# Patient Record
Sex: MALE | Race: ASIAN | NOT HISPANIC OR LATINO | Employment: OTHER | URBAN - METROPOLITAN AREA
[De-identification: names, ages, dates, MRNs, and addresses within clinical notes are randomized per-mention and may not be internally consistent; named-entity substitution may affect disease eponyms.]

---

## 2017-04-24 ENCOUNTER — HISTORICAL (OUTPATIENT)
Dept: ADMINISTRATIVE | Facility: HOSPITAL | Age: 62
End: 2017-04-24

## 2017-11-20 ENCOUNTER — HISTORICAL (OUTPATIENT)
Dept: ADMINISTRATIVE | Facility: HOSPITAL | Age: 62
End: 2017-11-20

## 2017-11-20 LAB
ALBUMIN SERPL-MCNC: 3.7 GM/DL (ref 3.4–5)
ALBUMIN/GLOB SERPL: 1 RATIO (ref 1.1–2)
ALP SERPL-CCNC: 74 UNIT/L (ref 50–136)
ALT SERPL-CCNC: 38 UNIT/L (ref 12–78)
AST SERPL-CCNC: 20 UNIT/L (ref 15–37)
BILIRUB SERPL-MCNC: 0.5 MG/DL (ref 0.2–1)
BILIRUBIN DIRECT+TOT PNL SERPL-MCNC: 0.1 MG/DL (ref 0–0.5)
BILIRUBIN DIRECT+TOT PNL SERPL-MCNC: 0.4 MG/DL (ref 0–0.8)
BUN SERPL-MCNC: 15 MG/DL (ref 7–18)
CALCIUM SERPL-MCNC: 8.8 MG/DL (ref 8.5–10.1)
CHLORIDE SERPL-SCNC: 104 MMOL/L (ref 98–107)
CHOLEST SERPL-MCNC: 179 MG/DL (ref 0–200)
CHOLEST/HDLC SERPL: 2.9 {RATIO} (ref 0–5)
CO2 SERPL-SCNC: 27 MMOL/L (ref 21–32)
CREAT SERPL-MCNC: 0.86 MG/DL (ref 0.7–1.3)
CREAT UR-MCNC: 285 MG/DL
GLOBULIN SER-MCNC: 3.7 GM/DL (ref 2.4–3.5)
GLUCOSE SERPL-MCNC: 106 MG/DL (ref 74–106)
HDLC SERPL-MCNC: 62 MG/DL (ref 35–60)
LDLC SERPL CALC-MCNC: 104 MG/DL (ref 0–129)
MICROALBUMIN UR-MCNC: 16.3 MG/DL
MICROALBUMIN/CREAT RATIO PNL UR: 57.2 MG/GM CR (ref 0–30)
POTASSIUM SERPL-SCNC: 4.4 MMOL/L (ref 3.5–5.1)
PROT SERPL-MCNC: 7.4 GM/DL (ref 6.4–8.2)
PSA SERPL-MCNC: 0.39 NG/ML (ref 0–4)
SODIUM SERPL-SCNC: 138 MMOL/L (ref 136–145)
TRIGL SERPL-MCNC: 67 MG/DL (ref 30–150)
VLDLC SERPL CALC-MCNC: 13 MG/DL

## 2019-12-11 ENCOUNTER — PATIENT OUTREACH (OUTPATIENT)
Dept: ADMINISTRATIVE | Facility: HOSPITAL | Age: 64
End: 2019-12-11

## 2019-12-11 ENCOUNTER — OFFICE VISIT (OUTPATIENT)
Dept: INTERNAL MEDICINE | Facility: CLINIC | Age: 64
End: 2019-12-11
Payer: MEDICAID

## 2019-12-11 VITALS
HEART RATE: 56 BPM | BODY MASS INDEX: 23.78 KG/M2 | HEIGHT: 64 IN | DIASTOLIC BLOOD PRESSURE: 76 MMHG | WEIGHT: 139.31 LBS | OXYGEN SATURATION: 99 % | TEMPERATURE: 97 F | SYSTOLIC BLOOD PRESSURE: 125 MMHG

## 2019-12-11 DIAGNOSIS — M79.10 MYALGIA: ICD-10-CM

## 2019-12-11 DIAGNOSIS — E78.5 HYPERLIPIDEMIA, UNSPECIFIED HYPERLIPIDEMIA TYPE: ICD-10-CM

## 2019-12-11 DIAGNOSIS — R74.8 ELEVATED CPK: ICD-10-CM

## 2019-12-11 DIAGNOSIS — I10 HYPERTENSION, ESSENTIAL: Primary | ICD-10-CM

## 2019-12-11 DIAGNOSIS — Z11.59 NEED FOR HEPATITIS C SCREENING TEST: ICD-10-CM

## 2019-12-11 DIAGNOSIS — Z23 IMMUNIZATION DUE: ICD-10-CM

## 2019-12-11 DIAGNOSIS — R06.83 SNORES: ICD-10-CM

## 2019-12-11 DIAGNOSIS — R53.83 FATIGUE, UNSPECIFIED TYPE: ICD-10-CM

## 2019-12-11 DIAGNOSIS — G47.19 EXCESSIVE DAYTIME SLEEPINESS: ICD-10-CM

## 2019-12-11 DIAGNOSIS — Z12.5 SCREENING FOR PROSTATE CANCER: ICD-10-CM

## 2019-12-11 LAB
ALBUMIN SERPL BCP-MCNC: 3.8 G/DL (ref 3.5–5.2)
ALP SERPL-CCNC: 87 U/L (ref 55–135)
ALT SERPL W/O P-5'-P-CCNC: 41 U/L (ref 10–44)
ANION GAP SERPL CALC-SCNC: 7 MMOL/L (ref 8–16)
AST SERPL-CCNC: 33 U/L (ref 10–40)
BASOPHILS # BLD AUTO: 0.1 K/UL (ref 0–0.2)
BASOPHILS NFR BLD: 1.5 % (ref 0–1.9)
BILIRUB SERPL-MCNC: 0.4 MG/DL (ref 0.1–1)
BUN SERPL-MCNC: 20 MG/DL (ref 8–23)
CALCIUM SERPL-MCNC: 9.1 MG/DL (ref 8.7–10.5)
CHLORIDE SERPL-SCNC: 107 MMOL/L (ref 95–110)
CK SERPL-CCNC: 308 U/L (ref 20–200)
CO2 SERPL-SCNC: 25 MMOL/L (ref 23–29)
CREAT SERPL-MCNC: 1.3 MG/DL (ref 0.5–1.4)
CRP SERPL-MCNC: 0.7 MG/L (ref 0–8.2)
DIFFERENTIAL METHOD: ABNORMAL
EOSINOPHIL # BLD AUTO: 0.2 K/UL (ref 0–0.5)
EOSINOPHIL NFR BLD: 2.3 % (ref 0–8)
ERYTHROCYTE [DISTWIDTH] IN BLOOD BY AUTOMATED COUNT: 13.8 % (ref 11.5–14.5)
ERYTHROCYTE [SEDIMENTATION RATE] IN BLOOD BY WESTERGREN METHOD: 28 MM/HR (ref 0–23)
EST. GFR  (AFRICAN AMERICAN): >60 ML/MIN/1.73 M^2
EST. GFR  (NON AFRICAN AMERICAN): 58.5 ML/MIN/1.73 M^2
GLUCOSE SERPL-MCNC: 111 MG/DL (ref 70–110)
HCT VFR BLD AUTO: 43.2 % (ref 40–54)
HGB BLD-MCNC: 13.4 G/DL (ref 14–18)
IMM GRANULOCYTES # BLD AUTO: 0.02 K/UL (ref 0–0.04)
IMM GRANULOCYTES NFR BLD AUTO: 0.3 % (ref 0–0.5)
LYMPHOCYTES # BLD AUTO: 1.7 K/UL (ref 1–4.8)
LYMPHOCYTES NFR BLD: 24.6 % (ref 18–48)
MCH RBC QN AUTO: 29.4 PG (ref 27–31)
MCHC RBC AUTO-ENTMCNC: 31 G/DL (ref 32–36)
MCV RBC AUTO: 95 FL (ref 82–98)
MONOCYTES # BLD AUTO: 0.5 K/UL (ref 0.3–1)
MONOCYTES NFR BLD: 7.8 % (ref 4–15)
NEUTROPHILS # BLD AUTO: 4.3 K/UL (ref 1.8–7.7)
NEUTROPHILS NFR BLD: 63.5 % (ref 38–73)
NRBC BLD-RTO: 0 /100 WBC
PLATELET # BLD AUTO: 301 K/UL (ref 150–350)
PMV BLD AUTO: 11.8 FL (ref 9.2–12.9)
POTASSIUM SERPL-SCNC: 4.7 MMOL/L (ref 3.5–5.1)
PROT SERPL-MCNC: 7.4 G/DL (ref 6–8.4)
RBC # BLD AUTO: 4.56 M/UL (ref 4.6–6.2)
SODIUM SERPL-SCNC: 139 MMOL/L (ref 136–145)
TSH SERPL DL<=0.005 MIU/L-ACNC: 0.57 UIU/ML (ref 0.4–4)
WBC # BLD AUTO: 6.82 K/UL (ref 3.9–12.7)

## 2019-12-11 PROCEDURE — 82550 ASSAY OF CK (CPK): CPT

## 2019-12-11 PROCEDURE — 99204 OFFICE O/P NEW MOD 45 MIN: CPT | Mod: S$PBB,,, | Performed by: FAMILY MEDICINE

## 2019-12-11 PROCEDURE — 85025 COMPLETE CBC W/AUTO DIFF WBC: CPT

## 2019-12-11 PROCEDURE — 86140 C-REACTIVE PROTEIN: CPT

## 2019-12-11 PROCEDURE — 84153 ASSAY OF PSA TOTAL: CPT

## 2019-12-11 PROCEDURE — 84443 ASSAY THYROID STIM HORMONE: CPT

## 2019-12-11 PROCEDURE — 99204 OFFICE O/P NEW MOD 45 MIN: CPT | Mod: PBBFAC,PO,25 | Performed by: FAMILY MEDICINE

## 2019-12-11 PROCEDURE — 99204 PR OFFICE/OUTPT VISIT, NEW, LEVL IV, 45-59 MIN: ICD-10-PCS | Mod: S$PBB,,, | Performed by: FAMILY MEDICINE

## 2019-12-11 PROCEDURE — 86803 HEPATITIS C AB TEST: CPT

## 2019-12-11 PROCEDURE — 99999 PR PBB SHADOW E&M-NEW PATIENT-LVL IV: CPT | Mod: PBBFAC,,, | Performed by: FAMILY MEDICINE

## 2019-12-11 PROCEDURE — 85652 RBC SED RATE AUTOMATED: CPT

## 2019-12-11 PROCEDURE — 99999 PR PBB SHADOW E&M-NEW PATIENT-LVL IV: ICD-10-PCS | Mod: PBBFAC,,, | Performed by: FAMILY MEDICINE

## 2019-12-11 PROCEDURE — 90471 IMMUNIZATION ADMIN: CPT | Mod: PBBFAC,PO

## 2019-12-11 PROCEDURE — 80053 COMPREHEN METABOLIC PANEL: CPT

## 2019-12-11 RX ORDER — NAPROXEN SODIUM 220 MG
220 TABLET ORAL
COMMUNITY
End: 2020-09-15 | Stop reason: ALTCHOICE

## 2019-12-11 RX ORDER — CLONIDINE HYDROCHLORIDE 0.2 MG/1
0.2 TABLET ORAL 2 TIMES DAILY
COMMUNITY
End: 2021-03-05 | Stop reason: SDUPTHER

## 2019-12-11 RX ORDER — BENAZEPRIL HYDROCHLORIDE 40 MG/1
40 TABLET ORAL DAILY
COMMUNITY
End: 2021-03-05 | Stop reason: SDUPTHER

## 2019-12-11 RX ORDER — AMLODIPINE BESYLATE 10 MG/1
10 TABLET ORAL DAILY
COMMUNITY
End: 2021-03-05 | Stop reason: SDUPTHER

## 2019-12-11 RX ORDER — SIMVASTATIN 40 MG/1
40 TABLET, FILM COATED ORAL NIGHTLY
COMMUNITY
End: 2020-09-15 | Stop reason: SINTOL

## 2019-12-11 NOTE — PROGRESS NOTES
Subjective:       Patient ID: Deborah White is a 64 y.o. male.    Chief Complaint: Annual Exam and Generalized Body Aches    He is here is a new patient.  I see his wife.  He has been treated for hypertension and hyperlipidemia.  He has myalgias and arthralgias.  Taking Aleve p.r.n..  Blood pressure controlled in office at 125/76.  Health maintenance, c/o all over body pain. Has RUE knots that are very painful.   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  When he was working for himself he could sit down during the course of his day as .  Now he is on his feet all day.    He intends to continue to see his doctor in [place], [place].  His daughter wants him to also have a doctor here he can refer to when visiting her.    S/he has completed a full 14 system review. All items are negative except as indicated.      Review of Systems   Constitutional: Positive for appetite change and unexpected weight change. Negative for activity change.   HENT: Negative.  Negative for hearing loss, rhinorrhea and trouble swallowing.    Eyes: Positive for visual disturbance. Negative for discharge.   Respiratory: Negative.  Negative for chest tightness and wheezing.    Cardiovascular: Negative.  Negative for chest pain and palpitations.   Gastrointestinal: Positive for abdominal pain and constipation. Negative for blood in stool, diarrhea and vomiting.   Endocrine: Negative.  Negative for polydipsia and polyuria.   Genitourinary: Negative.  Negative for difficulty urinating, hematuria and urgency.   Musculoskeletal: Positive for arthralgias, back pain and myalgias. Negative for joint swelling and neck pain.   Skin: Negative.    Allergic/Immunologic: Negative.    Neurological: Positive for headaches. Negative for weakness.   Hematological: Negative.    Psychiatric/Behavioral: Positive for sleep  disturbance (awakens after 4 hours - takes 1 1/2 hour nap daily. falls asleep any time he is sitting down.). Negative for confusion and dysphoric mood.       Objective:      Physical Exam   Constitutional: He is oriented to person, place, and time. He appears well-developed and well-nourished.   HENT:   Head: Normocephalic and atraumatic.   Right Ear: Tympanic membrane, external ear and ear canal normal.   Left Ear: Tympanic membrane, external ear and ear canal normal.   Nose: Nose normal.   Mouth/Throat: Oropharynx is clear and moist.   Eyes: Conjunctivae and EOM are normal.   Neck: Normal range of motion. Neck supple. No thyromegaly present.   Cardiovascular: Normal rate, regular rhythm and normal heart sounds.   Pulmonary/Chest: Effort normal and breath sounds normal.   Abdominal: Soft. He exhibits no distension. There is no tenderness.   Musculoskeletal: Normal range of motion. He exhibits no edema.   Lymphadenopathy:     He has no cervical adenopathy.   Neurological: He is alert and oriented to person, place, and time.   Skin: Skin is warm and dry.   Psychiatric: He has a normal mood and affect. His behavior is normal.         Assessment/Plan:     1. Hypertension, essential  Comprehensive metabolic panel    CBC auto differential   2. Hyperlipidemia, unspecified hyperlipidemia type     3. Immunization due  Pneumococcal Polysaccharide Vaccine (23 Valent) (SQ/IM)   4. Myalgia  Sedimentation rate    C-reactive protein    CK    CK isoenzymes   5. Screening for prostate cancer  PSA, Screening   6. Need for hepatitis C screening test  Hepatitis C antibody   7. Fatigue, unspecified type  TSH    Ambulatory referral to Sleep Disorders   8. Excessive daytime sleepiness  Ambulatory referral to Sleep Disorders   9. Snores  Ambulatory referral to Sleep Disorders   10. Elevated CPK  CK isoenzymes   MARIA G signed for lab from Dr Didier Maldonado in [place], [place] and for colonoscopy from [facility].  Myalgia - likely from exertion,  but discussed possiblity of statin related myalgias. He would discuss cessation of statin with his cardiologist.   He.does need eval for JESUS - although normal BMI, he does snore and has daytime sleepiness, narrow post oropharynx.  Advised on safety of tylenol use versus NSAID.

## 2019-12-12 LAB
COMPLEXED PSA SERPL-MCNC: 0.31 NG/ML (ref 0–4)
HCV AB SERPL QL IA: NEGATIVE

## 2019-12-13 ENCOUNTER — PATIENT MESSAGE (OUTPATIENT)
Dept: PULMONOLOGY | Facility: CLINIC | Age: 64
End: 2019-12-13

## 2019-12-15 ENCOUNTER — TELEPHONE (OUTPATIENT)
Dept: INTERNAL MEDICINE | Facility: CLINIC | Age: 64
End: 2019-12-15

## 2019-12-16 NOTE — TELEPHONE ENCOUNTER
Daughter informed the blood test result and additional lab ordered. Patient appointment scheduled for lab. Daughter verbally understood.

## 2019-12-19 ENCOUNTER — PATIENT OUTREACH (OUTPATIENT)
Dept: ADMINISTRATIVE | Facility: HOSPITAL | Age: 64
End: 2019-12-19

## 2020-01-06 ENCOUNTER — TELEPHONE (OUTPATIENT)
Dept: INTERNAL MEDICINE | Facility: CLINIC | Age: 65
End: 2020-01-06

## 2020-01-06 NOTE — TELEPHONE ENCOUNTER
----- Message from Jesi Leonardo sent at 1/6/2020  9:53 AM CST -----  Contact: [employer] request  Message     Appointment Request From: Deborah White    With Provider: nurse visit    Preferred Date Range: 1/7/2020 - 1/7/2020    Preferred Times: Tuesday Afternoon    Reason for visit: blood work    Comments:  follow up blood work requested by Dr. Rios

## 2020-01-07 ENCOUNTER — CLINICAL SUPPORT (OUTPATIENT)
Dept: INTERNAL MEDICINE | Facility: CLINIC | Age: 65
End: 2020-01-07
Payer: MEDICAID

## 2020-01-07 DIAGNOSIS — M79.10 MYALGIA: ICD-10-CM

## 2020-01-07 DIAGNOSIS — R74.8 ELEVATED CPK: ICD-10-CM

## 2020-01-07 PROCEDURE — 82552 ASSAY OF CPK IN BLOOD: CPT

## 2020-01-10 LAB
CK BB CFR SERPL ELPH: 0 %
CK MB CFR SERPL ELPH: 0 % (ref 0–3.3)
CK MM CFR SERPL ELPH: 100 % (ref 96.7–100)
CK SERPL-CCNC: 207 U/L (ref 30–223)

## 2020-04-24 ENCOUNTER — PATIENT MESSAGE (OUTPATIENT)
Dept: INTERNAL MEDICINE | Facility: CLINIC | Age: 65
End: 2020-04-24

## 2020-06-10 ENCOUNTER — TELEPHONE (OUTPATIENT)
Dept: INTERNAL MEDICINE | Facility: CLINIC | Age: 65
End: 2020-06-10

## 2020-06-10 NOTE — TELEPHONE ENCOUNTER
Spoke with patient's daughter and informed her of patient's appt on 06/22/2020. She thanked me for calling

## 2020-06-22 ENCOUNTER — LAB VISIT (OUTPATIENT)
Dept: LAB | Facility: HOSPITAL | Age: 65
End: 2020-06-22
Attending: FAMILY MEDICINE
Payer: MEDICAID

## 2020-06-22 ENCOUNTER — OFFICE VISIT (OUTPATIENT)
Dept: INTERNAL MEDICINE | Facility: CLINIC | Age: 65
End: 2020-06-22
Payer: MEDICAID

## 2020-06-22 VITALS
BODY MASS INDEX: 24.17 KG/M2 | HEIGHT: 64 IN | DIASTOLIC BLOOD PRESSURE: 64 MMHG | WEIGHT: 141.56 LBS | OXYGEN SATURATION: 96 % | HEART RATE: 66 BPM | SYSTOLIC BLOOD PRESSURE: 130 MMHG | TEMPERATURE: 97 F

## 2020-06-22 DIAGNOSIS — R74.8 ELEVATED CPK: ICD-10-CM

## 2020-06-22 DIAGNOSIS — R79.82 ELEVATED C-REACTIVE PROTEIN (CRP): ICD-10-CM

## 2020-06-22 DIAGNOSIS — Z79.899 ON STATIN THERAPY: ICD-10-CM

## 2020-06-22 DIAGNOSIS — Z11.4 SCREENING FOR HIV WITHOUT PRESENCE OF RISK FACTORS: ICD-10-CM

## 2020-06-22 DIAGNOSIS — I10 HYPERTENSION, ESSENTIAL: ICD-10-CM

## 2020-06-22 DIAGNOSIS — E78.5 HYPERLIPIDEMIA, UNSPECIFIED HYPERLIPIDEMIA TYPE: ICD-10-CM

## 2020-06-22 DIAGNOSIS — M79.10 MYALGIA: Primary | ICD-10-CM

## 2020-06-22 DIAGNOSIS — R53.83 FATIGUE, UNSPECIFIED TYPE: ICD-10-CM

## 2020-06-22 DIAGNOSIS — R73.9 ELEVATED BLOOD SUGAR LEVEL: ICD-10-CM

## 2020-06-22 DIAGNOSIS — M79.10 MYALGIA: ICD-10-CM

## 2020-06-22 LAB
ANION GAP SERPL CALC-SCNC: 11 MMOL/L (ref 8–16)
BASOPHILS # BLD AUTO: 0.03 K/UL (ref 0–0.2)
BASOPHILS NFR BLD: 0.6 % (ref 0–1.9)
BUN SERPL-MCNC: 21 MG/DL (ref 8–23)
CALCIUM SERPL-MCNC: 9 MG/DL (ref 8.7–10.5)
CHLORIDE SERPL-SCNC: 111 MMOL/L (ref 95–110)
CO2 SERPL-SCNC: 21 MMOL/L (ref 23–29)
CREAT SERPL-MCNC: 1.2 MG/DL (ref 0.5–1.4)
CRP SERPL-MCNC: 20.1 MG/L (ref 0–8.2)
DIFFERENTIAL METHOD: ABNORMAL
EOSINOPHIL # BLD AUTO: 0 K/UL (ref 0–0.5)
EOSINOPHIL NFR BLD: 0.6 % (ref 0–8)
ERYTHROCYTE [DISTWIDTH] IN BLOOD BY AUTOMATED COUNT: 13.9 % (ref 11.5–14.5)
ERYTHROCYTE [SEDIMENTATION RATE] IN BLOOD BY WESTERGREN METHOD: 39 MM/HR (ref 0–10)
EST. GFR  (AFRICAN AMERICAN): >60 ML/MIN/1.73 M^2
EST. GFR  (NON AFRICAN AMERICAN): >60 ML/MIN/1.73 M^2
GLUCOSE SERPL-MCNC: 92 MG/DL (ref 70–110)
HCT VFR BLD AUTO: 40.8 % (ref 40–54)
HGB BLD-MCNC: 12.5 G/DL (ref 14–18)
IMM GRANULOCYTES # BLD AUTO: 0.02 K/UL (ref 0–0.04)
IMM GRANULOCYTES NFR BLD AUTO: 0.4 % (ref 0–0.5)
LYMPHOCYTES # BLD AUTO: 1.7 K/UL (ref 1–4.8)
LYMPHOCYTES NFR BLD: 31.7 % (ref 18–48)
MCH RBC QN AUTO: 29.1 PG (ref 27–31)
MCHC RBC AUTO-ENTMCNC: 30.6 G/DL (ref 32–36)
MCV RBC AUTO: 95 FL (ref 82–98)
MONOCYTES # BLD AUTO: 0.7 K/UL (ref 0.3–1)
MONOCYTES NFR BLD: 13.1 % (ref 4–15)
NEUTROPHILS # BLD AUTO: 2.9 K/UL (ref 1.8–7.7)
NEUTROPHILS NFR BLD: 53.6 % (ref 38–73)
NRBC BLD-RTO: 0 /100 WBC
PLATELET # BLD AUTO: 237 K/UL (ref 150–350)
PMV BLD AUTO: 11 FL (ref 9.2–12.9)
POTASSIUM SERPL-SCNC: 4.2 MMOL/L (ref 3.5–5.1)
RBC # BLD AUTO: 4.3 M/UL (ref 4.6–6.2)
SODIUM SERPL-SCNC: 143 MMOL/L (ref 136–145)
WBC # BLD AUTO: 5.42 K/UL (ref 3.9–12.7)

## 2020-06-22 PROCEDURE — 99214 OFFICE O/P EST MOD 30 MIN: CPT | Mod: S$PBB,,, | Performed by: FAMILY MEDICINE

## 2020-06-22 PROCEDURE — 99215 OFFICE O/P EST HI 40 MIN: CPT | Mod: PBBFAC | Performed by: FAMILY MEDICINE

## 2020-06-22 PROCEDURE — 86703 HIV-1/HIV-2 1 RESULT ANTBDY: CPT

## 2020-06-22 PROCEDURE — 85651 RBC SED RATE NONAUTOMATED: CPT

## 2020-06-22 PROCEDURE — 99999 PR PBB SHADOW E&M-EST. PATIENT-LVL V: ICD-10-PCS | Mod: PBBFAC,,, | Performed by: FAMILY MEDICINE

## 2020-06-22 PROCEDURE — 99999 PR PBB SHADOW E&M-EST. PATIENT-LVL V: CPT | Mod: PBBFAC,,, | Performed by: FAMILY MEDICINE

## 2020-06-22 PROCEDURE — 36415 COLL VENOUS BLD VENIPUNCTURE: CPT

## 2020-06-22 PROCEDURE — 80048 BASIC METABOLIC PNL TOTAL CA: CPT

## 2020-06-22 PROCEDURE — 99214 PR OFFICE/OUTPT VISIT, EST, LEVL IV, 30-39 MIN: ICD-10-PCS | Mod: S$PBB,,, | Performed by: FAMILY MEDICINE

## 2020-06-22 PROCEDURE — 82552 ASSAY OF CPK IN BLOOD: CPT

## 2020-06-22 PROCEDURE — 83036 HEMOGLOBIN GLYCOSYLATED A1C: CPT

## 2020-06-22 PROCEDURE — 85025 COMPLETE CBC W/AUTO DIFF WBC: CPT

## 2020-06-22 PROCEDURE — 86140 C-REACTIVE PROTEIN: CPT

## 2020-06-22 NOTE — PROGRESS NOTES
"Subjective:       Patient ID: Deborah White is a 65 y.o. male.    Chief Complaint: Muscle Pain (joint pain (all over body))    Patient is here for follow-up of abnormal lab CPK elevated in December, in context of muscle pain.  On repeat it was in normal range.  100% CK . Here with daughter as . He has continued to hurt - no change except worsening.  He has taken "Flanex", a brand name for 220 mg naproxen and that has helped.  Walks 30 min every day no pain.  Sleeping only 1-2 hours before pain wakes him. Pain is very bad when lying down - "inside pain - to upper/lower arms, upper/lower legs, not really the joints. Also upper back. Not hip/pelvic girdle. Pain to both sides lower ribs. Not to low back, no ab pain, has occas CP if works hard but saw cardiologist this year before covid-19 with Dr Randall Nelson - has been seeing him for yrs. Had angiogram 1998 - told him small vessels and was put on statin - does treadmill every two yrs.  Blood sugar was 111 slightly elevated on a nonfasting specimen.  Lipids and screening for HIV are not on the chart.    Today he is continuing to complain of joint pain, headaches, complaining of neck pain.    Review of Systems   Constitutional: Positive for activity change. Negative for unexpected weight change.   HENT: Negative for hearing loss, rhinorrhea and trouble swallowing.    Eyes: Negative for discharge and visual disturbance.   Respiratory: Negative for chest tightness and wheezing.    Cardiovascular: Negative for chest pain and palpitations.   Gastrointestinal: Negative for blood in stool, constipation, diarrhea and vomiting.   Endocrine: Negative for polydipsia and polyuria.   Genitourinary: Negative for difficulty urinating, hematuria and urgency.   Musculoskeletal: Positive for arthralgias and neck pain. Negative for joint swelling.   Neurological: Positive for headaches. Negative for weakness.   Psychiatric/Behavioral: Negative for confusion and dysphoric " mood.       Objective:      Physical Exam  Constitutional:       Appearance: He is well-developed.   HENT:      Head: Normocephalic and atraumatic.   Cardiovascular:      Rate and Rhythm: Normal rate and regular rhythm.      Heart sounds: Normal heart sounds.   Pulmonary:      Effort: Pulmonary effort is normal. No respiratory distress.   Musculoskeletal:      Comments: NTTP to B calf muscles   Skin:     General: Skin is warm and dry.   Neurological:      Mental Status: He is alert and oriented to person, place, and time.   Psychiatric:         Behavior: Behavior normal.           Assessment/Plan:     1. Myalgia  Sedimentation rate    C-Reactive Protein    Ambulatory referral/consult to Rheumatology   2. Elevated CPK  CK isoenzymes   3. Hypertension, essential  CBC auto differential    Basic metabolic panel   4. Hyperlipidemia, unspecified hyperlipidemia type     5. Screening for HIV without presence of risk factors  HIV 1/2 Ag/Ab (4th Gen)   6. Fatigue, unspecified type     7. Elevated blood sugar level  Hemoglobin A1C   8. On statin therapy     9. Elevated C-reactive protein (CRP)       Will refer rheum - await labs.  Advise stopping statin for a month - consult with your cardiologist before doing so.  Pt instructions: Try one aleve or flanex (220mg naprosyn) at bedtime. Stay on this dose if it helps. If not helpful, may increase to two at bedtime.  OK to take acetaminophen 650 mg up to two twice daily (no more than 4 in a day).  I recommend stopping your statin for one month - talk to your cardiologist first.    Addendum 6/28/2020:  CK remains in normal range and has decreased from the last check.  Sed rate has increased slightly, now 30.  CRP is now elevated.  Recommend referral to rheumatology - also still recommend stop the statin and monitor for symptom change.

## 2020-06-22 NOTE — PATIENT INSTRUCTIONS
Try one aleve or flanex (220mg naprosyn) at bedtime. Stay on this dose if it helps. If not helpful, may increase to two at bedtime.    OK to take acetaminophen 650 mg up to two twice daily (no more than 4 in a day).    I recommend stopping your statin for one month - talk to your cardiologist first.

## 2020-06-23 LAB
ESTIMATED AVG GLUCOSE: 120 MG/DL (ref 68–131)
HBA1C MFR BLD HPLC: 5.8 % (ref 4–5.6)
HIV 1+2 AB+HIV1 P24 AG SERPL QL IA: NEGATIVE

## 2020-06-24 ENCOUNTER — PATIENT MESSAGE (OUTPATIENT)
Dept: INTERNAL MEDICINE | Facility: CLINIC | Age: 65
End: 2020-06-24

## 2020-06-25 LAB
CK BB CFR SERPL ELPH: 0 %
CK MB CFR SERPL ELPH: 0 % (ref 0–3.3)
CK MM CFR SERPL ELPH: 100 % (ref 96.7–100)
CK SERPL-CCNC: 113 U/L (ref 30–223)

## 2020-06-28 ENCOUNTER — PATIENT MESSAGE (OUTPATIENT)
Dept: INTERNAL MEDICINE | Facility: CLINIC | Age: 65
End: 2020-06-28

## 2020-08-05 LAB
CHOLEST SERPL-MSCNC: 285 MG/DL (ref 0–200)
HDLC SERPL-MCNC: 56 MG/DL
LDLC SERPL CALC-MCNC: 202 MG/DL
TRIGL SERPL-MCNC: 133 MG/DL

## 2020-08-10 ENCOUNTER — PATIENT OUTREACH (OUTPATIENT)
Dept: ADMINISTRATIVE | Facility: OTHER | Age: 65
End: 2020-08-10

## 2020-08-10 ENCOUNTER — TELEPHONE (OUTPATIENT)
Dept: RHEUMATOLOGY | Facility: CLINIC | Age: 65
End: 2020-08-10

## 2020-08-10 NOTE — TELEPHONE ENCOUNTER
Spoke with daughter and confirmed appointment with Dr. Lopez for 8.11.20 at 4.15.    Aware of check in process due to covid 19.

## 2020-08-11 ENCOUNTER — OFFICE VISIT (OUTPATIENT)
Dept: RHEUMATOLOGY | Facility: CLINIC | Age: 65
End: 2020-08-11
Payer: MEDICAID

## 2020-08-11 ENCOUNTER — HOSPITAL ENCOUNTER (OUTPATIENT)
Dept: RADIOLOGY | Facility: HOSPITAL | Age: 65
Discharge: HOME | End: 2020-08-11
Attending: INTERNAL MEDICINE
Payer: MEDICAID

## 2020-08-11 VITALS
DIASTOLIC BLOOD PRESSURE: 87 MMHG | WEIGHT: 150.38 LBS | HEART RATE: 78 BPM | BODY MASS INDEX: 25.67 KG/M2 | HEIGHT: 64 IN | SYSTOLIC BLOOD PRESSURE: 178 MMHG

## 2020-08-11 DIAGNOSIS — M25.50 POLYARTHRALGIA: ICD-10-CM

## 2020-08-11 DIAGNOSIS — R70.0 ELEVATED SED RATE: ICD-10-CM

## 2020-08-11 DIAGNOSIS — M79.10 MYALGIA: ICD-10-CM

## 2020-08-11 DIAGNOSIS — R20.0 NUMBNESS AND TINGLING IN BOTH HANDS: ICD-10-CM

## 2020-08-11 DIAGNOSIS — R79.82 ELEVATED C-REACTIVE PROTEIN (CRP): ICD-10-CM

## 2020-08-11 DIAGNOSIS — R20.2 NUMBNESS AND TINGLING IN BOTH HANDS: ICD-10-CM

## 2020-08-11 DIAGNOSIS — M25.50 POLYARTHRALGIA: Primary | ICD-10-CM

## 2020-08-11 PROCEDURE — 77075 RADEX OSSEOUS SURVEY COMPL: CPT | Mod: 26,,, | Performed by: RADIOLOGY

## 2020-08-11 PROCEDURE — 73130 X-RAY EXAM OF HAND: CPT | Mod: TC,50

## 2020-08-11 PROCEDURE — 99205 OFFICE O/P NEW HI 60 MIN: CPT | Mod: S$PBB,,, | Performed by: INTERNAL MEDICINE

## 2020-08-11 PROCEDURE — 77075 RADEX OSSEOUS SURVEY COMPL: CPT | Mod: TC

## 2020-08-11 PROCEDURE — 73130 XR HAND COMPLETE 3 VIEWS BILATERAL: ICD-10-PCS | Mod: 26,50,, | Performed by: RADIOLOGY

## 2020-08-11 PROCEDURE — 99999 PR PBB SHADOW E&M-EST. PATIENT-LVL IV: CPT | Mod: PBBFAC,,, | Performed by: INTERNAL MEDICINE

## 2020-08-11 PROCEDURE — 77075 XR LONG BONE SURVEY: ICD-10-PCS | Mod: 26,,, | Performed by: RADIOLOGY

## 2020-08-11 PROCEDURE — 73130 X-RAY EXAM OF HAND: CPT | Mod: 26,50,, | Performed by: RADIOLOGY

## 2020-08-11 PROCEDURE — 72040 XR CERVICAL SPINE AP LATERAL: ICD-10-PCS | Mod: 26,,, | Performed by: RADIOLOGY

## 2020-08-11 PROCEDURE — 99214 OFFICE O/P EST MOD 30 MIN: CPT | Mod: PBBFAC,25 | Performed by: INTERNAL MEDICINE

## 2020-08-11 PROCEDURE — 72040 X-RAY EXAM NECK SPINE 2-3 VW: CPT | Mod: 26,,, | Performed by: RADIOLOGY

## 2020-08-11 PROCEDURE — 99205 PR OFFICE/OUTPT VISIT, NEW, LEVL V, 60-74 MIN: ICD-10-PCS | Mod: S$PBB,,, | Performed by: INTERNAL MEDICINE

## 2020-08-11 PROCEDURE — 99999 PR PBB SHADOW E&M-EST. PATIENT-LVL IV: ICD-10-PCS | Mod: PBBFAC,,, | Performed by: INTERNAL MEDICINE

## 2020-08-11 PROCEDURE — 72040 X-RAY EXAM NECK SPINE 2-3 VW: CPT | Mod: TC

## 2020-08-11 RX ORDER — GABAPENTIN 300 MG/1
300 CAPSULE ORAL 3 TIMES DAILY
Qty: 90 CAPSULE | Refills: 11 | Status: SHIPPED | OUTPATIENT
Start: 2020-08-11 | End: 2020-09-15

## 2020-08-11 NOTE — LETTER
August 13, 2020      Indigo Rios MD  [id number] [place] [address] [address] [place] [id number]           [facility]  [address] [facility]  [place] [address]  Phone: [phone]  Fax: [phone]          Patient: Deborah White   MR Number: [id number]   YOB: 1955   Date of Visit: 8/11/2020       Dear Dr. Indigo Rios:    Thank you for referring Deborah White to me for evaluation. Attached you will find relevant portions of my assessment and plan of care.    If you have questions, please do not hesitate to call me. I look forward to following Deborah White along with you.    Sincerely,    Kory Lopez MD    Enclosure  CC:  No Recipients    If you would like to receive this communication electronically, please contact [email or web address] or [phone] to request more information on [email or web address] Link access.    For providers and/or their staff who would like to refer a patient to [facility], please contact us through our one-stop-shop provider referral line, [facility], at [phone].    If you feel you have received this communication in error or would no longer like to receive these types of communications, please e-mail [email or web address]

## 2020-08-12 ENCOUNTER — TELEPHONE (OUTPATIENT)
Dept: PHYSICAL MEDICINE AND REHAB | Facility: CLINIC | Age: 65
End: 2020-08-12

## 2020-08-12 ENCOUNTER — PATIENT MESSAGE (OUTPATIENT)
Dept: RHEUMATOLOGY | Facility: CLINIC | Age: 65
End: 2020-08-12

## 2020-08-12 DIAGNOSIS — M48.12: Primary | ICD-10-CM

## 2020-08-12 DIAGNOSIS — M54.12 CERVICAL RADICULOPATHY: ICD-10-CM

## 2020-08-13 ENCOUNTER — TELEPHONE (OUTPATIENT)
Dept: PHYSICAL MEDICINE AND REHAB | Facility: CLINIC | Age: 65
End: 2020-08-13

## 2020-08-13 RX ORDER — PREDNISONE 5 MG/1
5 TABLET ORAL DAILY
Qty: 30 TABLET | Refills: 2 | Status: SHIPPED | OUTPATIENT
Start: 2020-08-13 | End: 2020-11-17

## 2020-08-13 NOTE — PROGRESS NOTES
RHEUMATOLOGY CLINIC INITIAL VISIT    Reason for referral:-  Referred for evaluation of myalgias.    Chief complaints:-   My muscles and joints hurt.    HPI:-  Deborah Cordova a 65 y.o. pleasant male comes in for an initial visit with above chief complaints.  He has been complaining of muscle pain without any significant weakness for the past several months.  He had mild muscle enzyme abnormality which resolved after stopping cholesterol medication.  But the muscle pain failed to resolve.  He reports some numbness tingling in his bilateral fingers and decreased  strength in the last several months.  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    Review of Systems   Constitutional: Negative for chills and fever.   HENT: Negative for congestion and sore throat.    Eyes: Negative for blurred vision and redness.   Respiratory: Negative for cough and shortness of breath.    Cardiovascular: Negative for chest pain and leg swelling.   Gastrointestinal: Negative for abdominal pain.   Genitourinary: Negative for dysuria.   Musculoskeletal: Positive for back pain, joint pain, myalgias and neck pain. Negative for falls.   Skin: Negative for rash.   Neurological: Negative for headaches.   Endo/Heme/Allergies: Does not bruise/bleed easily.   Psychiatric/Behavioral: Negative for memory loss. The patient does not have insomnia.        Past Medical History:   Diagnosis Date    Coronary artery disease     Hyperlipidemia     Hypertension        History reviewed. No pertinent surgical history.     Social History     Tobacco Use    Smoking status: Current Every Day Smoker     Types: Cigarettes    Smokeless tobacco: Current User   Substance Use Topics    Alcohol use: Never     Frequency: Monthly or less     Drinks per session: 1 or 2     Binge frequency: Never    Drug use: Not on file  "      Family History   Problem Relation Age of Onset    Cancer Mother        Review of patient's allergies indicates:  No Known Allergies    Vitals:    08/11/20 1605   BP: (!) 178/87   Pulse: 78   Weight: 68.2 kg (150 lb 5.7 oz)   Height: 5' 4" (1.626 m)   PainSc:   3   PainLoc: Arm       Physical Exam   Constitutional: He is oriented to person, place, and time and well-developed, well-nourished, and in no distress. No distress.   HENT:   Head: Normocephalic.   Mouth/Throat: Oropharynx is clear and moist.   Eyes: Pupils are equal, round, and reactive to light. Conjunctivae are normal.   Neck: Normal range of motion. Neck supple.   Cardiovascular: Normal rate and intact distal pulses.   Pulmonary/Chest: Effort normal. No respiratory distress.   Abdominal: Soft. There is no abdominal tenderness.   Musculoskeletal:      Comments: Tenderness present over proximal and distal muscle groups but no evidence of erythema, synovitis over joints or effusion over long joints.  Tenderness over cervical and lumbar spine present.  No SI joint tenderness.   Neurological: He is alert and oriented to person, place, and time. Gait normal.   Skin: Skin is warm. No rash noted. No erythema.   Psychiatric: Mood and affect normal.   Nursing note and vitals reviewed.      Labs:-  Elevated inflammatory markers    Radiographs:-  Independent visualization of images done.    Old and Outside medical records :-  Reviewed old and all outside medical records available in Care Everywhere.    Medication List with Changes/Refills   New Medications    GABAPENTIN (NEURONTIN) 300 MG CAPSULE    Take 1 capsule (300 mg total) by mouth 3 (three) times daily.    PREDNISONE (DELTASONE) 5 MG TABLET    Take 1 tablet (5 mg total) by mouth once daily.   Current Medications    AMLODIPINE (NORVASC) 10 MG TABLET    Take 10 mg by mouth once daily.    BENAZEPRIL (LOTENSIN) 40 MG TABLET    Take 40 mg by mouth once daily.    CLONIDINE (CATAPRES) 0.2 MG TABLET    Take 0.2 " mg by mouth 2 (two) times daily.    NAPROXEN SODIUM (ANAPROX) 220 MG TABLET    Take 220 mg by mouth every 12 (twelve) hours.    SIMVASTATIN (ZOCOR) 40 MG TABLET    Take 40 mg by mouth every evening.       Assessment/Plans:-  1. Polyarthralgia    2. Myalgia    3. Elevated sed rate    4. Elevated C-reactive protein (CRP)    5. Numbness and tingling in both hands    ·  Based on his history and physical presentation, his myalgias are more likely related to his radiculopathy symptoms from cervical and lumbar region rather than underlying myopathy.  He has more of a neuropathic pain.  · Check x-ray of cervical spine and sent for EMG to confirm radiculopathy.  · Not sure what is causing his inflammatory marker elevation.  His examination failed to show any evidence of rheumatoid arthritis.  Possible that he might have underlying CPPD arthropathy since his MCP joint shows evidence of osteoarthritis on exam.  Check hand x-rays to confirm that.  Repeat inflammatory markers today.  · Check serologies for underlying inflammatory connective tissue disease.  · Will try gabapentin for radiculopathy and low dose prednisone for possible chronic CPPD arthropathy.     Problem List Items Addressed This Visit     None      Visit Diagnoses     Polyarthralgia    -  Primary    Relevant Medications    predniSONE (DELTASONE) 5 MG tablet    Other Relevant Orders    Rheumatoid factor (Completed)    C-Reactive Protein (Completed)    CARMEN Screen w/Reflex    Cyclic Citrullinated Peptide Antibody, IgG (Completed)    XR Long Bone Survey (Completed)    X-Ray Hand 3 View Bilateral (Completed)    EMG W/ ULTRASOUND AND NERVE CONDUCTION TEST 2 Extremities    Myalgia        Relevant Orders    Aldolase    HMGCR (HMG Coenzyme A Reductase) Ab    EMG W/ ULTRASOUND AND NERVE CONDUCTION TEST 2 Extremities    Elevated sed rate        Relevant Medications    predniSONE (DELTASONE) 5 MG tablet    Other Relevant Orders    Rheumatoid factor (Completed)    C-Reactive  Protein (Completed)    CARMEN Screen w/Reflex    Cyclic Citrullinated Peptide Antibody, IgG (Completed)    XR Long Bone Survey (Completed)    Elevated C-reactive protein (CRP)        Relevant Medications    predniSONE (DELTASONE) 5 MG tablet    Other Relevant Orders    Rheumatoid factor (Completed)    C-Reactive Protein (Completed)    CARMEN Screen w/Reflex    Cyclic Citrullinated Peptide Antibody, IgG (Completed)    XR Long Bone Survey (Completed)    Numbness and tingling in both hands        Relevant Medications    gabapentin (NEURONTIN) 300 MG capsule    Other Relevant Orders    EMG W/ ULTRASOUND AND NERVE CONDUCTION TEST 2 Extremities    X-Ray Cervical Spine AP And Lateral (Completed)    EMG W/ ULTRASOUND AND NERVE CONDUCTION TEST 2 Extremities          Follow up in about 4 weeks (around 9/8/2020).    Thank you for allowing me to participate in the care ofKODYcortes White.    Disclaimer: This note was prepared using voice recognition system and is likely to have sound alike errors and is not proof read.  Please call me with any questions.

## 2020-08-13 NOTE — TELEPHONE ENCOUNTER
----- Message from Lizeth Walters sent at 8/13/2020  8:32 AM CDT -----  Please call pt daughter Lea @ [phone] regarding appt on 8/31, pt would cathi to move appt to 8/28, pt have another appt the same day.

## 2020-08-18 ENCOUNTER — PATIENT MESSAGE (OUTPATIENT)
Dept: INTERNAL MEDICINE | Facility: CLINIC | Age: 65
End: 2020-08-18

## 2020-08-28 ENCOUNTER — OFFICE VISIT (OUTPATIENT)
Dept: PAIN MEDICINE | Facility: CLINIC | Age: 65
End: 2020-08-28
Payer: MEDICAID

## 2020-08-28 ENCOUNTER — OFFICE VISIT (OUTPATIENT)
Dept: PHYSICAL MEDICINE AND REHAB | Facility: CLINIC | Age: 65
End: 2020-08-28
Payer: MEDICAID

## 2020-08-28 VITALS
WEIGHT: 148 LBS | HEART RATE: 59 BPM | SYSTOLIC BLOOD PRESSURE: 162 MMHG | RESPIRATION RATE: 14 BRPM | BODY MASS INDEX: 25.27 KG/M2 | DIASTOLIC BLOOD PRESSURE: 90 MMHG | HEIGHT: 64 IN

## 2020-08-28 VITALS
HEIGHT: 64 IN | WEIGHT: 148.38 LBS | SYSTOLIC BLOOD PRESSURE: 145 MMHG | DIASTOLIC BLOOD PRESSURE: 85 MMHG | BODY MASS INDEX: 25.33 KG/M2 | HEART RATE: 58 BPM

## 2020-08-28 DIAGNOSIS — M48.12: ICD-10-CM

## 2020-08-28 DIAGNOSIS — M47.812 CERVICAL SPONDYLOSIS: ICD-10-CM

## 2020-08-28 DIAGNOSIS — M47.812 CERVICAL FACET JOINT SYNDROME: Primary | ICD-10-CM

## 2020-08-28 DIAGNOSIS — M54.12 CERVICAL RADICULOPATHY: ICD-10-CM

## 2020-08-28 DIAGNOSIS — G56.03 BILATERAL CARPAL TUNNEL SYNDROME: ICD-10-CM

## 2020-08-28 PROCEDURE — 99204 PR OFFICE/OUTPT VISIT, NEW, LEVL IV, 45-59 MIN: ICD-10-PCS | Mod: 25,S$PBB,, | Performed by: PHYSICAL MEDICINE & REHABILITATION

## 2020-08-28 PROCEDURE — 99213 OFFICE O/P EST LOW 20 MIN: CPT | Mod: PBBFAC,27 | Performed by: PHYSICAL MEDICINE & REHABILITATION

## 2020-08-28 PROCEDURE — 99204 PR OFFICE/OUTPT VISIT, NEW, LEVL IV, 45-59 MIN: ICD-10-PCS | Mod: S$PBB,,, | Performed by: ANESTHESIOLOGY

## 2020-08-28 PROCEDURE — 99999 PR PBB SHADOW E&M-EST. PATIENT-LVL IV: ICD-10-PCS | Mod: PBBFAC,,, | Performed by: ANESTHESIOLOGY

## 2020-08-28 PROCEDURE — 99204 OFFICE O/P NEW MOD 45 MIN: CPT | Mod: 25,S$PBB,, | Performed by: PHYSICAL MEDICINE & REHABILITATION

## 2020-08-28 PROCEDURE — 99999 PR PBB SHADOW E&M-EST. PATIENT-LVL IV: CPT | Mod: PBBFAC,,, | Performed by: ANESTHESIOLOGY

## 2020-08-28 PROCEDURE — 99204 OFFICE O/P NEW MOD 45 MIN: CPT | Mod: S$PBB,,, | Performed by: ANESTHESIOLOGY

## 2020-08-28 PROCEDURE — 95885 MUSC TST DONE W/NERV TST LIM: CPT | Mod: PBBFAC | Performed by: PHYSICAL MEDICINE & REHABILITATION

## 2020-08-28 PROCEDURE — 99214 OFFICE O/P EST MOD 30 MIN: CPT | Mod: PBBFAC | Performed by: ANESTHESIOLOGY

## 2020-08-28 PROCEDURE — 95885 MUSC TST DONE W/NERV TST LIM: CPT | Mod: 26,S$PBB,, | Performed by: PHYSICAL MEDICINE & REHABILITATION

## 2020-08-28 PROCEDURE — 99999 PR PBB SHADOW E&M-EST. PATIENT-LVL III: CPT | Mod: PBBFAC,,, | Performed by: PHYSICAL MEDICINE & REHABILITATION

## 2020-08-28 PROCEDURE — 99999 PR PBB SHADOW E&M-EST. PATIENT-LVL III: ICD-10-PCS | Mod: PBBFAC,,, | Performed by: PHYSICAL MEDICINE & REHABILITATION

## 2020-08-28 PROCEDURE — 95912 PR NERVE CONDUCTION STUDY; 11 -12 STUDIES: ICD-10-PCS | Mod: 26,S$PBB,, | Performed by: PHYSICAL MEDICINE & REHABILITATION

## 2020-08-28 PROCEDURE — 95912 NRV CNDJ TEST 11-12 STUDIES: CPT | Mod: 26,S$PBB,, | Performed by: PHYSICAL MEDICINE & REHABILITATION

## 2020-08-28 PROCEDURE — 95912 NRV CNDJ TEST 11-12 STUDIES: CPT | Mod: PBBFAC | Performed by: PHYSICAL MEDICINE & REHABILITATION

## 2020-08-28 PROCEDURE — 95885 PR MUSC TST DONE W/NERV TST LIM: ICD-10-PCS | Mod: 26,S$PBB,, | Performed by: PHYSICAL MEDICINE & REHABILITATION

## 2020-08-28 NOTE — PATIENT INSTRUCTIONS
Carpal Tunnel Syndrome Prevention Tips  Some repetitive hand activities put you at higher risk for carpal tunnel syndrome (CTS). But you can reduce your risk. Learn how to change the way you use your hands. Below are tips for at home and on the job. Be sure to also follow the hand and wrist safety policies at your workplace.      Keep your wrist in a neutral (straight) position when exercising.      Keep your wrist in neutral  Keep a neutral (straight) wrist position as often as you can. Dont use your wrist in a bent (flexed) position for long periods of time. This includes extended or twisted positions.  Watch your   Dont just use your thumb and index finger to grasp or lift. This can put stress on your wrist. When you can, use your whole hand and all its fingers to grasp an object.  Minimize repetition  Dont move your arms or hands or hold an object in the same way for long periods of time. Even simple, light tasks can cause injury this way. Instead, alternate tasks or switch hands.  Rest your hands  Give your hands a break from time to time with a rest. Even a few minutes once an hour can help.  Reduce speed and force  Slow down the speed in which you do a forceful, repetitive motion. This gives your wrist time to recover from the effort. Use power tools to help reduce the force.  Strengthen the muscles  Weak muscles may lead to a poor wrist or arm position. Exercises will make your hand and arm muscles stronger. This can help you keep a better position.  Date Last Reviewed: 9/11/2015 © 2000-2017 [employer]. [address] [place] [address], [place] [address]. All rights reserved. This information is not intended as a substitute for professional medical care. Always follow your healthcare professional's instructions.        Carpal Tunnel Syndrome    Carpal tunnel syndrome is a painful condition of the wrist and arm. It is caused by pressure on the median nerve.  The median nerve is one of the  nerves that give feeling and movement to the hand. It passes through a tunnel in the wrist called the carpal tunnel. This tunnel is made up of bones and ligaments. Narrowing of this tunnel or swelling of the tissues inside the tunnel puts pressure on the median nerve. This causes numbness, pins and needles, or electric shooting pains in your hand and forearm. Often the pain is worse at night and may wake you when you are asleep.  Carpal tunnel syndrome may occur during pregnancy and with use of birth control pills. It is more common in workers who must often bend their wrists. It is also common in people who work with power tools that cause strong vibrations.  Home care  · Rest the painful wrist. Avoid repeated bending of the wrist back and forth. This puts pressure on the median nerve. Avoid using power tools with strong vibrations.  · If you were given a splint, wear it at night while you sleep. You may also wear it during the day for comfort.  · Move your fingers and wrists often to avoid stiffness.  · Elevate your arms on pillows when you lie down.  · Try using the unaffected hand more.  · Try not to hold your wrists in a bent, downward position.  · Sometimes changes in the work place may ease symptoms. If you type most of the day, it may help to change the position of your keyboard or add a wrist support. Your wrist should be in a neutral position and not bent back when typing.  · You may use over-the-counter pain medicine to treat pain and inflammation, unless another medicine was prescribed. Anti-inflammatory pain medicines, such as ibuprofen or naproxen may be more effective than acetaminophen, which treats pain, but not inflammation. If you have chronic liver or kidney disease or ever had a stomach ulcer or GI bleeding, talk with your doctor before using these medicines.  · Opioid pain medicine will only give temporary relief and does not treat the problem. If pain continues, you may need a shot of a  steroid drug into your wrist.  · If the above methods fail, you may need surgery. This will open the carpal tunnel and release the pressure on the trapped nerve.  Follow-up care  Follow up with your healthcare provider, or as advised, if the pain doesnt begin to improve within the next week.  If X-rays were taken, you will be notified of any new findings that may affect your care.  When to seek medical advice  Call your healthcare provider right away if any of these occur:  · Pain not improving with the above treatment  · Fingers or hand become cold, blue, numb, or tingly  · Your whole arm becomes swollen or weak  Date Last Reviewed: 11/23/2015 © 2000-2017 [employer]. [address] [place] [address], [place] [address]. All rights reserved. This information is not intended as a substitute for professional medical care. Always follow your healthcare professional's instructions.        Understanding Carpal Tunnel Syndrome    The carpal tunnel is a narrow space inside the wrist. It is ringed by bone and a band of tough tissue called the transverse carpal ligament. A major nerve called the median nerve runs from the forearm into the hand through the carpal tunnel. Tendons also run through the carpal tunnel.  With carpal tunnel syndrome, the tendons or nearby tissues within the carpal tunnel may swell or thicken. Or the transverse carpal ligament may harden and shorten. This narrows the space in the carpal tunnel and puts pressure on the median nerve. This pressure leads to tingling and numbness of the hand and wrist. In time, the condition can make even simple tasks hard to do.  What causes carpal tunnel syndrome?  Doctors arent entirely clear why the condition occurs. Certain things may make a person more likely to have it. These include:  · Being female  · Being pregnant  · Being overweight  · Having diabetes or rheumatoid arthritis  Symptoms of carpal tunnel syndrome  Symptoms often come and go. At first,  symptoms may occur mainly at night. Later, they may be noticed during the day as well. They may get worse with activities such as driving, reading, typing, or holding a phone. Symptoms can include:  · Tingling and numbness in the hand or wrist  · Sharp pain that shoots up the arm or down to the fingers  · Hand stiffness or cramping, especially in the morning  · Trouble making a fist  · Hand weakness and clumsiness  Treatment for carpal tunnel syndrome  Certain treatments help reduce the pressure on the median nerve and relieve symptoms. Choices for treatment may include one or more of the following:  · Wrist splint. This involves wearing a special brace on the wrist and hand. The splint holds the wrist straight, in a neutral position. This helps keep the carpal tunnel as open as possible.  · Cortisone shots. Cortisone is a medicine that helps reduce swelling. It is injected directly into the wrist. It helps shrink tissues inside the carpal tunnel. This relieves symptoms for a time.  · Pain medicines. You may take over-the-counter or prescription medicines to help reduce swelling and relieve symptoms.  · Surgery. If the condition doesnt respond to other treatments and doesnt go away on its own, you may need surgery. During surgery, the surgeon cuts the transverse carpal ligament to relieve pressure on the median nerve.     When to call your healthcare provider  Call your healthcare provider right away if you have any of these:  · Fever of 100.4°F (38°C) or higher, or as directed  · Symptoms that dont get better, or get worse  · New symptoms   Date Last Reviewed: 3/10/2016  © 1249-2513 [employer]. [address] [place] [address], [place] [address]. All rights reserved. This information is not intended as a substitute for professional medical care. Always follow your healthcare professional's instructions.

## 2020-08-28 NOTE — LETTER
August 28, 2020      Kory Lopez MD  [address] [facility]  [place] [address]           [facility]  [address] [facility]  [place] [address]  Phone: [phone]  Fax: [phone]          Patient: Deborah White   MR Number: [id number]   YOB: 1955   Date of Visit: 8/28/2020       Dear Dr. Kory Lopez:    Thank you for referring Deborah White to me for evaluation. Attached you will find relevant portions of my assessment and plan of care.    If you have questions, please do not hesitate to call me. I look forward to following Deborah White along with you.    Sincerely,    Zuly Watson MD    Enclosure  CC:  No Recipients    If you would like to receive this communication electronically, please contact [email or web address] or [phone] to request more information on [employer] Link access.    For providers and/or their staff who would like to refer a patient to [facility], please contact us through our one-stop-shop provider referral line, [facility], at [phone].    If you feel you have received this communication in error or would no longer like to receive these types of communications, please e-mail [email or web address]

## 2020-08-28 NOTE — PROGRESS NOTES
[facility]   [address] [facility]  [place] [address]  Phone: [phone]        Full Name: brittnee doyle YOB: 1955  Patient ID: [id number]      Visit Date: 8/28/2020 13:31  Age: 65 Years 6 Months Old  Examining Physician: Zuly Watson M.D.  Referring Physician:   Reason for Referral: uex numbness      Chief Complaint   Patient presents with    Numbness     hands     HPI: This is a 65 y.o.  male being seen in clinic today for evaluation of chronic hand numbness/tingling with decreased  strength and achy joint pain-left worse than right.  His symptoms are worse with increased hand usage ( He has been a  at a [employer] for over 30+ years).  He has cervical DJD with DISH and mild cervical DDD.  Resting his hands/arms provides minimal relief.     History obtained from patient and daughter translating     Past family, medical, social, and surgical history reviewed in chart    Review of Systems:     General- denies lethargy, weight change, fever, chills  Head/neck- denies swallowing difficulties  ENT- denies hearing changes  Cardiovascular-denies chest pain  Pulmonary- denies shortness of breath  GI- denies constipation or bowel incontinence  - denies bladder incontinence  Skin- denies wounds or rashes  Musculoskeletal- +weakness, +pain  Neurologic- +numbness and tingling  Psychiatric- denies depressive or psychotic features, denies anxiety  Lymphatic-denies swelling  Endocrine- denies hypoglycemic symptoms/DM history  All other pertinent systems negative     Physical Examination:  General: Well developed, well nourished male, NAD, very pleasant  HEENT:NCAT EOMI bilaterally   Pulmonary:Normal respirations    Spinal Examination: CERVICAL  Active ROM is within normal limits.  Inspection: No deformity of spinal alignment.    Musculoskeletal Tests:  Phalen: neg  Elbow compression (ulnar): neg  Tinels at wrist: neg    Bilateral Upper and Lower  Extremities:  Pulses are 2+ at radial bilaterally.  Shoulder/Elbow/Wrist/Hand ROM wnl, ttp at jts, arthritic nodules at DIP, PIP  Hip/Knee/Ankle ROM   Bilateral Extremities show normal capillary refill.  No signs of cyanosis, rubor, edema, skin changes, or dysvascular changes of appendages.  Nails appear intact.    Neurological Exam:  Cranial Nerves:  II-XII grossly intact    Manual Muscle Testing: (Motor 5=normal)  5/5 strength bilateral upper extremities    No focal atrophy is noted of either upper extremity.    Bilateral Reflexes:1+bic tri br  Moss's response is absent bilaterally.    Sensation: tested to light touch  - intact in arms  Gait: Narrow base and good arm swing.      Entire procedure explained to patient prior to proceeding.  Verbal consent obtained      SNC      Nerve / Sites Rec. Site Onset Lat Peak Lat Amp Segments Distance Velocity     ms ms µV  mm m/s   R Median - Digit II (Antidromic)      Wrist Dig II 3.0 3.5 22.2 Wrist - Dig  46   L Median - Digit II (Antidromic)      Wrist Dig II 3.2 4.1 16.8 Wrist - Dig  44   R Ulnar - Digit V (Antidromic)      Wrist Dig V 2.5 3.3 15.5 Wrist - Dig V 140 56   L Ulnar - Digit V (Antidromic)      Wrist Dig V 2.8 3.4 19.5 Wrist - Dig V 140 51   R Radial - Anatomical snuff box (Forearm)      Forearm Wrist 1.4 2.2 13.3 Forearm - Wrist 100 71   L Radial - Anatomical snuff box (Forearm)      Forearm Wrist 2.0 2.4 18.5 Forearm - Wrist 100 51       CSI      Nerve / Sites Rec. Site Peak Lat NP Amp Segments Peak Diff     ms µV  ms   R Median - CSI      Median Thumb 3.4 9.3 Median - Radial 1.4      Radial Thumb 2.1 19.6 Median - Ulnar       CSI    CSI 1.4       MNC      Nerve / Sites Muscle Latency Amplitude Duration Rel Amp Segments Distance Lat Diff Velocity     ms mV ms %  mm ms m/s   R Median - APB      Wrist APB 3.8 9.9 5.7 100 Wrist - APB 80        Elbow APB 7.8 9.3 5.8 94.6 Elbow - Wrist 230 4.1 57   L Median - APB      Wrist APB 3.7 11.0 5.9 100  Wrist - APB 80        Elbow APB 7.9 9.9 6.0 90.1 Elbow - Wrist 210 4.2 50   R Ulnar - ADM      Wrist ADM 2.6 6.9 5.3 100 Wrist - ADM 80        B.Elbow ADM 6.1 6.6 5.4 96.1 B.Elbow - Wrist 200 3.5 56      A.Elbow ADM 8.5 6.4 5.6 96.4 A.Elbow - B.Elbow 120 2.3 51         A.Elbow - Wrist  5.9    L Ulnar - ADM      Wrist ADM 3.0 6.8 5.4 100 Wrist - ADM 80        B.Elbow ADM 6.7 6.8 7.1 99.4 B.Elbow - Wrist 210 3.7 57      A.Elbow ADM 8.5 7.0 7.8 103 A.Elbow - B.Elbow 95 1.9 51         A.Elbow - Wrist  5.6        EMG            EMG Summary Table     Spontaneous MUAP Recruitment   Muscle IA Fib PSW Fasc Other Dur. Dur Amp Dur Polys Pattern Effort   L. First dorsal interosseous N None None None .   N N N N Max   L. Pronator teres N None None None .   N N N N Max   L. Deltoid N None None None .   N N N N Max                                INTERPRETATION  -Bilateral median motor nerve conduction study showed normal latency, amplitude, and conduction velocity  -Bilateral median sensory nerve conduction study showed prolonged peak latency on the left and normal amplitude  -Bilateral ulnar motor nerve conduction study showed normal latency, amplitude, and conduction velocity  -Bilateral ulnar sensory nerve conduction study showed normal peak latency and amplitude  -Bilateral radial sensory nerve conduction study showed normal peak latency and amplitude  -Right combined sensory index was significant at 1.4 msec  -Needle EMG examination performed to above mentioned muscles     IMPRESSION  1. ABNORMAL study   2. There is electrodiagnostic evidence of a MILD demyelinating median neuropathy (Carpal tunnel syndrome) across BILATERAL wrists-worse on the LEFT.  There was no evidence of a cervical radiculopathy of the C5-T1 nerve roots or a myopathy.    PLAN  1. Follow up with referring provider: Dr. Kory Kang*  2. Handouts on CTS provided. Consider wrist braces and continue care for polyarthralgia   3. This study is good for  one year. If symptoms worsen or do not improve, please re-consult.    Zuly Watson M.D.  Physical Medicine and Rehab

## 2020-08-28 NOTE — LETTER
August 28, 2020      Kory Lopez MD  [address]  [place] [address]           [facility]  [address] [facility] [address]  Phone: [phone]  Fax: [phone]          Patient: Deborah White   MR Number: [id number]   YOB: 1955   Date of Visit: 8/28/2020       Dear Dr. Kory Lopez:    Thank you for referring Deborah White to me for evaluation. Attached you will find relevant portions of my assessment and plan of care.    If you have questions, please do not hesitate to call me. I look forward to following Deborah White along with you.    Sincerely,    Martín Barnes MD    Enclosure  CC:  No Recipients    If you would like to receive this communication electronically, please contact [email or web address] or [phone] to request more information on [employer] Link access.    For providers and/or their staff who would like to refer a patient to [facility], please contact us through our one-stop-shop provider referral line, [facility], at [phone].    If you feel you have received this communication in error or would no longer like to receive these types of communications, please e-mail [email or web address]

## 2020-08-28 NOTE — PROGRESS NOTES
Chief Pain Complaint:  Neck Pain        History of Present Illness:   Deborah White is a 65 y.o. male  who is presenting with a chief complaint of Neck Pain  . The patient began experiencing this problem insidiously, and the pain has been gradually worsening over the past 12 month(s). The pain is described as throbbing, cramping, aching and heavy and is located in the right cervical spine C3-4. Pain is intermittent and lasts hours. The pain radiates to bilateral upper extremities. Pain is 80% axial. The patient rates his pain a 8 out of ten and interferes with activities of daily living a 7 out of ten. Pain is exacerbated by rotation of the cervical spine, and is improved by rest. Patient reports no prior trauma, no prior spinal surgery     - pertinent negatives: No fever, No chills, No weight loss, No bladder dysfunction, No bowel dysfunction, No saddle anesthesia  - pertinent positives: none    - medications, other therapies tried (physical therapy, injections):     >> NSAIDs, Tylenol, gabapentin and medrol dose pack    >> Has NOT previously undergone Physical Therapy    >> Has NOT previously undergone spinal injection/s      Imaging / Labs / Studies (reviewed on 8/28/2020):    Results for orders placed during the hospital encounter of 08/11/20   X-Ray Cervical Spine AP And Lateral    Narrative EXAMINATION:  XR CERVICAL SPINE AP LATERAL    CLINICAL HISTORY:  neck pain; Anesthesia of skin    TECHNIQUE:  AP, lateral and open mouth views of the cervical spine were performed.    COMPARISON:  None.    FINDINGS:  The vertebral body heights are well maintained.  There is bridging ossification noted anteriorly at the C2-3, C3-4, and C4-5 levels with relative maintenance of the disc spaces.  Findings are most consistent with DISH.  Mild disc space narrowing and spondylosis present at the C6-7 level.  Multilevel bilateral facet arthropathy noted within the mid cervical spine greater on the right.  The C1-C2 articulation is  "within normal limits.      Impression As above      Electronically signed by: Rfai Larkin DO  Date:    08/12/2020  Time:    08:43       Review of Systems:  CONSTITUTIONAL: patient denies any fever, chills, or weight loss  SKIN: patient denies any rash or itching  RESPIRATORY: patient denies having any shortness of breath  GASTROINTESTINAL: patient denies having any diarrhea, constipation, or bowel incontinence  GENITOURINARY: patient denies having any abnormal bladder function    MUSCULOSKELETAL:  - patient complains of the above noted pain/s (see chief pain complaint)    NEUROLOGICAL:   - pain as above  - strength in Upper extremities is intact, BILATERALLY  - sensation in Upper extremities is intact, BILATERALLY  - patient denies any loss of bowel or bladder control      PSYCHIATRIC: patient denies any change in mood    Other:  All other systems reviewed and are negative      Physical Exam:  BP (!) 145/85   Pulse (!) 58   Ht 5' 4" (1.626 m)   Wt 67.3 kg (148 lb 5.9 oz)   BMI 25.47 kg/m²  (reviewed on 8/28/2020)  General: Alert and oriented, in no apparent distress.  Gait: normal gait.  Skin: No rashes, No discoloration, No obvious lesions  HEENT: Normocephalic, atraumatic. Pupils equal and round.  Cardiovascular: Regular rate and rhythm , no significant peripheral edema present  Respiratory: Without audible wheezing, without use of accessory muscles of respiration.    Musculoskeletal:    Cervical Spine    - Pain on flexion of cervical spine Absent  - Spurling's Test:  Absent    - Pain on extension of cervical spine Present  - TTP over the cervical facet joints Present Right C3-4   - Cervical facet loading Present  -TTP over bilateral trapezius       Lumbar Spine    - Pain on flexion of lumbar spine Absent  - Straight Leg Raise:  Absent    - Pain on extension of lumbar spine Absent  - TTP over the lumbar facet joints Absent  - Lumbar facet loading Absent    -Pain on palpation over the SI joint  Absent  - " DOROTHY: Absent      Neuro:    Strength:  UE R/L: D: 5/5; B: 5/5; T: 5/5; WF: 5/5; WE: 5/5; IO: 5/5;  LE R/L: HF: 5/5, HE: 5/5, KF: 5/5; KE: 5/5; FE: 5/5; FF: 5/5    Extremity Reflexes: Brisk and symmetric throughout.      Extremity Sensory: Sensation to pinprick and temperature symmetric. Proprioception intact.      Psych:  Mood and affect is appropriate      Assessment:    Deborah White is a 65 y.o. year old male who is presenting with   Encounter Diagnoses   Name Primary?    Ankylosing hyperostosis of cervical region     Cervical radiculopathy        Plan:    1. Interventional: Consider Right C 3, 4, 5 MBB with RN IV sedation    2. Pharmacologic: Increase Gabapentin from 300 mg PO TID to 300/300/600 then to 300/300/900 mg PO TID. Tylenol NSAIDs PRN.    3. Rehabilitative: External referal to PT.    4. Diagnostic: Cervical xray reviewed. Consider Cervical MRI if pain not relived with PT and MBB.     5. Follow up: 4 weeks.     20  minutes were spent in this encounter with more than 50% of the time used for counseling and review of the plan.  Imaging / studies reviewed, detailed above.  I discussed in detail the risks, benefits, and alternatives to any and all potential treatment options.  All questions and concerns were fully addressed today in clinic. Medical decision making moderate.    Thank you for the opportunity to assist in the care of this patient.    Best wishes,    Signed:    Martín Barnes MD          Disclaimer:  This note may have been prepared using voice recognition software, it may have not been extensively proofed, as such there could be errors within the text such as sound alike errors.

## 2020-08-29 DIAGNOSIS — Z12.11 SPECIAL SCREENING FOR MALIGNANT NEOPLASM OF COLON: Primary | ICD-10-CM

## 2020-08-31 ENCOUNTER — PATIENT OUTREACH (OUTPATIENT)
Dept: ADMINISTRATIVE | Facility: HOSPITAL | Age: 65
End: 2020-08-31

## 2020-09-15 ENCOUNTER — LAB VISIT (OUTPATIENT)
Dept: LAB | Facility: HOSPITAL | Age: 65
End: 2020-09-15
Attending: INTERNAL MEDICINE
Payer: MEDICAID

## 2020-09-15 ENCOUNTER — OFFICE VISIT (OUTPATIENT)
Dept: RHEUMATOLOGY | Facility: CLINIC | Age: 65
End: 2020-09-15
Payer: MEDICAID

## 2020-09-15 ENCOUNTER — OFFICE VISIT (OUTPATIENT)
Dept: INTERNAL MEDICINE | Facility: CLINIC | Age: 65
End: 2020-09-15
Payer: MEDICAID

## 2020-09-15 VITALS
WEIGHT: 148.56 LBS | DIASTOLIC BLOOD PRESSURE: 72 MMHG | OXYGEN SATURATION: 96 % | TEMPERATURE: 98 F | BODY MASS INDEX: 25.36 KG/M2 | HEIGHT: 64 IN | SYSTOLIC BLOOD PRESSURE: 156 MMHG | HEART RATE: 69 BPM

## 2020-09-15 VITALS
HEART RATE: 67 BPM | WEIGHT: 148.56 LBS | HEIGHT: 64 IN | BODY MASS INDEX: 25.36 KG/M2 | DIASTOLIC BLOOD PRESSURE: 79 MMHG | SYSTOLIC BLOOD PRESSURE: 159 MMHG

## 2020-09-15 DIAGNOSIS — Z23 FLU VACCINE NEED: ICD-10-CM

## 2020-09-15 DIAGNOSIS — M48.12: Primary | ICD-10-CM

## 2020-09-15 DIAGNOSIS — G56.03 BILATERAL CARPAL TUNNEL SYNDROME: ICD-10-CM

## 2020-09-15 DIAGNOSIS — E78.5 HYPERLIPIDEMIA, UNSPECIFIED HYPERLIPIDEMIA TYPE: ICD-10-CM

## 2020-09-15 DIAGNOSIS — R70.0 ELEVATED SED RATE: ICD-10-CM

## 2020-09-15 DIAGNOSIS — M48.12: ICD-10-CM

## 2020-09-15 DIAGNOSIS — M48.10 DISH (DIFFUSE IDIOPATHIC SKELETAL HYPEROSTOSIS): ICD-10-CM

## 2020-09-15 DIAGNOSIS — I10 HYPERTENSION, ESSENTIAL: Primary | ICD-10-CM

## 2020-09-15 LAB — ERYTHROCYTE [SEDIMENTATION RATE] IN BLOOD BY WESTERGREN METHOD: 13 MM/HR (ref 0–10)

## 2020-09-15 PROCEDURE — 99213 OFFICE O/P EST LOW 20 MIN: CPT | Mod: PBBFAC | Performed by: INTERNAL MEDICINE

## 2020-09-15 PROCEDURE — 99213 PR OFFICE/OUTPT VISIT, EST, LEVL III, 20-29 MIN: ICD-10-PCS | Mod: S$PBB,,, | Performed by: FAMILY MEDICINE

## 2020-09-15 PROCEDURE — 99214 PR OFFICE/OUTPT VISIT, EST, LEVL IV, 30-39 MIN: ICD-10-PCS | Mod: S$PBB,,, | Performed by: INTERNAL MEDICINE

## 2020-09-15 PROCEDURE — 81374 HLA I TYPING 1 ANTIGEN LR: CPT

## 2020-09-15 PROCEDURE — 99214 OFFICE O/P EST MOD 30 MIN: CPT | Mod: PBBFAC,27 | Performed by: FAMILY MEDICINE

## 2020-09-15 PROCEDURE — 99999 PR PBB SHADOW E&M-EST. PATIENT-LVL IV: CPT | Mod: PBBFAC,,, | Performed by: FAMILY MEDICINE

## 2020-09-15 PROCEDURE — 99214 OFFICE O/P EST MOD 30 MIN: CPT | Mod: S$PBB,,, | Performed by: INTERNAL MEDICINE

## 2020-09-15 PROCEDURE — 99999 PR PBB SHADOW E&M-EST. PATIENT-LVL IV: ICD-10-PCS | Mod: PBBFAC,,, | Performed by: FAMILY MEDICINE

## 2020-09-15 PROCEDURE — 99999 PR PBB SHADOW E&M-EST. PATIENT-LVL III: ICD-10-PCS | Mod: PBBFAC,,, | Performed by: INTERNAL MEDICINE

## 2020-09-15 PROCEDURE — 85651 RBC SED RATE NONAUTOMATED: CPT

## 2020-09-15 PROCEDURE — 99999 PR PBB SHADOW E&M-EST. PATIENT-LVL III: CPT | Mod: PBBFAC,,, | Performed by: INTERNAL MEDICINE

## 2020-09-15 PROCEDURE — 90471 IMMUNIZATION ADMIN: CPT | Mod: PBBFAC

## 2020-09-15 PROCEDURE — 99213 OFFICE O/P EST LOW 20 MIN: CPT | Mod: S$PBB,,, | Performed by: FAMILY MEDICINE

## 2020-09-15 PROCEDURE — 36415 COLL VENOUS BLD VENIPUNCTURE: CPT

## 2020-09-15 RX ORDER — GABAPENTIN 300 MG/1
300 CAPSULE ORAL NIGHTLY
Qty: 90 CAPSULE | Refills: 3 | Status: SHIPPED | OUTPATIENT
Start: 2020-09-15 | End: 2021-08-18

## 2020-09-15 RX ORDER — HYDROCHLOROTHIAZIDE 12.5 MG/1
12.5 TABLET ORAL DAILY
Qty: 90 TABLET | Refills: 1 | Status: SHIPPED | OUTPATIENT
Start: 2020-09-15 | End: 2021-01-20

## 2020-09-15 RX ORDER — EZETIMIBE 10 MG/1
10 TABLET ORAL DAILY
COMMUNITY
Start: 2020-09-01 | End: 2021-11-16 | Stop reason: SDUPTHER

## 2020-09-15 NOTE — PROGRESS NOTES
Subjective:       Patient ID: Deborah White is a 65 y.o. male.    Chief Complaint: Follow-up    Patient with hypertension, hyperlipidemia, chronic neck pain here for recheck.  Accompanied by his daughter.  recent visit with his cardiologist Dr Abimael Nelson with ECHO - no problem except small amount leak one valve. Placed on zetia by Dr Nelson - on it a week. Pt had taken the simvastatin about 10 days prior to his visit with  Lab Results       Component                Value               Date                       WBC                      5.42                06/22/2020                 HGB                      12.5 (L)            06/22/2020                 HCT                      40.8                06/22/2020                 PLT                      237                 06/22/2020                 CHOL                     285 (A)             08/05/2020                 TRIG                     133                 08/05/2020                 HDL                      56                  08/05/2020                 LDLCALC                  202                 08/05/2020                 ALT                      41                  12/11/2019                 AST                      33                  12/11/2019                 NA                       143                 06/22/2020                 K                        4.2                 06/22/2020                 CL                       111 (H)             06/22/2020                 CALCIUM                  9.0                 06/22/2020                 CREATININE               1.2                 06/22/2020                 BUN                      21                  06/22/2020                 CO2                      21 (L)              06/22/2020                 TSH                      0.572               12/11/2019                 PSA                      0.31                12/11/2019                 GLU                      92                  06/22/2020                  ESTGFRAFRICA             >60.0               06/22/2020                 EGFRNONAA                >60.0               06/22/2020                 HGBA1C                   5.8 (H)             06/22/2020            Lab Results       Component                Value               Date                       HGBA1C                   5.8 (H)             06/22/2020                BP Readings from Last 3 Encounters:  09/15/20 : (!) 156/72 repeat: 164/82  08/28/20 : (!) 162/90  08/28/20 : (!) 145/85  Uncontrolled - on his meter 135/80 this AM.  It was good at his cardiologist 10 days ago.  Hypertension Medications        amLODIPine (NORVASC) 10 MG tablet Take 10 mg by mouth once daily.    benazepril (LOTENSIN) 40 MG tablet Take 40 mg by mouth once daily.    cloNIDine (CATAPRES) 0.2 MG tablet Take 0.2 mg by mouth 2 (two) times daily.        Lab Results on Zetia       Component                Value               Date                       CHOL                     285 (A)             08/05/2020            Lab Results       Component                Value               Date                       HDL                      56                  08/05/2020            Lab Results       Component                Value               Date                       LDLCALC                  202                 08/05/2020            Lab Results       Component                Value               Date                       TRIG                     133                 08/05/2020              He is to start with PT next week first per his pain interventionist.    Review of Systems   Constitutional: Negative for activity change and unexpected weight change.   HENT: Negative for hearing loss, rhinorrhea and trouble swallowing.    Eyes: Negative for discharge and visual disturbance.   Respiratory: Negative for chest tightness and wheezing.    Cardiovascular: Negative for chest pain and palpitations.   Gastrointestinal: Negative for blood in stool,  constipation, diarrhea and vomiting.   Endocrine: Negative for polydipsia and polyuria.   Genitourinary: Negative for difficulty urinating, hematuria and urgency.   Musculoskeletal: Positive for neck pain. Negative for arthralgias and joint swelling.   Neurological: Negative for weakness and headaches.   Psychiatric/Behavioral: Negative for confusion and dysphoric mood.       Objective:      Physical Exam  Constitutional:       Appearance: Normal appearance. He is well-developed.   HENT:      Head: Normocephalic and atraumatic.   Cardiovascular:      Rate and Rhythm: Normal rate and regular rhythm.      Heart sounds: Normal heart sounds.   Pulmonary:      Effort: Pulmonary effort is normal.      Breath sounds: Normal breath sounds.   Musculoskeletal:      Right lower leg: No edema.      Left lower leg: No edema.   Skin:     General: Skin is warm and dry.   Neurological:      Mental Status: He is alert and oriented to person, place, and time.   Psychiatric:         Behavior: Behavior normal.           Assessment/Plan:     1. Hypertension, essential  hydroCHLOROthiazide (HYDRODIURIL) 12.5 MG Tab   2. Hyperlipidemia, unspecified hyperlipidemia type     3. Flu vaccine need  CANCELED: Influenza - Quadrivalent (PF)     Uncontrolled hypertension on our checks but controlled on his meter in at his cardiologist 10 days ago..  Add hydrochlorothiazide 12.5 and recheck 3 months

## 2020-09-15 NOTE — PROGRESS NOTES
RHEUMATOLOGY CLINIC FOLLOW UP VISIT  Chief complaints:-  To follow up for neck pain.  Diffuse body pain.    HPI:-  Deborah Cordova a 65 y.o. pleasant male comes in for a follow up visit.  He was recently seen in rheumatology clinic for diffuse body pain, joint pain and neck pain with numbness tingling in both hands.  Workup showed bilateral carpal tunnel, cervical hyperostosis-diffuse idiopathic skeletal hyperostosis and osteoarthritis.  He also had elevated inflammatory marker-ESR which was gradually resolving.  No symptoms to suspect polymyalgia rheumatica.  No symptoms of giant cell arteritis.  He reports significant improvement in pain especially the back pain and the radiculopathy symptoms when he takes gabapentin at night.  Physical therapy is scheduled by pain specialist before considering interventional therapy for his back.    Review of Systems   Constitutional: Negative for chills and fever.   HENT: Negative for congestion and sore throat.    Eyes: Negative for blurred vision and redness.   Respiratory: Negative for cough and shortness of breath.    Cardiovascular: Negative for chest pain and leg swelling.   Gastrointestinal: Negative for abdominal pain.   Genitourinary: Negative for dysuria.   Musculoskeletal: Positive for back pain, joint pain and neck pain. Negative for falls and myalgias.   Skin: Negative for rash.   Neurological: Negative for headaches.   Endo/Heme/Allergies: Does not bruise/bleed easily.   Psychiatric/Behavioral: Negative for memory loss. The patient does not have insomnia.        Past Medical History:   Diagnosis Date    Coronary artery disease     Hyperlipidemia     Hypertension        History reviewed. No pertinent surgical history.     Social History     Tobacco Use    Smoking status: Current Every Day Smoker     Types: Cigarettes    Smokeless tobacco: Current User   Substance Use Topics    Alcohol use: Never     Frequency:  "Monthly or less     Drinks per session: 1 or 2     Binge frequency: Never    Drug use: Not on file       Family History   Problem Relation Age of Onset    Cancer Mother        Review of patient's allergies indicates:  No Known Allergies    Vitals:    09/15/20 1656   BP: (!) 159/79   Pulse: 67   Weight: 67.4 kg (148 lb 9.4 oz)   Height: 5' 4" (1.626 m)   PainSc:   3   PainLoc: Generalized       Physical Exam   Constitutional: He is oriented to person, place, and time and well-developed, well-nourished, and in no distress. No distress.   HENT:   Head: Normocephalic.   Mouth/Throat: Oropharynx is clear and moist.   Eyes: Pupils are equal, round, and reactive to light. Conjunctivae are normal.   Neck: Normal range of motion. Neck supple.   Cardiovascular: Normal rate and intact distal pulses.   Pulmonary/Chest: Effort normal. No respiratory distress.   Abdominal: Soft. There is no abdominal tenderness.   Musculoskeletal:      Comments: Cervical tenderness present.  Mild tenderness around bilateral MCP joints.   Neurological: He is alert and oriented to person, place, and time. Gait normal.   Skin: Skin is warm. No rash noted. No erythema.   Psychiatric: Mood and affect normal.   Nursing note and vitals reviewed.      Medication List with Changes/Refills   Current Medications    AMLODIPINE (NORVASC) 10 MG TABLET    Take 10 mg by mouth once daily.    BENAZEPRIL (LOTENSIN) 40 MG TABLET    Take 40 mg by mouth once daily.    CLONIDINE (CATAPRES) 0.2 MG TABLET    Take 0.2 mg by mouth 2 (two) times daily.    EZETIMIBE (ZETIA) 10 MG TABLET    Take 10 mg by mouth once daily.    HYDROCHLOROTHIAZIDE (HYDRODIURIL) 12.5 MG TAB    Take 1 tablet (12.5 mg total) by mouth once daily.    PREDNISONE (DELTASONE) 5 MG TABLET    Take 1 tablet (5 mg total) by mouth once daily.   Changed and/or Refilled Medications    Modified Medication Previous Medication    GABAPENTIN (NEURONTIN) 300 MG CAPSULE gabapentin (NEURONTIN) 300 MG capsule       " Take 1 capsule (300 mg total) by mouth every evening.    Take 1 capsule (300 mg total) by mouth 3 (three) times daily.   Discontinued Medications    NAPROXEN SODIUM (ANAPROX) 220 MG TABLET    Take 220 mg by mouth every 12 (twelve) hours.       Assessment/Plans:-  1. Ankylosing hyperostosis of cervical region    2. Bilateral carpal tunnel syndrome    3. Elevated sed rate    4. DISH (diffuse idiopathic skeletal hyperostosis)    ·  Diffuse idiopathic skeletal hyperostosis primarily affecting the cervical region with mild radiculopathy and myelopathy symptoms improving on low-dose prednisone.  · Consulted pain specialist and was advised to do physical therapy before considering interventional pain therapy.  · Advised to follow up with physical therapy.  · No significant evidence to suspect underlying spondylitis other than hyperostosis.  But he had elevated inflammatory marker-ESR.  No polymyalgia rheumatica or small joint inflammation except mild pain around MCP joints.  Possible that he has mild underlying inflammatory arthritis but not sure at this time.  Repeat ESR today.  Continue low-dose prednisone.  Continue gabapentin.  Problem List Items Addressed This Visit     Ankylosing hyperostosis of cervical region - Primary    Relevant Orders    HLA B27 Antigen    Sedimentation rate    Bilateral carpal tunnel syndrome    Relevant Medications    gabapentin (NEURONTIN) 300 MG capsule    Elevated sed rate      Other Visit Diagnoses     DISH (diffuse idiopathic skeletal hyperostosis)            # Follow up in about 2 months (around 11/15/2020).      Disclaimer: This note was prepared using voice recognition system and is likely to have sound alike errors and is not proof read.  Please call me with any questions.

## 2020-09-21 PROBLEM — I10 HYPERTENSION, ESSENTIAL: Status: ACTIVE | Noted: 2020-09-21

## 2020-09-21 PROBLEM — E78.5 HYPERLIPIDEMIA: Status: ACTIVE | Noted: 2020-09-21

## 2020-09-23 LAB
HLA B27 INTERPRETATION: NORMAL
HLA-B27 RELATED AG QL: NORMAL
HLA-B27 RELATED AG QL: POSITIVE

## 2020-09-24 ENCOUNTER — PATIENT MESSAGE (OUTPATIENT)
Dept: RHEUMATOLOGY | Facility: CLINIC | Age: 65
End: 2020-09-24

## 2020-09-24 ENCOUNTER — TELEPHONE (OUTPATIENT)
Dept: RHEUMATOLOGY | Facility: CLINIC | Age: 65
End: 2020-09-24

## 2020-09-24 DIAGNOSIS — M45.0 ANKYLOSING SPONDYLITIS OF MULTIPLE SITES IN SPINE: Primary | ICD-10-CM

## 2020-09-24 NOTE — TELEPHONE ENCOUNTER
Positive HLAB27. I might start him on humira. Please schedule lab appointment for the tests ordered today and I will call once the results are available.   Thanks.

## 2020-09-24 NOTE — TELEPHONE ENCOUNTER
----- Message from Dominique Bean sent at 9/24/2020  2:45 PM CDT -----  Contact: Lea-mushtaq  Type:  Patient Returning Call    Who Called:Lea-daughter  Who Left Message for Patient:  Does the patient know what this is regarding?:  Would the patient rather a call back or a response via [facility]? Call  Best Call Back Number: [phone]  Additional Information:

## 2020-09-25 ENCOUNTER — HISTORICAL (OUTPATIENT)
Dept: PHYSICAL THERAPY | Facility: HOSPITAL | Age: 65
End: 2020-09-25

## 2020-09-29 ENCOUNTER — HISTORICAL (OUTPATIENT)
Dept: PHYSICAL THERAPY | Facility: HOSPITAL | Age: 65
End: 2020-09-29

## 2020-10-02 ENCOUNTER — HISTORICAL (OUTPATIENT)
Dept: PHYSICAL THERAPY | Facility: HOSPITAL | Age: 65
End: 2020-10-02

## 2020-10-06 ENCOUNTER — HISTORICAL (OUTPATIENT)
Dept: PHYSICAL THERAPY | Facility: HOSPITAL | Age: 65
End: 2020-10-06

## 2020-10-13 ENCOUNTER — HISTORICAL (OUTPATIENT)
Dept: PHYSICAL THERAPY | Facility: HOSPITAL | Age: 65
End: 2020-10-13

## 2020-10-16 ENCOUNTER — HISTORICAL (OUTPATIENT)
Dept: PHYSICAL THERAPY | Facility: HOSPITAL | Age: 65
End: 2020-10-16

## 2020-10-18 ENCOUNTER — PATIENT MESSAGE (OUTPATIENT)
Dept: INTERNAL MEDICINE | Facility: CLINIC | Age: 65
End: 2020-10-18

## 2020-10-20 ENCOUNTER — HISTORICAL (OUTPATIENT)
Dept: PHYSICAL THERAPY | Facility: HOSPITAL | Age: 65
End: 2020-10-20

## 2020-10-22 ENCOUNTER — PATIENT MESSAGE (OUTPATIENT)
Dept: RHEUMATOLOGY | Facility: CLINIC | Age: 65
End: 2020-10-22

## 2020-10-23 ENCOUNTER — HISTORICAL (OUTPATIENT)
Dept: PHYSICAL THERAPY | Facility: HOSPITAL | Age: 65
End: 2020-10-23

## 2020-10-23 ENCOUNTER — PATIENT OUTREACH (OUTPATIENT)
Dept: ADMINISTRATIVE | Facility: OTHER | Age: 65
End: 2020-10-23

## 2020-10-23 ENCOUNTER — LAB VISIT (OUTPATIENT)
Dept: LAB | Facility: HOSPITAL | Age: 65
End: 2020-10-23
Attending: FAMILY MEDICINE
Payer: MEDICAID

## 2020-10-23 ENCOUNTER — OFFICE VISIT (OUTPATIENT)
Dept: PAIN MEDICINE | Facility: CLINIC | Age: 65
End: 2020-10-23
Payer: MEDICAID

## 2020-10-23 VITALS
BODY MASS INDEX: 25.67 KG/M2 | DIASTOLIC BLOOD PRESSURE: 76 MMHG | HEART RATE: 71 BPM | HEIGHT: 64 IN | WEIGHT: 150.38 LBS | SYSTOLIC BLOOD PRESSURE: 123 MMHG

## 2020-10-23 DIAGNOSIS — M45.0 ANKYLOSING SPONDYLITIS OF MULTIPLE SITES IN SPINE: ICD-10-CM

## 2020-10-23 DIAGNOSIS — M47.812 CERVICAL SPONDYLOSIS: ICD-10-CM

## 2020-10-23 DIAGNOSIS — M47.812 CERVICAL FACET JOINT SYNDROME: Primary | ICD-10-CM

## 2020-10-23 PROCEDURE — 99213 OFFICE O/P EST LOW 20 MIN: CPT | Mod: S$PBB,,, | Performed by: ANESTHESIOLOGY

## 2020-10-23 PROCEDURE — 87340 HEPATITIS B SURFACE AG IA: CPT

## 2020-10-23 PROCEDURE — 36415 COLL VENOUS BLD VENIPUNCTURE: CPT

## 2020-10-23 PROCEDURE — 99213 PR OFFICE/OUTPT VISIT, EST, LEVL III, 20-29 MIN: ICD-10-PCS | Mod: S$PBB,,, | Performed by: ANESTHESIOLOGY

## 2020-10-23 PROCEDURE — 99999 PR PBB SHADOW E&M-EST. PATIENT-LVL III: CPT | Mod: PBBFAC,,, | Performed by: ANESTHESIOLOGY

## 2020-10-23 PROCEDURE — 99213 OFFICE O/P EST LOW 20 MIN: CPT | Mod: PBBFAC | Performed by: ANESTHESIOLOGY

## 2020-10-23 PROCEDURE — 86704 HEP B CORE ANTIBODY TOTAL: CPT

## 2020-10-23 PROCEDURE — 99999 PR PBB SHADOW E&M-EST. PATIENT-LVL III: ICD-10-PCS | Mod: PBBFAC,,, | Performed by: ANESTHESIOLOGY

## 2020-10-23 PROCEDURE — 86803 HEPATITIS C AB TEST: CPT

## 2020-10-23 PROCEDURE — 86706 HEP B SURFACE ANTIBODY: CPT

## 2020-10-23 PROCEDURE — 86480 TB TEST CELL IMMUN MEASURE: CPT

## 2020-10-23 RX ORDER — ASPIRIN 81 MG/1
81 TABLET ORAL DAILY
COMMUNITY

## 2020-10-23 NOTE — PROGRESS NOTES
Chief Pain Complaint:  Neck Pain, Elbow Pain (Bilateral elbow pain), and Leg Pain (Bilateral leg pain)    History of Present Illness:   Deborah White is a 65 y.o. male  who is presenting with a chief complaint of Neck Pain, Elbow Pain (Bilateral elbow pain), and Leg Pain (Bilateral leg pain)  . The patient began experiencing this problem insidiously, and the pain has been gradually worsening over the past 12 month(s). The pain is described as throbbing, cramping, aching and heavy and is located in the right cervical spine C3-4. Pain is intermittent and lasts hours. The pain radiates to bilateral upper extremities. Pain is 80% axial. The patient rates his pain a 8 out of ten and interferes with activities of daily living a 7 out of ten. Pain is exacerbated by rotation of the cervical spine, and is improved by rest. Patient reports no prior trauma, no prior spinal surgery     - pertinent negatives: No fever, No chills, No weight loss, No bladder dysfunction, No bowel dysfunction, No saddle anesthesia  - pertinent positives: none    - medications, other therapies tried (physical therapy, injections):     >> NSAIDs, Tylenol, gabapentin and medrol dose pack    >> Has  previously undergone Physical Therapy    >> Has NOT previously undergone spinal injection/s    Imaging / Labs / Studies (reviewed on 10/23/2020):    Results for orders placed during the hospital encounter of 08/11/20   X-Ray Cervical Spine AP And Lateral    Narrative EXAMINATION:  XR CERVICAL SPINE AP LATERAL    CLINICAL HISTORY:  neck pain; Anesthesia of skin    TECHNIQUE:  AP, lateral and open mouth views of the cervical spine were performed.    COMPARISON:  None.    FINDINGS:  The vertebral body heights are well maintained.  There is bridging ossification noted anteriorly at the C2-3, C3-4, and C4-5 levels with relative maintenance of the disc spaces.  Findings are most consistent with DISH.  Mild disc space narrowing and spondylosis present at the C6-7  "level.  Multilevel bilateral facet arthropathy noted within the mid cervical spine greater on the right.  The C1-C2 articulation is within normal limits.      Impression As above      Electronically signed by: Rafi Larkin DO  Date:    08/12/2020  Time:    08:43       Review of Systems:  CONSTITUTIONAL: patient denies any fever, chills, or weight loss  SKIN: patient denies any rash or itching  RESPIRATORY: patient denies having any shortness of breath  GASTROINTESTINAL: patient denies having any diarrhea, constipation, or bowel incontinence  GENITOURINARY: patient denies having any abnormal bladder function    MUSCULOSKELETAL:  - patient complains of the above noted pain/s (see chief pain complaint)    NEUROLOGICAL:   - pain as above  - strength in Upper extremities is intact, BILATERALLY  - sensation in Upper extremities is intact, BILATERALLY  - patient denies any loss of bowel or bladder control      PSYCHIATRIC: patient denies any change in mood    Other:  All other systems reviewed and are negative      Physical Exam:  /76 (BP Location: Left arm, Patient Position: Sitting, BP Method: Medium (Automatic))   Pulse 71   Ht 5' 4" (1.626 m)   Wt 68.2 kg (150 lb 5.7 oz)   BMI 25.81 kg/m²  (reviewed on 10/23/2020)  General: Alert and oriented, in no apparent distress.  Gait: normal gait.  Skin: No rashes, No discoloration, No obvious lesions  HEENT: Normocephalic, atraumatic. Pupils equal and round.  Cardiovascular: Regular rate and rhythm , no significant peripheral edema present  Respiratory: Without audible wheezing, without use of accessory muscles of respiration.    Musculoskeletal:    Cervical Spine    - Pain on flexion of cervical spine Absent  - Spurling's Test:  Absent    - Pain on extension of cervical spine Present  - TTP over the cervical facet joints Present Right C3-4   - Cervical facet loading Present  -TTP over bilateral trapezius       Lumbar Spine    - Pain on flexion of lumbar spine " Absent  - Straight Leg Raise:  Absent    - Pain on extension of lumbar spine Absent  - TTP over the lumbar facet joints Absent  - Lumbar facet loading Absent    -Pain on palpation over the SI joint  Absent  - DOROTHY: Absent      Neuro:    Strength:  UE R/L: D: 5/5; B: 5/5; T: 5/5; WF: 5/5; WE: 5/5; IO: 5/5;  LE R/L: HF: 5/5, HE: 5/5, KF: 5/5; KE: 5/5; FE: 5/5; FF: 5/5    Extremity Reflexes: Brisk and symmetric throughout.      Extremity Sensory: Sensation to pinprick and temperature symmetric. Proprioception intact.      Psych:  Mood and affect is appropriate      Assessment:    Deborah White is a 65 y.o. year old male who is presenting with   Encounter Diagnoses   Name Primary?    Cervical facet joint syndrome Yes    Cervical spondylosis        Plan:    1. Interventional: Proceed with Right C 3, 4, 5 MBB with RN IV sedation    2. Pharmacologic: Increase Gabapentin from 300 mg PO TID to 300/300/600 then to 300/300/900 mg PO TID. Tylenol NSAIDs PRN.    3. Rehabilitative: External referal to PT.    4. Diagnostic: Cervical xray reviewed. Consider Cervical MRI if pain not relived with PT and MBB.     5. Follow up: Post injection.     20  minutes were spent in this encounter with more than 50% of the time used for counseling and review of the plan.  Imaging / studies reviewed, detailed above.  I discussed in detail the risks, benefits, and alternatives to any and all potential treatment options.  All questions and concerns were fully addressed today in clinic. Medical decision making moderate.    Thank you for the opportunity to assist in the care of this patient.    Best wishes,    Signed:    Martín Barnes MD          Disclaimer:  This note may have been prepared using voice recognition software, it may have not been extensively proofed, as such there could be errors within the text such as sound alike errors.

## 2020-10-23 NOTE — PROGRESS NOTES
Health Maintenance Due   Topic Date Due    TETANUS VACCINE  02/25/1973    Shingles Vaccine (1 of 2) 02/25/2007    Colorectal Cancer Screening  02/25/2007       Chart was reviewed for overdue Proactive [facility] Encounters (JS) topics (CRS, Breast Cancer Screening, Eye exam)  Health Maintenance has been updated.  LINKS immunization registry triggered.  Immunizations were reconciled.

## 2020-10-26 LAB
HBV CORE AB SERPL QL IA: NEGATIVE
HBV SURFACE AB SER-ACNC: NEGATIVE M[IU]/ML
HBV SURFACE AG SERPL QL IA: NEGATIVE
HCV AB SERPL QL IA: NEGATIVE

## 2020-10-27 ENCOUNTER — PATIENT MESSAGE (OUTPATIENT)
Dept: RHEUMATOLOGY | Facility: CLINIC | Age: 65
End: 2020-10-27

## 2020-10-27 ENCOUNTER — TELEPHONE (OUTPATIENT)
Dept: RHEUMATOLOGY | Facility: CLINIC | Age: 65
End: 2020-10-27

## 2020-10-27 DIAGNOSIS — R76.12 POSITIVE QUANTIFERON-TB GOLD TEST: Primary | ICD-10-CM

## 2020-10-27 LAB
GAMMA INTERFERON BACKGROUND BLD IA-ACNC: 0.03 IU/ML
M TB IFN-G CD4+ BCKGRND COR BLD-ACNC: 1.75 IU/ML
MITOGEN IGNF BCKGRD COR BLD-ACNC: 8.58 IU/ML
TB GOLD PLUS: POSITIVE
TB2 - NIL: 1.17 IU/ML

## 2020-10-27 NOTE — TELEPHONE ENCOUNTER
----- Message from Kory Lopez MD sent at 10/27/2020  4:51 PM CDT -----  Positive TB QuantiFERON.  Refer to Infectious Disease.  Thanks.

## 2020-10-27 NOTE — TELEPHONE ENCOUNTER
Spoke with daughter and advised her of below, verbalized understanding.         Please place internal referral.

## 2020-10-28 ENCOUNTER — PATIENT MESSAGE (OUTPATIENT)
Dept: INTERNAL MEDICINE | Facility: CLINIC | Age: 65
End: 2020-10-28

## 2020-10-28 DIAGNOSIS — I73.9 CLAUDICATION OF BOTH LOWER EXTREMITIES: Primary | ICD-10-CM

## 2020-10-28 NOTE — TELEPHONE ENCOUNTER
I have placed an order for ankle brachial index testing through cardiology dept. See if this test can be scheduled - usually has to be scheduled through cardiology dept I believe.- his insurance may be limiting factor. Let me know status.

## 2020-10-30 ENCOUNTER — PATIENT MESSAGE (OUTPATIENT)
Dept: ADMINISTRATIVE | Facility: HOSPITAL | Age: 65
End: 2020-10-30

## 2020-11-09 ENCOUNTER — PATIENT MESSAGE (OUTPATIENT)
Dept: INFECTIOUS DISEASES | Facility: CLINIC | Age: 65
End: 2020-11-09

## 2020-11-09 ENCOUNTER — TELEPHONE (OUTPATIENT)
Dept: INFECTIOUS DISEASES | Facility: CLINIC | Age: 65
End: 2020-11-09

## 2020-11-16 ENCOUNTER — PATIENT MESSAGE (OUTPATIENT)
Dept: RHEUMATOLOGY | Facility: CLINIC | Age: 65
End: 2020-11-16

## 2020-11-16 ENCOUNTER — TELEPHONE (OUTPATIENT)
Dept: RHEUMATOLOGY | Facility: CLINIC | Age: 65
End: 2020-11-16

## 2020-11-16 NOTE — TELEPHONE ENCOUNTER
Left message to confirm apt for 11/17/20 at 5:15 with Dr. Lopez at the [facility].

## 2020-11-16 NOTE — PRE-PROCEDURE INSTRUCTIONS
Spoke with patient regarding procedure scheduled on 11/18    Arrival time 1040    Has patient been sick with fever or on antibiotics within the last 7 days? No    Has patient received a vaccination within the last 7 days? no    Has the patient stopped all medications as directed? ASA on 11.9. Hold vitamins, supplements and other NSAIDS. Cardiac clearance obtained from Dr. Nelson on 10.29.    Does patient have a pacemaker and or defibrillator? no    Does the patient have a ride to and from procedure and someone reliable to remain with patient? Daughter Lea [phone]. Per daughter she will interpret. Pt speaks chinese of Serbian.    Is the patient diabetic? no    Does the patient have sleep apnea? Or use O2 at home? No and no     Is the patient receiving sedation? yes    Is the patient instructed to remain NPO beginning at midnight the night before their procedure? yes    Procedure location confirmed with patient? Yes    Covid- Denies signs/symptoms. Instructed to notify PAT/MD if any changes.

## 2020-11-17 ENCOUNTER — OFFICE VISIT (OUTPATIENT)
Dept: RHEUMATOLOGY | Facility: CLINIC | Age: 65
End: 2020-11-17
Payer: MEDICAID

## 2020-11-17 VITALS
HEIGHT: 64 IN | SYSTOLIC BLOOD PRESSURE: 159 MMHG | HEART RATE: 72 BPM | BODY MASS INDEX: 26.31 KG/M2 | WEIGHT: 154.13 LBS | DIASTOLIC BLOOD PRESSURE: 80 MMHG

## 2020-11-17 DIAGNOSIS — M25.50 POLYARTHRALGIA: ICD-10-CM

## 2020-11-17 DIAGNOSIS — M45.0 ANKYLOSING SPONDYLITIS OF MULTIPLE SITES IN SPINE: Primary | ICD-10-CM

## 2020-11-17 DIAGNOSIS — R79.82 ELEVATED C-REACTIVE PROTEIN (CRP): ICD-10-CM

## 2020-11-17 DIAGNOSIS — R70.0 ELEVATED SED RATE: ICD-10-CM

## 2020-11-17 PROCEDURE — 99215 OFFICE O/P EST HI 40 MIN: CPT | Mod: S$PBB,,, | Performed by: INTERNAL MEDICINE

## 2020-11-17 PROCEDURE — 99213 OFFICE O/P EST LOW 20 MIN: CPT | Mod: PBBFAC | Performed by: INTERNAL MEDICINE

## 2020-11-17 PROCEDURE — 99999 PR PBB SHADOW E&M-EST. PATIENT-LVL III: ICD-10-PCS | Mod: PBBFAC,,, | Performed by: INTERNAL MEDICINE

## 2020-11-17 PROCEDURE — 99215 PR OFFICE/OUTPT VISIT, EST, LEVL V, 40-54 MIN: ICD-10-PCS | Mod: S$PBB,,, | Performed by: INTERNAL MEDICINE

## 2020-11-17 PROCEDURE — 99999 PR PBB SHADOW E&M-EST. PATIENT-LVL III: CPT | Mod: PBBFAC,,, | Performed by: INTERNAL MEDICINE

## 2020-11-17 RX ORDER — FOLIC ACID 1 MG/1
1 TABLET ORAL DAILY
Qty: 90 TABLET | Refills: 0 | Status: SHIPPED | OUTPATIENT
Start: 2020-11-17 | End: 2021-03-08 | Stop reason: SDUPTHER

## 2020-11-17 RX ORDER — METHOTREXATE 2.5 MG/1
10 TABLET ORAL
Qty: 16 TABLET | Refills: 1 | Status: SHIPPED | OUTPATIENT
Start: 2020-11-17 | End: 2021-01-15 | Stop reason: SDUPTHER

## 2020-11-17 RX ORDER — PREDNISONE 5 MG/1
10 TABLET ORAL DAILY
Qty: 60 TABLET | Refills: 3 | Status: SHIPPED | OUTPATIENT
Start: 2020-11-17 | End: 2021-03-22

## 2020-11-17 NOTE — Clinical Note
No labs today, 4 labs in 4 weeks since he is starting methotrexate and before next visit in 3 months.

## 2020-11-17 NOTE — PROGRESS NOTES
RHEUMATOLOGY CLINIC FOLLOW UP VISIT  Chief complaints:-  To follow up for neck pain    HPI:-  Deborah Cordova a 65 y.o. pleasant male comes in for a follow up visit.  He has significant past medical history as reviewed below including recently diagnosed ankylosing spondylitis with diffuse idiopathic skeletal hyperostosis with elevated inflammatory markers in positive HLA B27 antigen.  He noticed significant improvement with higher dose of prednisone.  Persistent numbness tingling in his hands.  Scheduled to undergo nerve block of the lower cervical region tomorrow.    Review of Systems   Constitutional: Negative for chills and fever.   HENT: Negative for congestion and sore throat.    Eyes: Negative for blurred vision and redness.   Respiratory: Negative for cough and shortness of breath.    Cardiovascular: Negative for chest pain and leg swelling.   Gastrointestinal: Negative for abdominal pain.   Genitourinary: Negative for dysuria.   Musculoskeletal: Positive for back pain, joint pain and neck pain. Negative for falls and myalgias.   Skin: Negative for rash.   Neurological: Positive for tingling. Negative for headaches.   Endo/Heme/Allergies: Does not bruise/bleed easily.   Psychiatric/Behavioral: Negative for memory loss. The patient does not have insomnia.        Past Medical History:   Diagnosis Date    Coronary artery disease     Hyperlipidemia     Hypertension        History reviewed. No pertinent surgical history.     Social History     Tobacco Use    Smoking status: Current Every Day Smoker     Types: Cigarettes    Smokeless tobacco: Current User   Substance Use Topics    Alcohol use: Never     Frequency: Monthly or less     Drinks per session: 1 or 2     Binge frequency: Never    Drug use: Not on file       Family History   Problem Relation Age of Onset    Cancer Mother        Review of patient's allergies indicates:  No Known Allergies    Vitals:  "   11/17/20 1705   BP: (!) 159/80   Pulse: 72   Weight: 69.9 kg (154 lb 1.6 oz)   Height: 5' 4" (1.626 m)   PainSc:   3   PainLoc: Neck       Physical Exam   Constitutional: He is oriented to person, place, and time and well-developed, well-nourished, and in no distress. No distress.   HENT:   Head: Normocephalic.   Mouth/Throat: Oropharynx is clear and moist.   Eyes: Pupils are equal, round, and reactive to light. Conjunctivae are normal.   Neck: Normal range of motion. Neck supple.   Cardiovascular: Normal rate and intact distal pulses.   Pulmonary/Chest: Effort normal. No respiratory distress.   Abdominal: Soft. There is no abdominal tenderness.   Musculoskeletal:      Comments: Cervical tenderness present causing restricted range of motion with evidence of fusion.  Mild tenderness around bilateral MCP joints.   Neurological: He is alert and oriented to person, place, and time. Gait normal.   Skin: Skin is warm. No rash noted. No erythema.   Psychiatric: Mood and affect normal.   Nursing note and vitals reviewed.      Medication List with Changes/Refills   New Medications    FOLIC ACID (FOLVITE) 1 MG TABLET    Take 1 tablet (1 mg total) by mouth once daily.    METHOTREXATE 2.5 MG TAB    Take 4 tablets (10 mg total) by mouth every 7 days.   Current Medications    AMLODIPINE (NORVASC) 10 MG TABLET    Take 10 mg by mouth once daily.    ASPIRIN (ECOTRIN) 81 MG EC TABLET    Take 81 mg by mouth once daily.    BENAZEPRIL (LOTENSIN) 40 MG TABLET    Take 40 mg by mouth once daily.    CLONIDINE (CATAPRES) 0.2 MG TABLET    Take 0.2 mg by mouth 2 (two) times daily.    EZETIMIBE (ZETIA) 10 MG TABLET    Take 10 mg by mouth once daily.    GABAPENTIN (NEURONTIN) 300 MG CAPSULE    Take 1 capsule (300 mg total) by mouth every evening.    HYDROCHLOROTHIAZIDE (HYDRODIURIL) 12.5 MG TAB    Take 1 tablet (12.5 mg total) by mouth once daily.   Changed and/or Refilled Medications    Modified Medication Previous Medication    PREDNISONE " (DELTASONE) 5 MG TABLET predniSONE (DELTASONE) 5 MG tablet       Take 2 tablets (10 mg total) by mouth once daily.    Take 1 tablet (5 mg total) by mouth once daily.       Assessment/Plans:-  1. Ankylosing spondylitis of multiple sites in spine    2. Polyarthralgia    3. Elevated sed rate    4. Elevated C-reactive protein (CRP)     Ankylosing spondylitis predominantly affecting cervical spine causing diffuse idiopathic skeletal hyperostosis with elevated inflammatory markers, positive HLA B27 antigen.  Start methotrexate along with prednisone.  Referral sent to establish care with infectious disease specialist to start treatment for latent TB since he might benefit from biologic disease modifying agents like Enbrel/Humira in the near future.    Problem List Items Addressed This Visit     Elevated sed rate    Relevant Medications    predniSONE (DELTASONE) 5 MG tablet    Ankylosing spondylitis of multiple sites in spine - Primary    Relevant Medications    methotrexate 2.5 MG Tab    folic acid (FOLVITE) 1 MG tablet    predniSONE (DELTASONE) 5 MG tablet    Other Relevant Orders    CBC Auto Differential    Comprehensive Metabolic Panel    C-Reactive Protein    Sedimentation rate      Other Visit Diagnoses     Polyarthralgia        Relevant Medications    methotrexate 2.5 MG Tab    predniSONE (DELTASONE) 5 MG tablet    Elevated C-reactive protein (CRP)        Relevant Medications    predniSONE (DELTASONE) 5 MG tablet        # Follow up in about 3 months (around 2/17/2021).      Disclaimer: This note was prepared using voice recognition system and is likely to have sound alike errors and is not proof read.  Please call me with any questions.

## 2020-11-18 ENCOUNTER — HOSPITAL ENCOUNTER (OUTPATIENT)
Facility: HOSPITAL | Age: 65
Discharge: HOME | End: 2020-11-18
Attending: ANESTHESIOLOGY | Admitting: ANESTHESIOLOGY
Payer: MEDICAID

## 2020-11-18 VITALS
BODY MASS INDEX: 25.62 KG/M2 | TEMPERATURE: 98 F | RESPIRATION RATE: 16 BRPM | HEIGHT: 65 IN | HEART RATE: 58 BPM | WEIGHT: 153.75 LBS | OXYGEN SATURATION: 97 % | SYSTOLIC BLOOD PRESSURE: 152 MMHG | DIASTOLIC BLOOD PRESSURE: 80 MMHG

## 2020-11-18 DIAGNOSIS — M47.812 CERVICAL FACET JOINT SYNDROME: ICD-10-CM

## 2020-11-18 PROCEDURE — 25000003 PHARM REV CODE 250: Performed by: ANESTHESIOLOGY

## 2020-11-18 PROCEDURE — 64491 PR INJ DX/THER AGNT PARAVERT FACET JOINT,IMG GUIDE,CERV/THORAC, 2ND LEVEL: ICD-10-PCS | Mod: RT,,, | Performed by: ANESTHESIOLOGY

## 2020-11-18 PROCEDURE — 64492 INJ PARAVERT F JNT C/T 3 LEV: CPT | Performed by: ANESTHESIOLOGY

## 2020-11-18 PROCEDURE — 63600175 PHARM REV CODE 636 W HCPCS: Performed by: ANESTHESIOLOGY

## 2020-11-18 PROCEDURE — 64491 INJ PARAVERT F JNT C/T 2 LEV: CPT | Performed by: ANESTHESIOLOGY

## 2020-11-18 PROCEDURE — 64490 INJ PARAVERT F JNT C/T 1 LEV: CPT | Performed by: ANESTHESIOLOGY

## 2020-11-18 PROCEDURE — 99152 MOD SED SAME PHYS/QHP 5/>YRS: CPT | Performed by: ANESTHESIOLOGY

## 2020-11-18 PROCEDURE — 64490 INJ PARAVERT F JNT C/T 1 LEV: CPT | Mod: RT,,, | Performed by: ANESTHESIOLOGY

## 2020-11-18 PROCEDURE — 64491 INJ PARAVERT F JNT C/T 2 LEV: CPT | Mod: RT,,, | Performed by: ANESTHESIOLOGY

## 2020-11-18 PROCEDURE — 64490 PR INJ DX/THER AGNT PARAVERT FACET JOINT,IMG GUIDE,CERV/THORAC, 1ST LEVEL: ICD-10-PCS | Mod: RT,,, | Performed by: ANESTHESIOLOGY

## 2020-11-18 RX ORDER — FENTANYL CITRATE 50 UG/ML
INJECTION, SOLUTION INTRAMUSCULAR; INTRAVENOUS
Status: DISCONTINUED | OUTPATIENT
Start: 2020-11-18 | End: 2020-11-18 | Stop reason: HOSPADM

## 2020-11-18 RX ORDER — MIDAZOLAM HYDROCHLORIDE 1 MG/ML
INJECTION, SOLUTION INTRAMUSCULAR; INTRAVENOUS
Status: DISCONTINUED | OUTPATIENT
Start: 2020-11-18 | End: 2020-11-18 | Stop reason: HOSPADM

## 2020-11-18 RX ORDER — DEXAMETHASONE SODIUM PHOSPHATE 100 MG/10ML
INJECTION INTRAMUSCULAR; INTRAVENOUS
Status: DISCONTINUED | OUTPATIENT
Start: 2020-11-18 | End: 2020-11-18 | Stop reason: HOSPADM

## 2020-11-18 RX ORDER — LIDOCAINE HYDROCHLORIDE 20 MG/ML
INJECTION, SOLUTION EPIDURAL; INFILTRATION; INTRACAUDAL; PERINEURAL
Status: DISCONTINUED | OUTPATIENT
Start: 2020-11-18 | End: 2020-11-18 | Stop reason: HOSPADM

## 2020-11-18 NOTE — DISCHARGE SUMMARY
[facility]  Discharge Note       Description of Procedure: Right C 3, 4, 5  Cervical Medial Branch Block under Fluoroscopic Guidance    Procedure Date: 11/18/2020    Admit Date: 11/18/2020  Discharge Date: 11/18/2020     Attending Physician: Martín Barnes   Discharge Provider: Martín Barnes    Preoperative Diagnosis: Cervical Spondylosis, Cervical facet joint syndrome    Postoperative Diagnosis: as above, same as preoperative diagnosis    Discharged Condition: Stable    Hospital Course: Patient was admitted for an outpatient procedure. The procedure was tolerated well with no complications.    Final Diagnoses: Same as principal problem.    Disposition: Home, self-care.    Follow up/Patient Instructions:  Follow-up in clinic in 2-3 weeks.    Medications: No medications were prescribed today. The patient was advised to resume normal medication regimen without change.  Specific information was provided regarding restarting any anticoagulant/s.    Discharge Procedure Orders (must include Diet, Follow-up, Activity):  Light activity for the remainder of the day, resume normal activity tomorrow. Resume normal diet. Follow-up in clinic in 2-3 weeks.

## 2020-11-18 NOTE — OP NOTE
"Procedure: CERVICAL Medial Branch Block (posterior approach) under Fluorsocopic Guidance    Side: right    Level:  C3 articular pillar (Corresponding to the C3 medial branch), C4 articular pillar (Corresponding to the C4 medial branch) and C5 articular pillar (Corresponding to the C5 medial branch)     Procedure Date: 11/18/2020    Pre-operative Diagnosis: Cervical Spondylosis  Post-operative Diagnosis: Cervical Spondylosis    Provider: Martín Barnes MD  Assistant(s): none     Anesthesia: Local, IV Sedation     >> 2 mg of VERSED    >> 100 mcg of FENTANYL     Indication: Cervical pain without radiculopathy. Symptoms unresponsive to conservative treatments. Fluoroscopy was used to optimize visualization of needle placement and to maximize safety.     Procedure Description / Technique:  The patient was seen and identified in the preoperative area. Risks, benefits, complications, and alternatives were discussed with the patient. The patient agreed to proceed with the procedure and signed the consent. The site and side of the procedure was identified and marked. An IV was started. The patient was taken to the procedural suite.     The patient was positioned in PRONE orientation on procedure table. A time out was performed prior to any intervention. The procedure, site, side, and allergies were stated and agreed to by all present. The cervical area was widely prepped with ChloraPrep (10.5 mL) x 2. The procedural site was draped in usual sterile fashion. Vital signs were closely monitored throughout this procedure. Conscious sedation was used for this procedure to decrease patient anxiety.     The articular pillars at the above noted levels and side/s were identified using AP fluoroscopy. The "scallops" of the articular pillars at each level were the sites targeted. Each site was marked and localized with 0.5 mL of 1% PF Lidocaine using a 27 gauge 1.5 inch needle. A 25 gauge 3.5 inch spinal needle was then advanced at each " targeted area under AP fluoroscopic guidance until the needle rested on OS at lateral most border of the articular pillar. After negative aspiration, 0.5 mL of a solution containing 2 mL of 1% PF Lidocaine and 2 mL of Dexamethasone (10 mg/mL) was injected. No pain or paresthesia was noted on injection. This same technique was performed for each of the above noted levels. The stylet was replaced and the needle was removed intact following injection at each targeted site.    Description of Findings: Not applicable    Prosthetic devices, grafts, tissues, or devices implanted: None    Specimen Removed: No    Estimated Blood Loss: minimal    COMPLICATIONS: None    DISPOSITION / PLANS: The patient was transferred to the recovery area in a stable condition for observation. The patient was reexamined prior to discharge. There was no evidence of acute neurologic injury following the procedure.  Patient was discharged from the recovery room after meeting discharge criteria. Home discharge instructions were given to the patient by the staff.

## 2020-11-18 NOTE — PLAN OF CARE
Pt drowsy but arousable, o x's 4, denies any pain/discomfort, what to expect during recovery discussed with pt and family at bedside. Pt and family verbalized understanding of all post op instructions. All questions and concerns addressed and answered, will continue to monitor pt until discharged.

## 2020-11-18 NOTE — DISCHARGE INSTRUCTIONS

## 2020-11-18 NOTE — PLAN OF CARE
Consent signed with cantonese  on language line. Site marked as well on neck. Nurse for procedure and admission spoke information as well. Aware no to drive for 24 hours via .

## 2020-11-19 ENCOUNTER — PATIENT MESSAGE (OUTPATIENT)
Dept: INTERNAL MEDICINE | Facility: CLINIC | Age: 65
End: 2020-11-19

## 2020-11-20 ENCOUNTER — TELEPHONE (OUTPATIENT)
Dept: PULMONOLOGY | Facility: CLINIC | Age: 65
End: 2020-11-20

## 2020-11-20 NOTE — TELEPHONE ENCOUNTER
----- Message from Ro Boland sent at 11/20/2020  2:14 PM CST -----  Regarding: Need to r/s  Contact: Lea Villagomez:  Needs Medical Advice    Who Called: Pt daughter  Would the patient rather a call back or a response via [facility]? Call back   Best Call Back Number: [phone]  Additional Information: Need to r/s 12/2 appt

## 2020-11-30 ENCOUNTER — HOSPITAL ENCOUNTER (OUTPATIENT)
Dept: CARDIOLOGY | Facility: HOSPITAL | Age: 65
Discharge: HOME | End: 2020-11-30
Attending: FAMILY MEDICINE
Payer: MEDICAID

## 2020-11-30 VITALS — BODY MASS INDEX: 25.49 KG/M2 | WEIGHT: 153 LBS | HEIGHT: 65 IN

## 2020-11-30 DIAGNOSIS — I73.9 CLAUDICATION OF BOTH LOWER EXTREMITIES: ICD-10-CM

## 2020-11-30 LAB
ABI CLAUDICATION TIME: 1 MIN
ABI POST MINUTES1: 2 MIN
ABI POST MINUTES2: 3 MIN
IMMEDIATE ARM BP: 180 MMHG
IMMEDIATE LEFT ABI: 0.52
IMMEDIATE LEFT TIBIAL: 94 MMHG
IMMEDIATE RIGHT ABI: 0.68
IMMEDIATE RIGHT TIBIAL: 123 MMHG
LEFT ABI: 1.07
LEFT ARM BP: 146 MMHG
LEFT DORSALIS PEDIS: 140 MMHG
LEFT POSTERIOR TIBIAL: 156 MMHG
LEFT TBI: 0.82
LEFT TOE PRESSURE: 119 MMHG
POST1 ARM BP: 175 MMHG
POST1 LEFT ABI: 0.71
POST1 LEFT TIBIAL: 125 MMHG
POST1 RIGHT ABI: 0.81
POST1 RIGHT TIBIAL: 142 MMHG
POST2 ARM BP: 164 MMHG
POST2 LEFT ABI: 0.85
POST2 LEFT TIBIAL: 140 MMHG
POST2 RIGHT ABI: 0.88
POST2 RIGHT TIBIAL: 145 MMHG
RIGHT ABI: 1.09
RIGHT ARM BP: 143 MMHG
RIGHT DORSALIS PEDIS: 140 MMHG
RIGHT POSTERIOR TIBIAL: 159 MMHG
RIGHT TBI: 0.87
RIGHT TOE PRESSURE: 127 MMHG
TREADMILL GRADE: 10 %
TREADMILL SPEED: 2 MPH
TREADMILL TIME: 3.5 MIN

## 2020-11-30 PROCEDURE — 93924 ANKLE BRACHIAL INDICES (ABI): ICD-10-PCS | Mod: 26,,, | Performed by: INTERNAL MEDICINE

## 2020-11-30 PROCEDURE — 93924 LWR XTR VASC STDY BILAT: CPT

## 2020-11-30 PROCEDURE — 93924 LWR XTR VASC STDY BILAT: CPT | Mod: 26,,, | Performed by: INTERNAL MEDICINE

## 2020-12-17 ENCOUNTER — PATIENT OUTREACH (OUTPATIENT)
Dept: ADMINISTRATIVE | Facility: OTHER | Age: 65
End: 2020-12-17

## 2020-12-17 NOTE — PROGRESS NOTES
Health Maintenance Due   Topic Date Due    TETANUS VACCINE  02/25/1975    Shingles Vaccine (1 of 2) 02/25/2007    Colorectal Cancer Screening  02/25/2007     Updates were requested from care everywhere.  Chart was reviewed for overdue Proactive [facility] Encounters (JS) topics (CRS, Breast Cancer Screening, Eye exam)  Health Maintenance has been updated.  LINKS immunization registry triggered.  LINKS not responding.

## 2020-12-18 ENCOUNTER — PATIENT MESSAGE (OUTPATIENT)
Dept: INFECTIOUS DISEASES | Facility: CLINIC | Age: 65
End: 2020-12-18

## 2020-12-18 ENCOUNTER — LAB VISIT (OUTPATIENT)
Dept: LAB | Facility: HOSPITAL | Age: 65
End: 2020-12-18
Attending: INTERNAL MEDICINE
Payer: MEDICAID

## 2020-12-18 ENCOUNTER — TELEPHONE (OUTPATIENT)
Dept: INFECTIOUS DISEASES | Facility: CLINIC | Age: 65
End: 2020-12-18

## 2020-12-18 ENCOUNTER — OFFICE VISIT (OUTPATIENT)
Dept: PAIN MEDICINE | Facility: CLINIC | Age: 65
End: 2020-12-18
Payer: MEDICAID

## 2020-12-18 VITALS
DIASTOLIC BLOOD PRESSURE: 79 MMHG | HEIGHT: 65 IN | BODY MASS INDEX: 25.33 KG/M2 | RESPIRATION RATE: 18 BRPM | HEART RATE: 73 BPM | SYSTOLIC BLOOD PRESSURE: 132 MMHG | WEIGHT: 152 LBS

## 2020-12-18 DIAGNOSIS — M54.12 CERVICAL RADICULOPATHY: ICD-10-CM

## 2020-12-18 DIAGNOSIS — M45.0 ANKYLOSING SPONDYLITIS OF MULTIPLE SITES IN SPINE: ICD-10-CM

## 2020-12-18 DIAGNOSIS — M47.812 CERVICAL FACET JOINT SYNDROME: Primary | ICD-10-CM

## 2020-12-18 DIAGNOSIS — M47.812 CERVICAL SPONDYLOSIS: ICD-10-CM

## 2020-12-18 LAB
ALBUMIN SERPL BCP-MCNC: 4 G/DL (ref 3.5–5.2)
ALP SERPL-CCNC: 71 U/L (ref 55–135)
ALT SERPL W/O P-5'-P-CCNC: 44 U/L (ref 10–44)
ANION GAP SERPL CALC-SCNC: 11 MMOL/L (ref 8–16)
AST SERPL-CCNC: 25 U/L (ref 10–40)
BASOPHILS # BLD AUTO: 0.06 K/UL (ref 0–0.2)
BASOPHILS NFR BLD: 0.6 % (ref 0–1.9)
BILIRUB SERPL-MCNC: 0.2 MG/DL (ref 0.1–1)
BUN SERPL-MCNC: 22 MG/DL (ref 8–23)
CALCIUM SERPL-MCNC: 9.2 MG/DL (ref 8.7–10.5)
CHLORIDE SERPL-SCNC: 105 MMOL/L (ref 95–110)
CO2 SERPL-SCNC: 24 MMOL/L (ref 23–29)
CREAT SERPL-MCNC: 1.4 MG/DL (ref 0.5–1.4)
CRP SERPL-MCNC: 0.9 MG/L (ref 0–8.2)
DIFFERENTIAL METHOD: ABNORMAL
EOSINOPHIL # BLD AUTO: 0 K/UL (ref 0–0.5)
EOSINOPHIL NFR BLD: 0.2 % (ref 0–8)
ERYTHROCYTE [DISTWIDTH] IN BLOOD BY AUTOMATED COUNT: 14.6 % (ref 11.5–14.5)
ERYTHROCYTE [SEDIMENTATION RATE] IN BLOOD BY WESTERGREN METHOD: 15 MM/HR (ref 0–10)
EST. GFR  (AFRICAN AMERICAN): >60 ML/MIN/1.73 M^2
EST. GFR  (NON AFRICAN AMERICAN): 53 ML/MIN/1.73 M^2
GLUCOSE SERPL-MCNC: 97 MG/DL (ref 70–110)
HCT VFR BLD AUTO: 41.5 % (ref 40–54)
HGB BLD-MCNC: 13.5 G/DL (ref 14–18)
IMM GRANULOCYTES # BLD AUTO: 0.14 K/UL (ref 0–0.04)
IMM GRANULOCYTES NFR BLD AUTO: 1.4 % (ref 0–0.5)
LYMPHOCYTES # BLD AUTO: 2.2 K/UL (ref 1–4.8)
LYMPHOCYTES NFR BLD: 21.9 % (ref 18–48)
MCH RBC QN AUTO: 29.1 PG (ref 27–31)
MCHC RBC AUTO-ENTMCNC: 32.5 G/DL (ref 32–36)
MCV RBC AUTO: 89 FL (ref 82–98)
MONOCYTES # BLD AUTO: 0.5 K/UL (ref 0.3–1)
MONOCYTES NFR BLD: 4.9 % (ref 4–15)
NEUTROPHILS # BLD AUTO: 7.2 K/UL (ref 1.8–7.7)
NEUTROPHILS NFR BLD: 71 % (ref 38–73)
NRBC BLD-RTO: 0 /100 WBC
PLATELET # BLD AUTO: 349 K/UL (ref 150–350)
PMV BLD AUTO: 9.3 FL (ref 9.2–12.9)
POTASSIUM SERPL-SCNC: 3.8 MMOL/L (ref 3.5–5.1)
PROT SERPL-MCNC: 7.5 G/DL (ref 6–8.4)
RBC # BLD AUTO: 4.64 M/UL (ref 4.6–6.2)
SODIUM SERPL-SCNC: 140 MMOL/L (ref 136–145)
WBC # BLD AUTO: 10.12 K/UL (ref 3.9–12.7)

## 2020-12-18 PROCEDURE — 99213 PR OFFICE/OUTPT VISIT, EST, LEVL III, 20-29 MIN: ICD-10-PCS | Mod: S$PBB,,, | Performed by: ANESTHESIOLOGY

## 2020-12-18 PROCEDURE — 86140 C-REACTIVE PROTEIN: CPT

## 2020-12-18 PROCEDURE — 99213 OFFICE O/P EST LOW 20 MIN: CPT | Mod: PBBFAC | Performed by: ANESTHESIOLOGY

## 2020-12-18 PROCEDURE — 85025 COMPLETE CBC W/AUTO DIFF WBC: CPT

## 2020-12-18 PROCEDURE — 99213 OFFICE O/P EST LOW 20 MIN: CPT | Mod: S$PBB,,, | Performed by: ANESTHESIOLOGY

## 2020-12-18 PROCEDURE — 80053 COMPREHEN METABOLIC PANEL: CPT

## 2020-12-18 PROCEDURE — 85651 RBC SED RATE NONAUTOMATED: CPT

## 2020-12-18 PROCEDURE — 99999 PR PBB SHADOW E&M-EST. PATIENT-LVL III: CPT | Mod: PBBFAC,,, | Performed by: ANESTHESIOLOGY

## 2020-12-18 PROCEDURE — 99999 PR PBB SHADOW E&M-EST. PATIENT-LVL III: ICD-10-PCS | Mod: PBBFAC,,, | Performed by: ANESTHESIOLOGY

## 2020-12-18 PROCEDURE — 36415 COLL VENOUS BLD VENIPUNCTURE: CPT

## 2020-12-18 RX ORDER — SIMVASTATIN 40 MG/1
40 TABLET, FILM COATED ORAL NIGHTLY
COMMUNITY
Start: 2020-12-08 | End: 2021-02-23 | Stop reason: ALTCHOICE

## 2020-12-18 NOTE — PROGRESS NOTES
Chief Pain Complaint:  Cervical Spine Pain (C-spine)    History of Present Illness:   Deborah White is a 65 y.o. male  who is presenting with a chief complaint of Cervical Spine Pain (C-spine)  . The patient began experiencing this problem insidiously, and the pain has been gradually worsening over the past 12 month(s). The pain is described as throbbing, cramping, aching and heavy and is located in the right cervical spine C3-4. Pain is intermittent and lasts hours. The pain radiates to bilateral upper extremities. Pain is 80% axial. The patient rates his pain a 8 out of ten and interferes with activities of daily living a 7 out of ten. Pain is exacerbated by rotation of the cervical spine, and is improved by rest. Patient reports no prior trauma, no prior spinal surgery     - pertinent negatives: No fever, No chills, No weight loss, No bladder dysfunction, No bowel dysfunction, No saddle anesthesia  - pertinent positives: none    - medications, other therapies tried (physical therapy, injections):     >> NSAIDs, Tylenol, gabapentin and medrol dose pack    >> Has  previously undergone Physical Therapy    >> Has previously undergone spinal injection/s          Right C 3, 4, 5 MBB on 11/18/2020 with 80% improvement in pain.     Imaging / Labs / Studies (reviewed on 12/18/2020):    Results for orders placed during the hospital encounter of 08/11/20   X-Ray Cervical Spine AP And Lateral    Narrative EXAMINATION:  XR CERVICAL SPINE AP LATERAL    CLINICAL HISTORY:  neck pain; Anesthesia of skin    TECHNIQUE:  AP, lateral and open mouth views of the cervical spine were performed.    COMPARISON:  None.    FINDINGS:  The vertebral body heights are well maintained.  There is bridging ossification noted anteriorly at the C2-3, C3-4, and C4-5 levels with relative maintenance of the disc spaces.  Findings are most consistent with DISH.  Mild disc space narrowing and spondylosis present at the C6-7 level.  Multilevel bilateral  "facet arthropathy noted within the mid cervical spine greater on the right.  The C1-C2 articulation is within normal limits.      Impression As above      Electronically signed by: Rafi Larkin DO  Date:    08/12/2020  Time:    08:43       Review of Systems:  CONSTITUTIONAL: patient denies any fever, chills, or weight loss  SKIN: patient denies any rash or itching  RESPIRATORY: patient denies having any shortness of breath  GASTROINTESTINAL: patient denies having any diarrhea, constipation, or bowel incontinence  GENITOURINARY: patient denies having any abnormal bladder function    MUSCULOSKELETAL:  - patient complains of the above noted pain/s (see chief pain complaint)    NEUROLOGICAL:   - pain as above  - strength in Upper extremities is intact, BILATERALLY  - sensation in Upper extremities is intact, BILATERALLY  - patient denies any loss of bowel or bladder control      PSYCHIATRIC: patient denies any change in mood    Other:  All other systems reviewed and are negative      Physical Exam:  /79 (BP Location: Left arm, Patient Position: Sitting, BP Method: Medium (Automatic))   Pulse 73   Resp 18   Ht 5' 5" (1.651 m)   Wt 68.9 kg (152 lb)   BMI 25.29 kg/m²  (reviewed on 12/18/2020)  General: Alert and oriented, in no apparent distress.  Gait: normal gait.  Skin: No rashes, No discoloration, No obvious lesions  HEENT: Normocephalic, atraumatic. Pupils equal and round.  Cardiovascular: Regular rate and rhythm , no significant peripheral edema present  Respiratory: Without audible wheezing, without use of accessory muscles of respiration.    Musculoskeletal:    Cervical Spine    - Pain on flexion of cervical spine Absent  - Spurling's Test:  Absent    - Pain on extension of cervical spine Present  - TTP over the cervical facet joints Present Right C3-4   - Cervical facet loading Present  -TTP over bilateral trapezius       Lumbar Spine    - Pain on flexion of lumbar spine Absent  - Straight Leg Raise:  " Absent    - Pain on extension of lumbar spine Absent  - TTP over the lumbar facet joints Absent  - Lumbar facet loading Absent    -Pain on palpation over the SI joint  Absent  - DOROTHY: Absent      Neuro:    Strength:  UE R/L: D: 5/5; B: 5/5; T: 5/5; WF: 5/5; WE: 5/5; IO: 5/5;  LE R/L: HF: 5/5, HE: 5/5, KF: 5/5; KE: 5/5; FE: 5/5; FF: 5/5    Extremity Reflexes: Brisk and symmetric throughout.      Extremity Sensory: Sensation to pinprick and temperature symmetric. Proprioception intact.      Psych:  Mood and affect is appropriate      Assessment:    Deborah White is a 65 y.o. year old male who is presenting with   Encounter Diagnoses   Name Primary?    Cervical facet joint syndrome Yes    Cervical spondylosis     Cervical radiculopathy        Plan:    1. Interventional: Consider cervical YENNY.   S/p Right C 3, 4, 5 MBB on 11/18/2020 with 80% improvement in pain.     2. Pharmacologic: Continue Gabapentin 300/300/900 mg PO TID. Tylenol NSAIDs PRN.    3. Rehabilitative: External referal to PT done at last visit..    4. Diagnostic: Cervical xray reviewed. Consider Cervical MRI if radicular pain worsens.     5. Follow up: PRN.      20  minutes were spent in this encounter with more than 50% of the time used for counseling and review of the plan.  Imaging / studies reviewed, detailed above.  I discussed in detail the risks, benefits, and alternatives to any and all potential treatment options.  All questions and concerns were fully addressed today in clinic. Medical decision making moderate.    Thank you for the opportunity to assist in the care of this patient.    Best wishes,    Signed:    Martín Barnes MD          Disclaimer:  This note may have been prepared using voice recognition software, it may have not been extensively proofed, as such there could be errors within the text such as sound alike errors.

## 2020-12-22 DIAGNOSIS — I10 HYPERTENSION, ESSENTIAL: Primary | ICD-10-CM

## 2020-12-28 ENCOUNTER — HOSPITAL ENCOUNTER (OUTPATIENT)
Dept: CARDIOLOGY | Facility: HOSPITAL | Age: 65
Discharge: HOME | End: 2020-12-28
Attending: INTERNAL MEDICINE
Payer: MEDICAID

## 2020-12-28 ENCOUNTER — OFFICE VISIT (OUTPATIENT)
Dept: CARDIOLOGY | Facility: CLINIC | Age: 65
End: 2020-12-28
Payer: MEDICAID

## 2020-12-28 VITALS
OXYGEN SATURATION: 96 % | SYSTOLIC BLOOD PRESSURE: 130 MMHG | DIASTOLIC BLOOD PRESSURE: 62 MMHG | WEIGHT: 152 LBS | HEART RATE: 61 BPM | BODY MASS INDEX: 25.29 KG/M2

## 2020-12-28 DIAGNOSIS — I25.118 CORONARY ARTERY DISEASE OF NATIVE ARTERY OF NATIVE HEART WITH STABLE ANGINA PECTORIS: ICD-10-CM

## 2020-12-28 DIAGNOSIS — I10 HYPERTENSION, ESSENTIAL: ICD-10-CM

## 2020-12-28 DIAGNOSIS — I73.9 PAD (PERIPHERAL ARTERY DISEASE): Primary | ICD-10-CM

## 2020-12-28 DIAGNOSIS — F17.200 SMOKING: ICD-10-CM

## 2020-12-28 DIAGNOSIS — E78.2 MIXED HYPERLIPIDEMIA: ICD-10-CM

## 2020-12-28 PROCEDURE — 99999 PR PBB SHADOW E&M-EST. PATIENT-LVL III: CPT | Mod: PBBFAC,,, | Performed by: INTERNAL MEDICINE

## 2020-12-28 PROCEDURE — 93010 ELECTROCARDIOGRAM REPORT: CPT | Mod: ,,, | Performed by: INTERNAL MEDICINE

## 2020-12-28 PROCEDURE — 93010 EKG 12-LEAD: ICD-10-PCS | Mod: ,,, | Performed by: INTERNAL MEDICINE

## 2020-12-28 PROCEDURE — 99999 PR PBB SHADOW E&M-EST. PATIENT-LVL III: ICD-10-PCS | Mod: PBBFAC,,, | Performed by: INTERNAL MEDICINE

## 2020-12-28 PROCEDURE — 93005 ELECTROCARDIOGRAM TRACING: CPT

## 2020-12-28 PROCEDURE — 99213 OFFICE O/P EST LOW 20 MIN: CPT | Mod: PBBFAC,25 | Performed by: INTERNAL MEDICINE

## 2020-12-28 PROCEDURE — 99204 OFFICE O/P NEW MOD 45 MIN: CPT | Mod: S$PBB,,, | Performed by: INTERNAL MEDICINE

## 2020-12-28 PROCEDURE — 99204 PR OFFICE/OUTPT VISIT, NEW, LEVL IV, 45-59 MIN: ICD-10-PCS | Mod: S$PBB,,, | Performed by: INTERNAL MEDICINE

## 2020-12-28 RX ORDER — CILOSTAZOL 50 MG/1
50 TABLET ORAL 2 TIMES DAILY
Qty: 60 TABLET | Refills: 11 | Status: SHIPPED | OUTPATIENT
Start: 2020-12-28 | End: 2021-02-24 | Stop reason: SDUPTHER

## 2020-12-28 NOTE — PROGRESS NOTES
Subjective:   Patient ID:  Deborah White is a 65 y.o. male who presents for evaluation of No chief complaint on file.      64 yo male, care establish. Retired from restaurant bussiness  [facility] CAD h/o LHC in 1998, nonobstructive, HTN HLD. Smoking 1/2 PPD > 40 yrs, no drinking  Prior cardiologist DR FISHMAN AT [facility]  Walks at home  He denied chest pain, dyspnea on exertion, palpitation, fainting, PND, orthopnea, syncope  Exertional calf pain.    exercise KAL showed normal resting KAL. Mod to severe decreased exercise KAL to 0.5 left and 0.6 right  EKG NSR and LVH  Started Simvastatin in 1998 and developed body ache this year. Pt states that the ache resovled after d/c simvastatin. ldl 202 IN . Now resumed Simvastatin AND NO RECURRENT BODY ACHE    08-202O ECHO DONE AT [facility] NORMAL EF, MILD MR AND MOD. LVH  , CR 1.1      Addendum on 03/15/2021  Decrease Pletal to once a day due to headache      Past Medical History:   Diagnosis Date    Coronary artery disease     Hyperlipidemia     Hypertension        Past Surgical History:   Procedure Laterality Date    INJECTION OF ANESTHETIC AGENT AROUND MEDIAL BRANCH NERVES INNERVATING CERVICAL FACET JOINT Right 11/18/2020    Procedure: Block-nerve-medial branch-cervical Right C3, 4, 5with RN IV sedation;  Surgeon: Martín Barnes MD;  Location: [facility];  Service: Pain Management;  Laterality: Right;       Social History     Tobacco Use    Smoking status: Current Every Day Smoker     Types: Cigarettes    Smokeless tobacco: Current User   Substance Use Topics    Alcohol use: Never     Frequency: Monthly or less     Drinks per session: 1 or 2     Binge frequency: Never    Drug use: Not on file       Family History   Problem Relation Age of Onset    Cancer Mother        Review of Systems   Constitution: Negative for decreased appetite, diaphoresis, fever, malaise/fatigue and night sweats.   HENT: Negative for nosebleeds.    Eyes: Negative for blurred vision  and double vision.   Cardiovascular: Negative for chest pain, claudication, dyspnea on exertion, irregular heartbeat, leg swelling, near-syncope, orthopnea, palpitations, paroxysmal nocturnal dyspnea and syncope.   Respiratory: Negative for cough, shortness of breath, sleep disturbances due to breathing, snoring, sputum production and wheezing.    Endocrine: Negative for cold intolerance and polyuria.   Hematologic/Lymphatic: Does not bruise/bleed easily.   Skin: Negative for rash.   Musculoskeletal: Negative for back pain, falls, joint pain, joint swelling and neck pain.   Gastrointestinal: Negative for abdominal pain, heartburn, nausea and vomiting.   Genitourinary: Negative for dysuria, frequency and hematuria.   Neurological: Negative for difficulty with concentration, dizziness, focal weakness, headaches, light-headedness, numbness, seizures and weakness.   Psychiatric/Behavioral: Negative for depression, memory loss and substance abuse. The patient does not have insomnia.    Allergic/Immunologic: Negative for HIV exposure and hives.       Objective:   Physical Exam   Constitutional: He is oriented to person, place, and time. He appears well-nourished.   HENT:   Head: Normocephalic.   Eyes: Pupils are equal, round, and reactive to light.   Neck: Normal carotid pulses and no JVD present. Carotid bruit is not present. No thyromegaly present.   Cardiovascular: Normal rate, regular rhythm, normal heart sounds and normal pulses.  No extrasystoles are present. PMI is not displaced. Exam reveals no gallop and no S3.   No murmur heard.  Pulmonary/Chest: Breath sounds normal. No stridor. No respiratory distress.   Abdominal: Soft. Bowel sounds are normal. There is no abdominal tenderness. There is no rebound.   Musculoskeletal: Normal range of motion.   Neurological: He is alert and oriented to person, place, and time.   Skin: Skin is intact. No rash noted.   Psychiatric: His behavior is normal.       Lab Results    Component Value Date    CHOL 285 (A) 08/05/2020     Lab Results   Component Value Date    HDL 56 08/05/2020     Lab Results   Component Value Date    LDLCALC 202 08/05/2020     Lab Results   Component Value Date    TRIG 133 08/05/2020     No results found for: CHOLHDL    Chemistry        Component Value Date/Time     12/18/2020 1506    K 3.8 12/18/2020 1506     12/18/2020 1506    CO2 24 12/18/2020 1506    BUN 22 12/18/2020 1506    CREATININE 1.4 12/18/2020 1506    GLU 97 12/18/2020 1506        Component Value Date/Time    CALCIUM 9.2 12/18/2020 1506    ALKPHOS 71 12/18/2020 1506    AST 25 12/18/2020 1506    ALT 44 12/18/2020 1506    BILITOT 0.2 12/18/2020 1506    ESTGFRAFRICA >60 12/18/2020 1506    EGFRNONAA 53 (A) 12/18/2020 1506          Lab Results   Component Value Date    HGBA1C 5.8 (H) 06/22/2020     Lab Results   Component Value Date    TSH 0.572 12/11/2019     No results found for: INR, PROTIME  Lab Results   Component Value Date    WBC 10.12 12/18/2020    HGB 13.5 (L) 12/18/2020    HCT 41.5 12/18/2020    MCV 89 12/18/2020     12/18/2020     BNP  @LABRCNTIP(BNP,BNPTRIAGEBLO)@  CrCl cannot be calculated (Patient's most recent lab result is older than the maximum 7 days allowed.).  No results found in the last 24 hours.  No results found in the last 24 hours.  No results found in the last 24 hours.    Assessment:      1. PAD (peripheral artery disease)    2. Coronary artery disease of native artery of native heart with stable angina pectoris    3. Mixed hyperlipidemia    4. Hypertension, essential    5. Smoking        Plan:   ADD PLETAL 50 MG BID for claudicartion  LE ARTERIAL US ordered  SMOKING CESSATION  CONTINUE asa, statin zetia, HCTZ amlodipine, benaepril and Clonidine    Counseled DASH  Check Lipid profile in 6 months  Recommend heart-healthy diet, weight control and regular exercise.  Quique. Risk modification.   I have reviewed all pertinent labs and cardiac studies independently.  Plans and recommendations have been formulated under my direct supervision. All questions answered and patient voiced understanding.   If symptoms persist go to the ED  RTC in 6 months

## 2021-01-05 ENCOUNTER — PATIENT MESSAGE (OUTPATIENT)
Dept: RHEUMATOLOGY | Facility: CLINIC | Age: 66
End: 2021-01-05

## 2021-01-11 ENCOUNTER — TELEPHONE (OUTPATIENT)
Dept: CARDIOLOGY | Facility: CLINIC | Age: 66
End: 2021-01-11

## 2021-01-15 ENCOUNTER — PATIENT MESSAGE (OUTPATIENT)
Dept: RHEUMATOLOGY | Facility: CLINIC | Age: 66
End: 2021-01-15

## 2021-01-15 DIAGNOSIS — M25.50 POLYARTHRALGIA: ICD-10-CM

## 2021-01-15 DIAGNOSIS — M45.0 ANKYLOSING SPONDYLITIS OF MULTIPLE SITES IN SPINE: ICD-10-CM

## 2021-01-15 RX ORDER — METHOTREXATE 2.5 MG/1
10 TABLET ORAL
Qty: 16 TABLET | Refills: 1 | Status: SHIPPED | OUTPATIENT
Start: 2021-01-15 | End: 2021-03-26 | Stop reason: SDUPTHER

## 2021-01-18 ENCOUNTER — PATIENT MESSAGE (OUTPATIENT)
Dept: INTERNAL MEDICINE | Facility: CLINIC | Age: 66
End: 2021-01-18

## 2021-01-18 DIAGNOSIS — I10 HYPERTENSION, ESSENTIAL: ICD-10-CM

## 2021-01-19 ENCOUNTER — PATIENT MESSAGE (OUTPATIENT)
Dept: RHEUMATOLOGY | Facility: CLINIC | Age: 66
End: 2021-01-19

## 2021-01-19 RX ORDER — HYDROCHLOROTHIAZIDE 12.5 MG/1
12.5 TABLET ORAL DAILY
Qty: 90 TABLET | Refills: 1 | Status: CANCELLED | OUTPATIENT
Start: 2021-01-19

## 2021-01-20 RX ORDER — HYDROCHLOROTHIAZIDE 25 MG/1
25 TABLET ORAL DAILY
Qty: 90 TABLET | Refills: 3 | Status: SHIPPED | OUTPATIENT
Start: 2021-01-20 | End: 2022-01-05 | Stop reason: SDUPTHER

## 2021-01-27 ENCOUNTER — PATIENT MESSAGE (OUTPATIENT)
Dept: RHEUMATOLOGY | Facility: CLINIC | Age: 66
End: 2021-01-27

## 2021-02-02 ENCOUNTER — PATIENT MESSAGE (OUTPATIENT)
Dept: INTERNAL MEDICINE | Facility: CLINIC | Age: 66
End: 2021-02-02

## 2021-02-03 ENCOUNTER — PATIENT MESSAGE (OUTPATIENT)
Dept: CARDIOLOGY | Facility: CLINIC | Age: 66
End: 2021-02-03

## 2021-02-04 ENCOUNTER — PATIENT MESSAGE (OUTPATIENT)
Dept: RHEUMATOLOGY | Facility: CLINIC | Age: 66
End: 2021-02-04

## 2021-02-04 ENCOUNTER — PATIENT MESSAGE (OUTPATIENT)
Dept: CARDIOLOGY | Facility: CLINIC | Age: 66
End: 2021-02-04

## 2021-02-05 ENCOUNTER — LAB VISIT (OUTPATIENT)
Dept: LAB | Facility: HOSPITAL | Age: 66
End: 2021-02-05
Attending: FAMILY MEDICINE
Payer: MEDICAID

## 2021-02-05 DIAGNOSIS — M45.0 ANKYLOSING SPONDYLITIS OF MULTIPLE SITES IN SPINE: ICD-10-CM

## 2021-02-05 LAB
BASOPHILS # BLD AUTO: 0.09 K/UL (ref 0–0.2)
BASOPHILS NFR BLD: 0.9 % (ref 0–1.9)
DIFFERENTIAL METHOD: ABNORMAL
EOSINOPHIL # BLD AUTO: 0 K/UL (ref 0–0.5)
EOSINOPHIL NFR BLD: 0.2 % (ref 0–8)
ERYTHROCYTE [DISTWIDTH] IN BLOOD BY AUTOMATED COUNT: 14.9 % (ref 11.5–14.5)
ERYTHROCYTE [SEDIMENTATION RATE] IN BLOOD BY WESTERGREN METHOD: 62 MM/HR (ref 0–23)
HCT VFR BLD AUTO: 40.1 % (ref 40–54)
HGB BLD-MCNC: 13.5 G/DL (ref 14–18)
IMM GRANULOCYTES # BLD AUTO: 0.15 K/UL (ref 0–0.04)
IMM GRANULOCYTES NFR BLD AUTO: 1.5 % (ref 0–0.5)
LYMPHOCYTES # BLD AUTO: 1.4 K/UL (ref 1–4.8)
LYMPHOCYTES NFR BLD: 13.7 % (ref 18–48)
MCH RBC QN AUTO: 30.8 PG (ref 27–31)
MCHC RBC AUTO-ENTMCNC: 33.7 G/DL (ref 32–36)
MCV RBC AUTO: 92 FL (ref 82–98)
MONOCYTES # BLD AUTO: 0.5 K/UL (ref 0.3–1)
MONOCYTES NFR BLD: 4.8 % (ref 4–15)
NEUTROPHILS # BLD AUTO: 7.9 K/UL (ref 1.8–7.7)
NEUTROPHILS NFR BLD: 78.9 % (ref 38–73)
NRBC BLD-RTO: 0 /100 WBC
PLATELET # BLD AUTO: 298 K/UL (ref 150–350)
PMV BLD AUTO: 9 FL (ref 9.2–12.9)
RBC # BLD AUTO: 4.38 M/UL (ref 4.6–6.2)
WBC # BLD AUTO: 10.02 K/UL (ref 3.9–12.7)

## 2021-02-05 PROCEDURE — 86140 C-REACTIVE PROTEIN: CPT

## 2021-02-05 PROCEDURE — 85651 RBC SED RATE NONAUTOMATED: CPT

## 2021-02-05 PROCEDURE — 36415 COLL VENOUS BLD VENIPUNCTURE: CPT

## 2021-02-05 PROCEDURE — 80053 COMPREHEN METABOLIC PANEL: CPT

## 2021-02-05 PROCEDURE — 85025 COMPLETE CBC W/AUTO DIFF WBC: CPT

## 2021-02-06 LAB
ALBUMIN SERPL BCP-MCNC: 3.9 G/DL (ref 3.5–5.2)
ALP SERPL-CCNC: 61 U/L (ref 55–135)
ALT SERPL W/O P-5'-P-CCNC: 34 U/L (ref 10–44)
ANION GAP SERPL CALC-SCNC: 17 MMOL/L (ref 8–16)
AST SERPL-CCNC: 26 U/L (ref 10–40)
BILIRUB SERPL-MCNC: 0.2 MG/DL (ref 0.1–1)
BUN SERPL-MCNC: 19 MG/DL (ref 8–23)
CALCIUM SERPL-MCNC: 9.3 MG/DL (ref 8.7–10.5)
CHLORIDE SERPL-SCNC: 101 MMOL/L (ref 95–110)
CO2 SERPL-SCNC: 22 MMOL/L (ref 23–29)
CREAT SERPL-MCNC: 1.4 MG/DL (ref 0.5–1.4)
CRP SERPL-MCNC: 26.1 MG/L (ref 0–8.2)
EST. GFR  (AFRICAN AMERICAN): >60 ML/MIN/1.73 M^2
EST. GFR  (NON AFRICAN AMERICAN): 53.1 ML/MIN/1.73 M^2
GLUCOSE SERPL-MCNC: 156 MG/DL (ref 70–110)
POTASSIUM SERPL-SCNC: 4.4 MMOL/L (ref 3.5–5.1)
PROT SERPL-MCNC: 7.6 G/DL (ref 6–8.4)
SODIUM SERPL-SCNC: 140 MMOL/L (ref 136–145)

## 2021-02-23 ENCOUNTER — OFFICE VISIT (OUTPATIENT)
Dept: RHEUMATOLOGY | Facility: CLINIC | Age: 66
End: 2021-02-23
Payer: MEDICAID

## 2021-02-23 ENCOUNTER — HOSPITAL ENCOUNTER (OUTPATIENT)
Dept: CARDIOLOGY | Facility: HOSPITAL | Age: 66
Discharge: HOME | End: 2021-02-23
Attending: INTERNAL MEDICINE
Payer: MEDICAID

## 2021-02-23 ENCOUNTER — OFFICE VISIT (OUTPATIENT)
Dept: INTERNAL MEDICINE | Facility: CLINIC | Age: 66
End: 2021-02-23
Payer: MEDICAID

## 2021-02-23 ENCOUNTER — PATIENT OUTREACH (OUTPATIENT)
Dept: ADMINISTRATIVE | Facility: OTHER | Age: 66
End: 2021-02-23

## 2021-02-23 ENCOUNTER — PATIENT MESSAGE (OUTPATIENT)
Dept: INTERNAL MEDICINE | Facility: CLINIC | Age: 66
End: 2021-02-23

## 2021-02-23 VITALS
OXYGEN SATURATION: 98 % | HEART RATE: 74 BPM | SYSTOLIC BLOOD PRESSURE: 120 MMHG | DIASTOLIC BLOOD PRESSURE: 70 MMHG | HEIGHT: 65 IN | BODY MASS INDEX: 26.15 KG/M2 | WEIGHT: 156.94 LBS | TEMPERATURE: 99 F

## 2021-02-23 VITALS
WEIGHT: 157.63 LBS | WEIGHT: 152 LBS | HEIGHT: 65 IN | DIASTOLIC BLOOD PRESSURE: 62 MMHG | SYSTOLIC BLOOD PRESSURE: 130 MMHG | SYSTOLIC BLOOD PRESSURE: 156 MMHG | DIASTOLIC BLOOD PRESSURE: 78 MMHG | BODY MASS INDEX: 26.26 KG/M2 | HEIGHT: 65 IN | HEART RATE: 66 BPM | BODY MASS INDEX: 25.33 KG/M2

## 2021-02-23 DIAGNOSIS — E78.5 HYPERLIPIDEMIA, UNSPECIFIED HYPERLIPIDEMIA TYPE: ICD-10-CM

## 2021-02-23 DIAGNOSIS — D84.9 IMMUNOCOMPROMISED: ICD-10-CM

## 2021-02-23 DIAGNOSIS — Z79.52 CURRENT CHRONIC USE OF SYSTEMIC STEROIDS: ICD-10-CM

## 2021-02-23 DIAGNOSIS — R76.12 POSITIVE QUANTIFERON-TB GOLD TEST: ICD-10-CM

## 2021-02-23 DIAGNOSIS — Z51.81 ENCOUNTER FOR MEDICATION MONITORING: ICD-10-CM

## 2021-02-23 DIAGNOSIS — Z79.899 LONG TERM CURRENT USE OF IMMUNOSUPPRESSIVE DRUG: ICD-10-CM

## 2021-02-23 DIAGNOSIS — M45.0 ANKYLOSING SPONDYLITIS OF MULTIPLE SITES IN SPINE: Primary | ICD-10-CM

## 2021-02-23 DIAGNOSIS — M48.10 DISH (DIFFUSE IDIOPATHIC SKELETAL HYPEROSTOSIS): ICD-10-CM

## 2021-02-23 DIAGNOSIS — I10 HYPERTENSION, ESSENTIAL: Primary | ICD-10-CM

## 2021-02-23 DIAGNOSIS — R73.9 ELEVATED BLOOD SUGAR LEVEL: ICD-10-CM

## 2021-02-23 DIAGNOSIS — I73.9 PAD (PERIPHERAL ARTERY DISEASE): ICD-10-CM

## 2021-02-23 DIAGNOSIS — Z12.11 SCREENING FOR COLON CANCER: ICD-10-CM

## 2021-02-23 DIAGNOSIS — Z12.5 SCREENING FOR PROSTATE CANCER: ICD-10-CM

## 2021-02-23 PROBLEM — Z79.60 LONG TERM CURRENT USE OF IMMUNOSUPPRESSIVE DRUG: Status: ACTIVE | Noted: 2021-02-23

## 2021-02-23 LAB
LEFT ANT TIBIAL SYS PSV: 46 CM/S
LEFT CFA PSV: 148 CM/S
LEFT PERONEAL SYS PSV: 29 CM/S
LEFT POPLITEAL PSV: 47 CM/S
LEFT POST TIBIAL SYS PSV: 50 CM/S
LEFT PROFUNDA SYS PSV: 52 CM/S
LEFT SUPER FEMORAL DIST SYS PSV: 62 CM/S
LEFT SUPER FEMORAL MID SYS PSV: 64 CM/S
LEFT SUPER FEMORAL OSTIAL SYS PSV: 92 CM/S
LEFT SUPER FEMORAL PROX SYS PSV: 72 CM/S
LEFT TIB/PER TRUNK SYS PSV: 53 CM/S
OHS CV LEFT LOWER EXTREMITY ABI (NO CALC): 1.07
OHS CV RIGHT ABI LOWER EXTREMITY (NO CALC): 1.09
RIGHT ANT TIBIAL SYS PSV: 38 CM/S
RIGHT CFA PSV: 108 CM/S
RIGHT PERONEAL SYS PSV: 39 CM/S
RIGHT POPLITEAL PSV: 53 CM/S
RIGHT POST TIBIAL SYS PSV: 71 CM/S
RIGHT PROFUNDA SYS PSV: 66 CM/S
RIGHT SUPER FEMORAL DIST SYS PSV: 62 CM/S
RIGHT SUPER FEMORAL MID SYS PSV: 66 CM/S
RIGHT SUPER FEMORAL OSTIAL SYS PSV: 89 CM/S
RIGHT SUPER FEMORAL PROX SYS PSV: 71 CM/S
RIGHT TIB/PER TRUNK SYS PSV: 96 CM/S

## 2021-02-23 PROCEDURE — 99212 OFFICE O/P EST SF 10 MIN: CPT | Mod: PBBFAC,25 | Performed by: INTERNAL MEDICINE

## 2021-02-23 PROCEDURE — 99214 PR OFFICE/OUTPT VISIT, EST, LEVL IV, 30-39 MIN: ICD-10-PCS | Mod: S$PBB,,, | Performed by: FAMILY MEDICINE

## 2021-02-23 PROCEDURE — 99999 PR PBB SHADOW E&M-EST. PATIENT-LVL IV: ICD-10-PCS | Mod: PBBFAC,,, | Performed by: FAMILY MEDICINE

## 2021-02-23 PROCEDURE — 99214 OFFICE O/P EST MOD 30 MIN: CPT | Mod: PBBFAC,25,27 | Performed by: FAMILY MEDICINE

## 2021-02-23 PROCEDURE — 93925 LOWER EXTREMITY STUDY: CPT

## 2021-02-23 PROCEDURE — 99999 PR PBB SHADOW E&M-EST. PATIENT-LVL II: CPT | Mod: PBBFAC,,, | Performed by: INTERNAL MEDICINE

## 2021-02-23 PROCEDURE — 99214 OFFICE O/P EST MOD 30 MIN: CPT | Mod: S$PBB,,, | Performed by: FAMILY MEDICINE

## 2021-02-23 PROCEDURE — 93925 LOWER EXTREMITY STUDY: CPT | Mod: 26,,, | Performed by: INTERNAL MEDICINE

## 2021-02-23 PROCEDURE — 99215 PR OFFICE/OUTPT VISIT, EST, LEVL V, 40-54 MIN: ICD-10-PCS | Mod: S$PBB,,, | Performed by: INTERNAL MEDICINE

## 2021-02-23 PROCEDURE — 99215 OFFICE O/P EST HI 40 MIN: CPT | Mod: S$PBB,,, | Performed by: INTERNAL MEDICINE

## 2021-02-23 PROCEDURE — 99999 PR PBB SHADOW E&M-EST. PATIENT-LVL II: ICD-10-PCS | Mod: PBBFAC,,, | Performed by: INTERNAL MEDICINE

## 2021-02-23 PROCEDURE — 93925 CV US DOPPLER ARTERIAL LEGS BILATERAL (CUPID ONLY): ICD-10-PCS | Mod: 26,,, | Performed by: INTERNAL MEDICINE

## 2021-02-23 PROCEDURE — 99999 PR PBB SHADOW E&M-EST. PATIENT-LVL IV: CPT | Mod: PBBFAC,,, | Performed by: FAMILY MEDICINE

## 2021-02-23 RX ORDER — SULFASALAZINE 500 MG/1
500 TABLET, DELAYED RELEASE ORAL 2 TIMES DAILY
Qty: 60 TABLET | Refills: 2 | Status: SHIPPED | OUTPATIENT
Start: 2021-02-23 | End: 2021-05-18 | Stop reason: SDUPTHER

## 2021-02-23 RX ORDER — ROSUVASTATIN CALCIUM 20 MG/1
20 TABLET, COATED ORAL DAILY
Qty: 90 TABLET | Refills: 3 | Status: SHIPPED | OUTPATIENT
Start: 2021-02-23 | End: 2021-06-04 | Stop reason: SDUPTHER

## 2021-02-24 ENCOUNTER — PATIENT MESSAGE (OUTPATIENT)
Dept: CARDIOLOGY | Facility: CLINIC | Age: 66
End: 2021-02-24

## 2021-02-24 ENCOUNTER — TELEPHONE (OUTPATIENT)
Dept: CARDIOLOGY | Facility: CLINIC | Age: 66
End: 2021-02-24

## 2021-02-24 RX ORDER — CILOSTAZOL 50 MG/1
50 TABLET ORAL 2 TIMES DAILY
Qty: 60 TABLET | Refills: 11 | Status: SHIPPED | OUTPATIENT
Start: 2021-02-24 | End: 2021-02-25 | Stop reason: SDUPTHER

## 2021-02-25 ENCOUNTER — PATIENT MESSAGE (OUTPATIENT)
Dept: INTERNAL MEDICINE | Facility: CLINIC | Age: 66
End: 2021-02-25

## 2021-02-25 DIAGNOSIS — E78.5 HYPERLIPIDEMIA, UNSPECIFIED HYPERLIPIDEMIA TYPE: Primary | ICD-10-CM

## 2021-02-25 RX ORDER — CILOSTAZOL 50 MG/1
50 TABLET ORAL 2 TIMES DAILY
Qty: 60 TABLET | Refills: 11 | Status: SHIPPED | OUTPATIENT
Start: 2021-02-25 | End: 2021-02-25 | Stop reason: SDUPTHER

## 2021-02-25 RX ORDER — CILOSTAZOL 50 MG/1
50 TABLET ORAL 2 TIMES DAILY
Qty: 60 TABLET | Refills: 11 | Status: SHIPPED | OUTPATIENT
Start: 2021-02-25 | End: 2022-03-02

## 2021-03-02 ENCOUNTER — PATIENT OUTREACH (OUTPATIENT)
Dept: ADMINISTRATIVE | Facility: HOSPITAL | Age: 66
End: 2021-03-02

## 2021-03-05 ENCOUNTER — PATIENT MESSAGE (OUTPATIENT)
Dept: INTERNAL MEDICINE | Facility: CLINIC | Age: 66
End: 2021-03-05

## 2021-03-05 ENCOUNTER — PATIENT MESSAGE (OUTPATIENT)
Dept: CARDIOLOGY | Facility: CLINIC | Age: 66
End: 2021-03-05

## 2021-03-05 DIAGNOSIS — E78.2 MIXED HYPERLIPIDEMIA: Primary | ICD-10-CM

## 2021-03-05 DIAGNOSIS — I25.118 CORONARY ARTERY DISEASE OF NATIVE ARTERY OF NATIVE HEART WITH STABLE ANGINA PECTORIS: ICD-10-CM

## 2021-03-05 DIAGNOSIS — I10 HYPERTENSION, ESSENTIAL: ICD-10-CM

## 2021-03-05 RX ORDER — CLONIDINE HYDROCHLORIDE 0.2 MG/1
0.2 TABLET ORAL 2 TIMES DAILY
Qty: 180 TABLET | Refills: 0 | Status: SHIPPED | OUTPATIENT
Start: 2021-03-05 | End: 2021-03-07 | Stop reason: SDUPTHER

## 2021-03-05 RX ORDER — AMLODIPINE BESYLATE 10 MG/1
10 TABLET ORAL DAILY
Qty: 30 TABLET | Refills: 3 | Status: CANCELLED | OUTPATIENT
Start: 2021-03-05

## 2021-03-05 RX ORDER — CLONIDINE HYDROCHLORIDE 0.2 MG/1
0.2 TABLET ORAL 2 TIMES DAILY
Qty: 60 TABLET | Refills: 3 | Status: CANCELLED | OUTPATIENT
Start: 2021-03-05 | End: 2021-04-04

## 2021-03-05 RX ORDER — BENAZEPRIL HYDROCHLORIDE 40 MG/1
40 TABLET ORAL DAILY
Qty: 30 TABLET | Refills: 3 | Status: CANCELLED | OUTPATIENT
Start: 2021-03-05 | End: 2021-04-04

## 2021-03-05 RX ORDER — AMLODIPINE BESYLATE 10 MG/1
10 TABLET ORAL DAILY
Qty: 90 TABLET | Refills: 0 | Status: SHIPPED | OUTPATIENT
Start: 2021-03-05 | End: 2021-03-07 | Stop reason: SDUPTHER

## 2021-03-05 RX ORDER — BENAZEPRIL HYDROCHLORIDE 40 MG/1
40 TABLET ORAL DAILY
Qty: 90 TABLET | Refills: 0 | Status: SHIPPED | OUTPATIENT
Start: 2021-03-05 | End: 2021-03-07 | Stop reason: SDUPTHER

## 2021-03-06 ENCOUNTER — PATIENT MESSAGE (OUTPATIENT)
Dept: RHEUMATOLOGY | Facility: CLINIC | Age: 66
End: 2021-03-06

## 2021-03-06 DIAGNOSIS — M45.0 ANKYLOSING SPONDYLITIS OF MULTIPLE SITES IN SPINE: ICD-10-CM

## 2021-03-07 RX ORDER — BENAZEPRIL HYDROCHLORIDE 40 MG/1
40 TABLET ORAL DAILY
Qty: 90 TABLET | Refills: 2 | Status: SHIPPED | OUTPATIENT
Start: 2021-03-07 | End: 2021-08-14 | Stop reason: SDUPTHER

## 2021-03-07 RX ORDER — AMLODIPINE BESYLATE 10 MG/1
10 TABLET ORAL DAILY
Qty: 90 TABLET | Refills: 2 | Status: SHIPPED | OUTPATIENT
Start: 2021-03-07 | End: 2021-08-14 | Stop reason: SDUPTHER

## 2021-03-07 RX ORDER — CLONIDINE HYDROCHLORIDE 0.2 MG/1
0.2 TABLET ORAL 2 TIMES DAILY
Qty: 180 TABLET | Refills: 2 | Status: SHIPPED | OUTPATIENT
Start: 2021-03-07 | End: 2021-08-14 | Stop reason: SDUPTHER

## 2021-03-08 RX ORDER — FOLIC ACID 1 MG/1
1 TABLET ORAL DAILY
Qty: 90 TABLET | Refills: 0 | Status: SHIPPED | OUTPATIENT
Start: 2021-03-08 | End: 2021-05-18 | Stop reason: SDUPTHER

## 2021-03-15 ENCOUNTER — PATIENT MESSAGE (OUTPATIENT)
Dept: RHEUMATOLOGY | Facility: CLINIC | Age: 66
End: 2021-03-15

## 2021-03-15 ENCOUNTER — PATIENT MESSAGE (OUTPATIENT)
Dept: CARDIOLOGY | Facility: CLINIC | Age: 66
End: 2021-03-15

## 2021-03-26 ENCOUNTER — LAB VISIT (OUTPATIENT)
Dept: LAB | Facility: HOSPITAL | Age: 66
End: 2021-03-26
Attending: INTERNAL MEDICINE
Payer: MEDICAID

## 2021-03-26 ENCOUNTER — OFFICE VISIT (OUTPATIENT)
Dept: CARDIOLOGY | Facility: CLINIC | Age: 66
End: 2021-03-26
Payer: MEDICAID

## 2021-03-26 VITALS
WEIGHT: 157.44 LBS | SYSTOLIC BLOOD PRESSURE: 144 MMHG | HEART RATE: 79 BPM | OXYGEN SATURATION: 97 % | DIASTOLIC BLOOD PRESSURE: 82 MMHG | BODY MASS INDEX: 26.19 KG/M2

## 2021-03-26 DIAGNOSIS — M25.50 POLYARTHRALGIA: ICD-10-CM

## 2021-03-26 DIAGNOSIS — F17.200 SMOKING: ICD-10-CM

## 2021-03-26 DIAGNOSIS — I25.118 CORONARY ARTERY DISEASE OF NATIVE ARTERY OF NATIVE HEART WITH STABLE ANGINA PECTORIS: ICD-10-CM

## 2021-03-26 DIAGNOSIS — I73.9 PAD (PERIPHERAL ARTERY DISEASE): Primary | ICD-10-CM

## 2021-03-26 DIAGNOSIS — M45.0 ANKYLOSING SPONDYLITIS OF MULTIPLE SITES IN SPINE: ICD-10-CM

## 2021-03-26 DIAGNOSIS — I10 HYPERTENSION, ESSENTIAL: ICD-10-CM

## 2021-03-26 DIAGNOSIS — E78.2 MIXED HYPERLIPIDEMIA: ICD-10-CM

## 2021-03-26 LAB
ALBUMIN SERPL BCP-MCNC: 3.8 G/DL (ref 3.5–5.2)
ALP SERPL-CCNC: 54 U/L (ref 55–135)
ALT SERPL W/O P-5'-P-CCNC: 29 U/L (ref 10–44)
ANION GAP SERPL CALC-SCNC: 12 MMOL/L (ref 8–16)
AST SERPL-CCNC: 21 U/L (ref 10–40)
BASOPHILS # BLD AUTO: 0.07 K/UL (ref 0–0.2)
BASOPHILS NFR BLD: 0.8 % (ref 0–1.9)
BILIRUB SERPL-MCNC: 0.2 MG/DL (ref 0.1–1)
BUN SERPL-MCNC: 17 MG/DL (ref 8–23)
CALCIUM SERPL-MCNC: 9.1 MG/DL (ref 8.7–10.5)
CHLORIDE SERPL-SCNC: 101 MMOL/L (ref 95–110)
CO2 SERPL-SCNC: 27 MMOL/L (ref 23–29)
CREAT SERPL-MCNC: 1.6 MG/DL (ref 0.5–1.4)
CRP SERPL-MCNC: 0.5 MG/L (ref 0–8.2)
DIFFERENTIAL METHOD: ABNORMAL
EOSINOPHIL # BLD AUTO: 0.1 K/UL (ref 0–0.5)
EOSINOPHIL NFR BLD: 0.9 % (ref 0–8)
ERYTHROCYTE [DISTWIDTH] IN BLOOD BY AUTOMATED COUNT: 14.5 % (ref 11.5–14.5)
ERYTHROCYTE [SEDIMENTATION RATE] IN BLOOD BY WESTERGREN METHOD: 12 MM/HR (ref 0–10)
EST. GFR  (AFRICAN AMERICAN): 52 ML/MIN/1.73 M^2
EST. GFR  (NON AFRICAN AMERICAN): 45 ML/MIN/1.73 M^2
GLUCOSE SERPL-MCNC: 231 MG/DL (ref 70–110)
HCT VFR BLD AUTO: 39.4 % (ref 40–54)
HGB BLD-MCNC: 12.7 G/DL (ref 14–18)
IMM GRANULOCYTES # BLD AUTO: 0.1 K/UL (ref 0–0.04)
IMM GRANULOCYTES NFR BLD AUTO: 1.1 % (ref 0–0.5)
LYMPHOCYTES # BLD AUTO: 2 K/UL (ref 1–4.8)
LYMPHOCYTES NFR BLD: 21.4 % (ref 18–48)
MCH RBC QN AUTO: 31.1 PG (ref 27–31)
MCHC RBC AUTO-ENTMCNC: 32.2 G/DL (ref 32–36)
MCV RBC AUTO: 96 FL (ref 82–98)
MONOCYTES # BLD AUTO: 0.6 K/UL (ref 0.3–1)
MONOCYTES NFR BLD: 5.9 % (ref 4–15)
NEUTROPHILS # BLD AUTO: 6.5 K/UL (ref 1.8–7.7)
NEUTROPHILS NFR BLD: 69.9 % (ref 38–73)
NRBC BLD-RTO: 0 /100 WBC
PLATELET # BLD AUTO: 341 K/UL (ref 150–350)
PMV BLD AUTO: 8.5 FL (ref 9.2–12.9)
POTASSIUM SERPL-SCNC: 3.5 MMOL/L (ref 3.5–5.1)
PROT SERPL-MCNC: 7.5 G/DL (ref 6–8.4)
RBC # BLD AUTO: 4.09 M/UL (ref 4.6–6.2)
SODIUM SERPL-SCNC: 140 MMOL/L (ref 136–145)
WBC # BLD AUTO: 9.33 K/UL (ref 3.9–12.7)

## 2021-03-26 PROCEDURE — 85025 COMPLETE CBC W/AUTO DIFF WBC: CPT | Performed by: INTERNAL MEDICINE

## 2021-03-26 PROCEDURE — 99214 OFFICE O/P EST MOD 30 MIN: CPT | Mod: S$PBB,,, | Performed by: INTERNAL MEDICINE

## 2021-03-26 PROCEDURE — 99999 PR PBB SHADOW E&M-EST. PATIENT-LVL IV: CPT | Mod: PBBFAC,,, | Performed by: INTERNAL MEDICINE

## 2021-03-26 PROCEDURE — 85651 RBC SED RATE NONAUTOMATED: CPT | Performed by: INTERNAL MEDICINE

## 2021-03-26 PROCEDURE — 99999 PR PBB SHADOW E&M-EST. PATIENT-LVL IV: ICD-10-PCS | Mod: PBBFAC,,, | Performed by: INTERNAL MEDICINE

## 2021-03-26 PROCEDURE — 80053 COMPREHEN METABOLIC PANEL: CPT | Performed by: INTERNAL MEDICINE

## 2021-03-26 PROCEDURE — 36415 COLL VENOUS BLD VENIPUNCTURE: CPT | Performed by: INTERNAL MEDICINE

## 2021-03-26 PROCEDURE — 99214 PR OFFICE/OUTPT VISIT, EST, LEVL IV, 30-39 MIN: ICD-10-PCS | Mod: S$PBB,,, | Performed by: INTERNAL MEDICINE

## 2021-03-26 PROCEDURE — 99214 OFFICE O/P EST MOD 30 MIN: CPT | Mod: PBBFAC | Performed by: INTERNAL MEDICINE

## 2021-03-26 PROCEDURE — 86140 C-REACTIVE PROTEIN: CPT | Performed by: INTERNAL MEDICINE

## 2021-03-26 RX ORDER — METHOTREXATE 2.5 MG/1
10 TABLET ORAL
Qty: 16 TABLET | Refills: 1 | Status: SHIPPED | OUTPATIENT
Start: 2021-03-26 | End: 2021-05-22 | Stop reason: SDUPTHER

## 2021-04-01 ENCOUNTER — PATIENT OUTREACH (OUTPATIENT)
Dept: ADMINISTRATIVE | Facility: HOSPITAL | Age: 66
End: 2021-04-01

## 2021-04-02 ENCOUNTER — PATIENT MESSAGE (OUTPATIENT)
Dept: ADMINISTRATIVE | Facility: HOSPITAL | Age: 66
End: 2021-04-02

## 2021-05-18 DIAGNOSIS — R79.82 ELEVATED C-REACTIVE PROTEIN (CRP): ICD-10-CM

## 2021-05-18 DIAGNOSIS — M45.0 ANKYLOSING SPONDYLITIS OF MULTIPLE SITES IN SPINE: ICD-10-CM

## 2021-05-18 DIAGNOSIS — R70.0 ELEVATED SED RATE: ICD-10-CM

## 2021-05-18 DIAGNOSIS — M25.50 POLYARTHRALGIA: ICD-10-CM

## 2021-05-18 RX ORDER — PREDNISONE 5 MG/1
10 TABLET ORAL DAILY
Qty: 60 TABLET | Refills: 0 | Status: SHIPPED | OUTPATIENT
Start: 2021-05-18 | End: 2022-04-14

## 2021-05-18 RX ORDER — SULFASALAZINE 500 MG/1
500 TABLET, DELAYED RELEASE ORAL 2 TIMES DAILY
Qty: 60 TABLET | Refills: 2 | Status: SHIPPED | OUTPATIENT
Start: 2021-05-18 | End: 2021-06-23 | Stop reason: SINTOL

## 2021-05-18 RX ORDER — FOLIC ACID 1 MG/1
1 TABLET ORAL DAILY
Qty: 90 TABLET | Refills: 0 | Status: SHIPPED | OUTPATIENT
Start: 2021-05-18 | End: 2021-06-23 | Stop reason: ALTCHOICE

## 2021-05-22 DIAGNOSIS — M25.50 POLYARTHRALGIA: ICD-10-CM

## 2021-05-22 DIAGNOSIS — M45.0 ANKYLOSING SPONDYLITIS OF MULTIPLE SITES IN SPINE: ICD-10-CM

## 2021-05-24 RX ORDER — METHOTREXATE 2.5 MG/1
10 TABLET ORAL
Qty: 16 TABLET | Refills: 1 | Status: SHIPPED | OUTPATIENT
Start: 2021-05-24 | End: 2021-06-23 | Stop reason: SINTOL

## 2021-06-03 ENCOUNTER — PATIENT MESSAGE (OUTPATIENT)
Dept: RHEUMATOLOGY | Facility: CLINIC | Age: 66
End: 2021-06-03

## 2021-06-04 ENCOUNTER — LAB VISIT (OUTPATIENT)
Dept: LAB | Facility: HOSPITAL | Age: 66
End: 2021-06-04
Attending: FAMILY MEDICINE
Payer: MEDICAID

## 2021-06-04 ENCOUNTER — OFFICE VISIT (OUTPATIENT)
Dept: INTERNAL MEDICINE | Facility: CLINIC | Age: 66
End: 2021-06-04
Payer: MEDICAID

## 2021-06-04 VITALS
OXYGEN SATURATION: 99 % | DIASTOLIC BLOOD PRESSURE: 72 MMHG | HEART RATE: 65 BPM | BODY MASS INDEX: 26.38 KG/M2 | TEMPERATURE: 96 F | RESPIRATION RATE: 18 BRPM | SYSTOLIC BLOOD PRESSURE: 126 MMHG | HEIGHT: 65 IN | WEIGHT: 158.31 LBS

## 2021-06-04 DIAGNOSIS — E78.2 MIXED HYPERLIPIDEMIA: ICD-10-CM

## 2021-06-04 DIAGNOSIS — M45.0 ANKYLOSING SPONDYLITIS OF MULTIPLE SITES IN SPINE: ICD-10-CM

## 2021-06-04 DIAGNOSIS — R52 SHARP PAIN: ICD-10-CM

## 2021-06-04 DIAGNOSIS — R51.9 INTRACTABLE HEADACHE, UNSPECIFIED CHRONICITY PATTERN, UNSPECIFIED HEADACHE TYPE: ICD-10-CM

## 2021-06-04 DIAGNOSIS — R73.03 PREDIABETES: ICD-10-CM

## 2021-06-04 DIAGNOSIS — Z00.00 ROUTINE PHYSICAL EXAMINATION: Primary | ICD-10-CM

## 2021-06-04 DIAGNOSIS — E78.5 HYPERLIPIDEMIA, UNSPECIFIED HYPERLIPIDEMIA TYPE: ICD-10-CM

## 2021-06-04 LAB
ALBUMIN SERPL BCP-MCNC: 4 G/DL (ref 3.5–5.2)
ALP SERPL-CCNC: 56 U/L (ref 55–135)
ALT SERPL W/O P-5'-P-CCNC: 36 U/L (ref 10–44)
ANION GAP SERPL CALC-SCNC: 13 MMOL/L (ref 8–16)
AST SERPL-CCNC: 27 U/L (ref 10–40)
BASOPHILS # BLD AUTO: 0.1 K/UL (ref 0–0.2)
BASOPHILS NFR BLD: 1.1 % (ref 0–1.9)
BILIRUB SERPL-MCNC: 0.2 MG/DL (ref 0.1–1)
BUN SERPL-MCNC: 16 MG/DL (ref 8–23)
CALCIUM SERPL-MCNC: 9.2 MG/DL (ref 8.7–10.5)
CHLORIDE SERPL-SCNC: 104 MMOL/L (ref 95–110)
CO2 SERPL-SCNC: 24 MMOL/L (ref 23–29)
CREAT SERPL-MCNC: 1.3 MG/DL (ref 0.5–1.4)
CRP SERPL-MCNC: 0.8 MG/L (ref 0–8.2)
DIFFERENTIAL METHOD: ABNORMAL
EOSINOPHIL # BLD AUTO: 0.1 K/UL (ref 0–0.5)
EOSINOPHIL NFR BLD: 1 % (ref 0–8)
ERYTHROCYTE [DISTWIDTH] IN BLOOD BY AUTOMATED COUNT: 14.3 % (ref 11.5–14.5)
ERYTHROCYTE [SEDIMENTATION RATE] IN BLOOD BY WESTERGREN METHOD: 22 MM/HR (ref 0–10)
EST. GFR  (AFRICAN AMERICAN): >60 ML/MIN/1.73 M^2
EST. GFR  (NON AFRICAN AMERICAN): 58 ML/MIN/1.73 M^2
ESTIMATED AVG GLUCOSE: 137 MG/DL (ref 68–131)
GLUCOSE SERPL-MCNC: 121 MG/DL (ref 70–110)
HBA1C MFR BLD: 6.4 % (ref 4–5.6)
HCT VFR BLD AUTO: 39.8 % (ref 40–54)
HGB BLD-MCNC: 13.2 G/DL (ref 14–18)
IMM GRANULOCYTES # BLD AUTO: 0.09 K/UL (ref 0–0.04)
IMM GRANULOCYTES NFR BLD AUTO: 1 % (ref 0–0.5)
LYMPHOCYTES # BLD AUTO: 1.8 K/UL (ref 1–4.8)
LYMPHOCYTES NFR BLD: 20.3 % (ref 18–48)
MCH RBC QN AUTO: 31.8 PG (ref 27–31)
MCHC RBC AUTO-ENTMCNC: 33.2 G/DL (ref 32–36)
MCV RBC AUTO: 96 FL (ref 82–98)
MONOCYTES # BLD AUTO: 0.5 K/UL (ref 0.3–1)
MONOCYTES NFR BLD: 5.7 % (ref 4–15)
NEUTROPHILS # BLD AUTO: 6.3 K/UL (ref 1.8–7.7)
NEUTROPHILS NFR BLD: 70.9 % (ref 38–73)
NRBC BLD-RTO: 0 /100 WBC
PLATELET # BLD AUTO: 333 K/UL (ref 150–450)
PMV BLD AUTO: 9.2 FL (ref 9.2–12.9)
POTASSIUM SERPL-SCNC: 4 MMOL/L (ref 3.5–5.1)
PROT SERPL-MCNC: 7.6 G/DL (ref 6–8.4)
RBC # BLD AUTO: 4.15 M/UL (ref 4.6–6.2)
SODIUM SERPL-SCNC: 141 MMOL/L (ref 136–145)
WBC # BLD AUTO: 8.9 K/UL (ref 3.9–12.7)

## 2021-06-04 PROCEDURE — 80061 LIPID PANEL: CPT | Performed by: INTERNAL MEDICINE

## 2021-06-04 PROCEDURE — 99999 PR PBB SHADOW E&M-EST. PATIENT-LVL III: CPT | Mod: PBBFAC,,, | Performed by: FAMILY MEDICINE

## 2021-06-04 PROCEDURE — 86140 C-REACTIVE PROTEIN: CPT | Performed by: INTERNAL MEDICINE

## 2021-06-04 PROCEDURE — 83036 HEMOGLOBIN GLYCOSYLATED A1C: CPT | Performed by: FAMILY MEDICINE

## 2021-06-04 PROCEDURE — 99999 PR PBB SHADOW E&M-EST. PATIENT-LVL III: ICD-10-PCS | Mod: PBBFAC,,, | Performed by: FAMILY MEDICINE

## 2021-06-04 PROCEDURE — 85651 RBC SED RATE NONAUTOMATED: CPT | Performed by: INTERNAL MEDICINE

## 2021-06-04 PROCEDURE — 85025 COMPLETE CBC W/AUTO DIFF WBC: CPT | Performed by: INTERNAL MEDICINE

## 2021-06-04 PROCEDURE — 99213 OFFICE O/P EST LOW 20 MIN: CPT | Mod: PBBFAC | Performed by: FAMILY MEDICINE

## 2021-06-04 PROCEDURE — 80053 COMPREHEN METABOLIC PANEL: CPT | Performed by: INTERNAL MEDICINE

## 2021-06-04 PROCEDURE — 99396 PREV VISIT EST AGE 40-64: CPT | Mod: S$PBB,,, | Performed by: FAMILY MEDICINE

## 2021-06-04 PROCEDURE — 36415 COLL VENOUS BLD VENIPUNCTURE: CPT | Performed by: INTERNAL MEDICINE

## 2021-06-04 PROCEDURE — 99396 PR PREVENTIVE VISIT,EST,40-64: ICD-10-PCS | Mod: S$PBB,,, | Performed by: FAMILY MEDICINE

## 2021-06-04 RX ORDER — ROSUVASTATIN CALCIUM 20 MG/1
20 TABLET, COATED ORAL DAILY
Qty: 90 TABLET | Refills: 3 | Status: SHIPPED | OUTPATIENT
Start: 2021-06-04 | End: 2022-02-14 | Stop reason: SDUPTHER

## 2021-06-05 LAB
CHOLEST SERPL-MCNC: 131 MG/DL (ref 120–199)
CHOLEST/HDLC SERPL: 2.2 {RATIO} (ref 2–5)
HDLC SERPL-MCNC: 60 MG/DL (ref 40–75)
HDLC SERPL: 45.8 % (ref 20–50)
LDLC SERPL CALC-MCNC: 51 MG/DL (ref 63–159)
NONHDLC SERPL-MCNC: 71 MG/DL
TRIGL SERPL-MCNC: 100 MG/DL (ref 30–150)

## 2021-06-07 ENCOUNTER — TELEPHONE (OUTPATIENT)
Dept: CARDIOLOGY | Facility: CLINIC | Age: 66
End: 2021-06-07

## 2021-06-10 ENCOUNTER — HOSPITAL ENCOUNTER (OUTPATIENT)
Dept: RADIOLOGY | Facility: HOSPITAL | Age: 66
Discharge: HOME | End: 2021-06-10
Attending: FAMILY MEDICINE
Payer: MEDICAID

## 2021-06-10 DIAGNOSIS — R51.9 INTRACTABLE HEADACHE, UNSPECIFIED CHRONICITY PATTERN, UNSPECIFIED HEADACHE TYPE: ICD-10-CM

## 2021-06-10 DIAGNOSIS — R52 SHARP PAIN: ICD-10-CM

## 2021-06-10 PROCEDURE — 70450 CT HEAD/BRAIN W/O DYE: CPT | Mod: TC

## 2021-06-22 ENCOUNTER — PATIENT OUTREACH (OUTPATIENT)
Dept: ADMINISTRATIVE | Facility: OTHER | Age: 66
End: 2021-06-22

## 2021-06-23 ENCOUNTER — OFFICE VISIT (OUTPATIENT)
Dept: RHEUMATOLOGY | Facility: CLINIC | Age: 66
End: 2021-06-23
Payer: MEDICAID

## 2021-06-23 VITALS
HEIGHT: 65 IN | BODY MASS INDEX: 25.97 KG/M2 | SYSTOLIC BLOOD PRESSURE: 116 MMHG | DIASTOLIC BLOOD PRESSURE: 67 MMHG | WEIGHT: 155.88 LBS | HEART RATE: 69 BPM

## 2021-06-23 DIAGNOSIS — M48.10 DISH (DIFFUSE IDIOPATHIC SKELETAL HYPEROSTOSIS): ICD-10-CM

## 2021-06-23 DIAGNOSIS — M45.0 ANKYLOSING SPONDYLITIS OF MULTIPLE SITES IN SPINE: Primary | ICD-10-CM

## 2021-06-23 PROCEDURE — 99214 OFFICE O/P EST MOD 30 MIN: CPT | Mod: PBBFAC | Performed by: INTERNAL MEDICINE

## 2021-06-23 PROCEDURE — 99999 PR PBB SHADOW E&M-EST. PATIENT-LVL IV: CPT | Mod: PBBFAC,,, | Performed by: INTERNAL MEDICINE

## 2021-06-23 PROCEDURE — 99214 PR OFFICE/OUTPT VISIT, EST, LEVL IV, 30-39 MIN: ICD-10-PCS | Mod: S$PBB,,, | Performed by: INTERNAL MEDICINE

## 2021-06-23 PROCEDURE — 99999 PR PBB SHADOW E&M-EST. PATIENT-LVL IV: ICD-10-PCS | Mod: PBBFAC,,, | Performed by: INTERNAL MEDICINE

## 2021-06-23 PROCEDURE — 99214 OFFICE O/P EST MOD 30 MIN: CPT | Mod: S$PBB,,, | Performed by: INTERNAL MEDICINE

## 2021-07-12 ENCOUNTER — PATIENT MESSAGE (OUTPATIENT)
Dept: RHEUMATOLOGY | Facility: CLINIC | Age: 66
End: 2021-07-12

## 2021-07-13 ENCOUNTER — PATIENT MESSAGE (OUTPATIENT)
Dept: RHEUMATOLOGY | Facility: CLINIC | Age: 66
End: 2021-07-13

## 2021-07-13 DIAGNOSIS — M45.0 ANKYLOSING SPONDYLITIS OF MULTIPLE SITES IN SPINE: Primary | ICD-10-CM

## 2021-07-15 ENCOUNTER — HISTORICAL (OUTPATIENT)
Dept: LAB | Facility: HOSPITAL | Age: 66
End: 2021-07-15

## 2021-07-15 LAB
CRP SERPL-MCNC: <0.1 MG/DL
ERYTHROCYTE [SEDIMENTATION RATE] IN BLOOD: 32 MM/HR (ref 0–20)
NEG CONT SPOT COUNT: NORMAL
PANEL A SPOT COUNT: 31
PANEL B SPOT COUNT: 9
POS CONT SPOT COUNT: NORMAL
T-SPOT.TB: POSITIVE

## 2021-07-19 ENCOUNTER — TELEPHONE (OUTPATIENT)
Dept: RHEUMATOLOGY | Facility: CLINIC | Age: 66
End: 2021-07-19

## 2021-07-19 DIAGNOSIS — Z22.7 TB LUNG, LATENT: Primary | ICD-10-CM

## 2021-07-20 ENCOUNTER — DOCUMENTATION ONLY (OUTPATIENT)
Dept: RHEUMATOLOGY | Facility: CLINIC | Age: 66
End: 2021-07-20

## 2021-07-21 ENCOUNTER — PATIENT MESSAGE (OUTPATIENT)
Dept: RHEUMATOLOGY | Facility: CLINIC | Age: 66
End: 2021-07-21

## 2021-08-14 ENCOUNTER — PATIENT MESSAGE (OUTPATIENT)
Dept: INTERNAL MEDICINE | Facility: CLINIC | Age: 66
End: 2021-08-14

## 2021-08-14 DIAGNOSIS — I27.20 PULMONARY HTN: Primary | ICD-10-CM

## 2021-08-16 RX ORDER — SILDENAFIL CITRATE 20 MG/1
20 TABLET ORAL DAILY PRN
Qty: 90 TABLET | Refills: 0 | Status: SHIPPED | OUTPATIENT
Start: 2021-08-16 | End: 2022-08-16 | Stop reason: SDUPTHER

## 2021-09-20 DIAGNOSIS — G56.03 BILATERAL CARPAL TUNNEL SYNDROME: ICD-10-CM

## 2021-09-21 RX ORDER — GABAPENTIN 300 MG/1
300 CAPSULE ORAL 3 TIMES DAILY
Qty: 90 CAPSULE | Refills: 11 | Status: SHIPPED | OUTPATIENT
Start: 2021-09-21 | End: 2022-10-02

## 2021-11-03 ENCOUNTER — HOSPITAL ENCOUNTER (OUTPATIENT)
Dept: RADIOLOGY | Facility: HOSPITAL | Age: 66
Discharge: HOME | End: 2021-11-03
Attending: INTERNAL MEDICINE
Payer: MEDICAID

## 2021-11-03 ENCOUNTER — OFFICE VISIT (OUTPATIENT)
Dept: INFECTIOUS DISEASES | Facility: CLINIC | Age: 66
End: 2021-11-03
Payer: MEDICAID

## 2021-11-03 VITALS
DIASTOLIC BLOOD PRESSURE: 70 MMHG | HEART RATE: 65 BPM | SYSTOLIC BLOOD PRESSURE: 137 MMHG | BODY MASS INDEX: 25.68 KG/M2 | WEIGHT: 154.31 LBS

## 2021-11-03 DIAGNOSIS — M48.10 DISH (DIFFUSE IDIOPATHIC SKELETAL HYPEROSTOSIS): ICD-10-CM

## 2021-11-03 DIAGNOSIS — I10 HYPERTENSION, ESSENTIAL: ICD-10-CM

## 2021-11-03 DIAGNOSIS — M48.12: ICD-10-CM

## 2021-11-03 DIAGNOSIS — Z22.7 TB LUNG, LATENT: Primary | ICD-10-CM

## 2021-11-03 DIAGNOSIS — Z22.7 TB LUNG, LATENT: ICD-10-CM

## 2021-11-03 PROCEDURE — 99212 OFFICE O/P EST SF 10 MIN: CPT | Mod: PBBFAC,25 | Performed by: INTERNAL MEDICINE

## 2021-11-03 PROCEDURE — 71046 XR CHEST PA AND LATERAL: ICD-10-PCS | Mod: 26,,, | Performed by: RADIOLOGY

## 2021-11-03 PROCEDURE — 99999 PR PBB SHADOW E&M-EST. PATIENT-LVL II: ICD-10-PCS | Mod: PBBFAC,,, | Performed by: INTERNAL MEDICINE

## 2021-11-03 PROCEDURE — 71046 X-RAY EXAM CHEST 2 VIEWS: CPT | Mod: 26,,, | Performed by: RADIOLOGY

## 2021-11-03 PROCEDURE — 71046 X-RAY EXAM CHEST 2 VIEWS: CPT | Mod: TC

## 2021-11-03 PROCEDURE — 99999 PR PBB SHADOW E&M-EST. PATIENT-LVL II: CPT | Mod: PBBFAC,,, | Performed by: INTERNAL MEDICINE

## 2021-11-03 PROCEDURE — 99204 PR OFFICE/OUTPT VISIT, NEW, LEVL IV, 45-59 MIN: ICD-10-PCS | Mod: S$PBB,,, | Performed by: INTERNAL MEDICINE

## 2021-11-03 PROCEDURE — 99204 OFFICE O/P NEW MOD 45 MIN: CPT | Mod: S$PBB,,, | Performed by: INTERNAL MEDICINE

## 2021-11-03 RX ORDER — ISONIAZID 300 MG/1
900 TABLET ORAL WEEKLY
Qty: 12 TABLET | Refills: 2 | Status: SHIPPED | OUTPATIENT
Start: 2021-11-03 | End: 2022-01-05

## 2021-11-03 RX ORDER — SULFASALAZINE 500 MG/1
TABLET, DELAYED RELEASE ORAL
COMMUNITY
Start: 2021-10-13 | End: 2022-03-14 | Stop reason: ALTCHOICE

## 2021-11-03 RX ORDER — LANOLIN ALCOHOL/MO/W.PET/CERES
50 CREAM (GRAM) TOPICAL WEEKLY
Qty: 12 TABLET | Refills: 0 | Status: SHIPPED | OUTPATIENT
Start: 2021-11-03 | End: 2022-02-01

## 2021-11-15 ENCOUNTER — PATIENT MESSAGE (OUTPATIENT)
Dept: INTERNAL MEDICINE | Facility: CLINIC | Age: 66
End: 2021-11-15
Payer: MEDICAID

## 2021-11-15 DIAGNOSIS — E78.5 HYPERLIPIDEMIA, UNSPECIFIED HYPERLIPIDEMIA TYPE: Primary | ICD-10-CM

## 2021-11-16 RX ORDER — EZETIMIBE 10 MG/1
10 TABLET ORAL DAILY
Qty: 90 TABLET | Refills: 1 | Status: SHIPPED | OUTPATIENT
Start: 2021-11-16 | End: 2022-05-12 | Stop reason: SDUPTHER

## 2021-11-21 ENCOUNTER — PATIENT MESSAGE (OUTPATIENT)
Dept: RHEUMATOLOGY | Facility: CLINIC | Age: 66
End: 2021-11-21
Payer: MEDICAID

## 2022-01-05 ENCOUNTER — OFFICE VISIT (OUTPATIENT)
Dept: RHEUMATOLOGY | Facility: CLINIC | Age: 67
End: 2022-01-05
Payer: MEDICAID

## 2022-01-05 ENCOUNTER — PATIENT OUTREACH (OUTPATIENT)
Dept: ADMINISTRATIVE | Facility: OTHER | Age: 67
End: 2022-01-05
Payer: MEDICAID

## 2022-01-05 ENCOUNTER — LAB VISIT (OUTPATIENT)
Dept: LAB | Facility: HOSPITAL | Age: 67
End: 2022-01-05
Attending: INTERNAL MEDICINE
Payer: MEDICAID

## 2022-01-05 VITALS
RESPIRATION RATE: 17 BRPM | HEIGHT: 65 IN | BODY MASS INDEX: 26.93 KG/M2 | SYSTOLIC BLOOD PRESSURE: 134 MMHG | DIASTOLIC BLOOD PRESSURE: 72 MMHG | WEIGHT: 161.63 LBS | HEART RATE: 65 BPM

## 2022-01-05 DIAGNOSIS — M45.0 ANKYLOSING SPONDYLITIS OF MULTIPLE SITES IN SPINE: ICD-10-CM

## 2022-01-05 DIAGNOSIS — M45.0 ANKYLOSING SPONDYLITIS OF MULTIPLE SITES IN SPINE: Primary | ICD-10-CM

## 2022-01-05 DIAGNOSIS — M48.10 DISH (DIFFUSE IDIOPATHIC SKELETAL HYPEROSTOSIS): ICD-10-CM

## 2022-01-05 DIAGNOSIS — Z51.81 ENCOUNTER FOR MEDICATION MONITORING: ICD-10-CM

## 2022-01-05 DIAGNOSIS — I10 HYPERTENSION, ESSENTIAL: ICD-10-CM

## 2022-01-05 DIAGNOSIS — D84.9 IMMUNOCOMPROMISED: ICD-10-CM

## 2022-01-05 DIAGNOSIS — Z22.7 TB LUNG, LATENT: ICD-10-CM

## 2022-01-05 LAB
ALBUMIN SERPL BCP-MCNC: 4.3 G/DL (ref 3.5–5.2)
ALP SERPL-CCNC: 60 U/L (ref 55–135)
ALT SERPL W/O P-5'-P-CCNC: 25 U/L (ref 10–44)
ANION GAP SERPL CALC-SCNC: 10 MMOL/L (ref 8–16)
AST SERPL-CCNC: 20 U/L (ref 10–40)
BASOPHILS # BLD AUTO: 0.08 K/UL (ref 0–0.2)
BASOPHILS NFR BLD: 1.2 % (ref 0–1.9)
BILIRUB SERPL-MCNC: 0.2 MG/DL (ref 0.1–1)
BUN SERPL-MCNC: 20 MG/DL (ref 8–23)
CALCIUM SERPL-MCNC: 9.3 MG/DL (ref 8.7–10.5)
CHLORIDE SERPL-SCNC: 107 MMOL/L (ref 95–110)
CO2 SERPL-SCNC: 27 MMOL/L (ref 23–29)
CREAT SERPL-MCNC: 1.4 MG/DL (ref 0.5–1.4)
CRP SERPL-MCNC: 1.6 MG/L (ref 0–8.2)
DIFFERENTIAL METHOD: ABNORMAL
EOSINOPHIL # BLD AUTO: 0.1 K/UL (ref 0–0.5)
EOSINOPHIL NFR BLD: 1.6 % (ref 0–8)
ERYTHROCYTE [DISTWIDTH] IN BLOOD BY AUTOMATED COUNT: 13.8 % (ref 11.5–14.5)
EST. GFR  (AFRICAN AMERICAN): >60 ML/MIN/1.73 M^2
EST. GFR  (NON AFRICAN AMERICAN): 53 ML/MIN/1.73 M^2
GLUCOSE SERPL-MCNC: 106 MG/DL (ref 70–110)
HCT VFR BLD AUTO: 39.3 % (ref 40–54)
HGB BLD-MCNC: 13 G/DL (ref 14–18)
IMM GRANULOCYTES # BLD AUTO: 0.04 K/UL (ref 0–0.04)
IMM GRANULOCYTES NFR BLD AUTO: 0.6 % (ref 0–0.5)
LYMPHOCYTES # BLD AUTO: 2.6 K/UL (ref 1–4.8)
LYMPHOCYTES NFR BLD: 39.3 % (ref 18–48)
MCH RBC QN AUTO: 29.7 PG (ref 27–31)
MCHC RBC AUTO-ENTMCNC: 33.1 G/DL (ref 32–36)
MCV RBC AUTO: 90 FL (ref 82–98)
MONOCYTES # BLD AUTO: 0.8 K/UL (ref 0.3–1)
MONOCYTES NFR BLD: 11.5 % (ref 4–15)
NEUTROPHILS # BLD AUTO: 3.1 K/UL (ref 1.8–7.7)
NEUTROPHILS NFR BLD: 45.8 % (ref 38–73)
NRBC BLD-RTO: 0 /100 WBC
PLATELET # BLD AUTO: 315 K/UL (ref 150–450)
PMV BLD AUTO: 9.4 FL (ref 9.2–12.9)
POTASSIUM SERPL-SCNC: 3.9 MMOL/L (ref 3.5–5.1)
PROT SERPL-MCNC: 7.9 G/DL (ref 6–8.4)
RBC # BLD AUTO: 4.37 M/UL (ref 4.6–6.2)
SODIUM SERPL-SCNC: 144 MMOL/L (ref 136–145)
WBC # BLD AUTO: 6.67 K/UL (ref 3.9–12.7)

## 2022-01-05 PROCEDURE — 86140 C-REACTIVE PROTEIN: CPT | Performed by: INTERNAL MEDICINE

## 2022-01-05 PROCEDURE — 85652 RBC SED RATE AUTOMATED: CPT | Performed by: INTERNAL MEDICINE

## 2022-01-05 PROCEDURE — 3078F PR MOST RECENT DIASTOLIC BLOOD PRESSURE < 80 MM HG: ICD-10-PCS | Mod: CPTII,,, | Performed by: INTERNAL MEDICINE

## 2022-01-05 PROCEDURE — 99215 PR OFFICE/OUTPT VISIT, EST, LEVL V, 40-54 MIN: ICD-10-PCS | Mod: S$PBB,,, | Performed by: INTERNAL MEDICINE

## 2022-01-05 PROCEDURE — 1159F PR MEDICATION LIST DOCUMENTED IN MEDICAL RECORD: ICD-10-PCS | Mod: CPTII,,, | Performed by: INTERNAL MEDICINE

## 2022-01-05 PROCEDURE — 3075F SYST BP GE 130 - 139MM HG: CPT | Mod: CPTII,,, | Performed by: INTERNAL MEDICINE

## 2022-01-05 PROCEDURE — 3075F PR MOST RECENT SYSTOLIC BLOOD PRESS GE 130-139MM HG: ICD-10-PCS | Mod: CPTII,,, | Performed by: INTERNAL MEDICINE

## 2022-01-05 PROCEDURE — 1159F MED LIST DOCD IN RCRD: CPT | Mod: CPTII,,, | Performed by: INTERNAL MEDICINE

## 2022-01-05 PROCEDURE — 99215 OFFICE O/P EST HI 40 MIN: CPT | Mod: S$PBB,,, | Performed by: INTERNAL MEDICINE

## 2022-01-05 PROCEDURE — 80053 COMPREHEN METABOLIC PANEL: CPT | Performed by: INTERNAL MEDICINE

## 2022-01-05 PROCEDURE — 85025 COMPLETE CBC W/AUTO DIFF WBC: CPT | Performed by: INTERNAL MEDICINE

## 2022-01-05 PROCEDURE — 99999 PR PBB SHADOW E&M-EST. PATIENT-LVL III: ICD-10-PCS | Mod: PBBFAC,,, | Performed by: INTERNAL MEDICINE

## 2022-01-05 PROCEDURE — 3078F DIAST BP <80 MM HG: CPT | Mod: CPTII,,, | Performed by: INTERNAL MEDICINE

## 2022-01-05 PROCEDURE — 3008F PR BODY MASS INDEX (BMI) DOCUMENTED: ICD-10-PCS | Mod: CPTII,,, | Performed by: INTERNAL MEDICINE

## 2022-01-05 PROCEDURE — 1160F PR REVIEW ALL MEDS BY PRESCRIBER/CLIN PHARMACIST DOCUMENTED: ICD-10-PCS | Mod: CPTII,,, | Performed by: INTERNAL MEDICINE

## 2022-01-05 PROCEDURE — 3008F BODY MASS INDEX DOCD: CPT | Mod: CPTII,,, | Performed by: INTERNAL MEDICINE

## 2022-01-05 PROCEDURE — 99999 PR PBB SHADOW E&M-EST. PATIENT-LVL III: CPT | Mod: PBBFAC,,, | Performed by: INTERNAL MEDICINE

## 2022-01-05 PROCEDURE — 1160F RVW MEDS BY RX/DR IN RCRD: CPT | Mod: CPTII,,, | Performed by: INTERNAL MEDICINE

## 2022-01-05 PROCEDURE — 99213 OFFICE O/P EST LOW 20 MIN: CPT | Mod: PBBFAC | Performed by: INTERNAL MEDICINE

## 2022-01-05 RX ORDER — IPRATROPIUM BROMIDE AND ALBUTEROL SULFATE 2.5; .5 MG/3ML; MG/3ML
3 SOLUTION RESPIRATORY (INHALATION)
Status: CANCELLED | OUTPATIENT
Start: 2022-01-05

## 2022-01-05 RX ORDER — HEPARIN 100 UNIT/ML
500 SYRINGE INTRAVENOUS
Status: CANCELLED | OUTPATIENT
Start: 2022-01-05

## 2022-01-05 RX ORDER — DIPHENHYDRAMINE HYDROCHLORIDE 50 MG/ML
25 INJECTION INTRAMUSCULAR; INTRAVENOUS
Status: CANCELLED | OUTPATIENT
Start: 2022-01-05

## 2022-01-05 RX ORDER — METHYLPREDNISOLONE SOD SUCC 125 MG
40 VIAL (EA) INJECTION
Status: CANCELLED | OUTPATIENT
Start: 2022-01-05

## 2022-01-05 RX ORDER — RIFAMPIN 300 MG/1
600 CAPSULE ORAL DAILY
Qty: 60 CAPSULE | Refills: 3 | Status: SHIPPED | OUTPATIENT
Start: 2022-01-05 | End: 2022-05-12

## 2022-01-05 RX ORDER — SODIUM CHLORIDE 0.9 % (FLUSH) 0.9 %
10 SYRINGE (ML) INJECTION
Status: CANCELLED | OUTPATIENT
Start: 2022-01-05

## 2022-01-05 RX ORDER — ACETAMINOPHEN 325 MG/1
650 TABLET ORAL
Status: CANCELLED | OUTPATIENT
Start: 2022-01-05

## 2022-01-05 RX ORDER — DIPHENHYDRAMINE HYDROCHLORIDE 50 MG/ML
50 INJECTION INTRAMUSCULAR; INTRAVENOUS ONCE AS NEEDED
Status: CANCELLED | OUTPATIENT
Start: 2022-01-05

## 2022-01-05 RX ORDER — EPINEPHRINE 0.3 MG/.3ML
0.3 INJECTION SUBCUTANEOUS ONCE AS NEEDED
Status: CANCELLED | OUTPATIENT
Start: 2022-01-05

## 2022-01-05 RX ORDER — TRAMADOL HYDROCHLORIDE 50 MG/1
50 TABLET ORAL
Qty: 30 TABLET | Refills: 0 | Status: SHIPPED | OUTPATIENT
Start: 2022-01-05 | End: 2022-02-10 | Stop reason: SDUPTHER

## 2022-01-06 ENCOUNTER — PATIENT MESSAGE (OUTPATIENT)
Dept: INTERNAL MEDICINE | Facility: CLINIC | Age: 67
End: 2022-01-06
Payer: MEDICAID

## 2022-01-06 ENCOUNTER — PATIENT MESSAGE (OUTPATIENT)
Dept: RHEUMATOLOGY | Facility: CLINIC | Age: 67
End: 2022-01-06
Payer: MEDICAID

## 2022-01-06 LAB — ERYTHROCYTE [SEDIMENTATION RATE] IN BLOOD BY WESTERGREN METHOD: 44 MM/HR (ref 0–23)

## 2022-01-06 RX ORDER — HYDROCHLOROTHIAZIDE 25 MG/1
25 TABLET ORAL DAILY
Qty: 90 TABLET | Refills: 0 | Status: SHIPPED | OUTPATIENT
Start: 2022-01-06 | End: 2022-02-14 | Stop reason: SDUPTHER

## 2022-01-19 ENCOUNTER — PATIENT MESSAGE (OUTPATIENT)
Dept: RHEUMATOLOGY | Facility: CLINIC | Age: 67
End: 2022-01-19
Payer: MEDICAID

## 2022-01-21 ENCOUNTER — TELEPHONE (OUTPATIENT)
Dept: INFUSION THERAPY | Facility: HOSPITAL | Age: 67
End: 2022-01-21
Payer: MEDICAID

## 2022-01-21 NOTE — TELEPHONE ENCOUNTER
Left a phone message with pt's daughter, Lea. Pt's ins. Has approved the [employer] infusions. First appt is scheduled for 1/27/22 at 10:00. Pt will need an .

## 2022-01-27 ENCOUNTER — INFUSION (OUTPATIENT)
Dept: INFUSION THERAPY | Facility: HOSPITAL | Age: 67
End: 2022-01-27
Attending: INTERNAL MEDICINE
Payer: MEDICAID

## 2022-01-27 ENCOUNTER — PATIENT MESSAGE (OUTPATIENT)
Dept: CARDIOLOGY | Facility: CLINIC | Age: 67
End: 2022-01-27
Payer: MEDICAID

## 2022-01-27 VITALS
TEMPERATURE: 99 F | BODY MASS INDEX: 26.41 KG/M2 | DIASTOLIC BLOOD PRESSURE: 84 MMHG | RESPIRATION RATE: 18 BRPM | WEIGHT: 158.5 LBS | OXYGEN SATURATION: 99 % | SYSTOLIC BLOOD PRESSURE: 187 MMHG | HEIGHT: 65 IN | HEART RATE: 74 BPM

## 2022-01-27 DIAGNOSIS — M45.0 ANKYLOSING SPONDYLITIS OF MULTIPLE SITES IN SPINE: Primary | ICD-10-CM

## 2022-01-27 PROCEDURE — 25000003 PHARM REV CODE 250: Performed by: INTERNAL MEDICINE

## 2022-01-27 PROCEDURE — 96375 TX/PRO/DX INJ NEW DRUG ADDON: CPT

## 2022-01-27 PROCEDURE — 63600175 PHARM REV CODE 636 W HCPCS: Performed by: INTERNAL MEDICINE

## 2022-01-27 PROCEDURE — 96413 CHEMO IV INFUSION 1 HR: CPT

## 2022-01-27 PROCEDURE — 96415 CHEMO IV INFUSION ADDL HR: CPT

## 2022-01-27 RX ORDER — IPRATROPIUM BROMIDE AND ALBUTEROL SULFATE 2.5; .5 MG/3ML; MG/3ML
3 SOLUTION RESPIRATORY (INHALATION)
Status: CANCELLED | OUTPATIENT
Start: 2022-03-24

## 2022-01-27 RX ORDER — HEPARIN 100 UNIT/ML
500 SYRINGE INTRAVENOUS
Status: CANCELLED | OUTPATIENT
Start: 2022-03-24

## 2022-01-27 RX ORDER — DIPHENHYDRAMINE HYDROCHLORIDE 50 MG/ML
25 INJECTION INTRAMUSCULAR; INTRAVENOUS
Status: CANCELLED | OUTPATIENT
Start: 2022-03-24

## 2022-01-27 RX ORDER — EPINEPHRINE 0.3 MG/.3ML
0.3 INJECTION SUBCUTANEOUS ONCE AS NEEDED
Status: CANCELLED | OUTPATIENT
Start: 2022-03-24

## 2022-01-27 RX ORDER — METHYLPREDNISOLONE SOD SUCC 125 MG
40 VIAL (EA) INJECTION
Status: COMPLETED | OUTPATIENT
Start: 2022-01-27 | End: 2022-01-27

## 2022-01-27 RX ORDER — METHYLPREDNISOLONE SOD SUCC 125 MG
40 VIAL (EA) INJECTION
Status: CANCELLED | OUTPATIENT
Start: 2022-03-24

## 2022-01-27 RX ORDER — SODIUM CHLORIDE 0.9 % (FLUSH) 0.9 %
10 SYRINGE (ML) INJECTION
Status: CANCELLED | OUTPATIENT
Start: 2022-03-24

## 2022-01-27 RX ORDER — ACETAMINOPHEN 325 MG/1
650 TABLET ORAL
Status: CANCELLED | OUTPATIENT
Start: 2022-03-24

## 2022-01-27 RX ORDER — ACETAMINOPHEN 325 MG/1
650 TABLET ORAL
Status: DISCONTINUED | OUTPATIENT
Start: 2022-01-27 | End: 2022-01-27 | Stop reason: HOSPADM

## 2022-01-27 RX ORDER — DIPHENHYDRAMINE HYDROCHLORIDE 50 MG/ML
50 INJECTION INTRAMUSCULAR; INTRAVENOUS ONCE AS NEEDED
Status: CANCELLED | OUTPATIENT
Start: 2022-03-24

## 2022-01-27 RX ORDER — SODIUM CHLORIDE 0.9 % (FLUSH) 0.9 %
10 SYRINGE (ML) INJECTION
Status: DISCONTINUED | OUTPATIENT
Start: 2022-01-27 | End: 2022-01-27 | Stop reason: HOSPADM

## 2022-01-27 RX ORDER — DIPHENHYDRAMINE HYDROCHLORIDE 50 MG/ML
25 INJECTION INTRAMUSCULAR; INTRAVENOUS
Status: COMPLETED | OUTPATIENT
Start: 2022-01-27 | End: 2022-01-27

## 2022-01-27 RX ADMIN — DIPHENHYDRAMINE HYDROCHLORIDE 25 MG: 50 INJECTION INTRAMUSCULAR; INTRAVENOUS at 10:01

## 2022-01-27 RX ADMIN — METHYLPREDNISOLONE SODIUM SUCCINATE 40 MG: 125 INJECTION, POWDER, FOR SOLUTION INTRAMUSCULAR; INTRAVENOUS at 10:01

## 2022-01-27 RX ADMIN — SODIUM CHLORIDE 360 MG: 0.9 INJECTION, SOLUTION INTRAVENOUS at 10:01

## 2022-01-27 NOTE — TELEPHONE ENCOUNTER
AUSTEN, see below message re: ins. Change. Pt is scheduled for his first remicade this morning. I will meet with them to discuss coverage options and the approval process.

## 2022-01-27 NOTE — PLAN OF CARE
Plan of care reviewed with patient. Discussed if there are any new or ongoing concerns. Denies.   Problem: Adult Inpatient Plan of Care  Goal: Plan of Care Review  Outcome: Ongoing, Progressing  Flowsheets (Taken 1/27/2022 1212)  Plan of Care Reviewed With: patient  Goal: Absence of Hospital-Acquired Illness or Injury  Outcome: Ongoing, Progressing  Intervention: Identify and Manage Fall Risk  Flowsheets (Taken 1/27/2022 1212)  Safety Promotion/Fall Prevention: in recliner, wheels locked  Goal: Optimal Comfort and Wellbeing  Outcome: Ongoing, Progressing  Intervention: Provide Person-Centered Care  Flowsheets (Taken 1/27/2022 1212)  Trust Relationship/Rapport:   care explained   reassurance provided   choices provided   thoughts/feelings acknowledged   emotional support provided   empathic listening provided   questions answered   questions encouraged     Problem: Infection  Goal: Absence of Infection Signs and Symptoms  Outcome: Ongoing, Progressing  Intervention: Prevent or Manage Infection  Flowsheets (Taken 1/27/2022 1212)  Infection Management: aseptic technique maintained

## 2022-01-27 NOTE — NURSING
Infusion # 1 (Week 0 loading dose) - Inflectra 5 mg/kg q 8 weeks after loading period    Any:  -recent illness, infection, or antibiotic use in past week- denies  -open wounds or mouth sores- denies  -invasive procedures or surgeries in past 4 weeks or in upcoming 4 weeks- denies  -vaccinations in past week- denies  -any new symptoms/change in symptoms-denies  -chance you may be pregnant- n/a      Recent labs? 1/5/22  Last Rheumatology provider visit- Seen by Dr. GATES on 1/5/22     Premeds-650 Tylenol PO, 40 Solumedrol IVP over 3 minutes, and 25 mg Benadryl IVP over 3 minutes     Inflectra 360 mg administered IV per ramp up protocol at a 120 minute rate per orders; see MAR and vitals for more details. Tolerated well without adverse events, discharged and ambulatory out of clinic.

## 2022-02-01 ENCOUNTER — PATIENT MESSAGE (OUTPATIENT)
Dept: RHEUMATOLOGY | Facility: CLINIC | Age: 67
End: 2022-02-01
Payer: MEDICARE

## 2022-02-01 NOTE — TELEPHONE ENCOUNTER
Please note pt has new insurance as of 2/01/22. He no longer has Medicaid. His new insurance has been updated in his chart. He now has Medicare A,B, & D with a supplemental insurance policy. His next dose of Inflectra is due next week. Please let me know if you need any additional information to ensure he is approved and stays on schedule with his treatments. Thanks for your help.

## 2022-02-03 ENCOUNTER — PATIENT MESSAGE (OUTPATIENT)
Dept: RHEUMATOLOGY | Facility: CLINIC | Age: 67
End: 2022-02-03
Payer: MEDICARE

## 2022-02-10 ENCOUNTER — INFUSION (OUTPATIENT)
Dept: INFUSION THERAPY | Facility: HOSPITAL | Age: 67
End: 2022-02-10
Attending: INTERNAL MEDICINE
Payer: MEDICARE

## 2022-02-10 VITALS
TEMPERATURE: 98 F | HEART RATE: 56 BPM | WEIGHT: 160.69 LBS | SYSTOLIC BLOOD PRESSURE: 179 MMHG | RESPIRATION RATE: 16 BRPM | BODY MASS INDEX: 26.74 KG/M2 | DIASTOLIC BLOOD PRESSURE: 79 MMHG | OXYGEN SATURATION: 97 %

## 2022-02-10 DIAGNOSIS — I10 HYPERTENSION, ESSENTIAL: ICD-10-CM

## 2022-02-10 DIAGNOSIS — M45.0 ANKYLOSING SPONDYLITIS OF MULTIPLE SITES IN SPINE: ICD-10-CM

## 2022-02-10 DIAGNOSIS — M45.0 ANKYLOSING SPONDYLITIS OF MULTIPLE SITES IN SPINE: Primary | ICD-10-CM

## 2022-02-10 PROCEDURE — 96413 CHEMO IV INFUSION 1 HR: CPT

## 2022-02-10 PROCEDURE — 63600175 PHARM REV CODE 636 W HCPCS: Performed by: INTERNAL MEDICINE

## 2022-02-10 PROCEDURE — 25000003 PHARM REV CODE 250: Performed by: INTERNAL MEDICINE

## 2022-02-10 PROCEDURE — 96375 TX/PRO/DX INJ NEW DRUG ADDON: CPT

## 2022-02-10 PROCEDURE — 96415 CHEMO IV INFUSION ADDL HR: CPT

## 2022-02-10 RX ORDER — SODIUM CHLORIDE 0.9 % (FLUSH) 0.9 %
10 SYRINGE (ML) INJECTION
Status: CANCELLED | OUTPATIENT
Start: 2022-03-24

## 2022-02-10 RX ORDER — HYDROCHLOROTHIAZIDE 25 MG/1
25 TABLET ORAL DAILY
Qty: 90 TABLET | Refills: 0 | Status: CANCELLED | OUTPATIENT
Start: 2022-02-10 | End: 2023-02-10

## 2022-02-10 RX ORDER — DIPHENHYDRAMINE HYDROCHLORIDE 50 MG/ML
50 INJECTION INTRAMUSCULAR; INTRAVENOUS ONCE AS NEEDED
Status: CANCELLED | OUTPATIENT
Start: 2022-03-24

## 2022-02-10 RX ORDER — ACETAMINOPHEN 325 MG/1
650 TABLET ORAL
Status: CANCELLED | OUTPATIENT
Start: 2022-03-24

## 2022-02-10 RX ORDER — EPINEPHRINE 0.3 MG/.3ML
0.3 INJECTION SUBCUTANEOUS ONCE AS NEEDED
Status: CANCELLED | OUTPATIENT
Start: 2022-03-24

## 2022-02-10 RX ORDER — METHYLPREDNISOLONE SOD SUCC 125 MG
40 VIAL (EA) INJECTION
Status: CANCELLED | OUTPATIENT
Start: 2022-03-24

## 2022-02-10 RX ORDER — IPRATROPIUM BROMIDE AND ALBUTEROL SULFATE 2.5; .5 MG/3ML; MG/3ML
3 SOLUTION RESPIRATORY (INHALATION)
Status: CANCELLED | OUTPATIENT
Start: 2022-03-24

## 2022-02-10 RX ORDER — SODIUM CHLORIDE 0.9 % (FLUSH) 0.9 %
10 SYRINGE (ML) INJECTION
Status: DISCONTINUED | OUTPATIENT
Start: 2022-02-10 | End: 2022-02-10 | Stop reason: HOSPADM

## 2022-02-10 RX ORDER — DIPHENHYDRAMINE HYDROCHLORIDE 50 MG/ML
25 INJECTION INTRAMUSCULAR; INTRAVENOUS
Status: COMPLETED | OUTPATIENT
Start: 2022-02-10 | End: 2022-02-10

## 2022-02-10 RX ORDER — HEPARIN 100 UNIT/ML
500 SYRINGE INTRAVENOUS
Status: CANCELLED | OUTPATIENT
Start: 2022-03-24

## 2022-02-10 RX ORDER — DIPHENHYDRAMINE HYDROCHLORIDE 50 MG/ML
25 INJECTION INTRAMUSCULAR; INTRAVENOUS
Status: CANCELLED | OUTPATIENT
Start: 2022-03-24

## 2022-02-10 RX ORDER — METHYLPREDNISOLONE SOD SUCC 125 MG
40 VIAL (EA) INJECTION
Status: COMPLETED | OUTPATIENT
Start: 2022-02-10 | End: 2022-02-10

## 2022-02-10 RX ADMIN — DIPHENHYDRAMINE HYDROCHLORIDE 25 MG: 50 INJECTION INTRAMUSCULAR; INTRAVENOUS at 11:02

## 2022-02-10 RX ADMIN — SODIUM CHLORIDE 360 MG: 0.9 INJECTION, SOLUTION INTRAVENOUS at 11:02

## 2022-02-10 RX ADMIN — METHYLPREDNISOLONE SODIUM SUCCINATE 40 MG: 125 INJECTION, POWDER, FOR SOLUTION INTRAMUSCULAR; INTRAVENOUS at 11:02

## 2022-02-10 NOTE — PLAN OF CARE
Plan of care reviewed with pt. All needs and concerns addressed.   Problem: Adult Inpatient Plan of Care  Goal: Plan of Care Review  Outcome: Ongoing, Progressing  Flowsheets (Taken 2/10/2022 1231)  Plan of Care Reviewed With: patient  Goal: Absence of Hospital-Acquired Illness or Injury  Outcome: Ongoing, Progressing  Intervention: Identify and Manage Fall Risk  Flowsheets (Taken 2/10/2022 1231)  Safety Promotion/Fall Prevention:   assistive device/personal item within reach   room near unit station   instructed to call staff for mobility   in recliner, wheels locked  Goal: Optimal Comfort and Wellbeing  Outcome: Ongoing, Progressing  Intervention: Provide Person-Centered Care  Flowsheets (Taken 2/10/2022 1231)  Trust Relationship/Rapport:   care explained   empathic listening provided   reassurance provided   choices provided   questions answered   thoughts/feelings acknowledged   emotional support provided   questions encouraged     Problem: Infection  Goal: Absence of Infection Signs and Symptoms  Outcome: Ongoing, Progressing  Intervention: Prevent or Manage Infection  Flowsheets (Taken 2/10/2022 1231)  Infection Management: aseptic technique maintained

## 2022-02-10 NOTE — DISCHARGE INSTRUCTIONS
HOME CARE AFTER CHEMOTHERAPY   Meals   Many patients feel sick and lose their appetites during treatment. Eat small meals several times a day. Choose bland foods with little taste or smell if you have problems with nausea. Be sure to cook all food thoroughly. This kills bacteria and helps you avoid intestinal infection. Soft foods are easier to swallow and digest.   Activity   Exercise keeps you strong and keeps your heart and lungs active. Talk to your doctor about an appropriate exercise program for you.   Skin Care   To prevent a skin infection, bathe or shower once a day. Use a moisturizing soap and wash with warm water. Avoid very hot or cold water. Chemotherapy can make your skin dry . Apply moisturizing lotion to help relieve dry skin. Some drugs used in high doses can cause slight burns to appear (like sunburn). Ask for a special cream to help relieve the burn and protect your skin.   Prevent Mouth Sores   During chemotherapy, many people get mouth sores. Do the following to help prevent mouth sores or to ease discomfort.   Brush your teeth with a soft-bristle toothbrush after every meal.  Don't use dental floss if your platelet count is below 50,000. Your doctor or nurse will tell you if this is the case.  Use an oral swab or special soft toothbrush if your gums bleed during regular brushing.  Use mouthwash as directed. If you can't tolerate commercial mouthwash, use salt and baking soda to clean your mouth. Mix 1 teaspoon of salt and 1 teaspoon of baking soda into a glass of water. Swish and spit.  Call your doctor or return to this facility if you develop any of the following:   Sore throat   White patches in the mouth or throat   Fever of 100.4ºF (38ºC) or higher, or as directed by your healthcare provider  © 0552-8349 [employer], [address] [place] [address], [place] [address]. All rights reserved. This information is not intended as a substitute for professional medical care. Always follow your  healthcare professional's   WAYS TO HELP PREVENT INFECTION         WASH YOUR HANDS OFTEN DURING THE DAY, ESPECIALLY BEFORE YOU EAT, AFTER USING THE BATHROOM, AND AFTER TOUCHING ANIMALS     STAY AWAY FROM PEOPLE WHO HAVE ILLNESSES YOU CAN CATCH; SUCH AS COLDS, FLU, CHICKEN POX     TRY TO AVOID CROWDS     STAY AWAY FROM CHILDREN WHO RECENTLY HAVE RECEIVED LIVE VIRUS VACCINES     MAINTAIN GOOD MOUTH CARE     DO NOT SQUEEZE OR SCRATCH PIMPLES     CLEAN CUTS & SCRAPES RIGHT AWAY AND DAILY UNTIL HEALED WITH WARM WATER, SOAP & AN ANTISEPTIC     AVOID CONTACT WITH LITTER BOXES, BIRD CAGES, & FISH TANKS     AVOID STANDING WATER, IE., BIRD BATHS, FLOWER POTS/VASES, OR HUMIDIFIERS     WEAR GLOVES WHEN GARDENING OR CLEANING UP AFTER OTHERS, ESPECIALLY BABIES & SMALL CHILDREN     DO NOT EAT RAW FISH, SEAFOOD, MEAT, OR EGGS

## 2022-02-10 NOTE — PLAN OF CARE
Plan of care reviewed with pt. All needs and concerns addressed.   Problem: Adult Inpatient Plan of Care  Goal: Plan of Care Review  2/10/2022 1436 by Yecenia Delatorre RN  Outcome: Ongoing, Progressing  Flowsheets (Taken 2/10/2022 1436)  Plan of Care Reviewed With: patient  2/10/2022 1231 by Yecenia Delatorre RN  Outcome: Ongoing, Progressing  Flowsheets (Taken 2/10/2022 1231)  Plan of Care Reviewed With: patient  Goal: Absence of Hospital-Acquired Illness or Injury  2/10/2022 1436 by Yecenia Delatorre RN  Outcome: Ongoing, Progressing  2/10/2022 1231 by Yecenia Delatorre RN  Outcome: Ongoing, Progressing  Intervention: Identify and Manage Fall Risk  Flowsheets (Taken 2/10/2022 1231)  Safety Promotion/Fall Prevention:   assistive device/personal item within reach   room near unit station   instructed to call staff for mobility   in recliner, wheels locked  Goal: Optimal Comfort and Wellbeing  2/10/2022 1436 by Yecenia Delatorre RN  Outcome: Ongoing, Progressing  2/10/2022 1231 by Yecenia Delatorre RN  Outcome: Ongoing, Progressing  Intervention: Provide Person-Centered Care  Flowsheets (Taken 2/10/2022 1231)  Trust Relationship/Rapport:   care explained   empathic listening provided   reassurance provided   choices provided   questions answered   thoughts/feelings acknowledged   emotional support provided   questions encouraged     Problem: Infection  Goal: Absence of Infection Signs and Symptoms  2/10/2022 1436 by Yecenia Delatorre RN  Outcome: Ongoing, Progressing  2/10/2022 1231 by Yecenia Delatorre RN  Outcome: Ongoing, Progressing  Intervention: Prevent or Manage Infection  2/10/2022 1436 by Yecenia Delatorre RN  Flowsheets (Taken 2/10/2022 1436)  Infection Management: aseptic technique maintained  2/10/2022 1231 by Yecenia Delatorre RN  Flowsheets (Taken 2/10/2022 1231)  Infection Management: aseptic technique maintained

## 2022-02-11 DIAGNOSIS — I10 HYPERTENSION, ESSENTIAL: ICD-10-CM

## 2022-02-11 RX ORDER — HYDROCHLOROTHIAZIDE 25 MG/1
25 TABLET ORAL DAILY
Qty: 90 TABLET | Refills: 0 | Status: CANCELLED | OUTPATIENT
Start: 2022-02-11 | End: 2023-02-11

## 2022-02-11 RX ORDER — TRAMADOL HYDROCHLORIDE 50 MG/1
50 TABLET ORAL
Qty: 30 TABLET | Refills: 0 | Status: SHIPPED | OUTPATIENT
Start: 2022-02-11 | End: 2022-06-25

## 2022-02-12 ENCOUNTER — PATIENT MESSAGE (OUTPATIENT)
Dept: RHEUMATOLOGY | Facility: CLINIC | Age: 67
End: 2022-02-12
Payer: MEDICARE

## 2022-02-14 ENCOUNTER — PATIENT MESSAGE (OUTPATIENT)
Dept: INTERNAL MEDICINE | Facility: CLINIC | Age: 67
End: 2022-02-14
Payer: MEDICARE

## 2022-02-14 DIAGNOSIS — E78.5 HYPERLIPIDEMIA, UNSPECIFIED HYPERLIPIDEMIA TYPE: ICD-10-CM

## 2022-02-14 DIAGNOSIS — I10 HYPERTENSION, ESSENTIAL: ICD-10-CM

## 2022-02-14 RX ORDER — ROSUVASTATIN CALCIUM 20 MG/1
20 TABLET, COATED ORAL DAILY
Qty: 90 TABLET | Refills: 0 | Status: SHIPPED | OUTPATIENT
Start: 2022-02-14 | End: 2022-05-12 | Stop reason: SDUPTHER

## 2022-02-14 RX ORDER — HYDROCHLOROTHIAZIDE 25 MG/1
25 TABLET ORAL DAILY
Qty: 90 TABLET | Refills: 0 | Status: SHIPPED | OUTPATIENT
Start: 2022-02-14 | End: 2022-05-12 | Stop reason: SDUPTHER

## 2022-02-14 NOTE — TELEPHONE ENCOUNTER
No new care gaps identified.  Powered by [employer]. Reference number: [id number].   2/14/2022 11:14:46 AM CST

## 2022-02-14 NOTE — TELEPHONE ENCOUNTER
----- Message from Don Bruno sent at 2/14/2022  9:45 AM CST -----  Contact: Kaye  Type:  RX Refill Request    Who Called: Kaye (JunoAbdiels)  Refill or New Rx:Refill  RX Name and Strength:hydrochlorothiazide 25 mg  Rosuvastatin 20 mg  How is the patient currently taking it? (ex. 1XDay):1XDay  Is this a 30 day or 90 day RX:90  Preferred Pharmacy with phone number:  [id number] [place] - [address]  [address] [place] [address]  Phone: [phone] Fax: [phone]  Local or Mail Order:Local  Ordering Provider:Willem  Would the patient rather a call back or a response via [facility]? Call back  Best Call Back Number:[phone]  Additional Information:

## 2022-03-08 ENCOUNTER — PATIENT MESSAGE (OUTPATIENT)
Dept: RHEUMATOLOGY | Facility: CLINIC | Age: 67
End: 2022-03-08
Payer: MEDICARE

## 2022-03-08 NOTE — TELEPHONE ENCOUNTER
Kaleb GATES, I spoke to pt's daughter, Lea, and she verified there have been no changes in Mr. White's medications or diet. I know that he get solumedrol 40 mg IVP prior to each infusion and did not require any prn steroids. Please advise. thanks

## 2022-03-09 ENCOUNTER — PATIENT OUTREACH (OUTPATIENT)
Dept: ADMINISTRATIVE | Facility: OTHER | Age: 67
End: 2022-03-09
Payer: MEDICARE

## 2022-03-09 ENCOUNTER — TELEPHONE (OUTPATIENT)
Dept: RHEUMATOLOGY | Facility: CLINIC | Age: 67
End: 2022-03-09
Payer: MEDICARE

## 2022-03-10 ENCOUNTER — PATIENT MESSAGE (OUTPATIENT)
Dept: RHEUMATOLOGY | Facility: CLINIC | Age: 67
End: 2022-03-10

## 2022-03-10 ENCOUNTER — INFUSION (OUTPATIENT)
Dept: INFUSION THERAPY | Facility: HOSPITAL | Age: 67
End: 2022-03-10
Attending: INTERNAL MEDICINE
Payer: MEDICARE

## 2022-03-10 ENCOUNTER — OFFICE VISIT (OUTPATIENT)
Dept: RHEUMATOLOGY | Facility: CLINIC | Age: 67
End: 2022-03-10
Payer: MEDICARE

## 2022-03-10 VITALS
RESPIRATION RATE: 18 BRPM | BODY MASS INDEX: 27.18 KG/M2 | HEIGHT: 65 IN | DIASTOLIC BLOOD PRESSURE: 64 MMHG | HEART RATE: 54 BPM | SYSTOLIC BLOOD PRESSURE: 153 MMHG | OXYGEN SATURATION: 97 % | WEIGHT: 163.13 LBS | TEMPERATURE: 98 F

## 2022-03-10 VITALS
DIASTOLIC BLOOD PRESSURE: 71 MMHG | BODY MASS INDEX: 27.18 KG/M2 | HEIGHT: 65 IN | SYSTOLIC BLOOD PRESSURE: 131 MMHG | WEIGHT: 163.13 LBS | HEART RATE: 73 BPM

## 2022-03-10 DIAGNOSIS — M06.00 SERONEGATIVE RHEUMATOID ARTHRITIS: ICD-10-CM

## 2022-03-10 DIAGNOSIS — M45.0 ANKYLOSING SPONDYLITIS OF MULTIPLE SITES IN SPINE: Primary | ICD-10-CM

## 2022-03-10 DIAGNOSIS — D84.9 IMMUNOCOMPROMISED: ICD-10-CM

## 2022-03-10 DIAGNOSIS — Z22.7 TB LUNG, LATENT: ICD-10-CM

## 2022-03-10 DIAGNOSIS — M48.10 DISH (DIFFUSE IDIOPATHIC SKELETAL HYPEROSTOSIS): ICD-10-CM

## 2022-03-10 DIAGNOSIS — Z51.81 ENCOUNTER FOR MEDICATION MONITORING: ICD-10-CM

## 2022-03-10 PROCEDURE — 99999 PR PBB SHADOW E&M-EST. PATIENT-LVL III: CPT | Mod: PBBFAC,,, | Performed by: INTERNAL MEDICINE

## 2022-03-10 PROCEDURE — 96413 CHEMO IV INFUSION 1 HR: CPT

## 2022-03-10 PROCEDURE — 63600175 PHARM REV CODE 636 W HCPCS: Mod: JG | Performed by: INTERNAL MEDICINE

## 2022-03-10 PROCEDURE — 25000003 PHARM REV CODE 250: Performed by: INTERNAL MEDICINE

## 2022-03-10 PROCEDURE — 99215 OFFICE O/P EST HI 40 MIN: CPT | Mod: S$PBB,,, | Performed by: INTERNAL MEDICINE

## 2022-03-10 PROCEDURE — 99213 OFFICE O/P EST LOW 20 MIN: CPT | Mod: PBBFAC | Performed by: INTERNAL MEDICINE

## 2022-03-10 PROCEDURE — 99215 PR OFFICE/OUTPT VISIT, EST, LEVL V, 40-54 MIN: ICD-10-PCS | Mod: S$PBB,,, | Performed by: INTERNAL MEDICINE

## 2022-03-10 PROCEDURE — 96415 CHEMO IV INFUSION ADDL HR: CPT

## 2022-03-10 PROCEDURE — 99999 PR PBB SHADOW E&M-EST. PATIENT-LVL III: ICD-10-PCS | Mod: PBBFAC,,, | Performed by: INTERNAL MEDICINE

## 2022-03-10 RX ORDER — DIPHENHYDRAMINE HYDROCHLORIDE 50 MG/ML
50 INJECTION INTRAMUSCULAR; INTRAVENOUS ONCE AS NEEDED
Status: CANCELLED | OUTPATIENT
Start: 2022-03-24

## 2022-03-10 RX ORDER — ACETAMINOPHEN 325 MG/1
650 TABLET ORAL
Status: COMPLETED | OUTPATIENT
Start: 2022-03-10 | End: 2022-03-10

## 2022-03-10 RX ORDER — SODIUM CHLORIDE 0.9 % (FLUSH) 0.9 %
10 SYRINGE (ML) INJECTION
Status: CANCELLED | OUTPATIENT
Start: 2022-03-24

## 2022-03-10 RX ORDER — IPRATROPIUM BROMIDE AND ALBUTEROL SULFATE 2.5; .5 MG/3ML; MG/3ML
3 SOLUTION RESPIRATORY (INHALATION)
Status: CANCELLED | OUTPATIENT
Start: 2022-03-24

## 2022-03-10 RX ORDER — HEPARIN 100 UNIT/ML
500 SYRINGE INTRAVENOUS
Status: CANCELLED | OUTPATIENT
Start: 2022-03-24

## 2022-03-10 RX ORDER — EPINEPHRINE 0.3 MG/.3ML
0.3 INJECTION SUBCUTANEOUS ONCE AS NEEDED
Status: CANCELLED | OUTPATIENT
Start: 2022-03-24

## 2022-03-10 RX ORDER — SODIUM CHLORIDE 0.9 % (FLUSH) 0.9 %
10 SYRINGE (ML) INJECTION
Status: DISCONTINUED | OUTPATIENT
Start: 2022-03-10 | End: 2022-03-10 | Stop reason: HOSPADM

## 2022-03-10 RX ORDER — ACETAMINOPHEN 325 MG/1
650 TABLET ORAL
Status: CANCELLED | OUTPATIENT
Start: 2022-03-24

## 2022-03-10 RX ADMIN — ACETAMINOPHEN 650 MG: 325 TABLET ORAL at 11:03

## 2022-03-10 RX ADMIN — SODIUM CHLORIDE 370 MG: 0.9 INJECTION, SOLUTION INTRAVENOUS at 11:03

## 2022-03-10 NOTE — PROGRESS NOTES
RHEUMATOLOGY CLINIC FOLLOW UP VISIT  Chief complaints, HPI, ROS, EXAM, Assessment & Plans:-  Deborah Cordova a 67 y.o. pleasant male comes in for worsening symptoms.  He has ankylosing spondylitis affecting multiple sites including diffuse idiopathic skeletal hyperostosis with bridging osteophytes.  He has failed methotrexate and sulfasalazine.  He responds very well to prednisone. Rheumatological review of system negative otherwise.  Exam shows severe tenderness of cervical, thoracic and lower lumbar vertebra and severe tenderness of bilateral sacroiliac joint with restricted lumbar flexion.  No small joint synovitis.    1. Ankylosing spondylitis of multiple sites in spine    2. Immunocompromised    3. Encounter for medication monitoring    4. DISH (diffuse idiopathic skeletal hyperostosis)    5. TB lung, latent    6. Seronegative rheumatoid arthritis      Problem List Items Addressed This Visit     Ankylosing spondylitis of multiple sites in spine - Primary    Encounter for medication monitoring    Immunocompromised    DISH (diffuse idiopathic skeletal hyperostosis)    TB lung, latent    Seronegative rheumatoid arthritis           AS with features of Seronegative RA responding well to remicade. But , I am not sure about his worsening BP at the end of infusion. ? Related to Steroids .    Try without premeds today with close monitoring.    If any infusion reaction, try benadryl before solumedrol.    Continuing rifampin for 4 months.   P.r.n. tramadol for worsening ankylosing spondylitis pain.  Advised all precautions.       # Follow up in about 3 months (around 6/10/2022).      Disclaimer: This note was prepared using voice recognition system and is likely to have sound alike errors and is not proof read.  Please call me with any questions.

## 2022-03-10 NOTE — NURSING
Infusion# 3  Next infusion is scheduled for 5/5/22.    Recent labs? Reviewed    S/S infection noted or voiced? None/Denies  Recent or planned surgeries/invasive procedures? None/Denies  Recent vaccines? None/Denies    Premeds? None  ( Patient had already taken Tylenol at home).    Remicade 370 mg administered IV. Titrated per orders over 2 hrs. see MAR & Flow Sheet for more  details. Tolerated well without adverse events

## 2022-03-14 ENCOUNTER — PATIENT MESSAGE (OUTPATIENT)
Dept: RHEUMATOLOGY | Facility: CLINIC | Age: 67
End: 2022-03-14
Payer: MEDICARE

## 2022-03-14 RX ORDER — TOFACITINIB 5 MG/1
5 TABLET, FILM COATED ORAL DAILY
Qty: 30 TABLET | Refills: 11 | Status: SHIPPED | OUTPATIENT
Start: 2022-03-14 | End: 2023-04-04 | Stop reason: SDUPTHER

## 2022-04-07 ENCOUNTER — SPECIALTY PHARMACY (OUTPATIENT)
Dept: PHARMACY | Facility: CLINIC | Age: 67
End: 2022-04-07
Payer: MEDICARE

## 2022-04-07 ENCOUNTER — PATIENT MESSAGE (OUTPATIENT)
Dept: RHEUMATOLOGY | Facility: CLINIC | Age: 67
End: 2022-04-07
Payer: MEDICARE

## 2022-04-07 NOTE — TELEPHONE ENCOUNTER
[facility] Medicare Plan  [facility] in network. LIS level 1. No deductible or TrOOP max.  Patient is in the initial stage of coverage  Will pay 2532.84 in initial & GAP and $0 in catastrophic

## 2022-04-07 NOTE — TELEPHONE ENCOUNTER
Open ivent- pt has abnormal TB test. On rifampin since January, per note will be on for 4 months. Patient currently receiving Remicade infusions

## 2022-04-07 NOTE — TELEPHONE ENCOUNTER
Ricardo, this is Mana Byrne with [facility].  We are working on your prescription that your doctor has sent us. We will be working with your insurance to get this approved for you. We will be calling you along the way with updates on your medication.  If you have any questions, you can reach us at [phone].    Welcome call outcome: Patient/caregiver reached       Spoke with patient's daughter, who is his caregiver. She stated that her father has either one more month of Rifampin or this is his last month of it. Also educated her that the Xeljanz will take the place of the Remicade that he was on.   Sending to initial.

## 2022-04-07 NOTE — TELEPHONE ENCOUNTER
Xeljanz PA approved from 01/01/2022 through 12/31/2022  Case ID: [id number]  Test claim going through for $9.85

## 2022-04-08 ENCOUNTER — PATIENT OUTREACH (OUTPATIENT)
Dept: ADMINISTRATIVE | Facility: HOSPITAL | Age: 67
End: 2022-04-08
Payer: MEDICARE

## 2022-04-08 ENCOUNTER — LAB VISIT (OUTPATIENT)
Dept: LAB | Facility: HOSPITAL | Age: 67
End: 2022-04-08
Attending: INTERNAL MEDICINE
Payer: MEDICARE

## 2022-04-08 DIAGNOSIS — D84.9 IMMUNOCOMPROMISED: ICD-10-CM

## 2022-04-08 DIAGNOSIS — M45.0 ANKYLOSING SPONDYLITIS OF MULTIPLE SITES IN SPINE: ICD-10-CM

## 2022-04-08 DIAGNOSIS — M48.10 DISH (DIFFUSE IDIOPATHIC SKELETAL HYPEROSTOSIS): ICD-10-CM

## 2022-04-08 DIAGNOSIS — Z51.81 ENCOUNTER FOR MEDICATION MONITORING: ICD-10-CM

## 2022-04-08 LAB
ALBUMIN SERPL BCP-MCNC: 3.8 G/DL (ref 3.5–5.2)
ALP SERPL-CCNC: 67 U/L (ref 55–135)
ALT SERPL W/O P-5'-P-CCNC: 20 U/L (ref 10–44)
ANION GAP SERPL CALC-SCNC: 10 MMOL/L (ref 8–16)
AST SERPL-CCNC: 21 U/L (ref 10–40)
BASOPHILS # BLD AUTO: 0.06 K/UL (ref 0–0.2)
BASOPHILS NFR BLD: 1 % (ref 0–1.9)
BILIRUB SERPL-MCNC: 0.2 MG/DL (ref 0.1–1)
BUN SERPL-MCNC: 16 MG/DL (ref 8–23)
CALCIUM SERPL-MCNC: 9 MG/DL (ref 8.7–10.5)
CHLORIDE SERPL-SCNC: 101 MMOL/L (ref 95–110)
CO2 SERPL-SCNC: 28 MMOL/L (ref 23–29)
CREAT SERPL-MCNC: 1.3 MG/DL (ref 0.5–1.4)
CRP SERPL-MCNC: 0.8 MG/L (ref 0–8.2)
DIFFERENTIAL METHOD: ABNORMAL
EOSINOPHIL # BLD AUTO: 0.1 K/UL (ref 0–0.5)
EOSINOPHIL NFR BLD: 2.2 % (ref 0–8)
ERYTHROCYTE [DISTWIDTH] IN BLOOD BY AUTOMATED COUNT: 13.5 % (ref 11.5–14.5)
ERYTHROCYTE [SEDIMENTATION RATE] IN BLOOD BY WESTERGREN METHOD: 7 MM/HR (ref 0–10)
EST. GFR  (AFRICAN AMERICAN): >60 ML/MIN/1.73 M^2
EST. GFR  (NON AFRICAN AMERICAN): 57 ML/MIN/1.73 M^2
GLUCOSE SERPL-MCNC: 175 MG/DL (ref 70–110)
HCT VFR BLD AUTO: 43.6 % (ref 40–54)
HGB BLD-MCNC: 14.3 G/DL (ref 14–18)
IMM GRANULOCYTES # BLD AUTO: 0.04 K/UL (ref 0–0.04)
IMM GRANULOCYTES NFR BLD AUTO: 0.6 % (ref 0–0.5)
LYMPHOCYTES # BLD AUTO: 2.3 K/UL (ref 1–4.8)
LYMPHOCYTES NFR BLD: 37 % (ref 18–48)
MCH RBC QN AUTO: 30 PG (ref 27–31)
MCHC RBC AUTO-ENTMCNC: 32.8 G/DL (ref 32–36)
MCV RBC AUTO: 92 FL (ref 82–98)
MONOCYTES # BLD AUTO: 0.6 K/UL (ref 0.3–1)
MONOCYTES NFR BLD: 10.2 % (ref 4–15)
NEUTROPHILS # BLD AUTO: 3.1 K/UL (ref 1.8–7.7)
NEUTROPHILS NFR BLD: 49 % (ref 38–73)
NRBC BLD-RTO: 0 /100 WBC
PLATELET # BLD AUTO: 268 K/UL (ref 150–450)
PMV BLD AUTO: 9.1 FL (ref 9.2–12.9)
POTASSIUM SERPL-SCNC: 3.8 MMOL/L (ref 3.5–5.1)
PROT SERPL-MCNC: 7.5 G/DL (ref 6–8.4)
RBC # BLD AUTO: 4.76 M/UL (ref 4.6–6.2)
SODIUM SERPL-SCNC: 139 MMOL/L (ref 136–145)
WBC # BLD AUTO: 6.27 K/UL (ref 3.9–12.7)

## 2022-04-08 PROCEDURE — 86140 C-REACTIVE PROTEIN: CPT | Performed by: INTERNAL MEDICINE

## 2022-04-08 PROCEDURE — 85651 RBC SED RATE NONAUTOMATED: CPT | Performed by: INTERNAL MEDICINE

## 2022-04-08 PROCEDURE — 85025 COMPLETE CBC W/AUTO DIFF WBC: CPT | Performed by: INTERNAL MEDICINE

## 2022-04-08 PROCEDURE — 80053 COMPREHEN METABOLIC PANEL: CPT | Performed by: INTERNAL MEDICINE

## 2022-04-08 PROCEDURE — 36415 COLL VENOUS BLD VENIPUNCTURE: CPT | Performed by: INTERNAL MEDICINE

## 2022-04-08 NOTE — PROGRESS NOTES
HTN REPORT: I spoke to patients daughter she states she will get her dads last BP reading and call me back with it. I scheduled pt an appt he is having some incontinence, his jaw is sore and locks up when he chews for a while. His eyes are also watering a lot. Appt scheduled 4/14/2022.

## 2022-04-12 NOTE — TELEPHONE ENCOUNTER
Per Dr. GATES patient to start Xeljanz when Rifampin complete. Patient should complete rifampin course on 05/12/2022

## 2022-04-14 ENCOUNTER — OFFICE VISIT (OUTPATIENT)
Dept: INTERNAL MEDICINE | Facility: CLINIC | Age: 67
End: 2022-04-14
Payer: MEDICARE

## 2022-04-14 ENCOUNTER — HOSPITAL ENCOUNTER (OUTPATIENT)
Dept: RADIOLOGY | Facility: HOSPITAL | Age: 67
Discharge: HOME | End: 2022-04-14
Attending: PHYSICIAN ASSISTANT
Payer: MEDICARE

## 2022-04-14 VITALS
BODY MASS INDEX: 26.63 KG/M2 | SYSTOLIC BLOOD PRESSURE: 162 MMHG | DIASTOLIC BLOOD PRESSURE: 80 MMHG | HEART RATE: 69 BPM | WEIGHT: 159.81 LBS | HEIGHT: 65 IN | OXYGEN SATURATION: 98 % | TEMPERATURE: 98 F

## 2022-04-14 DIAGNOSIS — I10 HYPERTENSION, ESSENTIAL: ICD-10-CM

## 2022-04-14 DIAGNOSIS — R29.898 WEAKNESS OF LOWER EXTREMITY, UNSPECIFIED LATERALITY: ICD-10-CM

## 2022-04-14 DIAGNOSIS — M06.00 SERONEGATIVE RHEUMATOID ARTHRITIS: ICD-10-CM

## 2022-04-14 DIAGNOSIS — Z79.899 LONG TERM CURRENT USE OF IMMUNOSUPPRESSIVE DRUG: ICD-10-CM

## 2022-04-14 DIAGNOSIS — M45.0 ANKYLOSING SPONDYLITIS OF MULTIPLE SITES IN SPINE: ICD-10-CM

## 2022-04-14 DIAGNOSIS — R14.0 ABDOMINAL BLOATING: ICD-10-CM

## 2022-04-14 DIAGNOSIS — H04.203 WATERY EYES: ICD-10-CM

## 2022-04-14 DIAGNOSIS — M54.9 DORSALGIA, UNSPECIFIED: Primary | ICD-10-CM

## 2022-04-14 DIAGNOSIS — R32 URINARY INCONTINENCE, UNSPECIFIED TYPE: ICD-10-CM

## 2022-04-14 PROCEDURE — 99214 PR OFFICE/OUTPT VISIT, EST, LEVL IV, 30-39 MIN: ICD-10-PCS | Mod: S$PBB,,, | Performed by: PHYSICIAN ASSISTANT

## 2022-04-14 PROCEDURE — 99999 PR PBB SHADOW E&M-EST. PATIENT-LVL V: ICD-10-PCS | Mod: PBBFAC,,, | Performed by: PHYSICIAN ASSISTANT

## 2022-04-14 PROCEDURE — 99999 PR PBB SHADOW E&M-EST. PATIENT-LVL V: CPT | Mod: PBBFAC,,, | Performed by: PHYSICIAN ASSISTANT

## 2022-04-14 PROCEDURE — 74019 RADEX ABDOMEN 2 VIEWS: CPT | Mod: TC

## 2022-04-14 PROCEDURE — 74019 XR ABDOMEN FLAT AND ERECT: ICD-10-PCS | Mod: 26,,, | Performed by: RADIOLOGY

## 2022-04-14 PROCEDURE — 99214 OFFICE O/P EST MOD 30 MIN: CPT | Mod: S$PBB,,, | Performed by: PHYSICIAN ASSISTANT

## 2022-04-14 PROCEDURE — 87086 URINE CULTURE/COLONY COUNT: CPT | Performed by: PHYSICIAN ASSISTANT

## 2022-04-14 PROCEDURE — 99215 OFFICE O/P EST HI 40 MIN: CPT | Mod: PBBFAC | Performed by: PHYSICIAN ASSISTANT

## 2022-04-14 PROCEDURE — 74019 RADEX ABDOMEN 2 VIEWS: CPT | Mod: 26,,, | Performed by: RADIOLOGY

## 2022-04-14 NOTE — PROGRESS NOTES
"Subjective:      Patient ID: Deborah White is a 67 y.o. male.    Chief Complaint: Jaw Pain (Only after talking for a while. ), Urinary Incontinence, and watery eyes (Only while eating)    Patient is new to me, being seen today for incontinence.     Ocassionally with urinary incontinence and fecal incontinence over the past month  Generalized pain but has not specifically complained of low back pain   Lower extremity weakness, gradually worsening, using cane for assistance, last fall 2mths ago   Numbness/tingling of L leg  Denies known injury, no recent falls     Started remicaide infusions about the same time incontinence occured, unsure whether this could be related, talked to Dr. GATES and they recommended intial eval to r/o pinched nerve as cause     Denies jaw pain but after eating/chewing has trouble closing completely    Reports tearing of eyes when eating, this has been years, have not been evaluated by eye doctor     Present with daughter at today's appt    Review of Systems   Constitutional: Negative for chills, diaphoresis and fever.   HENT: Negative for congestion, rhinorrhea and sore throat.    Respiratory: Negative for cough, shortness of breath and wheezing.    Gastrointestinal: Negative for abdominal pain, constipation, diarrhea, nausea and vomiting.        Feels stomach is bloated   Genitourinary: Negative for dysuria, frequency and hematuria.        +incontinence over the past month   Musculoskeletal: Positive for arthralgias (arthritis).   Skin: Negative for rash.   Neurological: Positive for numbness. Negative for dizziness, light-headedness and headaches.       Objective:   BP (!) 160/82   Pulse 69   Temp 97.9 °F (36.6 °C) (Temporal)   Ht 5' 5" (1.651 m)   Wt 72.5 kg (159 lb 13.3 oz)   SpO2 98%   BMI 26.60 kg/m²   Physical Exam  Constitutional:       General: He is not in acute distress.     Appearance: Normal appearance. He is well-developed. He is not ill-appearing.   HENT:      Head: " Normocephalic and atraumatic.      Jaw: No tenderness, pain on movement or malocclusion.   Cardiovascular:      Rate and Rhythm: Normal rate and regular rhythm.      Heart sounds: Normal heart sounds. No murmur heard.  Pulmonary:      Effort: Pulmonary effort is normal. No respiratory distress.      Breath sounds: Normal breath sounds. No decreased breath sounds.   Abdominal:      General: Bowel sounds are normal. There is distension.      Palpations: Abdomen is soft.      Tenderness: There is no abdominal tenderness.   Musculoskeletal:      Lumbar back: Normal.      Right lower leg: No edema.      Left lower leg: No edema.   Skin:     General: Skin is warm and dry.      Findings: No rash.   Neurological:      Sensory: No sensory deficit.      Motor: Motor function is intact.   Psychiatric:         Speech: Speech normal.         Behavior: Behavior normal.         Thought Content: Thought content normal.       Assessment:      1. Dorsalgia, unspecified    2. Weakness of lower extremity, unspecified laterality    3. Urinary incontinence, unspecified type    4. Hypertension, essential    5. Abdominal bloating    6. Long term current use of immunosuppressive drug    7. Ankylosing spondylitis of multiple sites in spine    8. Seronegative rheumatoid arthritis    9. Watery eyes       Plan:   Dorsalgia, unspecified  -     MRI Lumbar Spine Without Contrast; Future; Expected date: 04/14/2022    Weakness of lower extremity, unspecified laterality  -     MRI Lumbar Spine Without Contrast; Future; Expected date: 04/14/2022  -     Ambulatory referral/consult to Physical/Occupational Therapy; Future; Expected date: 04/21/2022    Urinary incontinence, unspecified type  -     Urinalysis  -     Urine culture    Hypertension, essential   2wk f/u for recheck     Abdominal bloating  -     X-Ray Abdomen Flat And Erect; Future; Expected date: 04/14/2022    Long term current use of immunosuppressive drug   Followed by Rheum     Ankylosing  spondylitis of multiple sites in spine   Followed by Rheum     Seronegative rheumatoid arthritis   Followed by Rheum     Watery eyes  -     Ambulatory referral/consult to Ophthalmology; Future; Expected date: 04/21/2022      Avoid chewing gum/candy  Follow up with dentist for TMJ eval     F/u eye doctor     Additional recs pending above eval     Discussed worsening signs/symptoms and when to return to clinic or go to ED.   Patient expresses understanding and agrees with treatment plan.

## 2022-04-14 NOTE — Clinical Note
Please fax referral to [facility] (through [facility]) in [place], [place]

## 2022-04-15 LAB — BACTERIA UR CULT: NO GROWTH

## 2022-04-21 ENCOUNTER — PATIENT OUTREACH (OUTPATIENT)
Dept: ADMINISTRATIVE | Facility: OTHER | Age: 67
End: 2022-04-21
Payer: MEDICARE

## 2022-04-21 NOTE — PROGRESS NOTES
Health Maintenance Due   Topic Date Due    TETANUS VACCINE  Never done    Shingles Vaccine (1 of 2) Never done    Pneumococcal Vaccines (Age 65+) (2 - PCV) 12/11/2020    PROSTATE-SPECIFIC ANTIGEN  02/23/2022    Abdominal Aortic Aneurysm Screening  Never done     Updates were requested from care everywhere.  Chart was reviewed for overdue Proactive [facility] Encounters (JS) topics (CRS, Breast Cancer Screening, Eye exam)  Health Maintenance has been updated.  LINKS immunization registry triggered.  Immunizations were reconciled.

## 2022-04-25 ENCOUNTER — OFFICE VISIT (OUTPATIENT)
Dept: OPHTHALMOLOGY | Facility: CLINIC | Age: 67
End: 2022-04-25
Payer: MEDICARE

## 2022-04-25 ENCOUNTER — OFFICE VISIT (OUTPATIENT)
Dept: INTERNAL MEDICINE | Facility: CLINIC | Age: 67
End: 2022-04-25
Payer: MEDICARE

## 2022-04-25 VITALS
TEMPERATURE: 100 F | HEART RATE: 82 BPM | WEIGHT: 157.19 LBS | DIASTOLIC BLOOD PRESSURE: 62 MMHG | SYSTOLIC BLOOD PRESSURE: 112 MMHG | HEIGHT: 65 IN | BODY MASS INDEX: 26.19 KG/M2 | RESPIRATION RATE: 18 BRPM | OXYGEN SATURATION: 95 %

## 2022-04-25 DIAGNOSIS — H04.203 WATERY EYES: ICD-10-CM

## 2022-04-25 DIAGNOSIS — I10 HYPERTENSION, ESSENTIAL: Primary | ICD-10-CM

## 2022-04-25 DIAGNOSIS — H04.123 DRY EYES, BILATERAL: Primary | ICD-10-CM

## 2022-04-25 PROCEDURE — 99213 PR OFFICE/OUTPT VISIT, EST, LEVL III, 20-29 MIN: ICD-10-PCS | Mod: S$PBB,,, | Performed by: PHYSICIAN ASSISTANT

## 2022-04-25 PROCEDURE — 92002 PR EYE EXAM, NEW PATIENT,INTERMED: ICD-10-PCS | Mod: S$PBB,,, | Performed by: OPTOMETRIST

## 2022-04-25 PROCEDURE — 99213 OFFICE O/P EST LOW 20 MIN: CPT | Mod: PBBFAC,27 | Performed by: OPTOMETRIST

## 2022-04-25 PROCEDURE — 99999 PR PBB SHADOW E&M-EST. PATIENT-LVL IV: CPT | Mod: PBBFAC,,, | Performed by: PHYSICIAN ASSISTANT

## 2022-04-25 PROCEDURE — 99999 PR PBB SHADOW E&M-EST. PATIENT-LVL III: CPT | Mod: PBBFAC,,, | Performed by: OPTOMETRIST

## 2022-04-25 PROCEDURE — 99999 PR PBB SHADOW E&M-EST. PATIENT-LVL III: ICD-10-PCS | Mod: PBBFAC,,, | Performed by: OPTOMETRIST

## 2022-04-25 PROCEDURE — 99999 PR PBB SHADOW E&M-EST. PATIENT-LVL IV: ICD-10-PCS | Mod: PBBFAC,,, | Performed by: PHYSICIAN ASSISTANT

## 2022-04-25 PROCEDURE — 99214 OFFICE O/P EST MOD 30 MIN: CPT | Mod: PBBFAC | Performed by: PHYSICIAN ASSISTANT

## 2022-04-25 PROCEDURE — 92002 INTRM OPH EXAM NEW PATIENT: CPT | Mod: S$PBB,,, | Performed by: OPTOMETRIST

## 2022-04-25 PROCEDURE — 99213 OFFICE O/P EST LOW 20 MIN: CPT | Mod: S$PBB,,, | Performed by: PHYSICIAN ASSISTANT

## 2022-04-25 NOTE — PROGRESS NOTES
HPI     Eye Drainage      Additional comments: Excessive tearing OU.              Comments     Pt c/o frequent tearing over the past year.  Worse when he eats food of   any kind.  No itching or pain.          Last edited by Vilma Duron MA on 4/25/2022  3:30 PM. (History)            Assessment /Plan     For exam results, see Encounter Report.    Dry eyes, bilateral    Watery eyes  -     Ambulatory referral/consult to Ophthalmology      Worksheet given. Discussed Dry Eyes in detail including Artificial Tears, lubricants, and Omega 3 Fish Oils.    RTC 2 months full exam, sooner if worsens, discuss Restasis

## 2022-04-25 NOTE — PROGRESS NOTES
"Subjective:      Patient ID: Deborah White is a 67 y.o. male.    Chief Complaint: Hypertension and Follow-up    Patient is known to me, being seen today for BP f/u.   Denies any new concerns/complaints     Last visit 2wks ago with myself, BP elevated at that time.  BP controlled at home     Daughter present at today's visit     Review of Systems   Constitutional: Negative for chills, diaphoresis and fever.   HENT: Negative for congestion, rhinorrhea and sore throat.    Respiratory: Negative for cough, shortness of breath and wheezing.    Cardiovascular: Negative for chest pain and leg swelling.   Gastrointestinal: Negative for abdominal pain, constipation, diarrhea, nausea and vomiting.   Skin: Negative for rash.   Neurological: Negative for dizziness, light-headedness and headaches.       Objective:   /62   Pulse 82   Temp 99.5 °F (37.5 °C) (Tympanic)   Resp 18   Ht 5' 5" (1.651 m)   Wt 71.3 kg (157 lb 3 oz)   SpO2 95%   BMI 26.16 kg/m²   Physical Exam  Constitutional:       General: He is not in acute distress.     Appearance: Normal appearance. He is well-developed. He is not ill-appearing.   HENT:      Head: Normocephalic and atraumatic.   Cardiovascular:      Rate and Rhythm: Normal rate and regular rhythm.      Heart sounds: Normal heart sounds. No murmur heard.  Pulmonary:      Effort: Pulmonary effort is normal. No respiratory distress.      Breath sounds: Normal breath sounds. No decreased breath sounds.   Musculoskeletal:      Right lower leg: No edema.      Left lower leg: No edema.   Skin:     General: Skin is warm and dry.      Findings: No rash.   Psychiatric:         Speech: Speech normal.         Behavior: Behavior normal.         Thought Content: Thought content normal.       Assessment:      1. Hypertension, essential       Plan:   Hypertension, essential   Controlled     Keep upcoming scheduled appts     Continue to monitor BP at home     Discussed worsening signs/symptoms and when to " return to clinic or go to ED.   Patient expresses understanding and agrees with treatment plan.

## 2022-04-26 ENCOUNTER — TELEPHONE (OUTPATIENT)
Dept: CARDIOTHORACIC SURGERY | Facility: CLINIC | Age: 67
End: 2022-04-26
Payer: MEDICARE

## 2022-04-26 ENCOUNTER — HOSPITAL ENCOUNTER (OUTPATIENT)
Dept: RADIOLOGY | Facility: HOSPITAL | Age: 67
Discharge: HOME | End: 2022-04-26
Attending: PHYSICIAN ASSISTANT
Payer: MEDICARE

## 2022-04-26 DIAGNOSIS — I71.40 ABDOMINAL AORTIC ANEURYSM (AAA) WITHOUT RUPTURE: Primary | ICD-10-CM

## 2022-04-26 DIAGNOSIS — R29.898 WEAKNESS OF LOWER EXTREMITY, UNSPECIFIED LATERALITY: ICD-10-CM

## 2022-04-26 DIAGNOSIS — M54.9 DORSALGIA, UNSPECIFIED: ICD-10-CM

## 2022-04-26 PROCEDURE — 72148 MRI LUMBAR SPINE W/O DYE: CPT | Mod: TC,PO

## 2022-04-26 PROCEDURE — 72148 MRI LUMBAR SPINE W/O DYE: CPT | Mod: 26,,, | Performed by: RADIOLOGY

## 2022-04-26 PROCEDURE — 72148 MRI LUMBAR SPINE WITHOUT CONTRAST: ICD-10-PCS | Mod: 26,,, | Performed by: RADIOLOGY

## 2022-04-26 NOTE — TELEPHONE ENCOUNTER
Patient contacted and spoke to the daughter, she was advised her that a Consult was placed to discuss a plan for --Abdominal aortic aneurysm (AAA) without rupture. The daughter stated that she will be accompanying the patient to the visit tomorrow. Daughter confirmed time and location.

## 2022-04-26 NOTE — PROGRESS NOTES
Please schedule close follow up with Cardiovascular for large abdominal aortic aneurysm (we may need to message Dr. Boyer staff).  If no close follow up, will need to refer externally.  Advise patient of results. He is to got to ER if he develops any sudden sharp chest/abdominal pain, shortness of breath, weakness, syncope.

## 2022-04-27 ENCOUNTER — PATIENT MESSAGE (OUTPATIENT)
Dept: INTERNAL MEDICINE | Facility: CLINIC | Age: 67
End: 2022-04-27
Payer: MEDICARE

## 2022-04-27 ENCOUNTER — PATIENT MESSAGE (OUTPATIENT)
Dept: RHEUMATOLOGY | Facility: CLINIC | Age: 67
End: 2022-04-27
Payer: MEDICARE

## 2022-04-28 ENCOUNTER — PATIENT MESSAGE (OUTPATIENT)
Dept: RHEUMATOLOGY | Facility: CLINIC | Age: 67
End: 2022-04-28
Payer: MEDICARE

## 2022-04-28 NOTE — TELEPHONE ENCOUNTER
Yes, he will stop infusion. Ok to schedule labs at [facility] when he is there for an appointment. Thanks.

## 2022-05-09 ENCOUNTER — PATIENT MESSAGE (OUTPATIENT)
Dept: RHEUMATOLOGY | Facility: CLINIC | Age: 67
End: 2022-05-09
Payer: MEDICARE

## 2022-05-12 ENCOUNTER — OFFICE VISIT (OUTPATIENT)
Dept: CARDIOTHORACIC SURGERY | Facility: CLINIC | Age: 67
End: 2022-05-12
Payer: MEDICARE

## 2022-05-12 ENCOUNTER — LAB VISIT (OUTPATIENT)
Dept: LAB | Facility: HOSPITAL | Age: 67
End: 2022-05-12
Attending: INTERNAL MEDICINE
Payer: MEDICARE

## 2022-05-12 ENCOUNTER — OFFICE VISIT (OUTPATIENT)
Dept: INTERNAL MEDICINE | Facility: CLINIC | Age: 67
End: 2022-05-12
Payer: MEDICARE

## 2022-05-12 VITALS
DIASTOLIC BLOOD PRESSURE: 64 MMHG | HEIGHT: 65 IN | SYSTOLIC BLOOD PRESSURE: 102 MMHG | OXYGEN SATURATION: 98 % | WEIGHT: 156.06 LBS | BODY MASS INDEX: 26 KG/M2 | HEART RATE: 68 BPM | RESPIRATION RATE: 18 BRPM | TEMPERATURE: 98 F

## 2022-05-12 VITALS
WEIGHT: 156.06 LBS | OXYGEN SATURATION: 97 % | HEIGHT: 65 IN | SYSTOLIC BLOOD PRESSURE: 116 MMHG | HEART RATE: 56 BPM | DIASTOLIC BLOOD PRESSURE: 70 MMHG | BODY MASS INDEX: 26 KG/M2

## 2022-05-12 DIAGNOSIS — M48.10 DISH (DIFFUSE IDIOPATHIC SKELETAL HYPEROSTOSIS): ICD-10-CM

## 2022-05-12 DIAGNOSIS — Z51.81 ENCOUNTER FOR MEDICATION MONITORING: ICD-10-CM

## 2022-05-12 DIAGNOSIS — Z01.818 ENCOUNTER FOR OTHER PREPROCEDURAL EXAMINATION: Primary | ICD-10-CM

## 2022-05-12 DIAGNOSIS — D84.9 IMMUNOCOMPROMISED: ICD-10-CM

## 2022-05-12 DIAGNOSIS — I71.40 ABDOMINAL AORTIC ANEURYSM (AAA) WITHOUT RUPTURE: ICD-10-CM

## 2022-05-12 DIAGNOSIS — M45.0 ANKYLOSING SPONDYLITIS OF MULTIPLE SITES IN SPINE: ICD-10-CM

## 2022-05-12 DIAGNOSIS — E78.5 HYPERLIPIDEMIA, UNSPECIFIED HYPERLIPIDEMIA TYPE: ICD-10-CM

## 2022-05-12 DIAGNOSIS — I10 HYPERTENSION, ESSENTIAL: Primary | ICD-10-CM

## 2022-05-12 LAB
ALBUMIN SERPL BCP-MCNC: 3.8 G/DL (ref 3.5–5.2)
ALP SERPL-CCNC: 62 U/L (ref 55–135)
ALT SERPL W/O P-5'-P-CCNC: 25 U/L (ref 10–44)
ANION GAP SERPL CALC-SCNC: 12 MMOL/L (ref 8–16)
AST SERPL-CCNC: 22 U/L (ref 10–40)
BASOPHILS # BLD AUTO: 0.06 K/UL (ref 0–0.2)
BASOPHILS NFR BLD: 0.9 % (ref 0–1.9)
BILIRUB SERPL-MCNC: 0.3 MG/DL (ref 0.1–1)
BUN SERPL-MCNC: 16 MG/DL (ref 8–23)
CALCIUM SERPL-MCNC: 9.2 MG/DL (ref 8.7–10.5)
CHLORIDE SERPL-SCNC: 102 MMOL/L (ref 95–110)
CO2 SERPL-SCNC: 27 MMOL/L (ref 23–29)
CREAT SERPL-MCNC: 1.4 MG/DL (ref 0.5–1.4)
CRP SERPL-MCNC: 1.3 MG/L (ref 0–8.2)
DIFFERENTIAL METHOD: ABNORMAL
EOSINOPHIL # BLD AUTO: 0.1 K/UL (ref 0–0.5)
EOSINOPHIL NFR BLD: 2 % (ref 0–8)
ERYTHROCYTE [DISTWIDTH] IN BLOOD BY AUTOMATED COUNT: 12.9 % (ref 11.5–14.5)
ERYTHROCYTE [SEDIMENTATION RATE] IN BLOOD BY WESTERGREN METHOD: 16 MM/HR (ref 0–10)
EST. GFR  (AFRICAN AMERICAN): >60 ML/MIN/1.73 M^2
EST. GFR  (NON AFRICAN AMERICAN): 52 ML/MIN/1.73 M^2
GLUCOSE SERPL-MCNC: 104 MG/DL (ref 70–110)
HCT VFR BLD AUTO: 40.7 % (ref 40–54)
HGB BLD-MCNC: 14.1 G/DL (ref 14–18)
IMM GRANULOCYTES # BLD AUTO: 0.04 K/UL (ref 0–0.04)
IMM GRANULOCYTES NFR BLD AUTO: 0.6 % (ref 0–0.5)
LYMPHOCYTES # BLD AUTO: 2.9 K/UL (ref 1–4.8)
LYMPHOCYTES NFR BLD: 42.3 % (ref 18–48)
MCH RBC QN AUTO: 30.8 PG (ref 27–31)
MCHC RBC AUTO-ENTMCNC: 34.6 G/DL (ref 32–36)
MCV RBC AUTO: 89 FL (ref 82–98)
MONOCYTES # BLD AUTO: 0.7 K/UL (ref 0.3–1)
MONOCYTES NFR BLD: 10.7 % (ref 4–15)
NEUTROPHILS # BLD AUTO: 3 K/UL (ref 1.8–7.7)
NEUTROPHILS NFR BLD: 43.5 % (ref 38–73)
NRBC BLD-RTO: 0 /100 WBC
PLATELET # BLD AUTO: 247 K/UL (ref 150–450)
PMV BLD AUTO: 9.3 FL (ref 9.2–12.9)
POTASSIUM SERPL-SCNC: 3.7 MMOL/L (ref 3.5–5.1)
PROT SERPL-MCNC: 7.5 G/DL (ref 6–8.4)
RBC # BLD AUTO: 4.58 M/UL (ref 4.6–6.2)
SODIUM SERPL-SCNC: 141 MMOL/L (ref 136–145)
WBC # BLD AUTO: 6.85 K/UL (ref 3.9–12.7)

## 2022-05-12 PROCEDURE — 80053 COMPREHEN METABOLIC PANEL: CPT | Performed by: INTERNAL MEDICINE

## 2022-05-12 PROCEDURE — 99214 OFFICE O/P EST MOD 30 MIN: CPT | Mod: PBBFAC | Performed by: PHYSICIAN ASSISTANT

## 2022-05-12 PROCEDURE — 99203 OFFICE O/P NEW LOW 30 MIN: CPT | Mod: S$PBB,,, | Performed by: THORACIC SURGERY (CARDIOTHORACIC VASCULAR SURGERY)

## 2022-05-12 PROCEDURE — 36415 COLL VENOUS BLD VENIPUNCTURE: CPT | Performed by: INTERNAL MEDICINE

## 2022-05-12 PROCEDURE — 99999 PR PBB SHADOW E&M-EST. PATIENT-LVL V: CPT | Mod: PBBFAC,,, | Performed by: THORACIC SURGERY (CARDIOTHORACIC VASCULAR SURGERY)

## 2022-05-12 PROCEDURE — 99214 PR OFFICE/OUTPT VISIT, EST, LEVL IV, 30-39 MIN: ICD-10-PCS | Mod: S$PBB,,, | Performed by: PHYSICIAN ASSISTANT

## 2022-05-12 PROCEDURE — 99999 PR PBB SHADOW E&M-EST. PATIENT-LVL IV: ICD-10-PCS | Mod: PBBFAC,,, | Performed by: PHYSICIAN ASSISTANT

## 2022-05-12 PROCEDURE — 86140 C-REACTIVE PROTEIN: CPT | Performed by: INTERNAL MEDICINE

## 2022-05-12 PROCEDURE — 85025 COMPLETE CBC W/AUTO DIFF WBC: CPT | Performed by: INTERNAL MEDICINE

## 2022-05-12 PROCEDURE — 99215 OFFICE O/P EST HI 40 MIN: CPT | Mod: PBBFAC,27 | Performed by: THORACIC SURGERY (CARDIOTHORACIC VASCULAR SURGERY)

## 2022-05-12 PROCEDURE — 85651 RBC SED RATE NONAUTOMATED: CPT | Performed by: INTERNAL MEDICINE

## 2022-05-12 PROCEDURE — 99203 PR OFFICE/OUTPT VISIT, NEW, LEVL III, 30-44 MIN: ICD-10-PCS | Mod: S$PBB,,, | Performed by: THORACIC SURGERY (CARDIOTHORACIC VASCULAR SURGERY)

## 2022-05-12 PROCEDURE — 99214 OFFICE O/P EST MOD 30 MIN: CPT | Mod: S$PBB,,, | Performed by: PHYSICIAN ASSISTANT

## 2022-05-12 PROCEDURE — 99999 PR PBB SHADOW E&M-EST. PATIENT-LVL IV: CPT | Mod: PBBFAC,,, | Performed by: PHYSICIAN ASSISTANT

## 2022-05-12 PROCEDURE — 99999 PR PBB SHADOW E&M-EST. PATIENT-LVL V: ICD-10-PCS | Mod: PBBFAC,,, | Performed by: THORACIC SURGERY (CARDIOTHORACIC VASCULAR SURGERY)

## 2022-05-12 RX ORDER — EZETIMIBE 10 MG/1
10 TABLET ORAL DAILY
Qty: 90 TABLET | Refills: 1 | Status: SHIPPED | OUTPATIENT
Start: 2022-05-12 | End: 2022-11-08 | Stop reason: SDUPTHER

## 2022-05-12 RX ORDER — HYDROCHLOROTHIAZIDE 25 MG/1
25 TABLET ORAL DAILY
Qty: 90 TABLET | Refills: 1 | Status: SHIPPED | OUTPATIENT
Start: 2022-05-12 | End: 2022-11-08 | Stop reason: SDUPTHER

## 2022-05-12 RX ORDER — AMLODIPINE BESYLATE 5 MG/1
5 TABLET ORAL DAILY
Qty: 90 TABLET | Refills: 1 | Status: SHIPPED | OUTPATIENT
Start: 2022-05-12 | End: 2022-11-08 | Stop reason: SDUPTHER

## 2022-05-12 RX ORDER — ROSUVASTATIN CALCIUM 20 MG/1
20 TABLET, COATED ORAL DAILY
Qty: 90 TABLET | Refills: 1 | Status: SHIPPED | OUTPATIENT
Start: 2022-05-12 | End: 2022-11-08 | Stop reason: SDUPTHER

## 2022-05-12 RX ORDER — BENAZEPRIL HYDROCHLORIDE 40 MG/1
40 TABLET ORAL DAILY
Qty: 90 TABLET | Refills: 1 | Status: SHIPPED | OUTPATIENT
Start: 2022-05-12 | End: 2022-11-08 | Stop reason: SDUPTHER

## 2022-05-12 NOTE — PROGRESS NOTES
"Subjective:      Patient ID: Deborah White is a 67 y.o. male.    Chief Complaint: Hypotension    Patient is known to me, being seen today for low BP.     HTN- benazepril 40mg, clonidine .2mg bid, amlodipine 10mg, HCTZ 25mg   Recently stopped rifampin and since that time BP has been low, completed medication   BP this morning at home 90/60s    Last visit April 2022 with myself, BP controlled at that time.    Present with daughter at today's appt.     Review of Systems   Constitutional: Positive for fatigue. Negative for activity change and unexpected weight change.   HENT: Negative for hearing loss, rhinorrhea and trouble swallowing.    Eyes: Negative for discharge and visual disturbance.   Respiratory: Negative for chest tightness and wheezing.    Cardiovascular: Negative for chest pain, palpitations and leg swelling.   Gastrointestinal: Negative for blood in stool, constipation, diarrhea and vomiting.   Endocrine: Negative for polydipsia and polyuria.   Genitourinary: Negative for difficulty urinating, hematuria and urgency.   Musculoskeletal: Positive for arthralgias. Negative for joint swelling and neck pain.   Neurological: Positive for dizziness, weakness and light-headedness. Negative for headaches.   Psychiatric/Behavioral: Negative for confusion and dysphoric mood.       Objective:   /64   Pulse 68   Temp 98.1 °F (36.7 °C) (Oral)   Resp 18   Ht 5' 5" (1.651 m)   Wt 70.8 kg (156 lb 1.4 oz)   SpO2 98%   BMI 25.97 kg/m²   Physical Exam  Constitutional:       General: He is not in acute distress.     Appearance: Normal appearance. He is well-developed. He is not ill-appearing.   HENT:      Head: Normocephalic and atraumatic.   Cardiovascular:      Rate and Rhythm: Normal rate and regular rhythm.      Heart sounds: Normal heart sounds. No murmur heard.  Pulmonary:      Effort: Pulmonary effort is normal. No respiratory distress.      Breath sounds: Normal breath sounds. No decreased breath sounds. "   Musculoskeletal:      Right lower leg: No edema.      Left lower leg: No edema.   Skin:     General: Skin is warm and dry.      Findings: No rash.   Psychiatric:         Speech: Speech normal.         Behavior: Behavior normal.         Thought Content: Thought content normal.       Assessment:      1. Hypertension, essential    2. Hyperlipidemia, unspecified hyperlipidemia type       Plan:   Hypertension, essential  -     amLODIPine (NORVASC) 5 MG tablet; Take 1 tablet (5 mg total) by mouth once daily.  Dispense: 90 tablet; Refill: 1  -     benazepriL (LOTENSIN) 40 MG tablet; Take 1 tablet (40 mg total) by mouth once daily.  Dispense: 90 tablet; Refill: 1  -     hydroCHLOROthiazide (HYDRODIURIL) 25 MG tablet; Take 1 tablet (25 mg total) by mouth once daily.  Dispense: 90 tablet; Refill: 1    Hyperlipidemia, unspecified hyperlipidemia type  -     rosuvastatin (CRESTOR) 20 MG tablet; Take 1 tablet (20 mg total) by mouth once daily.  Dispense: 90 tablet; Refill: 1  -     ezetimibe (ZETIA) 10 mg tablet; Take 1 tablet (10 mg total) by mouth once daily.  Dispense: 90 tablet; Refill: 1      Decrease amlodipine to 5mg   Take clonidine .2mg twice daily if BP >160/100  Monitor BP at home  Keep upcoming appt with Cardiology     Discussed worsening signs/symptoms and when to return to clinic or go to ED.   Patient expresses understanding and agrees with treatment plan.

## 2022-05-12 NOTE — PROGRESS NOTES
Subjective:      Patient ID: Deborah White is a 67 y.o. male.    Chief Complaint: No chief complaint on file.    HPI: 67-year-old male with a history of chronic backache had MRI of the lumbar spine which revealed a 5.1 cm infrarenal abdominal aortic aneurysm patient denies any abdominal pain.  No history of distress and his blood pressure has been fairly controlled.    Review of patient's allergies indicates:  No Known Allergies    Past Medical History:   Diagnosis Date    Coronary artery disease     Hyperlipidemia     Hypertension        Family History   Problem Relation Age of Onset    Cancer Mother        Social History     Socioeconomic History    Marital status:    Tobacco Use    Smoking status: Current Every Day Smoker     Types: Cigarettes    Smokeless tobacco: Never Used   Substance and Sexual Activity    Alcohol use: Never     Social Determinants of Health     Financial Resource Strain: Low Risk     Difficulty of Paying Living Expenses: Not very hard   Food Insecurity: No Food Insecurity    Worried About Running Out of Food in the Last Year: Never true    Ran Out of Food in the Last Year: Never true   Transportation Needs: No Transportation Needs    Lack of Transportation (Medical): No    Lack of Transportation (Non-Medical): No   Physical Activity: Insufficiently Active    Days of Exercise per Week: 3 days    Minutes of Exercise per Session: 30 min   Stress: Stress Concern Present    Feeling of Stress : Very much   Social Connections: Unknown    Frequency of Communication with Friends and Family: Twice a week    Frequency of Social Gatherings with Friends and Family: Once a week    Active Member of Clubs or Organizations: No    Attends Club or Organization Meetings: Never    Marital Status:    Housing Stability: Low Risk     Unable to Pay for Housing in the Last Year: No    Number of Places Lived in the Last Year: 1    Unstable Housing in the Last Year: No       Past  "Surgical History:   Procedure Laterality Date    INJECTION OF ANESTHETIC AGENT AROUND MEDIAL BRANCH NERVES INNERVATING CERVICAL FACET JOINT Right 11/18/2020    Procedure: Block-nerve-medial branch-cervical Right C3, 4, 5with RN IV sedation;  Surgeon: Martín Barnes MD;  Location: [facility], [place];  Service: Pain Management;  Laterality: Right;       Review of Systems   Constitutional: Negative for activity change and appetite change.   HENT: Negative for dental problem, nosebleeds and sore throat.    Eyes: Negative for discharge and visual disturbance.   Respiratory: Negative for cough, chest tightness and stridor.    Cardiovascular: Negative for leg swelling.   Gastrointestinal: Negative for abdominal distention and abdominal pain.   Genitourinary: Negative for difficulty urinating and dysuria.   Musculoskeletal: Negative for arthralgias, back pain and joint swelling.   Allergic/Immunologic: Negative for environmental allergies.   Neurological: Negative for dizziness, syncope and headaches.   Hematological: Does not bruise/bleed easily.   Psychiatric/Behavioral: Negative for behavioral problems.          Objective:   /70 (BP Location: Right arm, Patient Position: Sitting)   Pulse (!) 56   Ht 5' 5" (1.651 m)   Wt 70.8 kg (156 lb 1.4 oz)   SpO2 97%   BMI 25.97 kg/m²     MRI Lumbar Spine Without Contrast  Narrative: EXAMINATION:  MRI LUMBAR SPINE WITHOUT CONTRAST    CLINICAL HISTORY:  Low back pain, cauda equina syndrome suspected; Dorsalgia, unspecified    TECHNIQUE:  Multiplanar, multisequence MR images were acquired from the thoracolumbar junction to the sacrum without the administration of contrast.    COMPARISON:  None.    FINDINGS:  Visualized distal cord demonstrates normal signal intensity.  Tip of the conus medullaris terminates near the T12/L1 level.  Vertebral body heights are maintained.  There is mild grade 1 anterolisthesis of L4 on L5.  Mild associated disc space narrowing is also noted.  There " is mild multilevel marginal spurring with lower lumbar facet arthropathy.  No acute fracture.  No marrow edema.  Mildly heterogeneous appearance of the osseous structures likely sequelae of degenerative change.    Probable subcentimeter right renal cyst.  Aneurysmal dilation of the abdominal aorta is seen measuring up to 5.1 cm maximally.    L1-L2: No spinal canal or neural foraminal encroachment.    L2-L3: Mild facet arthropathy.  Minimal posterior disc bulge.  No spinal canal or neural foraminal encroachment.    L3-L4: Mild facet arthropathy.  No significant spinal canal or neural foraminal encroachment.    L4-L5: Grade 1 anterolisthesis.  Moderate bilateral facet arthropathy.  Posterior disc osteophyte complex.  Bilateral narrowing of the lateral recesses.  Bilateral the inferior neural foraminal narrowing is seen, right greater than left.  Normal AP spinal canal diameter is maintained.    L5-S1: Moderate facet arthropathy.  Minimal uncovertebral hypertrophy.  Spinal canal is maintained.  Asymmetric mild-to-moderate right neural foraminal narrowing is noted.  Impression: Discogenic degenerative changes as discussed above.  Infrarenal abdominal aortic aneurysm measuring up to 5.1 cm.    This report was flagged in Epic as abnormal.    Electronically signed by: Pardeep Saavedra MD  Date:    04/26/2022  Time:    15:42         Physical Exam  Vitals and nursing note reviewed.   Constitutional:       Appearance: He is obese.   HENT:      Head: Normocephalic.      Mouth/Throat:      Mouth: Mucous membranes are moist.   Eyes:      Extraocular Movements: Extraocular movements intact.      Pupils: Pupils are equal, round, and reactive to light.   Cardiovascular:      Rate and Rhythm: Normal rate and regular rhythm.   Pulmonary:      Effort: Pulmonary effort is normal.   Abdominal:      General: There is distension.      Palpations: Abdomen is soft.   Musculoskeletal:      Cervical back: Normal range of motion and neck supple.    Skin:     General: Skin is warm.      Capillary Refill: Capillary refill takes less than 2 seconds.   Neurological:      General: No focal deficit present.      Mental Status: He is alert and oriented to person, place, and time.   Psychiatric:         Mood and Affect: Mood normal.         Assessment:     1. Abdominal aortic aneurysm (AAA) without rupture    2. Encounter for other preprocedural examination    3. Hypertension  For hyperlipidemia  5. Renal insufficiency    Plan   Tight blood pressure control with a systolic less than 120 and diastolic less than 80  Hydration  Will get a CT scan of the abdomen and pelvis with contrast for accurate measurement of the aneurysm  I will see him back in 2 weeks after the CT scan.  Patient comes to the emergency room if he gets abdominal pain,          Lenore Julien MD,   [facility]  [place]

## 2022-05-13 ENCOUNTER — SPECIALTY PHARMACY (OUTPATIENT)
Dept: PHARMACY | Facility: CLINIC | Age: 67
End: 2022-05-13
Payer: MEDICARE

## 2022-05-13 ENCOUNTER — TELEPHONE (OUTPATIENT)
Dept: CARDIOTHORACIC SURGERY | Facility: CLINIC | Age: 67
End: 2022-05-13
Payer: MEDICARE

## 2022-05-13 NOTE — TELEPHONE ENCOUNTER
Specialty Pharmacy - Initial Clinical Assessment    Specialty Medication Orders Linked to Encounter    Flowsheet Row Most Recent Value   Medication #1 tofacitinib (XELJANZ) 5 mg Tab (Order#[id number], Rx#[id number])        Patient Diagnosis   M45.0 - Ankylosing spondylitis of multiple sites in spine  M06.00 - Seronegative rheumatoid arthritis    Subjective    Deborah White is a 67 y.o. male, who is followed by the specialty pharmacy service for management and education.    Recent Encounters     Date Type Provider Description    05/13/2022 Specialty Pharmacy Birgit Elaine, PharmD Initial Clinical Assessment    04/07/2022 Specialty Pharmacy Mana Byrne, Jose Referral Authorization        Clinical call attempts since last clinical assessment   No call attempts found.     Current Outpatient Medications   Medication Sig    amLODIPine (NORVASC) 5 MG tablet Take 1 tablet (5 mg total) by mouth once daily.    aspirin (ECOTRIN) 81 MG EC tablet Take 81 mg by mouth once daily.    benazepriL (LOTENSIN) 40 MG tablet Take 1 tablet (40 mg total) by mouth once daily.    cilostazoL (PLETAL) 50 MG Tab Take 1 tablet by mouth twice daily    cloNIDine (CATAPRES) 0.2 MG tablet Take 1 tablet (0.2 mg total) by mouth 2 (two) times daily.    ezetimibe (ZETIA) 10 mg tablet Take 1 tablet (10 mg total) by mouth once daily.    gabapentin (NEURONTIN) 300 MG capsule Take 1 capsule (300 mg total) by mouth 3 (three) times daily.    hydroCHLOROthiazide (HYDRODIURIL) 25 MG tablet Take 1 tablet (25 mg total) by mouth once daily.    rosuvastatin (CRESTOR) 20 MG tablet Take 1 tablet (20 mg total) by mouth once daily.    sildenafil (REVATIO) 20 mg Tab Take 1 tablet (20 mg total) by mouth daily as needed (pulm htn).    tofacitinib (XELJANZ) 5 mg Tab Take 5 mg by mouth once daily.    traMADoL (ULTRAM) 50 mg tablet Take 1 tablet (50 mg total) by mouth every 24 hours as needed for Pain.   Last reviewed on 5/12/2022  2:11 PM by Vilma MARTINEZ  BRANDON Bangura    Review of patient's allergies indicates:  No Known AllergiesLast reviewed on  5/12/2022 2:11 PM by Rina Groves    Drug Interactions    Clinically relevant drug interactions identified: no         Adverse Effects    Arthralgias: Pos  Back pain: Pos  Joint swelling: Pos       Assessment Questions - Documented Responses    Flowsheet Row Most Recent Value   Assessment    Medication Reconciliation completed for patient Yes   During the past 4 weeks, has patient missed any activities due to condition or medication? No   During the past 4 weeks, did patient have any of the following urgent care visits? None   Goals of Therapy Status Discussed (new start)   Status of the patients ability to self-administer: Is Able   All education points have been covered with patient? Yes, supplemental printed education provided   Welcome packet contents reviewed and discussed with patient? Yes   Assesment completed? Yes   Plan Therapy being initiated   Do you need to open a clinical intervention (i-vent)? No   Do you want to schedule first shipment? Yes   Medication #1 Assessment Info    Patient status New medication, New to [facility]   Is this medication appropriate for the patient? Yes   Is this medication effective? Not yet started        Refill Questions - Documented Responses    Flowsheet Row Most Recent Value   Patient Availability and HIPAA Verification    HIPAA/medical authority confirmed? Yes   Relationship to patient of person spoken to? Child   Refill Screening Questions    When does the patient need to receive the medication? 05/17/22   Refill Delivery Questions    How will the patient receive the medication? Delivery Lenore   When does the patient need to receive the medication? 05/17/22   Shipping Address Home   Address in [facility] confirmed and updated if neccessary? Yes   Expected Copay ($) 9.85   Is the patient able to afford the medication copay? Yes   Payment Method new CC added to file   Days  "supply of Refill 30   Supplies needed? No supplies needed   Refill activity completed? Yes   Refill activity plan Refill scheduled   Shipment/Pickup Date: 05/17/22          Objective    He has a past medical history of Coronary artery disease, Hyperlipidemia, and Hypertension.    Tried/failed medications: Remicade, steroids, mtx, ssz    BP Readings from Last 4 Encounters:   05/12/22 116/70   05/12/22 102/64   04/25/22 112/62   04/14/22 (!) 162/80     Ht Readings from Last 4 Encounters:   05/12/22 5' 5" (1.651 m)   05/12/22 5' 5" (1.651 m)   04/25/22 5' 5" (1.651 m)   04/14/22 5' 5" (1.651 m)     Wt Readings from Last 4 Encounters:   05/12/22 70.8 kg (156 lb 1.4 oz)   05/12/22 70.8 kg (156 lb 1.4 oz)   04/25/22 71.3 kg (157 lb 3 oz)   04/14/22 72.5 kg (159 lb 13.3 oz)     Recent Labs   Lab Result Units 05/12/22  1344 04/08/22  1014 03/10/22  0936   Creatinine mg/dL 1.4 1.3 1.3   ALT U/L 25 20 18   AST U/L 22 21 17     The goals of prescribed drug therapy management include:  · Supporting patient to meet the prescriber's medical treatment objectives  · Improving or maintaining quality of life  · Maintaining optimal therapy adherence  · Minimizing and managing side effects      Goals of Therapy Status: Discussed (new start)    Assessment/Plan  Patient plans to start therapy on 05/17/22      Indication, dosage, appropriateness, effectiveness, safety and convenience of his specialty medication(s) were reviewed today.     Patient Education   Patient received education on the following:    Expectations and possible outcomes of therapy   Proper use, timely administration, and missed dose management   Duration of therapy   Side effects, including prevention, minimization, and management   Contraindications and safety precautions   New or changed medications, including prescribe and over the counter medications and supplements   Reviews recommended vaccinations, as appropriate   Storage, safe handling, and " disposal    Patient competed rifampin therapy and has been cleared to start Xeljanz. Dosing correct for patient's moderate renal impairment. Patient's daughter, Tila, is caretaker and handles patient's medication. Does not need .     Tasks added this encounter   6/9/2022 - Refill Call (Auto Added)  2/13/2023 - Clinical - Follow Up Assesement (Annual)   Tasks due within next 3 months   No tasks due.     Birgit Elaine, PharmD  [facility]  [address]  [facility] [address]  Phone: [phone]  Fax: [phone]

## 2022-05-13 NOTE — TELEPHONE ENCOUNTER
Patient contacted [facility] to inform the daughter of the patient that the radiology dept has been notified  For scheduling.     Cruzito from the radiology department was advised on what to do about an urgent  CT abdomin pelvis scan with contrast. Radiology department stated the patient can go through the ER to get the testing done. Due to the contrast shortages in the area.       ----- Message from Delaney Mcnulty sent at 5/13/2022  9:47 AM CDT -----  Pt's daughter (Lea) would like the nurse to call her back please. Call back number is [phone]. Thx. El

## 2022-05-16 ENCOUNTER — PATIENT MESSAGE (OUTPATIENT)
Dept: CARDIOTHORACIC SURGERY | Facility: CLINIC | Age: 67
End: 2022-05-16
Payer: MEDICARE

## 2022-05-16 NOTE — TELEPHONE ENCOUNTER
Incoming call from patient's daughter updating the delivery address. Updated address in [facility] and informed fulfillment of the change.

## 2022-05-26 DIAGNOSIS — I73.9 PAD (PERIPHERAL ARTERY DISEASE): Primary | ICD-10-CM

## 2022-05-27 ENCOUNTER — HOSPITAL ENCOUNTER (OUTPATIENT)
Dept: CARDIOLOGY | Facility: HOSPITAL | Age: 67
Discharge: HOME | End: 2022-05-27
Attending: INTERNAL MEDICINE
Payer: MEDICARE

## 2022-05-27 ENCOUNTER — OFFICE VISIT (OUTPATIENT)
Dept: CARDIOLOGY | Facility: CLINIC | Age: 67
End: 2022-05-27
Payer: MEDICARE

## 2022-05-27 VITALS
DIASTOLIC BLOOD PRESSURE: 66 MMHG | SYSTOLIC BLOOD PRESSURE: 124 MMHG | WEIGHT: 156 LBS | OXYGEN SATURATION: 97 % | BODY MASS INDEX: 25.99 KG/M2 | HEART RATE: 73 BPM | HEIGHT: 65 IN

## 2022-05-27 DIAGNOSIS — E78.2 MIXED HYPERLIPIDEMIA: ICD-10-CM

## 2022-05-27 DIAGNOSIS — I73.9 PAD (PERIPHERAL ARTERY DISEASE): ICD-10-CM

## 2022-05-27 DIAGNOSIS — I10 HYPERTENSION, ESSENTIAL: Primary | ICD-10-CM

## 2022-05-27 DIAGNOSIS — F17.200 SMOKING: ICD-10-CM

## 2022-05-27 DIAGNOSIS — I25.118 CORONARY ARTERY DISEASE OF NATIVE ARTERY OF NATIVE HEART WITH STABLE ANGINA PECTORIS: ICD-10-CM

## 2022-05-27 PROCEDURE — 99999 PR PBB SHADOW E&M-EST. PATIENT-LVL III: ICD-10-PCS | Mod: PBBFAC,,, | Performed by: INTERNAL MEDICINE

## 2022-05-27 PROCEDURE — 93010 ELECTROCARDIOGRAM REPORT: CPT | Mod: ,,, | Performed by: INTERNAL MEDICINE

## 2022-05-27 PROCEDURE — 99999 PR PBB SHADOW E&M-EST. PATIENT-LVL III: CPT | Mod: PBBFAC,,, | Performed by: INTERNAL MEDICINE

## 2022-05-27 PROCEDURE — 93005 ELECTROCARDIOGRAM TRACING: CPT

## 2022-05-27 PROCEDURE — 93010 EKG 12-LEAD: ICD-10-PCS | Mod: ,,, | Performed by: INTERNAL MEDICINE

## 2022-05-27 PROCEDURE — 99214 OFFICE O/P EST MOD 30 MIN: CPT | Mod: S$PBB,,, | Performed by: INTERNAL MEDICINE

## 2022-05-27 PROCEDURE — 99214 PR OFFICE/OUTPT VISIT, EST, LEVL IV, 30-39 MIN: ICD-10-PCS | Mod: S$PBB,,, | Performed by: INTERNAL MEDICINE

## 2022-05-27 PROCEDURE — 99213 OFFICE O/P EST LOW 20 MIN: CPT | Mod: PBBFAC | Performed by: INTERNAL MEDICINE

## 2022-05-27 NOTE — PROGRESS NOTES
Subjective:   Patient ID:  Deborah White is a 67 y.o. male who presents for follow up of No chief complaint on file.      68 yo male, 1 yr f/u Retired from [employer]  [facility] [place] CAD h/o LHC in 1998, nonobstructive, PAD + claudication, HTN HLD. Smoking 1/2 PPD > 40 yrs, no drinking  Prior cardiologist DR FISHMAN AT [facility] ([place]  Walks at home  He denied chest pain, dyspnea on exertion, palpitation, fainting, PND, orthopnea, syncope  Exertional calf pain.    exercise KAL showed normal resting KAL. Mod to severe decreased exercise KAL to 0.5 left and 0.6 right. EKG NSR and LVH  Started Simvastatin in 1998 and developed body ache this year. Pt states that the ache resovled after d/c simvastatin.  IN . Now resumed Simvastatin AND NO RECURRENT BODY ACHE    08-202O ECHO DONE AT [facility] NORMAL EF, MILD MR AND MOD. LVH  , CR 1.1   LE arterial US no significant blockage  03/15/2021 Decrease Pletal to once a day due to headache  States that improved Claudication after added Pletal, walks longer distance    Interval history  Limited activity due to knee OA pain right arm pain  No chest pain dyspnea dizziness faint.  Smoking   EKG NSR. ABD CT pending for AAA        Past Medical History:   Diagnosis Date    Coronary artery disease     Hyperlipidemia     Hypertension        Past Surgical History:   Procedure Laterality Date    INJECTION OF ANESTHETIC AGENT AROUND MEDIAL BRANCH NERVES INNERVATING CERVICAL FACET JOINT Right 11/18/2020    Procedure: Block-nerve-medial branch-cervical Right C3, 4, 5with RN IV sedation;  Surgeon: Martín Barnes MD;  [facility] [facility];  Service: Pain Management;  Laterality: Right;       Social History     Tobacco Use    Smoking status: Current Every Day Smoker     Types: Cigarettes    Smokeless tobacco: Never Used   Substance Use Topics    Alcohol use: Never       Family History   Problem Relation Age of Onset    Cancer Mother          Review of Systems    Constitutional: Negative for decreased appetite, diaphoresis, fever, malaise/fatigue and night sweats.   HENT: Negative for nosebleeds.    Eyes: Negative for blurred vision and double vision.   Cardiovascular: Negative for chest pain, dyspnea on exertion, irregular heartbeat, leg swelling, near-syncope, orthopnea, palpitations, paroxysmal nocturnal dyspnea and syncope.   Respiratory: Negative for cough, shortness of breath, sleep disturbances due to breathing, snoring, sputum production and wheezing.    Endocrine: Negative for cold intolerance and polyuria.   Hematologic/Lymphatic: Does not bruise/bleed easily.   Skin: Negative for rash.   Musculoskeletal: Negative for back pain, falls, joint pain, joint swelling and neck pain.   Gastrointestinal: Negative for abdominal pain, heartburn, nausea and vomiting.   Genitourinary: Negative for dysuria, frequency and hematuria.   Neurological: Negative for difficulty with concentration, dizziness, focal weakness, headaches, light-headedness, numbness, seizures and weakness.   Psychiatric/Behavioral: Negative for depression, memory loss and substance abuse. The patient does not have insomnia.    Allergic/Immunologic: Negative for HIV exposure and hives.       Objective:   Physical Exam  HENT:      Head: Normocephalic.   Eyes:      Pupils: Pupils are equal, round, and reactive to light.   Neck:      Thyroid: No thyromegaly.      Vascular: Normal carotid pulses. No carotid bruit or JVD.   Cardiovascular:      Rate and Rhythm: Normal rate and regular rhythm.  No extrasystoles are present.     Chest Wall: PMI is not displaced.      Pulses: Normal pulses.      Heart sounds: Normal heart sounds. No murmur heard.    No gallop. No S3 sounds.   Pulmonary:      Effort: No respiratory distress.      Breath sounds: Normal breath sounds. No stridor.   Abdominal:      General: Bowel sounds are normal.      Palpations: Abdomen is soft.      Tenderness: There is no abdominal tenderness.  There is no rebound.   Musculoskeletal:         General: Normal range of motion.   Skin:     Findings: No rash.   Neurological:      Mental Status: He is alert and oriented to person, place, and time.   Psychiatric:         Behavior: Behavior normal.         Lab Results   Component Value Date    CHOL 131 06/04/2021    CHOL 285 (A) 08/05/2020     Lab Results   Component Value Date    HDL 60 06/04/2021    HDL 56 08/05/2020     Lab Results   Component Value Date    LDLCALC 51.0 (L) 06/04/2021    LDLCALC 202 08/05/2020     Lab Results   Component Value Date    TRIG 100 06/04/2021    TRIG 133 08/05/2020     Lab Results   Component Value Date    CHOLHDL 45.8 06/04/2021       Chemistry        Component Value Date/Time     05/12/2022 1344    K 3.7 05/12/2022 1344     05/12/2022 1344    CO2 27 05/12/2022 1344    BUN 16 05/12/2022 1344    CREATININE 1.4 05/12/2022 1344     05/12/2022 1344        Component Value Date/Time    CALCIUM 9.2 05/12/2022 1344    ALKPHOS 62 05/12/2022 1344    AST 22 05/12/2022 1344    ALT 25 05/12/2022 1344    BILITOT 0.3 05/12/2022 1344    ESTGFRAFRICA >60 05/12/2022 1344    EGFRNONAA 52 (A) 05/12/2022 1344          Lab Results   Component Value Date    HGBA1C 6.4 (H) 06/04/2021     Lab Results   Component Value Date    TSH 0.572 12/11/2019     No results found for: INR, PROTIME  Lab Results   Component Value Date    WBC 6.85 05/12/2022    HGB 14.1 05/12/2022    HCT 40.7 05/12/2022    MCV 89 05/12/2022     05/12/2022     BMP  Sodium   Date Value Ref Range Status   05/12/2022 141 136 - 145 mmol/L Final     Potassium   Date Value Ref Range Status   05/12/2022 3.7 3.5 - 5.1 mmol/L Final     Chloride   Date Value Ref Range Status   05/12/2022 102 95 - 110 mmol/L Final     CO2   Date Value Ref Range Status   05/12/2022 27 23 - 29 mmol/L Final     BUN   Date Value Ref Range Status   05/12/2022 16 8 - 23 mg/dL Final     Creatinine   Date Value Ref Range Status   05/12/2022 1.4 0.5  - 1.4 mg/dL Final     Calcium   Date Value Ref Range Status   05/12/2022 9.2 8.7 - 10.5 mg/dL Final     Anion Gap   Date Value Ref Range Status   05/12/2022 12 8 - 16 mmol/L Final     eGFR if    Date Value Ref Range Status   05/12/2022 >60 >60 mL/min/1.73 m^2 Final     eGFR if non    Date Value Ref Range Status   05/12/2022 52 (A) >60 mL/min/1.73 m^2 Final     Comment:     Calculation used to obtain the estimated glomerular filtration  rate (eGFR) is the CKD-EPI equation.        BNP  @LABRCNTIP(BNP,BNPTRIAGEBLO)@  @LABRCNTIP(troponini)@  CrCl cannot be calculated (Patient's most recent lab result is older than the maximum 7 days allowed.).  No results found in the last 24 hours.  No results found in the last 24 hours.  No results found in the last 24 hours.    Assessment:      1. Hypertension, essential    2. Mixed hyperlipidemia    3. Coronary artery disease of native artery of native heart with stable angina pectoris    4. PAD (peripheral artery disease)    5. Smoking        Plan:     Continue ASA Pletal statin zetia amlodipine clonidine benazepril and HCTZ  Smoking cessation  Counseled DASH  Check Lipid profile in 6 months  Recommend heart-healthy diet, weight control and regular exercise.  Quique. Risk modification.   I have reviewed all pertinent labs and cardiac studies independently. Plans and recommendations have been formulated under my direct supervision. All questions answered and patient voiced understanding.   If symptoms persist go to the ED  RTC in 12 months

## 2022-06-01 ENCOUNTER — PATIENT MESSAGE (OUTPATIENT)
Dept: RHEUMATOLOGY | Facility: CLINIC | Age: 67
End: 2022-06-01
Payer: MEDICARE

## 2022-06-09 ENCOUNTER — PATIENT MESSAGE (OUTPATIENT)
Dept: PHARMACY | Facility: CLINIC | Age: 67
End: 2022-06-09
Payer: MEDICARE

## 2022-06-13 ENCOUNTER — SPECIALTY PHARMACY (OUTPATIENT)
Dept: PHARMACY | Facility: CLINIC | Age: 67
End: 2022-06-13
Payer: MEDICARE

## 2022-06-13 NOTE — TELEPHONE ENCOUNTER
Specialty Pharmacy - Refill Coordination    Specialty Medication Orders Linked to Encounter    Flowsheet Row Most Recent Value   Medication #1 tofacitinib (XELJANZ) 5 mg Tab (Order#[id number], Rx#[id number])          Refill Questions - Documented Responses    Flowsheet Row Most Recent Value   Patient Availability and HIPAA Verification    Does patient want to proceed with activity? Yes   HIPAA/medical authority confirmed? Yes   Relationship to patient of person spoken to? Child   Refill Screening Questions    Changes to allergies? No   Changes to medications? No   New conditions since last clinic visit? No   Unplanned office visit, urgent care, ED, or hospital admission in the last 4 weeks? No   How does patient/caregiver feel medication is working? Good   Financial problems or insurance changes? No   How many doses of your specialty medications were missed in the last 4 weeks? 0   Would patient like to speak to a pharmacist? No   When does the patient need to receive the medication? 06/16/22   Refill Delivery Questions    How will the patient receive the medication? Mail   When does the patient need to receive the medication? 06/16/22   Shipping Address Prescription   Address in [facility] confirmed and updated if neccessary? Yes   Expected Copay ($) 9.85   Is the patient able to afford the medication copay? Yes   Payment Method zero copay   Days supply of Refill 30   Supplies needed? No supplies needed   Refill activity completed? Yes   Refill activity plan Refill scheduled   Shipment/Pickup Date: 06/13/22          Current Outpatient Medications   Medication Sig    amLODIPine (NORVASC) 5 MG tablet Take 1 tablet (5 mg total) by mouth once daily.    aspirin (ECOTRIN) 81 MG EC tablet Take 81 mg by mouth once daily.    benazepriL (LOTENSIN) 40 MG tablet Take 1 tablet (40 mg total) by mouth once daily.    cilostazoL (PLETAL) 50 MG Tab Take 1 tablet by mouth twice daily    cloNIDine (CATAPRES) 0.2 MG tablet  Take 1 tablet (0.2 mg total) by mouth 2 (two) times daily.    ezetimibe (ZETIA) 10 mg tablet Take 1 tablet (10 mg total) by mouth once daily.    gabapentin (NEURONTIN) 300 MG capsule Take 1 capsule (300 mg total) by mouth 3 (three) times daily.    hydroCHLOROthiazide (HYDRODIURIL) 25 MG tablet Take 1 tablet (25 mg total) by mouth once daily.    rosuvastatin (CRESTOR) 20 MG tablet Take 1 tablet (20 mg total) by mouth once daily.    sildenafil (REVATIO) 20 mg Tab Take 1 tablet (20 mg total) by mouth daily as needed (pulm htn).    tofacitinib (XELJANZ) 5 mg Tab Take 5 mg by mouth once daily.    traMADoL (ULTRAM) 50 mg tablet Take 1 tablet (50 mg total) by mouth every 24 hours as needed for Pain.   Last reviewed on 5/27/2022  3:12 PM by Naeem Aguilar MD    Review of patient's allergies indicates:  No Known Allergies Last reviewed on  5/27/2022 3:12 PM by Naeem Aguilar      Tasks added this encounter   7/9/2022 - Refill Call (Auto Added)   Tasks due within next 3 months   No tasks due.     Jong Goss, PharmD  [facility]  [address] [place] [id number]  Phone: [phone]  Fax: [phone]

## 2022-06-22 DIAGNOSIS — I71.40 ABDOMINAL AORTIC ANEURYSM (AAA) WITHOUT RUPTURE: Primary | ICD-10-CM

## 2022-06-24 ENCOUNTER — OFFICE VISIT (OUTPATIENT)
Dept: OPHTHALMOLOGY | Facility: CLINIC | Age: 67
End: 2022-06-24
Payer: MEDICARE

## 2022-06-24 ENCOUNTER — LAB VISIT (OUTPATIENT)
Dept: LAB | Facility: HOSPITAL | Age: 67
End: 2022-06-24
Attending: INTERNAL MEDICINE
Payer: MEDICARE

## 2022-06-24 ENCOUNTER — OFFICE VISIT (OUTPATIENT)
Dept: INTERNAL MEDICINE | Facility: CLINIC | Age: 67
End: 2022-06-24
Payer: MEDICARE

## 2022-06-24 VITALS
RESPIRATION RATE: 18 BRPM | DIASTOLIC BLOOD PRESSURE: 60 MMHG | WEIGHT: 156.94 LBS | HEART RATE: 71 BPM | OXYGEN SATURATION: 97 % | HEIGHT: 65 IN | TEMPERATURE: 98 F | SYSTOLIC BLOOD PRESSURE: 122 MMHG | BODY MASS INDEX: 26.15 KG/M2

## 2022-06-24 DIAGNOSIS — Z12.5 SCREENING PSA (PROSTATE SPECIFIC ANTIGEN): ICD-10-CM

## 2022-06-24 DIAGNOSIS — I10 HYPERTENSION, ESSENTIAL: ICD-10-CM

## 2022-06-24 DIAGNOSIS — H52.03 HYPEROPIA WITH PRESBYOPIA OF BOTH EYES: ICD-10-CM

## 2022-06-24 DIAGNOSIS — Z00.00 ROUTINE PHYSICAL EXAMINATION: Primary | ICD-10-CM

## 2022-06-24 DIAGNOSIS — H02.831 DERMATOCHALASIS OF BOTH UPPER EYELIDS: ICD-10-CM

## 2022-06-24 DIAGNOSIS — Z51.81 ENCOUNTER FOR MEDICATION MONITORING: ICD-10-CM

## 2022-06-24 DIAGNOSIS — H52.4 HYPEROPIA WITH PRESBYOPIA OF BOTH EYES: ICD-10-CM

## 2022-06-24 DIAGNOSIS — H25.13 NUCLEAR SCLEROTIC CATARACT OF BOTH EYES: Primary | ICD-10-CM

## 2022-06-24 DIAGNOSIS — H02.834 DERMATOCHALASIS OF BOTH UPPER EYELIDS: ICD-10-CM

## 2022-06-24 DIAGNOSIS — E78.5 HYPERLIPIDEMIA, UNSPECIFIED HYPERLIPIDEMIA TYPE: ICD-10-CM

## 2022-06-24 DIAGNOSIS — M45.0 ANKYLOSING SPONDYLITIS OF MULTIPLE SITES IN SPINE: ICD-10-CM

## 2022-06-24 DIAGNOSIS — R73.03 PREDIABETES: ICD-10-CM

## 2022-06-24 DIAGNOSIS — K59.00 CONSTIPATION, UNSPECIFIED CONSTIPATION TYPE: ICD-10-CM

## 2022-06-24 DIAGNOSIS — M48.10 DISH (DIFFUSE IDIOPATHIC SKELETAL HYPEROSTOSIS): ICD-10-CM

## 2022-06-24 DIAGNOSIS — D84.9 IMMUNOCOMPROMISED: ICD-10-CM

## 2022-06-24 LAB
ALBUMIN SERPL BCP-MCNC: 3.9 G/DL (ref 3.5–5.2)
ALP SERPL-CCNC: 60 U/L (ref 55–135)
ALT SERPL W/O P-5'-P-CCNC: 19 U/L (ref 10–44)
ANION GAP SERPL CALC-SCNC: 9 MMOL/L (ref 8–16)
AST SERPL-CCNC: 19 U/L (ref 10–40)
BASOPHILS # BLD AUTO: 0.08 K/UL (ref 0–0.2)
BASOPHILS NFR BLD: 1 % (ref 0–1.9)
BILIRUB SERPL-MCNC: 0.3 MG/DL (ref 0.1–1)
BUN SERPL-MCNC: 18 MG/DL (ref 8–23)
CALCIUM SERPL-MCNC: 9.4 MG/DL (ref 8.7–10.5)
CHLORIDE SERPL-SCNC: 105 MMOL/L (ref 95–110)
CO2 SERPL-SCNC: 25 MMOL/L (ref 23–29)
CREAT SERPL-MCNC: 1.5 MG/DL (ref 0.5–1.4)
CRP SERPL-MCNC: 0.9 MG/L (ref 0–8.2)
DIFFERENTIAL METHOD: ABNORMAL
EOSINOPHIL # BLD AUTO: 0.1 K/UL (ref 0–0.5)
EOSINOPHIL NFR BLD: 1.3 % (ref 0–8)
ERYTHROCYTE [DISTWIDTH] IN BLOOD BY AUTOMATED COUNT: 12.8 % (ref 11.5–14.5)
ERYTHROCYTE [SEDIMENTATION RATE] IN BLOOD BY WESTERGREN METHOD: 14 MM/HR (ref 0–10)
EST. GFR  (AFRICAN AMERICAN): 55.7 ML/MIN/1.73 M^2
EST. GFR  (NON AFRICAN AMERICAN): 48.2 ML/MIN/1.73 M^2
GLUCOSE SERPL-MCNC: 189 MG/DL (ref 70–110)
HCT VFR BLD AUTO: 38.9 % (ref 40–54)
HGB BLD-MCNC: 13.1 G/DL (ref 14–18)
IMM GRANULOCYTES # BLD AUTO: 0.07 K/UL (ref 0–0.04)
IMM GRANULOCYTES NFR BLD AUTO: 0.8 % (ref 0–0.5)
LYMPHOCYTES # BLD AUTO: 3 K/UL (ref 1–4.8)
LYMPHOCYTES NFR BLD: 35.7 % (ref 18–48)
MCH RBC QN AUTO: 30.2 PG (ref 27–31)
MCHC RBC AUTO-ENTMCNC: 33.7 G/DL (ref 32–36)
MCV RBC AUTO: 90 FL (ref 82–98)
MONOCYTES # BLD AUTO: 0.8 K/UL (ref 0.3–1)
MONOCYTES NFR BLD: 9.1 % (ref 4–15)
NEUTROPHILS # BLD AUTO: 4.4 K/UL (ref 1.8–7.7)
NEUTROPHILS NFR BLD: 52.1 % (ref 38–73)
NRBC BLD-RTO: 0 /100 WBC
PLATELET # BLD AUTO: 288 K/UL (ref 150–450)
PMV BLD AUTO: 9.2 FL (ref 9.2–12.9)
POTASSIUM SERPL-SCNC: 3.7 MMOL/L (ref 3.5–5.1)
PROT SERPL-MCNC: 7.2 G/DL (ref 6–8.4)
RBC # BLD AUTO: 4.34 M/UL (ref 4.6–6.2)
SODIUM SERPL-SCNC: 139 MMOL/L (ref 136–145)
WBC # BLD AUTO: 8.37 K/UL (ref 3.9–12.7)

## 2022-06-24 PROCEDURE — 92014 PR EYE EXAM, EST PATIENT,COMPREHESV: ICD-10-PCS | Mod: S$PBB,,, | Performed by: OPTOMETRIST

## 2022-06-24 PROCEDURE — 36415 COLL VENOUS BLD VENIPUNCTURE: CPT | Performed by: INTERNAL MEDICINE

## 2022-06-24 PROCEDURE — 80053 COMPREHEN METABOLIC PANEL: CPT | Performed by: INTERNAL MEDICINE

## 2022-06-24 PROCEDURE — 92015 PR REFRACTION: ICD-10-PCS | Mod: ,,, | Performed by: OPTOMETRIST

## 2022-06-24 PROCEDURE — 99397 PER PM REEVAL EST PAT 65+ YR: CPT | Mod: S$PBB,GY,, | Performed by: FAMILY MEDICINE

## 2022-06-24 PROCEDURE — 99999 PR PBB SHADOW E&M-EST. PATIENT-LVL III: CPT | Mod: PBBFAC,,, | Performed by: OPTOMETRIST

## 2022-06-24 PROCEDURE — 99999 PR PBB SHADOW E&M-EST. PATIENT-LVL IV: ICD-10-PCS | Mod: PBBFAC,,, | Performed by: FAMILY MEDICINE

## 2022-06-24 PROCEDURE — 85651 RBC SED RATE NONAUTOMATED: CPT | Performed by: INTERNAL MEDICINE

## 2022-06-24 PROCEDURE — 86140 C-REACTIVE PROTEIN: CPT | Performed by: INTERNAL MEDICINE

## 2022-06-24 PROCEDURE — 92015 DETERMINE REFRACTIVE STATE: CPT | Mod: ,,, | Performed by: OPTOMETRIST

## 2022-06-24 PROCEDURE — 92014 COMPRE OPH EXAM EST PT 1/>: CPT | Mod: S$PBB,,, | Performed by: OPTOMETRIST

## 2022-06-24 PROCEDURE — 99213 OFFICE O/P EST LOW 20 MIN: CPT | Mod: PBBFAC | Performed by: OPTOMETRIST

## 2022-06-24 PROCEDURE — 85025 COMPLETE CBC W/AUTO DIFF WBC: CPT | Performed by: INTERNAL MEDICINE

## 2022-06-24 PROCEDURE — 99397 PR PREVENTIVE VISIT,EST,65 & OVER: ICD-10-PCS | Mod: S$PBB,GY,, | Performed by: FAMILY MEDICINE

## 2022-06-24 PROCEDURE — 99214 OFFICE O/P EST MOD 30 MIN: CPT | Mod: PBBFAC,27 | Performed by: FAMILY MEDICINE

## 2022-06-24 PROCEDURE — 99999 PR PBB SHADOW E&M-EST. PATIENT-LVL IV: CPT | Mod: PBBFAC,,, | Performed by: FAMILY MEDICINE

## 2022-06-24 PROCEDURE — 99999 PR PBB SHADOW E&M-EST. PATIENT-LVL III: ICD-10-PCS | Mod: PBBFAC,,, | Performed by: OPTOMETRIST

## 2022-06-24 NOTE — PROGRESS NOTES
HPI     Last exam in April with Dr. Pearson     Vision changes since last eye exam?: no   Any eye pain today: no, but eyes watering frequently   Other ocular symptoms: no  Interested in contact lens fitting today? No       Last edited by Sameer Lama on 6/24/2022  1:28 PM. (History)            Assessment /Plan     For exam results, see Encounter Report.    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 Patient to return to clinic if no improvement in symptoms with current treatment.   Nuclear sclerotic cataract of both eyes  Cataracts are not visually significant and not affecting activities of daily living. Annual observation is recommended at this time. Patient to call or RTC with any significant change in vision prior to next visit.  Hyperopia with presbyopia of both eyes  Eyeglass Final Rx     Eyeglass Final Rx       Sphere Cylinder Axis Add    Right +2.75 +1.00 165 +2.50    Left +2.00 +1.50 005 +2.50    Type: PAL    Expiration Date: 6/24/2023          Eyeglass Final Rx #2       Sphere Cylinder Axis Add    Right +3.00 +1.00 156 +2.50    Left +2.25 +1.25 003 +2.50    Type: PAL    Expiration Date: 6/24/2023                RTC 1 yr for dilated eye exam or sooner if any changes to vision.   Discussed above and answered questions.

## 2022-06-24 NOTE — PROGRESS NOTES
Deborah White  06/25/2022  [id number]    Kate Lofton MD  Patient Care Team:  Kate Lofton MD as PCP - General (Internal Medicine)  Didier Maldonado MD (Family Medicine)  Ana Luisa Nelson MD (Cardiology)    Has the patient seen any provider outside of the network since the last visit ? (no). If yes, HIPPA forms completed and records requested.      Visit Type:a scheduled routine follow-up visit    Chief Complaint:  Chief Complaint   Patient presents with    Annual Exam       History of Present Illness:  HPI Ms. White presents today for his annual.   Daughter present to interpret    Constipation use to go daily  Every 3-4 days for BM   Bloated feeling       Fell on Wednesday  Feels his legs gave out   Caught self with hands   When he sits for long periods he states his legs will give out     Review of Systems   HENT: Negative for hearing loss.    Eyes: Positive for discharge.   Respiratory: Negative for wheezing.    Cardiovascular: Negative for chest pain and palpitations.   Gastrointestinal: Negative for blood in stool, constipation, diarrhea and vomiting.   Genitourinary: Negative for hematuria and urgency.   Musculoskeletal: Positive for neck pain.   Neurological: Positive for headaches. Negative for weakness.   Endo/Heme/Allergies: Negative for polydipsia.         Screening Questionnaires:    In the last two weeks how often have you felt down, depressed, or hopeless ( intermittent )    In the last two weeks how often have you had little interest or pleasure in doing  (no )    In the last two weeks how often have you been bothered by the following problems:  1. Feeling nervous, anxious, or on edge ( no )    2. Not being able to stop or control worrying ( no)    3. Worrying too much about different things ( intermittent)    4. Trouble relaxing ( no )    5. Being so restless that it is hard to sit still  (no )    6. Becoming easily annoyed or irritable (no)    7. Feeling afraid as if something awful might  happen (no )    How often do you have a drink containing Alcohol? denied     Do you exercise  (yes ) moderately active    Do you take a baby Aspirin daily ( yes)    Do you have an advance directive ( no ) The patient was given information regarding Living Will/Durable Power-of- if requested.     The following were reviewed: Active problem list, medication list, allergies, family history, social history, and Health Maintenance.     History:  Past Medical History:   Diagnosis Date    Coronary artery disease     Hyperlipidemia     Hypertension      Past Surgical History:   Procedure Laterality Date    INJECTION OF ANESTHETIC AGENT AROUND MEDIAL BRANCH NERVES INNERVATING CERVICAL FACET JOINT Right 11/18/2020    Procedure: Block-nerve-medial branch-cervical Right C3, 4, 5with RN IV sedation;  Surgeon: Martín Barnes MD;  Location: [facility];  Service: Pain Management;  Laterality: Right;     Family History   Problem Relation Age of Onset    Cancer Mother      Social History     Socioeconomic History    Marital status:    Tobacco Use    Smoking status: Current Every Day Smoker     Types: Cigarettes    Smokeless tobacco: Never Used   Substance and Sexual Activity    Alcohol use: Never     Social Determinants of Health     Financial Resource Strain: Medium Risk    Difficulty of Paying Living Expenses: Somewhat hard   Food Insecurity: No Food Insecurity    Worried About Running Out of Food in the Last Year: Never true    Ran Out of Food in the Last Year: Never true   Transportation Needs: No Transportation Needs    Lack of Transportation (Medical): No    Lack of Transportation (Non-Medical): No   Physical Activity: Insufficiently Active    Days of Exercise per Week: 1 day    Minutes of Exercise per Session: 20 min   Stress: Stress Concern Present    Feeling of Stress : Very much   Social Connections: Unknown    Frequency of Communication with Friends and Family: Three times a week     Frequency of Social Gatherings with Friends and Family: Once a week    Active Member of Clubs or Organizations: No    Attends Club or Organization Meetings: Never    Marital Status:    Housing Stability: Low Risk     Unable to Pay for Housing in the Last Year: No    Number of Places Lived in the Last Year: 1    Unstable Housing in the Last Year: No     Patient Active Problem List   Diagnosis    Ankylosing hyperostosis of cervical region    Bilateral carpal tunnel syndrome    Elevated sed rate    Hypertension, essential    Hyperlipidemia    Ankylosing spondylitis of multiple sites in spine    Cervical facet joint syndrome    Coronary artery disease of native artery of native heart with stable angina pectoris    PAD (peripheral artery disease)    Smoking    Positive QuantiFERON-TB Gold test    Encounter for medication monitoring    Immunocompromised    Long term current use of immunosuppressive drug    DISH (diffuse idiopathic skeletal hyperostosis)    TB lung, latent    Seronegative rheumatoid arthritis     Review of patient's allergies indicates:  No Known Allergies    Health Maintenance  Health Maintenance Topics with due status: Not Due       Topic Last Completion Date    Colorectal Cancer Screening 01/08/2018    Lipid Panel 06/04/2021    High Dose Statin 06/24/2022    Aspirin/Antiplatelet Therapy 06/24/2022     Health Maintenance Due   Topic Date Due    TETANUS VACCINE  Never done    Shingles Vaccine (1 of 2) Never done    Pneumococcal Vaccines (Age 65+) (2 - PCV) 12/11/2020    PROSTATE-SPECIFIC ANTIGEN  02/23/2022    Abdominal Aortic Aneurysm Screening  Never done    COVID-19 Vaccine (4 - Booster for Moderna series) 03/23/2022       Medications:  Current Outpatient Medications on File Prior to Visit   Medication Sig Dispense Refill    amLODIPine (NORVASC) 5 MG tablet Take 1 tablet (5 mg total) by mouth once daily. 90 tablet 1    aspirin (ECOTRIN) 81 MG EC tablet Take 81 mg  by mouth once daily.      benazepriL (LOTENSIN) 40 MG tablet Take 1 tablet (40 mg total) by mouth once daily. 90 tablet 1    cilostazoL (PLETAL) 50 MG Tab Take 1 tablet by mouth twice daily 60 tablet 9    cloNIDine (CATAPRES) 0.2 MG tablet Take 1 tablet (0.2 mg total) by mouth 2 (two) times daily. 180 tablet 3    ezetimibe (ZETIA) 10 mg tablet Take 1 tablet (10 mg total) by mouth once daily. 90 tablet 1    gabapentin (NEURONTIN) 300 MG capsule Take 1 capsule (300 mg total) by mouth 3 (three) times daily. 90 capsule 11    hydroCHLOROthiazide (HYDRODIURIL) 25 MG tablet Take 1 tablet (25 mg total) by mouth once daily. 90 tablet 1    rosuvastatin (CRESTOR) 20 MG tablet Take 1 tablet (20 mg total) by mouth once daily. 90 tablet 1    tofacitinib (XELJANZ) 5 mg Tab Take 5 mg by mouth once daily. 30 tablet 11    sildenafil (REVATIO) 20 mg Tab Take 1 tablet (20 mg total) by mouth daily as needed (pulm htn). 90 tablet 0    traMADoL (ULTRAM) 50 mg tablet Take 1 tablet (50 mg total) by mouth every 24 hours as needed for Pain. 30 tablet 0     No current facility-administered medications on file prior to visit.       Medications have been reviewed and reconciled with patient at visit today.    Barriers to medications present (no )    Adverse reactions to current medications (no)    Over the counter medications reviewed (Yes) and if needed added to active Medication list.    Exam:  Vitals:    06/24/22 0942   BP: 122/60   Pulse: 71   Resp: 18   Temp: 97.8 °F (36.6 °C)     Weight: 71.2 kg (156 lb 15.5 oz)   Body mass index is 26.12 kg/m².      Physical Exam  Vitals reviewed.   Constitutional:       Appearance: He is not ill-appearing.   HENT:      Head: Normocephalic and atraumatic.      Right Ear: External ear normal.      Left Ear: External ear normal.   Eyes:      General: No scleral icterus.        Right eye: No discharge.         Left eye: No discharge.      Pupils: Pupils are equal, round, and reactive to light.    Neck:      Thyroid: No thyromegaly.   Cardiovascular:      Rate and Rhythm: Normal rate and regular rhythm.      Heart sounds: Normal heart sounds. No murmur heard.  Pulmonary:      Effort: Pulmonary effort is normal. No respiratory distress.      Breath sounds: Normal breath sounds. No wheezing.   Abdominal:      General: Bowel sounds are normal. There is no distension.      Palpations: Abdomen is soft.      Tenderness: There is no abdominal tenderness.   Musculoskeletal:         General: Normal range of motion.      Cervical back: Normal range of motion and neck supple.   Skin:     General: Skin is warm.      Findings: No erythema or rash.   Neurological:      Mental Status: He is alert and oriented to person, place, and time.      Coordination: Coordination normal.      Deep Tendon Reflexes: Reflexes are normal and symmetric.   Psychiatric:         Behavior: Behavior normal.         Laboratory Reviewed: (Yes)  Lab Results   Component Value Date    WBC 8.37 06/24/2022    HGB 13.1 (L) 06/24/2022    HCT 38.9 (L) 06/24/2022     06/24/2022    CHOL 131 06/04/2021    TRIG 100 06/04/2021    HDL 60 06/04/2021    ALT 19 06/24/2022    AST 19 06/24/2022     06/24/2022    K 3.7 06/24/2022     06/24/2022    CREATININE 1.5 (H) 06/24/2022    BUN 18 06/24/2022    CO2 25 06/24/2022    TSH 0.572 12/11/2019    PSA 0.49 02/23/2021    HGBA1C 6.4 (H) 06/04/2021       Assessment:  The primary encounter diagnosis was Routine physical examination. Diagnoses of Constipation, unspecified constipation type, Hypertension, essential, Hyperlipidemia, unspecified hyperlipidemia type, Prediabetes, and Screening PSA (prostate specific antigen) were also pertinent to this visit.    Plan:  Routine physical examination    Constipation, unspecified constipation type  -     linaCLOtide (LINZESS) 72 mcg Cap capsule; Take 1 capsule (72 mcg total) by mouth before breakfast.  Dispense: 30 capsule; Refill: 3    Hypertension,  essential  -     Lipid Panel; Future; Expected date: 06/24/2022    Hyperlipidemia, unspecified hyperlipidemia type  -     Lipid Panel; Future; Expected date: 06/24/2022    Prediabetes  -     Hemoglobin A1C; Future; Expected date: 06/24/2022    Screening PSA (prostate specific antigen)  -     PSA, Screening; Future; Expected date: 06/24/2022      -Patient's lab results were reviewed and discussed with patient  -Treatment options and alternatives were discussed with the patient. Patient expressed understanding. Patient was given the opportunity to ask questions and be an active participant in their medical care. Patient had no further questions or concerns at this time.   -Documentation of patient's health and condition was obtained from family member who was present during visit.  -Patient is an overall moderate risk for health complications from their medical conditions.     Follow up: follow up 1 year sooner if needed      Care Plan/Goals: Reviewed N/A   Goals    None             After visit summary printed and given to patient upon discharge.  Patient goals and care plan are included in After visit summary.

## 2022-07-06 ENCOUNTER — HOSPITAL ENCOUNTER (OUTPATIENT)
Dept: RADIOLOGY | Facility: HOSPITAL | Age: 67
Discharge: HOME | End: 2022-07-06
Attending: THORACIC SURGERY (CARDIOTHORACIC VASCULAR SURGERY)
Payer: MEDICARE

## 2022-07-06 ENCOUNTER — PATIENT MESSAGE (OUTPATIENT)
Dept: INTERNAL MEDICINE | Facility: CLINIC | Age: 67
End: 2022-07-06
Payer: MEDICARE

## 2022-07-06 DIAGNOSIS — Z01.818 ENCOUNTER FOR OTHER PREPROCEDURAL EXAMINATION: ICD-10-CM

## 2022-07-06 PROCEDURE — 25500020 PHARM REV CODE 255: Performed by: THORACIC SURGERY (CARDIOTHORACIC VASCULAR SURGERY)

## 2022-07-06 PROCEDURE — 74174 CTA ABD&PLVS W/CONTRAST: CPT | Mod: TC

## 2022-07-06 RX ADMIN — IOHEXOL 100 ML: 350 INJECTION, SOLUTION INTRAVENOUS at 02:07

## 2022-07-11 ENCOUNTER — SPECIALTY PHARMACY (OUTPATIENT)
Dept: PHARMACY | Facility: CLINIC | Age: 67
End: 2022-07-11
Payer: MEDICARE

## 2022-07-11 NOTE — TELEPHONE ENCOUNTER
Outgoing call to pt's daughter regarding refill. Pt's daughter stated she will call [facility] back with an on hand count.

## 2022-07-12 NOTE — TELEPHONE ENCOUNTER
Specialty Pharmacy - Refill Coordination    Specialty Medication Orders Linked to Encounter    Flowsheet Row Most Recent Value   Medication #1 tofacitinib (XELJANZ) 5 mg Tab (Order#[id number], Rx#[id number])          Refill Questions - Documented Responses    Flowsheet Row Most Recent Value   Patient Availability and HIPAA Verification    Does patient want to proceed with activity? Yes   HIPAA/medical authority confirmed? Yes   Relationship to patient of person spoken to? Child   Refill Screening Questions    Changes to allergies? No   Changes to medications? Yes  [Started Linzess]   New conditions since last clinic visit? No   Unplanned office visit, urgent care, ED, or hospital admission in the last 4 weeks? No   How does patient/caregiver feel medication is working? Good   Financial problems or insurance changes? No   How many doses of your specialty medications were missed in the last 4 weeks? 0   Would patient like to speak to a pharmacist? No   When does the patient need to receive the medication? 07/14/22   Refill Delivery Questions    How will the patient receive the medication? Delivery Lenore   When does the patient need to receive the medication? 07/14/22   Shipping Address Prescription   Address in [facility] confirmed and updated if neccessary? Yes   Expected Copay ($) 9.85   Is the patient able to afford the medication copay? Yes   Payment Method zero copay   Days supply of Refill 30   Supplies needed? No supplies needed   Refill activity completed? Yes   Refill activity plan Refill scheduled   Shipment/Pickup Date: 07/12/22          Current Outpatient Medications   Medication Sig    amLODIPine (NORVASC) 5 MG tablet Take 1 tablet (5 mg total) by mouth once daily.    aspirin (ECOTRIN) 81 MG EC tablet Take 81 mg by mouth once daily.    benazepriL (LOTENSIN) 40 MG tablet Take 1 tablet (40 mg total) by mouth once daily.    cilostazoL (PLETAL) 50 MG Tab Take 1 tablet by mouth twice daily     cloNIDine (CATAPRES) 0.2 MG tablet Take 1 tablet (0.2 mg total) by mouth 2 (two) times daily.    ezetimibe (ZETIA) 10 mg tablet Take 1 tablet (10 mg total) by mouth once daily.    gabapentin (NEURONTIN) 300 MG capsule Take 1 capsule (300 mg total) by mouth 3 (three) times daily.    hydroCHLOROthiazide (HYDRODIURIL) 25 MG tablet Take 1 tablet (25 mg total) by mouth once daily.    linaCLOtide (LINZESS) 72 mcg Cap capsule Take 1 capsule (72 mcg total) by mouth before breakfast.    rosuvastatin (CRESTOR) 20 MG tablet Take 1 tablet (20 mg total) by mouth once daily.    tofacitinib (XELJANZ) 5 mg Tab Take 5 mg by mouth once daily.   Last reviewed on 6/24/2022  2:35 PM by Keshawn Peterson OD    Review of patient's allergies indicates:  No Known Allergies Last reviewed on  7/6/2022 2:21 PM by John Redding    Interventions added this encounter   Closed: [facility] Patient Intervention - Drug safety     Tasks added this encounter   8/6/2022 - Refill Call (Auto Added)   Tasks due within next 3 months   No tasks due.     [facility] - [facility]ty Pharmacy  [address] [facility] [address]  Phone: [phone]  Fax: [phone]

## 2022-07-15 ENCOUNTER — OFFICE VISIT (OUTPATIENT)
Dept: CARDIOTHORACIC SURGERY | Facility: CLINIC | Age: 67
End: 2022-07-15
Payer: MEDICARE

## 2022-07-15 VITALS
BODY MASS INDEX: 26.12 KG/M2 | HEIGHT: 65 IN | HEART RATE: 79 BPM | DIASTOLIC BLOOD PRESSURE: 74 MMHG | SYSTOLIC BLOOD PRESSURE: 116 MMHG | OXYGEN SATURATION: 96 % | WEIGHT: 156.75 LBS

## 2022-07-15 DIAGNOSIS — I25.118 CORONARY ARTERY DISEASE OF NATIVE ARTERY OF NATIVE HEART WITH STABLE ANGINA PECTORIS: ICD-10-CM

## 2022-07-15 DIAGNOSIS — I71.40 ABDOMINAL AORTIC ANEURYSM (AAA) WITHOUT RUPTURE: Primary | ICD-10-CM

## 2022-07-15 PROCEDURE — 99212 OFFICE O/P EST SF 10 MIN: CPT | Mod: S$PBB,,, | Performed by: THORACIC SURGERY (CARDIOTHORACIC VASCULAR SURGERY)

## 2022-07-15 PROCEDURE — 99212 PR OFFICE/OUTPT VISIT, EST, LEVL II, 10-19 MIN: ICD-10-PCS | Mod: S$PBB,,, | Performed by: THORACIC SURGERY (CARDIOTHORACIC VASCULAR SURGERY)

## 2022-07-15 PROCEDURE — 99213 OFFICE O/P EST LOW 20 MIN: CPT | Mod: PBBFAC | Performed by: THORACIC SURGERY (CARDIOTHORACIC VASCULAR SURGERY)

## 2022-07-15 PROCEDURE — 99999 PR PBB SHADOW E&M-EST. PATIENT-LVL III: CPT | Mod: PBBFAC,,, | Performed by: THORACIC SURGERY (CARDIOTHORACIC VASCULAR SURGERY)

## 2022-07-15 PROCEDURE — 99999 PR PBB SHADOW E&M-EST. PATIENT-LVL III: ICD-10-PCS | Mod: PBBFAC,,, | Performed by: THORACIC SURGERY (CARDIOTHORACIC VASCULAR SURGERY)

## 2022-07-15 NOTE — PROGRESS NOTES
Subjective:      Patient ID: Deborah White is a 67 y.o. male.    Chief Complaint: No chief complaint on file.    HPI:  68-year-old male with a history of smoking and hypertension coronary artery disease had an MRI incidental finding of a 5 cm aneurysm.  Patient does not have any symptoms of abdominal pain.  His blood pressure control has been good.  He is here for the follow-up office CT a of the abdomen pelvis.  He has been compliant with his blood pressure medication and his blood pressure control has been good    Review of patient's allergies indicates:  No Known Allergies    Past Medical History:   Diagnosis Date    Coronary artery disease     Hyperlipidemia     Hypertension        Family History   Problem Relation Age of Onset    Cancer Mother        Social History     Socioeconomic History    Marital status:    Tobacco Use    Smoking status: Current Every Day Smoker     Types: Cigarettes    Smokeless tobacco: Never Used   Substance and Sexual Activity    Alcohol use: Never     Social Determinants of Health     Financial Resource Strain: Medium Risk    Difficulty of Paying Living Expenses: Somewhat hard   Food Insecurity: No Food Insecurity    Worried About Running Out of Food in the Last Year: Never true    Ran Out of Food in the Last Year: Never true   Transportation Needs: No Transportation Needs    Lack of Transportation (Medical): No    Lack of Transportation (Non-Medical): No   Physical Activity: Insufficiently Active    Days of Exercise per Week: 1 day    Minutes of Exercise per Session: 20 min   Stress: Stress Concern Present    Feeling of Stress : Very much   Social Connections: Unknown    Frequency of Communication with Friends and Family: Three times a week    Frequency of Social Gatherings with Friends and Family: Once a week    Active Member of Clubs or Organizations: No    Attends Club or Organization Meetings: Never    Marital Status:    Housing Stability: Low Risk  "    Unable to Pay for Housing in the Last Year: No    Number of Places Lived in the Last Year: 1    Unstable Housing in the Last Year: No       Past Surgical History:   Procedure Laterality Date    INJECTION OF ANESTHETIC AGENT AROUND MEDIAL BRANCH NERVES INNERVATING CERVICAL FACET JOINT Right 11/18/2020    Procedure: Block-nerve-medial branch-cervical Right C3, 4, 5with RN IV sedation;  Surgeon: Martín Barnes MD;  Location: [facility];  Service: Pain Management;  Laterality: Right;       Review of Systems   Constitutional: Negative for activity change and appetite change.   HENT: Negative for dental problem, nosebleeds and sore throat.    Eyes: Negative for discharge and visual disturbance.   Respiratory: Negative for cough, chest tightness and stridor.    Cardiovascular: Negative for leg swelling.   Gastrointestinal: Negative for abdominal distention and abdominal pain.   Genitourinary: Negative for difficulty urinating and dysuria.   Musculoskeletal: Negative for arthralgias, back pain and joint swelling.   Allergic/Immunologic: Negative for environmental allergies.   Neurological: Negative for dizziness, syncope and headaches.   Hematological: Does not bruise/bleed easily.   Psychiatric/Behavioral: Negative for behavioral problems.          Objective:   /74 (BP Location: Left arm, Patient Position: Sitting)   Pulse 79   Ht 5' 5" (1.651 m)   Wt 71.1 kg (156 lb 12 oz)   SpO2 96%   BMI 26.08 kg/m²     CTA Abdomen and Pelvis  Narrative: EXAMINATION:  CTA ABDOMEN AND PELVIS    CLINICAL HISTORY:  Aortic aneurysm (AAA), pre-op planning;  Encounter for other preprocedural examination    TECHNIQUE:  Using 100 cc of  Omnipaque 350 IV, and multi-detector helical CT technique, axial CT angiogram images of the abdomen were obtained from the lung bases through the pelvis. Precontrast and portal venous phase images of the abdomen and pelvis also done. 2D post-processing coronal and sagittal reconstructions of " the abdominal aorta and visceral arteries performed.    COMPARISON:  04/26/2022    FINDINGS: MOTION LIMITED.  CTA images demonstrate diffuse atherosclerotic disease.  Infrarenal abdominal aortic aneurysm noted beginning approximately 4 cm below the left renal artery takeoff.  Aorta measures 2 cm in diameter below the left renal artery takeoff and the beginning of the aneurysm.  Aneurysm sac measures up to 5 x 5 cm with extensive mural thrombus.  Inferior mesenteric artery takeoff is at the cranial extent of the aneurysm sac with some flow within the thrombus identified potentially from the BOBBI.  Aneurysm extends to the aortic bifurcation.  Iliac arteries demonstrate heavy are atherosclerotic disease but without evidence of aneurysm or stricture.  Significant atherosclerotic disease seen at the origins of the iliac arteries with approximately 50% stenosis on the right and greater than 70% stenosis on the left.  Bilateral hypogastric arteries are atherosclerotic but patent.  Bilateral external iliac and common femoral arteries are widely patent with a diameter approximally 8 mm.    The lung bases are unremarkable.  There is no pleural fluid present.  The visualized portions of the heart appear normal.    The liver is normal in size and attenuation with no focal hepatic abnormality.  The gallbladder shows no evidence of stones or pericholecystic fluid.  There is no intra-or extrahepatic biliary ductal dilatation.    The spleen, pancreas, and adrenal glands are unremarkable.    Atrophic right kidney.  No hydronephrosis.  Left kidney is normal in appearance.  No mass identified.  The ureters appear normal in course and caliber without evidence of ureteral dilatation. Mild nonspecific bladder wall thickening.  Prostate appears within normal limits.  Mild prominence of seminal vesicles without discrete mass identified.    Mild rugal fold thickening within the stomach could reflect gastritis.  Mild thickening the GE  junction could reflect reflux disease.  The visualized loops of small bowel show no evidence of obstruction or inflammation.  Large bowel demonstrates mild constipation.  Areas of mild thickening within the sigmoid colon are indeterminate and the patient should have a screening colonoscopy.  Appendix is normal.  Appendix is normal.    There is no ascites, free fluid, or intraperitoneal free air.  Small fat containing left inguinal hernia.    There is no evidence of lymph node enlargement in the abdomen or pelvis.    Diffuse osteopenia and moderate degenerative changes throughout the lumbar spine.    The extraperitoneal soft tissues are unremarkable.  Impression: Infrarenal abdominal aortic aneurysm as above.    See additional findings above    All CT scans at this facility use dose modulation, iterative reconstruction, and/or weight based dosing when appropriate to reduce radiation dose to as low as reasonable achievable.    Electronically signed by: Kush Dickey MD  Date:    07/06/2022  Time:    14:44         Physical Exam  Vitals and nursing note reviewed.   Constitutional:       Appearance: Normal appearance.   HENT:      Head: Normocephalic and atraumatic.      Mouth/Throat:      Mouth: Mucous membranes are moist.   Eyes:      Extraocular Movements: Extraocular movements intact.      Pupils: Pupils are equal, round, and reactive to light.   Cardiovascular:      Rate and Rhythm: Normal rate and regular rhythm.      Pulses: Normal pulses.      Heart sounds: Normal heart sounds.   Pulmonary:      Effort: Pulmonary effort is normal.      Breath sounds: Normal breath sounds.   Abdominal:      Palpations: Abdomen is soft.   Musculoskeletal:      Cervical back: Normal range of motion and neck supple.   Skin:     General: Skin is warm.      Capillary Refill: Capillary refill takes less than 2 seconds.   Neurological:      General: No focal deficit present.      Mental Status: He is alert and oriented to person, place,  and time.   Psychiatric:         Mood and Affect: Mood normal.     CT scan of the abdomen and pelvis with contrast shows 5 cm aneurysm involving the infrarenal aorta with the thrombosis of the aneurysm sac no evidence of extraluminal contrast    Assessment:     1. Coronary artery disease of native artery of native heart with stable angina pectoris    Hypertension  Hyperlipidemia  Coronary artery disease  COPD and smoking  Renal insufficiency    Plan   Tight control of the blood pressure  Smoking cessation  Continue beta blockers  Follow-up in 6 months with a CT scan of the  abdomen and pelvis without contrast  I explained about the dangers of a 5 cm aneurysm and get medical medical help if there is abdominal pain with suitable studies in the nearest emergency room  At this time since his aneurysm is 5 cm it does not meet the criteria for endovascular stenting.          Lenore Julien MD,   [facility]  [place]

## 2022-08-12 ENCOUNTER — PATIENT MESSAGE (OUTPATIENT)
Dept: PHARMACY | Facility: CLINIC | Age: 67
End: 2022-08-12
Payer: MEDICARE

## 2022-08-15 ENCOUNTER — SPECIALTY PHARMACY (OUTPATIENT)
Dept: PHARMACY | Facility: CLINIC | Age: 67
End: 2022-08-15
Payer: MEDICARE

## 2022-08-15 NOTE — TELEPHONE ENCOUNTER
Specialty Pharmacy - Refill Coordination    Specialty Medication Orders Linked to Encounter    Flowsheet Row Most Recent Value   Medication #1 tofacitinib (XELJANZ) 5 mg Tab (Order#[id number], Rx#[id number])          Refill Questions - Documented Responses    Flowsheet Row Most Recent Value   Patient Availability and HIPAA Verification    Does patient want to proceed with activity? Yes   HIPAA/medical authority confirmed? Yes   Relationship to patient of person spoken to? Child          Current Outpatient Medications   Medication Sig    amLODIPine (NORVASC) 5 MG tablet Take 1 tablet (5 mg total) by mouth once daily.    aspirin (ECOTRIN) 81 MG EC tablet Take 81 mg by mouth once daily.    benazepriL (LOTENSIN) 40 MG tablet Take 1 tablet (40 mg total) by mouth once daily.    cilostazoL (PLETAL) 50 MG Tab Take 1 tablet by mouth twice daily    cloNIDine (CATAPRES) 0.2 MG tablet Take 1 tablet (0.2 mg total) by mouth 2 (two) times daily.    ezetimibe (ZETIA) 10 mg tablet Take 1 tablet (10 mg total) by mouth once daily.    gabapentin (NEURONTIN) 300 MG capsule Take 1 capsule (300 mg total) by mouth 3 (three) times daily.    hydroCHLOROthiazide (HYDRODIURIL) 25 MG tablet Take 1 tablet (25 mg total) by mouth once daily.    linaCLOtide (LINZESS) 72 mcg Cap capsule Take 1 capsule (72 mcg total) by mouth before breakfast.    rosuvastatin (CRESTOR) 20 MG tablet Take 1 tablet (20 mg total) by mouth once daily.    tofacitinib (XELJANZ) 5 mg Tab Take 5 mg by mouth once daily.   Last reviewed on 7/15/2022 11:46 AM by Rina Groves MA    Review of patient's allergies indicates:  No Known Allergies Last reviewed on  7/15/2022 11:46 AM by Rina Torres      Tasks added this encounter   9/9/2022 - Refill Call (Auto Added)   Tasks due within next 3 months   No tasks due.     Ekaterina Blankenship, PharmD  [facility]  [address] [place] [id number]  Phone: [phone]  Fax:  [phone]

## 2022-08-16 DIAGNOSIS — I27.20 PULMONARY HTN: ICD-10-CM

## 2022-08-16 RX ORDER — SILDENAFIL CITRATE 20 MG/1
20 TABLET ORAL DAILY PRN
Qty: 90 TABLET | Refills: 0 | Status: SHIPPED | OUTPATIENT
Start: 2022-08-16 | End: 2023-11-22

## 2022-08-17 ENCOUNTER — TELEPHONE (OUTPATIENT)
Dept: INTERNAL MEDICINE | Facility: CLINIC | Age: 67
End: 2022-08-17
Payer: MEDICARE

## 2022-08-30 ENCOUNTER — HOSPITAL ENCOUNTER (OUTPATIENT)
Dept: RADIOLOGY | Facility: HOSPITAL | Age: 67
Discharge: HOME | End: 2022-08-30
Attending: PHYSICIAN ASSISTANT
Payer: MEDICARE

## 2022-08-30 ENCOUNTER — OFFICE VISIT (OUTPATIENT)
Dept: RHEUMATOLOGY | Facility: CLINIC | Age: 67
End: 2022-08-30
Payer: MEDICARE

## 2022-08-30 VITALS
BODY MASS INDEX: 25.94 KG/M2 | HEART RATE: 72 BPM | DIASTOLIC BLOOD PRESSURE: 67 MMHG | WEIGHT: 155.88 LBS | SYSTOLIC BLOOD PRESSURE: 122 MMHG

## 2022-08-30 DIAGNOSIS — D84.9 IMMUNOCOMPROMISED: ICD-10-CM

## 2022-08-30 DIAGNOSIS — N26.1 ATROPHIC KIDNEY: ICD-10-CM

## 2022-08-30 DIAGNOSIS — Z51.81 ENCOUNTER FOR MEDICATION MONITORING: ICD-10-CM

## 2022-08-30 DIAGNOSIS — G89.29 CHRONIC PAIN OF BOTH KNEES: ICD-10-CM

## 2022-08-30 DIAGNOSIS — M25.561 CHRONIC PAIN OF BOTH KNEES: ICD-10-CM

## 2022-08-30 DIAGNOSIS — M25.562 CHRONIC PAIN OF BOTH KNEES: ICD-10-CM

## 2022-08-30 DIAGNOSIS — M45.0 ANKYLOSING SPONDYLITIS OF MULTIPLE SITES IN SPINE: Primary | ICD-10-CM

## 2022-08-30 DIAGNOSIS — M48.10 DISH (DIFFUSE IDIOPATHIC SKELETAL HYPEROSTOSIS): ICD-10-CM

## 2022-08-30 DIAGNOSIS — N18.30 STAGE 3 CHRONIC KIDNEY DISEASE, UNSPECIFIED WHETHER STAGE 3A OR 3B CKD: ICD-10-CM

## 2022-08-30 PROCEDURE — 99999 PR PBB SHADOW E&M-EST. PATIENT-LVL III: ICD-10-PCS | Mod: PBBFAC,,, | Performed by: PHYSICIAN ASSISTANT

## 2022-08-30 PROCEDURE — 99215 PR OFFICE/OUTPT VISIT, EST, LEVL V, 40-54 MIN: ICD-10-PCS | Mod: S$PBB,,, | Performed by: PHYSICIAN ASSISTANT

## 2022-08-30 PROCEDURE — 73564 X-RAY EXAM KNEE 4 OR MORE: CPT | Mod: 26,50,, | Performed by: RADIOLOGY

## 2022-08-30 PROCEDURE — 99999 PR PBB SHADOW E&M-EST. PATIENT-LVL III: CPT | Mod: PBBFAC,,, | Performed by: PHYSICIAN ASSISTANT

## 2022-08-30 PROCEDURE — 73564 X-RAY EXAM KNEE 4 OR MORE: CPT | Mod: TC,50

## 2022-08-30 PROCEDURE — 73564 XR KNEE ORTHO BILAT WITH FLEXION: ICD-10-PCS | Mod: 26,50,, | Performed by: RADIOLOGY

## 2022-08-30 PROCEDURE — 99213 OFFICE O/P EST LOW 20 MIN: CPT | Mod: PBBFAC | Performed by: PHYSICIAN ASSISTANT

## 2022-08-30 PROCEDURE — 99215 OFFICE O/P EST HI 40 MIN: CPT | Mod: S$PBB,,, | Performed by: PHYSICIAN ASSISTANT

## 2022-08-30 NOTE — PROGRESS NOTES
RHEUMATOLOGY OUTPATIENT CLINIC NOTE    8/30/2022    Attending Rheumatologist: Nati Cisse PA-C  Primary Care Provider: Kate Lofton MD   Chief Complaint/Reason For Consultation:  AS/DISH      Subjective:        Deborah White is a 67 y.o. male here today with his daughter for routine follow up of ankylosing spondylitis affecting multiple sites including diffuse idiopathic skeletal hyperostosis with bridging osteophytes.  He has failed methotrexate and sulfasalazine.  He responds very well to prednisone. Remicade was stopped because of very high BP elevations after infusions. Pt has been on xeljanz 5 mg daily for about 3.5 months now and can tell no improvement. He has had a few falls since last OV.    Rheumatological review of system negative otherwise.  Exam shows severe tenderness of cervical, thoracic and lower lumbar vertebra and severe tenderness of bilateral sacroiliac joint with restricted lumbar flexion.  No small joint synovitis. Left knee is tender to palpation today.    Serologies    Lab Results   Component Value Date    TBGOLDPLUS Positive (A) 01/05/2022     Lab Results   Component Value Date    RF <10.0 08/11/2020     Lab Results   Component Value Date    HEPBCAB Negative 10/23/2020    HEPCAB Negative 10/23/2020        Current Rheum Medications:  Xeljanz 5 mg daily  Previous Rheum Medications:   SSZ, remicade, MTX    Past Medical History:   Diagnosis Date    Coronary artery disease     Hyperlipidemia     Hypertension        Review of patient's allergies indicates:  No Known Allergies    Objective:   /67   Pulse 72   Wt 70.7 kg (155 lb 13.8 oz)   BMI 25.94 kg/m²     Immunization History   Administered Date(s) Administered    COVID-19, MRNA, LN-S, PF (MODERNA FULL 0.5 ML DOSE) 02/03/2021, 03/04/2021    COVID-19, MRNA, LN-S, PF (Pfizer) (Purple Cap) 11/23/2021    Influenza - Quadrivalent - PF *Preferred* (6 months and older) 10/09/2017, 09/03/2019, 09/15/2020    Influenza Split  10/12/2009, 10/14/2021    Pneumococcal Polysaccharide - 23 Valent 12/11/2019       Current Outpatient Medications:     amLODIPine (NORVASC) 5 MG tablet, Take 1 tablet (5 mg total) by mouth once daily., Disp: 90 tablet, Rfl: 1    aspirin (ECOTRIN) 81 MG EC tablet, Take 81 mg by mouth once daily., Disp: , Rfl:     benazepriL (LOTENSIN) 40 MG tablet, Take 1 tablet (40 mg total) by mouth once daily., Disp: 90 tablet, Rfl: 1    cilostazoL (PLETAL) 50 MG Tab, Take 1 tablet by mouth twice daily, Disp: 60 tablet, Rfl: 9    cloNIDine (CATAPRES) 0.2 MG tablet, Take 1 tablet (0.2 mg total) by mouth 2 (two) times daily., Disp: 180 tablet, Rfl: 3    ezetimibe (ZETIA) 10 mg tablet, Take 1 tablet (10 mg total) by mouth once daily., Disp: 90 tablet, Rfl: 1    gabapentin (NEURONTIN) 300 MG capsule, Take 1 capsule (300 mg total) by mouth 3 (three) times daily., Disp: 90 capsule, Rfl: 11    hydroCHLOROthiazide (HYDRODIURIL) 25 MG tablet, Take 1 tablet (25 mg total) by mouth once daily., Disp: 90 tablet, Rfl: 1    linaCLOtide (LINZESS) 72 mcg Cap capsule, Take 1 capsule (72 mcg total) by mouth before breakfast., Disp: 30 capsule, Rfl: 3    rosuvastatin (CRESTOR) 20 MG tablet, Take 1 tablet (20 mg total) by mouth once daily., Disp: 90 tablet, Rfl: 1    sildenafil (REVATIO) 20 mg Tab, Take 1 tablet (20 mg total) by mouth daily as needed (pulm htn)., Disp: 90 tablet, Rfl: 0    tofacitinib (XELJANZ) 5 mg Tab, Take 5 mg by mouth once daily., Disp: 30 tablet, Rfl: 11       Recent Results (from the past 336 hour(s))   CBC Auto Differential    Collection Time: 08/30/22  4:00 PM   Result Value Ref Range    WBC 7.70 3.90 - 12.70 K/uL    RBC 4.27 (L) 4.60 - 6.20 M/uL    Hemoglobin 13.0 (L) 14.0 - 18.0 g/dL    Hematocrit 39.0 (L) 40.0 - 54.0 %    MCV 91 82 - 98 fL    MCH 30.4 27.0 - 31.0 pg    MCHC 33.3 32.0 - 36.0 g/dL    RDW 13.2 11.5 - 14.5 %    Platelets 313 150 - 450 K/uL    MPV 8.8 (L) 9.2 - 12.9 fL    Immature Granulocytes 0.8 (H) 0.0 - 0.5  %    Gran # (ANC) 3.4 1.8 - 7.7 K/uL    Immature Grans (Abs) 0.06 (H) 0.00 - 0.04 K/uL    Lymph # 3.3 1.0 - 4.8 K/uL    Mono # 0.8 0.3 - 1.0 K/uL    Eos # 0.1 0.0 - 0.5 K/uL    Baso # 0.06 0.00 - 0.20 K/uL    nRBC 0 0 /100 WBC    Gran % 44.5 38.0 - 73.0 %    Lymph % 42.9 18.0 - 48.0 %    Mono % 9.7 4.0 - 15.0 %    Eosinophil % 1.3 0.0 - 8.0 %    Basophil % 0.8 0.0 - 1.9 %    Differential Method Automated    Comprehensive Metabolic Panel    Collection Time: 08/30/22  4:00 PM   Result Value Ref Range    Sodium 140 136 - 145 mmol/L    Potassium 3.3 (L) 3.5 - 5.1 mmol/L    Chloride 104 95 - 110 mmol/L    CO2 25 23 - 29 mmol/L    Glucose 130 (H) 70 - 110 mg/dL    BUN 16 8 - 23 mg/dL    Creatinine 1.5 (H) 0.5 - 1.4 mg/dL    Calcium 9.8 8.7 - 10.5 mg/dL    Total Protein 7.9 6.0 - 8.4 g/dL    Albumin 3.9 3.5 - 5.2 g/dL    Total Bilirubin 0.2 0.1 - 1.0 mg/dL    Alkaline Phosphatase 59 55 - 135 U/L    AST 17 10 - 40 U/L    ALT 20 10 - 44 U/L    Anion Gap 11 8 - 16 mmol/L    eGFR 51 (A) >60 mL/min/1.73 m^2   Sedimentation rate    Collection Time: 08/30/22  4:00 PM   Result Value Ref Range    Sed Rate 44 (H) 0 - 23 mm/Hr   C-Reactive Protein    Collection Time: 08/30/22  4:00 PM   Result Value Ref Range    CRP 2.5 0.0 - 8.2 mg/L         Imaging:  All imaging reviewed and independently interpreted by me.    XR KNEE ORTHO BILAT WITH FLEXION 8/30/22     FINDINGS:  Four views of bilateral knees including standing AP and flexion views show no fracture, dislocation, or destructive osseous lesion. Joint spaces are maintained.  Negative for joint effusion.  Arterial vascular calcifications are present.  Small superior left patellar enthesophyte occurs at quadriceps tendon insertion.    Assessment:     1. Ankylosing spondylitis of multiple sites in spine    2. DISH (diffuse idiopathic skeletal hyperostosis)    3. Immunocompromised    4. Encounter for medication monitoring    5. Chronic pain of both knees    6. Stage 3 chronic kidney  disease, unspecified whether stage 3a or 3b CKD    7. Atrophic kidney          Plan:       Check safety labs today; tentative plan to increase dose of xeljanz to 11 mg daily. Could move to another TNF or IL-17  Pt wishes to avoid use of oral steroids at this time- he is pre-diabetic and is watching diet closely  Xray bilateral knees today; consider IAC injection in future  Nephrology referral placed for CKD and atrophic right kidney on CT scan  Compromised immune system secondary to autoimmune disease and use of immunosuppressive drugs. Monitor carefully for infections and toxicities- Currently denies issues with recurrent infections. Advised to get immediate medical care if any infection.  Recommend Yearly Skin Cancer Screening by Dermatology for all patients on biologic or Joanna. advised strict adherence to age appropriate vaccinations (shingles, pneumonia, flu and covid) and cancer screenings with PCP   Patient advised to hold DMARD and/or biologic therapy for signs of infection or for surgery. If you are unsure what to do please call our office for instruction.[facility] [phone]  no current medication related issues, no evidence of toxicity. I ordered labs for toxicity monitoring;  results reviewed and discussed findings with the patient   Patient understands and contact clinic at any time regarding questions about today's office visit and any medication changes that were made  Return to clinic: 3 mos w/ reg 4    The patient understands, chooses and consents to this plan and accepts all the risks which include but are not limited to the risks mentioned above.     Method of contact with patient concerns: Riley attn Rheumatology    60 minutes of total time spent on the encounter, which includes face to face time and non-face to face time preparing to see the patient (eg, review of tests), Obtaining and/or reviewing separately obtained history, Documenting clinical information in the  electronic or other health record, Independently interpreting results (not separately reported) and communicating results to the patient/family/caregiver, or Care coordination (not separately reported).          Disclaimer: This note was prepared using a voice recognition system and is likely to have sound alike errors within the text.       Nati Cisse PA-C  Rheumatology Department   [facility]

## 2022-08-31 ENCOUNTER — TELEPHONE (OUTPATIENT)
Dept: RHEUMATOLOGY | Facility: CLINIC | Age: 67
End: 2022-08-31
Payer: MEDICARE

## 2022-08-31 ENCOUNTER — PATIENT MESSAGE (OUTPATIENT)
Dept: RHEUMATOLOGY | Facility: CLINIC | Age: 67
End: 2022-08-31
Payer: MEDICARE

## 2022-08-31 NOTE — TELEPHONE ENCOUNTER
Spoke with patient's daughter, Lea. At this time based on GFR pt is mild-moderate renal impairment. Do not recommend further increasing his dose of xeljanz at this time.    Discussed possibilty of rinvoq vs. Taltz/cosentyx vs. TNF that can be given at home. Reviewed side effect profile of Joanna at length with daughter.    Confirmed with daughter that pt has no history of CVA/DVT/MI/CAD. He is a current smoker and has tried unsuccessfully to quit. Smoking about 1/2 ppd at this time.    Daughter will further discuss with patient and get back with us.

## 2022-09-09 ENCOUNTER — SPECIALTY PHARMACY (OUTPATIENT)
Dept: PHARMACY | Facility: CLINIC | Age: 67
End: 2022-09-09
Payer: MEDICARE

## 2022-09-09 NOTE — TELEPHONE ENCOUNTER
Outgoing call regarding xeljanz refill; per pt's daughter, she is not sure how many he has on hand, so she will give us a call back once she finds out

## 2022-09-09 NOTE — TELEPHONE ENCOUNTER
Specialty Pharmacy - Refill Coordination    Specialty Medication Orders Linked to Encounter      Flowsheet Row Most Recent Value   Medication #1 tofacitinib (XELJANZ) 5 mg Tab (Order#[id number], Rx#[id number])            Refill Questions - Documented Responses      Flowsheet Row Most Recent Value   Patient Availability and HIPAA Verification    Does patient want to proceed with activity? Yes   HIPAA/medical authority confirmed? Yes   Relationship to patient of person spoken to? Child   Refill Screening Questions    Changes to allergies? No   Changes to medications? No   New conditions since last clinic visit? No   Unplanned office visit, urgent care, ED, or hospital admission in the last 4 weeks? No   How does patient/caregiver feel medication is working? Good   Financial problems or insurance changes? No   How many doses of your specialty medications were missed in the last 4 weeks? 0   Would patient like to speak to a pharmacist? No   When does the patient need to receive the medication? 09/13/22   Refill Delivery Questions    How will the patient receive the medication? Delivery Lenore   When does the patient need to receive the medication? 09/13/22   Shipping Address Prescription   Address in [facility] confirmed and updated if neccessary? Yes   Expected Copay ($) 0   Is the patient able to afford the medication copay? Yes   Payment Method zero copay   Days supply of Refill 30   Supplies needed? No supplies needed   Refill activity completed? Yes   Refill activity plan Refill scheduled   Shipment/Pickup Date: 09/12/22            Current Outpatient Medications   Medication Sig    amLODIPine (NORVASC) 5 MG tablet Take 1 tablet (5 mg total) by mouth once daily.    aspirin (ECOTRIN) 81 MG EC tablet Take 81 mg by mouth once daily.    benazepriL (LOTENSIN) 40 MG tablet Take 1 tablet (40 mg total) by mouth once daily.    cilostazoL (PLETAL) 50 MG Tab Take 1 tablet by mouth twice daily    cloNIDine (CATAPRES) 0.2  MG tablet Take 1 tablet (0.2 mg total) by mouth 2 (two) times daily.    ezetimibe (ZETIA) 10 mg tablet Take 1 tablet (10 mg total) by mouth once daily.    gabapentin (NEURONTIN) 300 MG capsule Take 1 capsule (300 mg total) by mouth 3 (three) times daily.    hydroCHLOROthiazide (HYDRODIURIL) 25 MG tablet Take 1 tablet (25 mg total) by mouth once daily.    linaCLOtide (LINZESS) 72 mcg Cap capsule Take 1 capsule (72 mcg total) by mouth before breakfast.    rosuvastatin (CRESTOR) 20 MG tablet Take 1 tablet (20 mg total) by mouth once daily.    sildenafil (REVATIO) 20 mg Tab Take 1 tablet (20 mg total) by mouth daily as needed (pulm htn).    tofacitinib (XELJANZ) 5 mg Tab Take 5 mg by mouth once daily.   Last reviewed on 8/30/2022  2:41 PM by Vilma Herron MA    Review of patient's allergies indicates:  No Known Allergies Last reviewed on  8/30/2022 2:40 PM by Vilma Herron      Tasks added this encounter   No tasks added.   Tasks due within next 3 months   9/9/2022 - Refill Call (Auto Added)     Colleen Momin, PharmD  [facility]  [address] [place], [place] [address]  [facility] [address]  Phone: [phone]  Fax: [phone]

## 2022-09-13 ENCOUNTER — TELEPHONE (OUTPATIENT)
Dept: PHARMACY | Facility: CLINIC | Age: 67
End: 2022-09-13
Payer: MEDICARE

## 2022-09-13 NOTE — TELEPHONE ENCOUNTER
LVM for patient's daughter to call back regarding shipment - please transfer to [facility].

## 2022-09-15 ENCOUNTER — TELEPHONE (OUTPATIENT)
Dept: RHEUMATOLOGY | Facility: CLINIC | Age: 67
End: 2022-09-15
Payer: MEDICARE

## 2022-09-15 ENCOUNTER — PATIENT MESSAGE (OUTPATIENT)
Dept: CARDIOTHORACIC SURGERY | Facility: CLINIC | Age: 67
End: 2022-09-15
Payer: MEDICARE

## 2022-09-15 ENCOUNTER — TELEPHONE (OUTPATIENT)
Dept: CARDIOTHORACIC SURGERY | Facility: CLINIC | Age: 67
End: 2022-09-15
Payer: MEDICARE

## 2022-09-15 ENCOUNTER — PATIENT MESSAGE (OUTPATIENT)
Dept: RHEUMATOLOGY | Facility: CLINIC | Age: 67
End: 2022-09-15
Payer: MEDICARE

## 2022-09-15 ENCOUNTER — PATIENT MESSAGE (OUTPATIENT)
Dept: CARDIOLOGY | Facility: CLINIC | Age: 67
End: 2022-09-15
Payer: MEDICARE

## 2022-09-15 DIAGNOSIS — N18.30 STAGE 3 CHRONIC KIDNEY DISEASE, UNSPECIFIED WHETHER STAGE 3A OR 3B CKD: Primary | ICD-10-CM

## 2022-09-15 NOTE — TELEPHONE ENCOUNTER
----- Message from Ronda Emerson LPN sent at 9/15/2022  3:23 PM CDT -----  Regarding: RE: appt  Hello we only have appts fr the end of dec avail. If urgent please send to [facility] or outside.  ----- Message -----  From: Sue Kenyon MA  Sent: 9/15/2022   3:20 PM CDT  To: [facility] Clinical Staff, #  Subject: appt                                             Good afternoon,     Per Nati Cisse PA-C, she wouldl cathi to get patient an appt with a nephrologist soon to further eval for treatment. Please contact patient's daughter to schedule. Please let me know when patient is on your books.     Thanks

## 2022-09-16 ENCOUNTER — PATIENT MESSAGE (OUTPATIENT)
Dept: CARDIOLOGY | Facility: CLINIC | Age: 67
End: 2022-09-16
Payer: MEDICARE

## 2022-09-26 ENCOUNTER — PATIENT MESSAGE (OUTPATIENT)
Dept: RESEARCH | Facility: HOSPITAL | Age: 67
End: 2022-09-26
Payer: MEDICARE

## 2022-10-06 ENCOUNTER — SPECIALTY PHARMACY (OUTPATIENT)
Dept: PHARMACY | Facility: CLINIC | Age: 67
End: 2022-10-06
Payer: MEDICARE

## 2022-10-06 NOTE — TELEPHONE ENCOUNTER
Specialty Pharmacy - Refill Coordination    Specialty Medication Orders Linked to Encounter      Flowsheet Row Most Recent Value   Medication #1 tofacitinib (XELJANZ) 5 mg Tab (Order#[id number], Rx#[id number])            Refill Questions - Documented Responses      Flowsheet Row Most Recent Value   Patient Availability and HIPAA Verification    Does patient want to proceed with activity? Yes   HIPAA/medical authority confirmed? Yes   Relationship to patient of person spoken to? Self   Refill Screening Questions    Changes to allergies? No   Changes to medications? No   New conditions since last clinic visit? No   Unplanned office visit, urgent care, ED, or hospital admission in the last 4 weeks? No   How does patient/caregiver feel medication is working? Very good   Financial problems or insurance changes? No   How many doses of your specialty medications were missed in the last 4 weeks? 0   Would patient like to speak to a pharmacist? No   When does the patient need to receive the medication? 10/13/22   Refill Delivery Questions    How will the patient receive the medication? MEDRx   When does the patient need to receive the medication? 10/13/22   Shipping Address Home   Address in [facility] confirmed and updated if neccessary? Yes   Expected Copay ($) 0   Is the patient able to afford the medication copay? Yes   Payment Method zero copay   Days supply of Refill 30   Supplies needed? No supplies needed   Refill activity completed? Yes   Refill activity plan Refill scheduled   Shipment/Pickup Date: 10/10/22            Current Outpatient Medications   Medication Sig    amLODIPine (NORVASC) 5 MG tablet Take 1 tablet (5 mg total) by mouth once daily.    aspirin (ECOTRIN) 81 MG EC tablet Take 81 mg by mouth once daily.    benazepriL (LOTENSIN) 40 MG tablet Take 1 tablet (40 mg total) by mouth once daily.    cilostazoL (PLETAL) 50 MG Tab Take 1 tablet by mouth twice daily    cloNIDine (CATAPRES) 0.2 MG tablet  Take 1 tablet (0.2 mg total) by mouth 2 (two) times daily.    ezetimibe (ZETIA) 10 mg tablet Take 1 tablet (10 mg total) by mouth once daily.    gabapentin (NEURONTIN) 300 MG capsule TAKE 1 CAPSULE BY MOUTH THREE TIMES DAILY    hydroCHLOROthiazide (HYDRODIURIL) 25 MG tablet Take 1 tablet (25 mg total) by mouth once daily.    linaCLOtide (LINZESS) 72 mcg Cap capsule Take 1 capsule (72 mcg total) by mouth before breakfast.    rosuvastatin (CRESTOR) 20 MG tablet Take 1 tablet (20 mg total) by mouth once daily.    sildenafil (REVATIO) 20 mg Tab Take 1 tablet (20 mg total) by mouth daily as needed (pulm htn).    tofacitinib (XELJANZ) 5 mg Tab Take 5 mg by mouth once daily.   Last reviewed on 8/30/2022  2:41 PM by Vilma Herron MA    Review of patient's allergies indicates:  No Known Allergies Last reviewed on  8/30/2022 2:40 PM by Vilma Herron      Tasks added this encounter   11/5/2022 - Refill Call (Auto Added)   Tasks due within next 3 months   No tasks due.     Mark Liao, PharmD  [facility]  [address] [facility] [address]  [facility] [address]  Phone: [phone]  Fax: [phone]

## 2022-10-17 ENCOUNTER — PATIENT MESSAGE (OUTPATIENT)
Dept: NEPHROLOGY | Facility: CLINIC | Age: 67
End: 2022-10-17
Payer: MEDICARE

## 2022-10-26 ENCOUNTER — TELEPHONE (OUTPATIENT)
Dept: CARDIOTHORACIC SURGERY | Facility: CLINIC | Age: 67
End: 2022-10-26
Payer: MEDICARE

## 2022-10-26 ENCOUNTER — PATIENT MESSAGE (OUTPATIENT)
Dept: CARDIOTHORACIC SURGERY | Facility: CLINIC | Age: 67
End: 2022-10-26
Payer: MEDICARE

## 2022-10-26 ENCOUNTER — PATIENT MESSAGE (OUTPATIENT)
Dept: CARDIOLOGY | Facility: CLINIC | Age: 67
End: 2022-10-26
Payer: MEDICARE

## 2022-10-26 DIAGNOSIS — I71.40 ABDOMINAL AORTIC ANEURYSM (AAA) WITHOUT RUPTURE, UNSPECIFIED PART: Primary | ICD-10-CM

## 2022-10-26 NOTE — TELEPHONE ENCOUNTER
Patient's daughter was contacted, Ms. Tate, we were unable to reach her. We left a similar message via my chart.  Thanks for reaching out to the clinic with your concerns. The order has been changed, he will not have contrast. The radiology department has helped us with the change.

## 2022-10-27 ENCOUNTER — HOSPITAL ENCOUNTER (OUTPATIENT)
Dept: RADIOLOGY | Facility: HOSPITAL | Age: 67
Discharge: HOME | End: 2022-10-27
Attending: THORACIC SURGERY (CARDIOTHORACIC VASCULAR SURGERY)
Payer: MEDICARE

## 2022-10-27 DIAGNOSIS — I71.40 ABDOMINAL AORTIC ANEURYSM (AAA) WITHOUT RUPTURE, UNSPECIFIED PART: ICD-10-CM

## 2022-10-27 PROCEDURE — 74176 CT ABD & PELVIS W/O CONTRAST: CPT | Mod: 26,,, | Performed by: RADIOLOGY

## 2022-10-27 PROCEDURE — 74176 CT ABD & PELVIS W/O CONTRAST: CPT | Mod: TC

## 2022-10-27 PROCEDURE — 74176 CT ABDOMEN PELVIS WITHOUT CONTRAST: ICD-10-PCS | Mod: 26,,, | Performed by: RADIOLOGY

## 2022-11-07 ENCOUNTER — SPECIALTY PHARMACY (OUTPATIENT)
Dept: PHARMACY | Facility: CLINIC | Age: 67
End: 2022-11-07
Payer: MEDICARE

## 2022-11-07 NOTE — TELEPHONE ENCOUNTER
Specialty Pharmacy - Refill Coordination    Specialty Medication Orders Linked to Encounter      Flowsheet Row Most Recent Value   Medication #1 tofacitinib (XELJANZ) 5 mg Tab (Order#[id number], Rx#[id number])            Refill Questions - Documented Responses      Flowsheet Row Most Recent Value   Patient Availability and HIPAA Verification    Does patient want to proceed with activity? Yes   HIPAA/medical authority confirmed? Yes   Relationship to patient of person spoken to? Child   Refill Screening Questions    Changes to allergies? No   Changes to medications? No   New conditions since last clinic visit? No   Unplanned office visit, urgent care, ED, or hospital admission in the last 4 weeks? No   How does patient/caregiver feel medication is working? Good   Financial problems or insurance changes? No   How many doses of your specialty medications were missed in the last 4 weeks? 0   Would patient like to speak to a pharmacist? No   When does the patient need to receive the medication? 11/14/22   Refill Delivery Questions    How will the patient receive the medication? MEDRx   When does the patient need to receive the medication? 11/14/22   Shipping Address Prescription   Address in [facility] confirmed and updated if neccessary? Yes   Expected Copay ($) 0   Is the patient able to afford the medication copay? Yes   Payment Method zero copay   Days supply of Refill 30   Supplies needed? No supplies needed   Refill activity completed? Yes   Refill activity plan Refill scheduled   Shipment/Pickup Date: 11/09/22            Current Outpatient Medications   Medication Sig    amLODIPine (NORVASC) 5 MG tablet Take 1 tablet (5 mg total) by mouth once daily.    aspirin (ECOTRIN) 81 MG EC tablet Take 81 mg by mouth once daily.    benazepriL (LOTENSIN) 40 MG tablet Take 1 tablet (40 mg total) by mouth once daily.    cilostazoL (PLETAL) 50 MG Tab Take 1 tablet by mouth twice daily    cloNIDine (CATAPRES) 0.2 MG tablet  Take 1 tablet (0.2 mg total) by mouth 2 (two) times daily.    ezetimibe (ZETIA) 10 mg tablet Take 1 tablet (10 mg total) by mouth once daily.    gabapentin (NEURONTIN) 300 MG capsule Take 1 capsule (300 mg total) by mouth 3 (three) times daily.    hydroCHLOROthiazide (HYDRODIURIL) 25 MG tablet Take 1 tablet (25 mg total) by mouth once daily.    linaCLOtide (LINZESS) 72 mcg Cap capsule Take 1 capsule (72 mcg total) by mouth before breakfast.    rosuvastatin (CRESTOR) 20 MG tablet Take 1 tablet (20 mg total) by mouth once daily.    sildenafil (REVATIO) 20 mg Tab Take 1 tablet (20 mg total) by mouth daily as needed (pulm htn).    tofacitinib (XELJANZ) 5 mg Tab Take 5 mg by mouth once daily.   Last reviewed on 8/30/2022  2:41 PM by Vimla Herron MA    Review of patient's allergies indicates:  No Known Allergies Last reviewed on  10/27/2022 9:49 AM by Erika Franklin      Tasks added this encounter   12/7/2022 - Refill Call (Auto Added)   Tasks due within next 3 months   No tasks due.     [facility]  [address] [facility] [address]  Phone: [phone]  Fax: [phone]

## 2022-11-08 DIAGNOSIS — E78.5 HYPERLIPIDEMIA, UNSPECIFIED HYPERLIPIDEMIA TYPE: ICD-10-CM

## 2022-11-08 DIAGNOSIS — I10 HYPERTENSION, ESSENTIAL: ICD-10-CM

## 2022-11-08 RX ORDER — ROSUVASTATIN CALCIUM 20 MG/1
20 TABLET, COATED ORAL DAILY
Qty: 90 TABLET | Refills: 1 | Status: SHIPPED | OUTPATIENT
Start: 2022-11-08 | End: 2023-05-22 | Stop reason: SDUPTHER

## 2022-11-08 RX ORDER — HYDROCHLOROTHIAZIDE 25 MG/1
25 TABLET ORAL DAILY
Qty: 90 TABLET | Refills: 1 | Status: SHIPPED | OUTPATIENT
Start: 2022-11-08 | End: 2023-05-21 | Stop reason: SDUPTHER

## 2022-11-08 RX ORDER — EZETIMIBE 10 MG/1
10 TABLET ORAL DAILY
Qty: 90 TABLET | Refills: 1 | Status: SHIPPED | OUTPATIENT
Start: 2022-11-08 | End: 2023-05-15 | Stop reason: SDUPTHER

## 2022-11-08 RX ORDER — BENAZEPRIL HYDROCHLORIDE 40 MG/1
40 TABLET ORAL DAILY
Qty: 90 TABLET | Refills: 1 | Status: SHIPPED | OUTPATIENT
Start: 2022-11-08 | End: 2023-05-21 | Stop reason: SDUPTHER

## 2022-11-08 RX ORDER — AMLODIPINE BESYLATE 5 MG/1
5 TABLET ORAL DAILY
Qty: 90 TABLET | Refills: 1 | Status: ON HOLD | OUTPATIENT
Start: 2022-11-08 | End: 2023-04-28

## 2022-11-08 NOTE — TELEPHONE ENCOUNTER
No new care gaps identified.  [facility] Embedded Care Gaps. Reference number: [id number]. 11/08/2022   12:02:14 PM CST

## 2022-12-07 ENCOUNTER — SPECIALTY PHARMACY (OUTPATIENT)
Dept: PHARMACY | Facility: CLINIC | Age: 67
End: 2022-12-07
Payer: MEDICARE

## 2022-12-07 NOTE — TELEPHONE ENCOUNTER
Specialty Pharmacy - Refill Coordination    Specialty Medication Orders Linked to Encounter      Flowsheet Row Most Recent Value   Medication #1 tofacitinib (XELJANZ) 5 mg Tab (Order#[id number], Rx#[id number])            Refill Questions - Documented Responses      Flowsheet Row Most Recent Value   Patient Availability and HIPAA Verification    Does patient want to proceed with activity? Yes   HIPAA/medical authority confirmed? Yes   Relationship to patient of person spoken to? Child   Refill Screening Questions    Changes to allergies? No   Changes to medications? No   New conditions since last clinic visit? No   Unplanned office visit, urgent care, ED, or hospital admission in the last 4 weeks? No   How does patient/caregiver feel medication is working? Good   Financial problems or insurance changes? No   How many doses of your specialty medications were missed in the last 4 weeks? 0   Would patient like to speak to a pharmacist? No   When does the patient need to receive the medication? 12/14/22   Refill Delivery Questions    How will the patient receive the medication? MEDRx   When does the patient need to receive the medication? 12/14/22   Shipping Address Prescription   Address in [facility] confirmed and updated if neccessary? Yes   Expected Copay ($) 0   Is the patient able to afford the medication copay? Yes   Payment Method zero copay   Days supply of Refill 30   Supplies needed? No supplies needed   Refill activity completed? Yes   Refill activity plan Refill scheduled   Shipment/Pickup Date: 12/09/22            Current Outpatient Medications   Medication Sig    amLODIPine (NORVASC) 5 MG tablet Take 1 tablet (5 mg total) by mouth once daily.    aspirin (ECOTRIN) 81 MG EC tablet Take 81 mg by mouth once daily.    benazepriL (LOTENSIN) 40 MG tablet Take 1 tablet (40 mg total) by mouth once daily.    cilostazoL (PLETAL) 50 MG Tab Take 1 tablet by mouth twice daily    cloNIDine (CATAPRES) 0.2 MG tablet  Take 1 tablet (0.2 mg total) by mouth 2 (two) times daily.    ezetimibe (ZETIA) 10 mg tablet Take 1 tablet (10 mg total) by mouth once daily.    gabapentin (NEURONTIN) 300 MG capsule Take 1 capsule (300 mg total) by mouth 3 (three) times daily.    hydroCHLOROthiazide (HYDRODIURIL) 25 MG tablet Take 1 tablet (25 mg total) by mouth once daily.    linaCLOtide (LINZESS) 72 mcg Cap capsule Take 1 capsule (72 mcg total) by mouth before breakfast.    rosuvastatin (CRESTOR) 20 MG tablet Take 1 tablet (20 mg total) by mouth once daily.    sildenafil (REVATIO) 20 mg Tab Take 1 tablet (20 mg total) by mouth daily as needed (pulm htn).    tofacitinib (XELJANZ) 5 mg Tab Take 5 mg by mouth once daily.   Last reviewed on 8/30/2022  2:41 PM by Vilma Herron MA    Review of patient's allergies indicates:  No Known Allergies Last reviewed on  10/27/2022 9:49 AM by Erika Franklin      Tasks added this encounter   1/6/2023 - Refill Call (Auto Added)   Tasks due within next 3 months   2/13/2023 - Clinical - Follow Up Assesement (Annual)     [facility]  [facility] [address] [facility]  [facility] [address]  Phone: [phone]  Fax: [phone]

## 2022-12-09 ENCOUNTER — HOSPITAL ENCOUNTER (OUTPATIENT)
Dept: RADIOLOGY | Facility: HOSPITAL | Age: 67
Discharge: HOME | End: 2022-12-09
Attending: PHYSICIAN ASSISTANT
Payer: MEDICARE

## 2022-12-09 ENCOUNTER — TELEPHONE (OUTPATIENT)
Dept: PAIN MEDICINE | Facility: CLINIC | Age: 67
End: 2022-12-09
Payer: MEDICARE

## 2022-12-09 ENCOUNTER — OFFICE VISIT (OUTPATIENT)
Dept: RHEUMATOLOGY | Facility: CLINIC | Age: 67
End: 2022-12-09
Payer: MEDICARE

## 2022-12-09 VITALS
DIASTOLIC BLOOD PRESSURE: 78 MMHG | SYSTOLIC BLOOD PRESSURE: 136 MMHG | HEIGHT: 65 IN | BODY MASS INDEX: 26.96 KG/M2 | WEIGHT: 161.81 LBS | HEART RATE: 70 BPM

## 2022-12-09 DIAGNOSIS — D84.9 IMMUNOCOMPROMISED: ICD-10-CM

## 2022-12-09 DIAGNOSIS — M54.2 NECK PAIN: ICD-10-CM

## 2022-12-09 DIAGNOSIS — M48.10 DISH (DIFFUSE IDIOPATHIC SKELETAL HYPEROSTOSIS): ICD-10-CM

## 2022-12-09 DIAGNOSIS — M45.0 ANKYLOSING SPONDYLITIS OF MULTIPLE SITES IN SPINE: ICD-10-CM

## 2022-12-09 DIAGNOSIS — M45.0 ANKYLOSING SPONDYLITIS OF MULTIPLE SITES IN SPINE: Primary | ICD-10-CM

## 2022-12-09 DIAGNOSIS — M47.816 LUMBAR SPONDYLOSIS: ICD-10-CM

## 2022-12-09 DIAGNOSIS — Z51.81 ENCOUNTER FOR MEDICATION MONITORING: ICD-10-CM

## 2022-12-09 PROCEDURE — 72040 XR CERVICAL SPINE 2 OR 3 VIEWS: ICD-10-PCS | Mod: 26,,, | Performed by: RADIOLOGY

## 2022-12-09 PROCEDURE — 99215 PR OFFICE/OUTPT VISIT, EST, LEVL V, 40-54 MIN: ICD-10-PCS | Mod: S$PBB,,, | Performed by: PHYSICIAN ASSISTANT

## 2022-12-09 PROCEDURE — 72040 X-RAY EXAM NECK SPINE 2-3 VW: CPT | Mod: 26,,, | Performed by: RADIOLOGY

## 2022-12-09 PROCEDURE — 99999 PR PBB SHADOW E&M-EST. PATIENT-LVL III: CPT | Mod: PBBFAC,,, | Performed by: PHYSICIAN ASSISTANT

## 2022-12-09 PROCEDURE — 72040 X-RAY EXAM NECK SPINE 2-3 VW: CPT | Mod: TC

## 2022-12-09 PROCEDURE — 99999 PR PBB SHADOW E&M-EST. PATIENT-LVL III: ICD-10-PCS | Mod: PBBFAC,,, | Performed by: PHYSICIAN ASSISTANT

## 2022-12-09 PROCEDURE — 99213 OFFICE O/P EST LOW 20 MIN: CPT | Mod: PBBFAC | Performed by: PHYSICIAN ASSISTANT

## 2022-12-09 PROCEDURE — 99215 OFFICE O/P EST HI 40 MIN: CPT | Mod: S$PBB,,, | Performed by: PHYSICIAN ASSISTANT

## 2022-12-09 NOTE — TELEPHONE ENCOUNTER
Contacted patient regarding referral with Back & Spine. Daughter Lea took call. Scheduled appointment, and she v/u.  Indigo De La Cruz RN

## 2022-12-09 NOTE — PROGRESS NOTES
"     RHEUMATOLOGY OUTPATIENT CLINIC NOTE    12/9/2022    Attending Rheumatologist: Nati Cisse PA-C  Primary Care Provider: Kate Lofton MD   Chief Complaint/Reason For Consultation:  AS/DISH      Subjective:        Deborah White is a 67 y.o. male here today with his daughter for follow up - was also utilized during today's visit. Routinely seen for ankylosing spondylitis affecting multiple sites including diffuse idiopathic skeletal hyperostosis with bridging osteophytes. Last visit we discussed increasing xeljanz to 11 mg/day- this was not done 2' pt's CKD. At that time patient wanted to give xeljanz a little more time to work. Today his main complaint is pain in the left leg- he cannot walk far w/o stopping to rest 2' pain. Has a lot of numbness when sitting for long periods. He is primarily interested in pain relief options. Last MRI 04/2022. Also has some neck pain- no recent neck imaging.    Rheumatological review of system negative otherwise.  Exam shows severe tenderness of cervical, thoracic and lower lumbar vertebra and severe tenderness of bilateral sacroiliac joint with restricted lumbar flexion.  No small joint synovitis.      Serologies    Lab Results   Component Value Date    TBGOLDPLUS Positive (A) 01/05/2022     Lab Results   Component Value Date    RF <10.0 08/11/2020     Lab Results   Component Value Date    HEPBCAB Negative 10/23/2020    HEPCAB Negative 10/23/2020        Current Rheum Medications:  Xeljanz 5 mg daily  Previous Rheum Medications:   SSZ, remicade (elevated BP during tx), MTX    Past Medical History:   Diagnosis Date    Coronary artery disease     Hyperlipidemia     Hypertension        Review of patient's allergies indicates:  No Known Allergies    Objective:   /78   Pulse 70   Ht 5' 5" (1.651 m)   Wt 73.4 kg (161 lb 13.1 oz)   BMI 26.93 kg/m²     Immunization History   Administered Date(s) Administered    COVID-19, MRNA, LN-S, PF (MODERNA FULL 0.5 ML " DOSE) 02/03/2021, 03/04/2021    COVID-19, MRNA, LN-S, PF (Pfizer) (Purple Cap) 11/23/2021    Influenza - Quadrivalent - PF *Preferred* (6 months and older) 10/09/2017, 09/03/2019, 09/15/2020    Influenza Split 10/12/2009, 10/14/2021    Pneumococcal Polysaccharide - 23 Valent 12/11/2019     Current Outpatient Medications   Medication Instructions    amLODIPine (NORVASC) 5 mg, Oral, Daily    aspirin (ECOTRIN) 81 mg, Oral, Daily    benazepriL (LOTENSIN) 40 mg, Oral, Daily    cilostazoL (PLETAL) 50 MG Tab Take 1 tablet by mouth twice daily    cloNIDine (CATAPRES) 0.2 mg, Oral, 2 times daily    ezetimibe (ZETIA) 10 mg, Oral, Daily    gabapentin (NEURONTIN) 300 mg, Oral, 3 times daily    hydroCHLOROthiazide (HYDRODIURIL) 25 mg, Oral, Daily    linaCLOtide (LINZESS) 72 mcg, Oral, Before breakfast    rosuvastatin (CRESTOR) 20 mg, Oral, Daily    sildenafil (REVATIO) 20 mg, Oral, Daily PRN    XELJANZ 5 mg, Oral, Daily       No results found for this or any previous visit (from the past 336 hour(s)).    Imaging:  All imaging reviewed and independently interpreted by me.    XR KNEE ORTHO BILAT WITH FLEXION 8/30/22     FINDINGS:  Four views of bilateral knees including standing AP and flexion views show no fracture, dislocation, or destructive osseous lesion. Joint spaces are maintained.  Negative for joint effusion.  Arterial vascular calcifications are present.  Small superior left patellar enthesophyte occurs at quadriceps tendon insertion.    MRI LUMBAR SPINE WITHOUT CONTRAST 4/26/22     CLINICAL HISTORY:  Low back pain, cauda equina syndrome suspected; Dorsalgia, unspecified     COMPARISON:  None.     FINDINGS:  Visualized distal cord demonstrates normal signal intensity.  Tip of the conus medullaris terminates near the T12/L1 level.  Vertebral body heights are maintained.  There is mild grade 1 anterolisthesis of L4 on L5.  Mild associated disc space narrowing is also noted.  There is mild multilevel marginal spurring with  lower lumbar facet arthropathy.  No acute fracture.  No marrow edema.  Mildly heterogeneous appearance of the osseous structures likely sequelae of degenerative change.     Probable subcentimeter right renal cyst.  Aneurysmal dilation of the abdominal aorta is seen measuring up to 5.1 cm maximally.     L1-L2: No spinal canal or neural foraminal encroachment.     L2-L3: Mild facet arthropathy.  Minimal posterior disc bulge.  No spinal canal or neural foraminal encroachment.     L3-L4: Mild facet arthropathy.  No significant spinal canal or neural foraminal encroachment.     L4-L5: Grade 1 anterolisthesis.  Moderate bilateral facet arthropathy.  Posterior disc osteophyte complex.  Bilateral narrowing of the lateral recesses.  Bilateral the inferior neural foraminal narrowing is seen, right greater than left.  Normal AP spinal canal diameter is maintained.     L5-S1: Moderate facet arthropathy.  Minimal uncovertebral hypertrophy.  Spinal canal is maintained.  Asymmetric mild-to-moderate right neural foraminal narrowing is noted.     Impression:     Discogenic degenerative changes as discussed above.  Infrarenal abdominal aortic aneurysm measuring up to 5.1 cm.    X-Ray Cervical Spine 2 or 3 Views  Narrative: EXAMINATION:  XR CERVICAL SPINE 2 OR 3 VIEWS    INDICATION:  Ankylosing spondylitis of multiple sites in spine    COMPARISON:  Cervical spine radiographs from 08/11/2020    FINDINGS:  AP, lateral and open-mouth odontoid views of the cervical spine are obtained.  Lower cervical spine obscured secondary to patient's overlying shoulders on the lateral views including the swimmer's view.  Mild degenerative disc changes are present throughout the visualized cervical spine.  Mild to moderate hypertrophic facet changes are present.  No fracture seen.  No prevertebral edema.  Lung apices are clear  Impression: 1. Multilevel degenerative disc and hypertrophic facet changes, slightly worse since 08/11/2020.    Electronically  signed by: Mark Calvo MD  Date:    12/09/2022  Time:    14:23     Assessment:     1. Ankylosing spondylitis of multiple sites in spine    2. DISH (diffuse idiopathic skeletal hyperostosis)    3. Immunocompromised    4. Encounter for medication monitoring          Plan:     Refer to back and spine clinic for eval of lumbar and cervial spine- main complaints seem to stem from lumbar spondylosis.  consider another TNF or IL-17; today patient elects to address back pain before making any medication changes  Pt wishes to avoid use of oral steroids at this time- he is pre-diabetic and is watching diet closely  Nephrology appt scheduled for Jan 2023  Compromised immune system secondary to autoimmune disease and use of immunosuppressive drugs. Monitor carefully for infections and toxicities- Currently denies issues with recurrent infections. Advised to get immediate medical care if any infection.  Recommend Yearly Skin Cancer Screening by Dermatology for all patients on biologic or Joanna. advised strict adherence to age appropriate vaccinations (shingles, pneumonia, flu and covid) and cancer screenings with PCP   Patient advised to hold DMARD and/or biologic therapy for signs of infection or for surgery. If you are unsure what to do please call our office for instruction.[facility] [phone]  no current medication related issues, no evidence of toxicity. I ordered labs for toxicity monitoring;  results reviewed and discussed findings with the patient   Patient understands and contact clinic at any time regarding questions about today's office visit and any medication changes that were made  Return to clinic: 3 mos w/ reg 4    The patient understands, chooses and consents to this plan and accepts all the risks which include but are not limited to the risks mentioned above.     Method of contact with patient concerns: [facility]    51 minutes of total time spent on the encounter, which  includes face to face time and non-face to face time preparing to see the patient (eg, review of tests), Obtaining and/or reviewing separately obtained history, Documenting clinical information in the electronic or other health record, Independently interpreting results (not separately reported) and communicating results to the patient/family/caregiver, or Care coordination (not separately reported).          Disclaimer: This note was prepared using a voice recognition system and is likely to have sound alike errors within the text.       Nati Cisse PA-C  Rheumatology Department   [facility]

## 2022-12-15 ENCOUNTER — PATIENT MESSAGE (OUTPATIENT)
Dept: NEPHROLOGY | Facility: CLINIC | Age: 67
End: 2022-12-15
Payer: MEDICARE

## 2022-12-23 ENCOUNTER — LAB VISIT (OUTPATIENT)
Dept: LAB | Facility: HOSPITAL | Age: 67
End: 2022-12-23
Attending: INTERNAL MEDICINE
Payer: MEDICARE

## 2022-12-23 DIAGNOSIS — N18.30 STAGE 3 CHRONIC KIDNEY DISEASE, UNSPECIFIED WHETHER STAGE 3A OR 3B CKD: ICD-10-CM

## 2022-12-23 LAB
ALBUMIN SERPL BCP-MCNC: 3.8 G/DL (ref 3.5–5.2)
ANION GAP SERPL CALC-SCNC: 12 MMOL/L (ref 8–16)
BASOPHILS # BLD AUTO: 0.1 K/UL (ref 0–0.2)
BASOPHILS NFR BLD: 1.1 % (ref 0–1.9)
BUN SERPL-MCNC: 22 MG/DL (ref 8–23)
CALCIUM SERPL-MCNC: 9.6 MG/DL (ref 8.7–10.5)
CHLORIDE SERPL-SCNC: 103 MMOL/L (ref 95–110)
CO2 SERPL-SCNC: 27 MMOL/L (ref 23–29)
CREAT SERPL-MCNC: 1.4 MG/DL (ref 0.5–1.4)
DIFFERENTIAL METHOD: ABNORMAL
EOSINOPHIL # BLD AUTO: 0.2 K/UL (ref 0–0.5)
EOSINOPHIL NFR BLD: 1.6 % (ref 0–8)
ERYTHROCYTE [DISTWIDTH] IN BLOOD BY AUTOMATED COUNT: 13.7 % (ref 11.5–14.5)
EST. GFR  (NO RACE VARIABLE): 55.8 ML/MIN/1.73 M^2
GLUCOSE SERPL-MCNC: 91 MG/DL (ref 70–110)
HCT VFR BLD AUTO: 39.8 % (ref 40–54)
HGB BLD-MCNC: 12.7 G/DL (ref 14–18)
IMM GRANULOCYTES # BLD AUTO: 0.1 K/UL (ref 0–0.04)
IMM GRANULOCYTES NFR BLD AUTO: 1.1 % (ref 0–0.5)
LYMPHOCYTES # BLD AUTO: 3.1 K/UL (ref 1–4.8)
LYMPHOCYTES NFR BLD: 34.3 % (ref 18–48)
MCH RBC QN AUTO: 30 PG (ref 27–31)
MCHC RBC AUTO-ENTMCNC: 31.9 G/DL (ref 32–36)
MCV RBC AUTO: 94 FL (ref 82–98)
MONOCYTES # BLD AUTO: 1.1 K/UL (ref 0.3–1)
MONOCYTES NFR BLD: 12.1 % (ref 4–15)
NEUTROPHILS # BLD AUTO: 4.5 K/UL (ref 1.8–7.7)
NEUTROPHILS NFR BLD: 49.8 % (ref 38–73)
NRBC BLD-RTO: 0 /100 WBC
PHOSPHATE SERPL-MCNC: 5.3 MG/DL (ref 2.7–4.5)
PLATELET # BLD AUTO: 328 K/UL (ref 150–450)
PMV BLD AUTO: 10.9 FL (ref 9.2–12.9)
POTASSIUM SERPL-SCNC: 3.8 MMOL/L (ref 3.5–5.1)
RBC # BLD AUTO: 4.23 M/UL (ref 4.6–6.2)
SODIUM SERPL-SCNC: 142 MMOL/L (ref 136–145)
WBC # BLD AUTO: 9.1 K/UL (ref 3.9–12.7)

## 2022-12-23 PROCEDURE — 85025 COMPLETE CBC W/AUTO DIFF WBC: CPT | Performed by: INTERNAL MEDICINE

## 2022-12-23 PROCEDURE — 80069 RENAL FUNCTION PANEL: CPT | Performed by: INTERNAL MEDICINE

## 2022-12-23 PROCEDURE — 36415 COLL VENOUS BLD VENIPUNCTURE: CPT | Performed by: INTERNAL MEDICINE

## 2023-01-06 ENCOUNTER — SPECIALTY PHARMACY (OUTPATIENT)
Dept: PHARMACY | Facility: CLINIC | Age: 68
End: 2023-01-06
Payer: MEDICARE

## 2023-01-06 NOTE — TELEPHONE ENCOUNTER
Specialty Pharmacy - Refill Coordination    Specialty Medication Orders Linked to Encounter      Flowsheet Row Most Recent Value   Medication #1 tofacitinib (XELJANZ) 5 mg Tab (Order#[id number], Rx#[id number])            Refill Questions - Documented Responses      Flowsheet Row Most Recent Value   Patient Availability and HIPAA Verification    Does patient want to proceed with activity? Yes   HIPAA/medical authority confirmed? Yes   Relationship to patient of person spoken to? Child   Refill Screening Questions    Changes to allergies? No   Changes to medications? No   New conditions since last clinic visit? No   Unplanned office visit, urgent care, ED, or hospital admission in the last 4 weeks? No   How does patient/caregiver feel medication is working? Good   Financial problems or insurance changes? No   How many doses of your specialty medications were missed in the last 4 weeks? 0   Would patient like to speak to a pharmacist? No   When does the patient need to receive the medication? 01/13/23   Refill Delivery Questions    How will the patient receive the medication? MEDRx   When does the patient need to receive the medication? 01/13/23   Shipping Address Prescription   Address in [facility] confirmed and updated if neccessary? Yes   Expected Copay ($) 10.35   Is the patient able to afford the medication copay? Yes   Payment Method CC on file   Days supply of Refill 30   Supplies needed? No supplies needed   Refill activity completed? Yes   Refill activity plan Refill scheduled   Shipment/Pickup Date: 01/11/23            Current Outpatient Medications   Medication Sig    amLODIPine (NORVASC) 5 MG tablet Take 1 tablet (5 mg total) by mouth once daily.    aspirin (ECOTRIN) 81 MG EC tablet Take 81 mg by mouth once daily.    benazepriL (LOTENSIN) 40 MG tablet Take 1 tablet (40 mg total) by mouth once daily.    cilostazoL (PLETAL) 50 MG Tab Take 1 tablet (50 mg total) by mouth 2 (two) times daily.     cloNIDine (CATAPRES) 0.2 MG tablet Take 1 tablet (0.2 mg total) by mouth 2 (two) times daily.    ezetimibe (ZETIA) 10 mg tablet Take 1 tablet (10 mg total) by mouth once daily.    gabapentin (NEURONTIN) 300 MG capsule Take 1 capsule (300 mg total) by mouth 3 (three) times daily.    hydroCHLOROthiazide (HYDRODIURIL) 25 MG tablet Take 1 tablet (25 mg total) by mouth once daily.    linaCLOtide (LINZESS) 72 mcg Cap capsule Take 1 capsule (72 mcg total) by mouth before breakfast.    rosuvastatin (CRESTOR) 20 MG tablet Take 1 tablet (20 mg total) by mouth once daily.    sildenafil (REVATIO) 20 mg Tab Take 1 tablet (20 mg total) by mouth daily as needed (pulm htn).    tofacitinib (XELJANZ) 5 mg Tab Take 5 mg by mouth once daily.   Last reviewed on 12/9/2022 12:39 PM by Sue Kenyon MA    Review of patient's allergies indicates:  No Known Allergies Last reviewed on  12/9/2022 12:39 PM by Sue Kenyon      Tasks added this encounter   2/5/2023 - Refill Call (Auto Added)   Tasks due within next 3 months   2/13/2023 - Clinical - Follow Up Assesement (Annual)     Lori Montiel, Patient Care Assistant  [facility]  [address]  [facility] [address]  Phone: [phone]  Fax: [phone]

## 2023-01-19 DIAGNOSIS — I10 HYPERTENSION, ESSENTIAL: Primary | ICD-10-CM

## 2023-01-30 ENCOUNTER — TELEPHONE (OUTPATIENT)
Dept: PAIN MEDICINE | Facility: CLINIC | Age: 68
End: 2023-01-30
Payer: MEDICARE

## 2023-01-30 NOTE — TELEPHONE ENCOUNTER
Reached out to patient to schedule a sooner  appointment from messages. Apt has been made.   Pt understand. All questions answered.     Avery Abdi  Medical Assistant

## 2023-02-06 ENCOUNTER — SPECIALTY PHARMACY (OUTPATIENT)
Dept: PHARMACY | Facility: CLINIC | Age: 68
End: 2023-02-06
Payer: MEDICARE

## 2023-02-06 DIAGNOSIS — M45.0 ANKYLOSING SPONDYLITIS OF MULTIPLE SITES IN SPINE: Primary | ICD-10-CM

## 2023-02-06 NOTE — TELEPHONE ENCOUNTER
"Outgoing call to pt regarding Zeljanz refill. Pt's daughter answered the phone. Pt's daughter reported a few days on hand. Claim rejecting stating "Provide beneficiary with CMS Notice of Appeal Rights" and "Product not on formulary." Routed to [facility].   "

## 2023-02-06 NOTE — TELEPHONE ENCOUNTER
Xeljanz PA [facility] (Key: [id number])    DOCUMENTATION ONLY:  Prior authorization for Xeljanz approved from 1/1/23 to 12/31/23    Case Id: [id number]    Co-pay: 10.35    Medicare, LIS level 1. FA not required.

## 2023-02-07 NOTE — TELEPHONE ENCOUNTER
Specialty Pharmacy - Refill Coordination  Specialty Pharmacy - Medication/Referral Authorization  Specialty Pharmacy - Clinical Reassessment    Specialty Medication Orders Linked to Encounter      Flowsheet Row Most Recent Value   Medication #1 tofacitinib (XELJANZ) 5 mg Tab (Order#[id number], Rx#[id number])            Refill Questions - Documented Responses      Flowsheet Row Most Recent Value   Patient Availability and HIPAA Verification    Does patient want to proceed with activity? Yes   HIPAA/medical authority confirmed? Yes   Relationship to patient of person spoken to? Child   Refill Screening Questions    Changes to allergies? No   Changes to medications? No   New conditions since last clinic visit? No   Unplanned office visit, urgent care, ED, or hospital admission in the last 4 weeks? No   How does patient/caregiver feel medication is working? Excellent   Financial problems or insurance changes? No   How many doses of your specialty medications were missed in the last 4 weeks? 0   Would patient like to speak to a pharmacist? No   When does the patient need to receive the medication? 02/11/23   Refill Delivery Questions    How will the patient receive the medication? MEDRx   When does the patient need to receive the medication? 02/11/23   Shipping Address Home   Address in [facility] confirmed and updated if neccessary? Yes   Expected Copay ($) 10.35   Is the patient able to afford the medication copay? Yes   Payment Method CC on file   Days supply of Refill 30   Supplies needed? No supplies needed   Refill activity completed? Yes   Refill activity plan Refill scheduled   Shipment/Pickup Date: 02/08/23            Current Outpatient Medications   Medication Sig    amLODIPine (NORVASC) 5 MG tablet Take 1 tablet (5 mg total) by mouth once daily.    aspirin (ECOTRIN) 81 MG EC tablet Take 81 mg by mouth once daily.    benazepriL (LOTENSIN) 40 MG tablet Take 1 tablet (40 mg total) by mouth once daily.     cilostazoL (PLETAL) 50 MG Tab Take 1 tablet (50 mg total) by mouth 2 (two) times daily.    cloNIDine (CATAPRES) 0.2 MG tablet Take 1 tablet (0.2 mg total) by mouth 2 (two) times daily.    ezetimibe (ZETIA) 10 mg tablet Take 1 tablet (10 mg total) by mouth once daily.    gabapentin (NEURONTIN) 300 MG capsule Take 1 capsule (300 mg total) by mouth 3 (three) times daily.    hydroCHLOROthiazide (HYDRODIURIL) 25 MG tablet Take 1 tablet (25 mg total) by mouth once daily.    linaCLOtide (LINZESS) 72 mcg Cap capsule Take 1 capsule (72 mcg total) by mouth before breakfast.    rosuvastatin (CRESTOR) 20 MG tablet Take 1 tablet (20 mg total) by mouth once daily.    sildenafil (REVATIO) 20 mg Tab Take 1 tablet (20 mg total) by mouth daily as needed (pulm htn).    tofacitinib (XELJANZ) 5 mg Tab Take 5 mg by mouth once daily.   Last reviewed on 12/9/2022 12:39 PM by Sue Kenyon MA    Review of patient's allergies indicates:  No Known Allergies Last reviewed on  12/9/2022 12:39 PM by Sue Kenyon    Interventions added this encounter   Closed: [facility] Patient Intervention - Patient educational resources: tofacitinib (XELJANZ) 5 mg Tab     Tasks added this encounter   11/7/2023 - Clinical - Follow Up Assesement (Annual)   Tasks due within next 3 months   2/5/2023 - Refill Call (Auto Added)     Tarah PedrozaD  [facility]  [address] [place] [address]  Phone: [phone]  Fax: [phone]

## 2023-02-07 NOTE — TELEPHONE ENCOUNTER
Specialty Pharmacy - Refill Coordination  Specialty Pharmacy - Medication/Referral Authorization  Specialty Pharmacy - Clinical Reassessment    Specialty Medication Orders Linked to Encounter      Flowsheet Row Most Recent Value   Medication #1 tofacitinib (XELJANZ) 5 mg Tab (Order#[id number], Rx#[id number])          Patient Diagnosis   M45.0 - Ankylosing spondylitis of multiple sites in spine  M06.00 - Seronegative rheumatoid arthritis    Deborah White is a 67 y.o. male, who is followed by the specialty pharmacy service for management and education of his Xeljanz.  He has been on therapy with Xeljanz for 8 months.  I have reviewed his electronic medical record and current medication list and determined that specialty medication adjustment Is not needed at this time.    Patient has not experienced adverse events.  He Is adherent reporting 0 missed doses since last review.  Adherence has been encouraged with the following mechanism(s): proactive refill calls.  He is not meeting goals of therapy and will continue treatment. Per last office visit, patient is not doing very well on Xeljanz at this time, but will continue until next visit where therapy will be reassessed.         1/6/2023 12/7/2022 11/7/2022 10/6/2022 9/9/2022 7/12/2022 6/13/2022   Follow Up Review   # of missed doses 0 0 0 0 0 0 0   New Medications? No No No No No Yes       Started Linzess    No No   New Conditions? No No No No No No No   New Allergies? No No No No No No No   Med Effective? Good Good Good Very good Good Good Good   Urgent Care? No No No No No No No   Requested Pharmacist? No No No No No No No       Multiple values from one day are sorted in reverse-chronological order         Therapy is appropriate to continue.    Therapy is effective: Yes  On scale of 1 to 10, how does patient rank quality of life? (10 - Best): Unable to Assess  Recommendations: none at this time.  Review Method: Chart Review    Tasks added this encounter   No tasks  added.   Tasks due within next 3 months   2/13/2023 - Clinical - Follow Up Assesement (Annual)  2/5/2023 - Refill Call (Auto Added)     Birgit Alanis, PharmD  [facility]  [address]  [facility] [address]  Phone: [phone]  Fax: [phone]

## 2023-03-02 ENCOUNTER — LAB VISIT (OUTPATIENT)
Dept: LAB | Facility: HOSPITAL | Age: 68
End: 2023-03-02
Attending: PHYSICIAN ASSISTANT
Payer: MEDICARE

## 2023-03-02 DIAGNOSIS — Z51.81 ENCOUNTER FOR MEDICATION MONITORING: ICD-10-CM

## 2023-03-02 LAB
ALBUMIN SERPL-MCNC: 3.7 G/DL (ref 3.4–4.8)
ALBUMIN/GLOB SERPL: 1.1 RATIO (ref 1.1–2)
ALP SERPL-CCNC: 51 UNIT/L (ref 40–150)
ALT SERPL-CCNC: 18 UNIT/L (ref 0–55)
AST SERPL-CCNC: 17 UNIT/L (ref 5–34)
BASOPHILS # BLD AUTO: 0.07 X10(3)/MCL (ref 0–0.2)
BASOPHILS NFR BLD AUTO: 0.9 %
BILIRUBIN DIRECT+TOT PNL SERPL-MCNC: 0.2 MG/DL
BUN SERPL-MCNC: 17 MG/DL (ref 8.4–25.7)
CALCIUM SERPL-MCNC: 9.1 MG/DL (ref 8.8–10)
CHLORIDE SERPL-SCNC: 104 MMOL/L (ref 98–107)
CO2 SERPL-SCNC: 25 MMOL/L (ref 23–31)
CREAT SERPL-MCNC: 1.54 MG/DL (ref 0.73–1.18)
CRP SERPL-MCNC: <1 MG/L
EOSINOPHIL # BLD AUTO: 0.13 X10(3)/MCL (ref 0–0.9)
EOSINOPHIL NFR BLD AUTO: 1.7 %
ERYTHROCYTE [DISTWIDTH] IN BLOOD BY AUTOMATED COUNT: 13.6 % (ref 11.5–17)
ERYTHROCYTE [SEDIMENTATION RATE] IN BLOOD: 22 MM/HR (ref 0–15)
GFR SERPLBLD CREATININE-BSD FMLA CKD-EPI: 49 MLS/MIN/1.73/M2
GLOBULIN SER-MCNC: 3.4 GM/DL (ref 2.4–3.5)
GLUCOSE SERPL-MCNC: 224 MG/DL (ref 82–115)
HCT VFR BLD AUTO: 36.9 % (ref 42–52)
HGB BLD-MCNC: 12 G/DL (ref 14–18)
IMM GRANULOCYTES # BLD AUTO: 0.09 X10(3)/MCL (ref 0–0.04)
IMM GRANULOCYTES NFR BLD AUTO: 1.2 %
LYMPHOCYTES # BLD AUTO: 3.2 X10(3)/MCL (ref 0.6–4.6)
LYMPHOCYTES NFR BLD AUTO: 41.7 %
MCH RBC QN AUTO: 29.9 PG
MCHC RBC AUTO-ENTMCNC: 32.5 G/DL (ref 33–36)
MCV RBC AUTO: 92 FL (ref 80–94)
MONOCYTES # BLD AUTO: 0.73 X10(3)/MCL (ref 0.1–1.3)
MONOCYTES NFR BLD AUTO: 9.5 %
NEUTROPHILS # BLD AUTO: 3.46 X10(3)/MCL (ref 2.1–9.2)
NEUTROPHILS NFR BLD AUTO: 45 %
PLATELET # BLD AUTO: 297 X10(3)/MCL (ref 130–400)
PMV BLD AUTO: 9.6 FL (ref 7.4–10.4)
POTASSIUM SERPL-SCNC: 3.5 MMOL/L (ref 3.5–5.1)
PROT SERPL-MCNC: 7.1 GM/DL (ref 5.8–7.6)
RBC # BLD AUTO: 4.01 X10(6)/MCL (ref 4.7–6.1)
SODIUM SERPL-SCNC: 140 MMOL/L (ref 136–145)
WBC # SPEC AUTO: 7.7 X10(3)/MCL (ref 4.5–11.5)

## 2023-03-02 PROCEDURE — 85651 RBC SED RATE NONAUTOMATED: CPT

## 2023-03-02 PROCEDURE — 36415 COLL VENOUS BLD VENIPUNCTURE: CPT

## 2023-03-02 PROCEDURE — 86140 C-REACTIVE PROTEIN: CPT

## 2023-03-02 PROCEDURE — 85025 COMPLETE CBC W/AUTO DIFF WBC: CPT

## 2023-03-02 PROCEDURE — 80053 COMPREHEN METABOLIC PANEL: CPT

## 2023-03-06 ENCOUNTER — SPECIALTY PHARMACY (OUTPATIENT)
Dept: PHARMACY | Facility: CLINIC | Age: 68
End: 2023-03-06
Payer: MEDICARE

## 2023-03-06 ENCOUNTER — PATIENT MESSAGE (OUTPATIENT)
Dept: PHARMACY | Facility: CLINIC | Age: 68
End: 2023-03-06
Payer: MEDICARE

## 2023-03-06 NOTE — TELEPHONE ENCOUNTER
Specialty Pharmacy - Refill Coordination    Specialty Medication Orders Linked to Encounter      Flowsheet Row Most Recent Value   Medication #1 tofacitinib (XELJANZ) 5 mg Tab (Order#[id number], Rx#[id number])            Refill Questions - Documented Responses      Flowsheet Row Most Recent Value   Patient Availability and HIPAA Verification    Does patient want to proceed with activity? Yes   HIPAA/medical authority confirmed? Yes   Relationship to patient of person spoken to? Self   Refill Screening Questions    Changes to allergies? No   Changes to medications? No   New conditions since last clinic visit? No   Unplanned office visit, urgent care, ED, or hospital admission in the last 4 weeks? No   How does patient/caregiver feel medication is working? Good   Financial problems or insurance changes? No   How many doses of your specialty medications were missed in the last 4 weeks? 0   Would patient like to speak to a pharmacist? No   When does the patient need to receive the medication? 03/12/23   Refill Delivery Questions    How will the patient receive the medication? MEDRx   When does the patient need to receive the medication? 03/12/23   Shipping Address Prescription   Address in [facility] confirmed and updated if neccessary? Yes   Expected Copay ($) 10.35   Is the patient able to afford the medication copay? Yes   Payment Method CC on file   Days supply of Refill 30   Supplies needed? No supplies needed   Refill activity completed? Yes   Refill activity plan Refill scheduled   Shipment/Pickup Date: 03/09/23            Current Outpatient Medications   Medication Sig    amLODIPine (NORVASC) 5 MG tablet Take 1 tablet (5 mg total) by mouth once daily.    aspirin (ECOTRIN) 81 MG EC tablet Take 81 mg by mouth once daily.    benazepriL (LOTENSIN) 40 MG tablet Take 1 tablet (40 mg total) by mouth once daily.    cilostazoL (PLETAL) 50 MG Tab Take 1 tablet (50 mg total) by mouth 2 (two) times daily.     cloNIDine (CATAPRES) 0.2 MG tablet Take 1 tablet (0.2 mg total) by mouth 2 (two) times daily.    ezetimibe (ZETIA) 10 mg tablet Take 1 tablet (10 mg total) by mouth once daily.    gabapentin (NEURONTIN) 300 MG capsule Take 1 capsule (300 mg total) by mouth 3 (three) times daily.    hydroCHLOROthiazide (HYDRODIURIL) 25 MG tablet Take 1 tablet (25 mg total) by mouth once daily.    linaCLOtide (LINZESS) 72 mcg Cap capsule Take 1 capsule (72 mcg total) by mouth before breakfast.    rosuvastatin (CRESTOR) 20 MG tablet Take 1 tablet (20 mg total) by mouth once daily.    sildenafil (REVATIO) 20 mg Tab Take 1 tablet (20 mg total) by mouth daily as needed (pulm htn).    tofacitinib (XELJANZ) 5 mg Tab Take 5 mg by mouth once daily.   Last reviewed on 12/9/2022 12:39 PM by Sue Kenyon MA    Review of patient's allergies indicates:  No Known Allergies Last reviewed on  3/2/2023 7:54 AM by Erika Franklin      Tasks added this encounter   4/4/2023 - Refill Call (Auto Added)   Tasks due within next 3 months   No tasks due.     Jong Goss, PharmD  [facility]  [address] [place] [address]  Phone: [phone]  Fax: [phone]

## 2023-03-08 ENCOUNTER — TELEPHONE (OUTPATIENT)
Dept: CARDIOLOGY | Facility: CLINIC | Age: 68
End: 2023-03-08
Payer: MEDICARE

## 2023-03-09 ENCOUNTER — PATIENT MESSAGE (OUTPATIENT)
Dept: CARDIOTHORACIC SURGERY | Facility: CLINIC | Age: 68
End: 2023-03-09
Payer: MEDICARE

## 2023-03-28 ENCOUNTER — TELEPHONE (OUTPATIENT)
Dept: CARDIOLOGY | Facility: CLINIC | Age: 68
End: 2023-03-28
Payer: MEDICARE

## 2023-03-28 ENCOUNTER — OFFICE VISIT (OUTPATIENT)
Dept: RHEUMATOLOGY | Facility: CLINIC | Age: 68
End: 2023-03-28
Payer: MEDICARE

## 2023-03-28 VITALS
HEIGHT: 65 IN | WEIGHT: 166.25 LBS | BODY MASS INDEX: 27.7 KG/M2 | SYSTOLIC BLOOD PRESSURE: 151 MMHG | HEART RATE: 67 BPM | DIASTOLIC BLOOD PRESSURE: 79 MMHG

## 2023-03-28 DIAGNOSIS — N18.30 STAGE 3 CHRONIC KIDNEY DISEASE, UNSPECIFIED WHETHER STAGE 3A OR 3B CKD: ICD-10-CM

## 2023-03-28 DIAGNOSIS — D84.9 IMMUNOCOMPROMISED: ICD-10-CM

## 2023-03-28 DIAGNOSIS — M45.0 ANKYLOSING SPONDYLITIS OF MULTIPLE SITES IN SPINE: Primary | ICD-10-CM

## 2023-03-28 DIAGNOSIS — M47.816 LUMBAR SPONDYLOSIS: ICD-10-CM

## 2023-03-28 DIAGNOSIS — Z51.81 ENCOUNTER FOR MEDICATION MONITORING: Primary | ICD-10-CM

## 2023-03-28 DIAGNOSIS — M48.10 DISH (DIFFUSE IDIOPATHIC SKELETAL HYPEROSTOSIS): ICD-10-CM

## 2023-03-28 DIAGNOSIS — Z51.81 ENCOUNTER FOR MEDICATION MONITORING: ICD-10-CM

## 2023-03-28 PROCEDURE — 99999 PR PBB SHADOW E&M-EST. PATIENT-LVL III: ICD-10-PCS | Mod: PBBFAC,,, | Performed by: PHYSICIAN ASSISTANT

## 2023-03-28 PROCEDURE — 99214 PR OFFICE/OUTPT VISIT, EST, LEVL IV, 30-39 MIN: ICD-10-PCS | Mod: S$PBB,,, | Performed by: PHYSICIAN ASSISTANT

## 2023-03-28 PROCEDURE — 99999 PR PBB SHADOW E&M-EST. PATIENT-LVL III: CPT | Mod: PBBFAC,,, | Performed by: PHYSICIAN ASSISTANT

## 2023-03-28 PROCEDURE — 99213 OFFICE O/P EST LOW 20 MIN: CPT | Mod: PBBFAC | Performed by: PHYSICIAN ASSISTANT

## 2023-03-28 PROCEDURE — 99214 OFFICE O/P EST MOD 30 MIN: CPT | Mod: S$PBB,,, | Performed by: PHYSICIAN ASSISTANT

## 2023-03-28 NOTE — PROGRESS NOTES
"     RHEUMATOLOGY OUTPATIENT CLINIC NOTE    3/28/2023    Attending Rheumatologist: Nati Cisse PA-C  Primary Care Provider: Kate Lofton MD   Chief Complaint/Reason For Consultation:  AS/DISH      Subjective:        Deborah White is a 68 y.o. male here today with his daughter for follow up - was also utilized during today's visit. Routinely seen for ankylosing spondylitis affecting multiple sites including diffuse idiopathic skeletal hyperostosis with bridging osteophytes. Tolerating xeljanz well. C/o neck pain that radiates to the right arm and some tingling and weakness of the left leg. Having some trouble w/ weakness of the left leg as well. Today his main complaint is pain in the left leg- he cannot walk far w/o stopping to rest 2' pain. Has a lot of numbness when sitting for long periods. He is primarily interested in pain relief options. Last MRI 04/2022. Also has some neck pain- no recent neck imaging. Has had YENNY injections of the c-spine in the past that worked well for him. Rheumatological review of system negative otherwise.  Exam shows severe tenderness of cervical, thoracic and lower lumbar vertebra and severe tenderness of bilateral sacroiliac joint with restricted lumbar flexion.  No small joint synovitis.      Serologies    Lab Results   Component Value Date    TBGOLDPLUS Positive (A) 01/05/2022     Lab Results   Component Value Date    RF <10.0 08/11/2020     Lab Results   Component Value Date    HEPBCAB Non-reactive 12/09/2022    HEPCAB Non-reactive 12/09/2022        Current Rheum Medications:  Xeljanz 5 mg daily  Previous Rheum Medications:   SSZ, remicade (elevated BP during tx), MTX    Past Medical History:   Diagnosis Date    Coronary artery disease     Hyperlipidemia     Hypertension        Review of patient's allergies indicates:  No Known Allergies    Objective:   BP (!) 151/79   Pulse 67   Ht 5' 5" (1.651 m)   Wt 75.4 kg (166 lb 3.6 oz)   BMI 27.66 kg/m² "     Immunization History   Administered Date(s) Administered    COVID-19, MRNA, LN-S, PF (MODERNA FULL 0.5 ML DOSE) 02/03/2021, 03/04/2021    COVID-19, MRNA, LN-S, PF (Pfizer) (Purple Cap) 11/23/2021    Influenza - Quadrivalent - PF *Preferred* (6 months and older) 10/09/2017, 09/03/2019, 09/15/2020    Influenza Split 10/12/2009, 10/14/2021    Pneumococcal Polysaccharide - 23 Valent 12/11/2019     Current Outpatient Medications   Medication Instructions    amLODIPine (NORVASC) 5 mg, Oral, Daily    aspirin (ECOTRIN) 81 mg, Oral, Daily    benazepriL (LOTENSIN) 40 mg, Oral, Daily    cilostazoL (PLETAL) 50 mg, Oral, 2 times daily    cloNIDine (CATAPRES) 0.2 mg, Oral, 2 times daily    ezetimibe (ZETIA) 10 mg, Oral, Daily    gabapentin (NEURONTIN) 300 mg, Oral, 3 times daily    hydroCHLOROthiazide (HYDRODIURIL) 25 mg, Oral, Daily    linaCLOtide (LINZESS) 72 mcg, Oral, Before breakfast    rosuvastatin (CRESTOR) 20 mg, Oral, Daily    sildenafil (REVATIO) 20 mg, Oral, Daily PRN    XELJANZ 5 mg, Oral, Daily       No results found for this or any previous visit (from the past 336 hour(s)).    Imaging:  All imaging reviewed and independently interpreted by me.    XR KNEE ORTHO BILAT WITH FLEXION 8/30/22     FINDINGS:  Four views of bilateral knees including standing AP and flexion views show no fracture, dislocation, or destructive osseous lesion. Joint spaces are maintained.  Negative for joint effusion.  Arterial vascular calcifications are present.  Small superior left patellar enthesophyte occurs at quadriceps tendon insertion.    MRI LUMBAR SPINE WITHOUT CONTRAST 4/26/22     CLINICAL HISTORY:  Low back pain, cauda equina syndrome suspected; Dorsalgia, unspecified     COMPARISON:  None.     FINDINGS:  Visualized distal cord demonstrates normal signal intensity.  Tip of the conus medullaris terminates near the T12/L1 level.  Vertebral body heights are maintained.  There is mild grade 1 anterolisthesis of L4 on L5.  Mild  associated disc space narrowing is also noted.  There is mild multilevel marginal spurring with lower lumbar facet arthropathy.  No acute fracture.  No marrow edema.  Mildly heterogeneous appearance of the osseous structures likely sequelae of degenerative change.     Probable subcentimeter right renal cyst.  Aneurysmal dilation of the abdominal aorta is seen measuring up to 5.1 cm maximally.     L1-L2: No spinal canal or neural foraminal encroachment.     L2-L3: Mild facet arthropathy.  Minimal posterior disc bulge.  No spinal canal or neural foraminal encroachment.     L3-L4: Mild facet arthropathy.  No significant spinal canal or neural foraminal encroachment.     L4-L5: Grade 1 anterolisthesis.  Moderate bilateral facet arthropathy.  Posterior disc osteophyte complex.  Bilateral narrowing of the lateral recesses.  Bilateral the inferior neural foraminal narrowing is seen, right greater than left.  Normal AP spinal canal diameter is maintained.     L5-S1: Moderate facet arthropathy.  Minimal uncovertebral hypertrophy.  Spinal canal is maintained.  Asymmetric mild-to-moderate right neural foraminal narrowing is noted.     Impression:     Discogenic degenerative changes as discussed above.  Infrarenal abdominal aortic aneurysm measuring up to 5.1 cm.    X-Ray Cervical Spine 2 or 3 Views  Narrative: EXAMINATION:  XR CERVICAL SPINE 2 OR 3 VIEWS    INDICATION:  Ankylosing spondylitis of multiple sites in spine    COMPARISON:  Cervical spine radiographs from 08/11/2020    FINDINGS:  AP, lateral and open-mouth odontoid views of the cervical spine are obtained.  Lower cervical spine obscured secondary to patient's overlying shoulders on the lateral views including the swimmer's view.  Mild degenerative disc changes are present throughout the visualized cervical spine.  Mild to moderate hypertrophic facet changes are present.  No fracture seen.  No prevertebral edema.  Lung apices are clear  Impression: 1. Multilevel  degenerative disc and hypertrophic facet changes, slightly worse since 08/11/2020.    Electronically signed by: Mark Calvo MD  Date:    12/09/2022  Time:    14:23       Assessment:     1. Ankylosing spondylitis of multiple sites in spine    2. DISH (diffuse idiopathic skeletal hyperostosis)    3. Immunocompromised    4. Encounter for medication monitoring    5. Lumbar spondylosis    6. Stage 3 chronic kidney disease, unspecified whether stage 3a or 3b CKD            Plan:     Has appt w/ IPM next week- keep appt for neck and lumbar spine issues  AS well controlled w/ xeljanz at this time. consider another TNF or IL-17; today patient elects to address back pain before making any medication changes  Pt wishes to avoid use of oral steroids at this time- he is pre-diabetic and is watching diet closely  Compromised immune system secondary to autoimmune disease and use of immunosuppressive drugs. Monitor carefully for infections and toxicities- Currently denies issues with recurrent infections. Advised to get immediate medical care if any infection.  Recommend Yearly Skin Cancer Screening by Dermatology for all patients on biologic or Joanna. advised strict adherence to age appropriate vaccinations (shingles, pneumonia, flu and covid) and cancer screenings with PCP   Patient advised to hold DMARD and/or biologic therapy for signs of infection or for surgery. If you are unsure what to do please call our office for instruction.[facility] [phone]  no current medication related issues, no evidence of toxicity. I ordered labs for toxicity monitoring;  results reviewed and discussed findings with the patient   Patient understands and contact clinic at any time regarding questions about today's office visit and any medication changes that were made  Return to clinic: 4 mos w/ reg 4    The patient understands, chooses and consents to this plan and accepts all the risks which include but are not limited  to the risks mentioned above.     Method of contact with patient concerns: [facility]    37 minutes of total time spent on the encounter, which includes face to face time and non-face to face time preparing to see the patient (eg, review of tests), Obtaining and/or reviewing separately obtained history, Documenting clinical information in the electronic or other health record, Independently interpreting results (not separately reported) and communicating results to the patient/family/caregiver, or Care coordination (not separately reported).          Disclaimer: This note was prepared using a voice recognition system and is likely to have sound alike errors within the text.       Nati Cisse PA-C  Rheumatology Department   [facility]

## 2023-04-04 ENCOUNTER — PATIENT MESSAGE (OUTPATIENT)
Dept: RHEUMATOLOGY | Facility: CLINIC | Age: 68
End: 2023-04-04
Payer: MEDICARE

## 2023-04-04 ENCOUNTER — SPECIALTY PHARMACY (OUTPATIENT)
Dept: PHARMACY | Facility: CLINIC | Age: 68
End: 2023-04-04
Payer: MEDICARE

## 2023-04-04 DIAGNOSIS — M45.0 ANKYLOSING SPONDYLITIS OF MULTIPLE SITES IN SPINE: ICD-10-CM

## 2023-04-04 RX ORDER — TOFACITINIB 5 MG/1
5 TABLET, FILM COATED ORAL DAILY
Qty: 30 TABLET | Refills: 11 | Status: CANCELLED | OUTPATIENT
Start: 2023-04-04

## 2023-04-04 NOTE — PROGRESS NOTES
"New Patient Chronic Pain Note (Initial Visit)    Referring Physician: Nati Cisse PA-C    PCP: Kate Lofton MD    Chief Complaint:   Chief Complaint   Patient presents with    Neck Pain    Knee Pain        SUBJECTIVE:    Deborah White is a 68 y.o. male with past medical history significant for diffuse idiopathic skeletal hyperostosis (dish), coronary artery disease, peripheral arterial disease, hypertension, hyperlipidemia, latent TB, rheumatoid arthritis, ankylosing spondylitis, nicotine dependence who presents to the clinic for the evaluation of   Neck and lower back pain.  Today patient's daughter is in the room to facilitate translation as well as use of the Timothy.  She reports his pain is most severe in the neck.  Patient reports pain is constant and today is rated a 7/10, at its best is a 5/10 and at its worse is a 10/10.  Pain is described as "inside" or deep in nature.  Patient reports pain in the neck which radiates down the left upper extremity to the hand in C6 dermatomal distribution.  Pain is exacerbated with lifting exceeding 5 lb and cervical lateral flexion.  Patient denies significant weakness in the left upper extremity but does report numbness and tingling in the hand which can affect hand  strength or dexterity.  Pain is improved with rest, gabapentin which she is taking 300 mg in the morning and 300 mg in the evening as well as with prior cervical medial branch block.  Patient reports he received 1 year of relief following prior right-sided C4-6 cervical medial branch block with Dr. Barnes.   Patient has completed conventional physical therapy in November 2020 without any improvement in his pain.    Patient also reports lower back pain which radiates down the left lower extremity to the knee.  Pain is described as numbness and tingling in nature.  Pain can be exacerbated with standing or with ambulation.  Patient's daughter also reports some associated weakness in the left lower " extremity.    Patient reports significant motor weakness and loss of sensations.  Patient denies night fever/night sweats, urinary incontinence, bowel incontinence, and significant weight loss.      Pain Disability Index Review:   No flowsheet data found.    Non-Pharmacologic Treatments:  Physical Therapy/Home Exercise: yes  Ice/Heat:yes  TENS: no  Acupuncture: no  Massage: no  Chiropractic: no    Other: no      Pain Medications:  - Adjuvant Medications: Neurontin (Gabapentin), Xeljanz  - Anti-Coagulants: Aspirin,, cilostazol    Pain Procedures:   - 18/20 20: Right-sided C4-6 medial branch blocks; Dr. Barnes    Past Medical History:   Diagnosis Date    Coronary artery disease     Hyperlipidemia     Hypertension      Past Surgical History:   Procedure Laterality Date    INJECTION OF ANESTHETIC AGENT AROUND MEDIAL BRANCH NERVES INNERVATING CERVICAL FACET JOINT Right 11/18/2020    Procedure: Block-nerve-medial branch-cervical Right C3, 4, 5with RN IV sedation;  Surgeon: Martín Barnes MD;  Location: [facility] T;  Service: Pain Management;  Laterality: Right;     Review of patient's allergies indicates:  No Known Allergies    Current Outpatient Medications   Medication Sig    amLODIPine (NORVASC) 5 MG tablet Take 1 tablet (5 mg total) by mouth once daily.    aspirin (ECOTRIN) 81 MG EC tablet Take 81 mg by mouth once daily.    benazepriL (LOTENSIN) 40 MG tablet Take 1 tablet (40 mg total) by mouth once daily.    cilostazoL (PLETAL) 50 MG Tab Take 1 tablet (50 mg total) by mouth 2 (two) times daily.    cloNIDine (CATAPRES) 0.2 MG tablet Take 1 tablet (0.2 mg total) by mouth 2 (two) times daily.    ezetimibe (ZETIA) 10 mg tablet Take 1 tablet (10 mg total) by mouth once daily.    hydroCHLOROthiazide (HYDRODIURIL) 25 MG tablet Take 1 tablet (25 mg total) by mouth once daily.    linaCLOtide (LINZESS) 72 mcg Cap capsule Take 1 capsule (72 mcg total) by mouth before breakfast.    rosuvastatin (CRESTOR) 20 MG tablet Take 1  "tablet (20 mg total) by mouth once daily.    tofacitinib (XELJANZ) 5 mg Tab Take 5 mg by mouth once daily.    gabapentin (NEURONTIN) 300 MG capsule Take 1 capsule (300 mg total) by mouth daily with breakfast AND 2 capsules (600 mg total) every evening.    sildenafil (REVATIO) 20 mg Tab Take 1 tablet (20 mg total) by mouth daily as needed (pulm htn). (Patient not taking: Reported on 4/5/2023)     No current facility-administered medications for this visit.       Review of Systems     GENERAL:  No weight loss, malaise or fevers.  HEENT:   No recent changes in vision or hearing  NECK:  Negative for lumps, no difficulty with swallowing.  RESPIRATORY:  Negative for cough, wheezing or shortness of breath, patient denies any recent URI.  CARDIOVASCULAR:  Negative for chest pain or palpitations.  GI:  Negative for abdominal discomfort, blood in stools or black stools or change in bowel habits.  MUSCULOSKELETAL:  See HPI.  SKIN:  Negative for lesions, rash, and itching.  PSYCH:  No mood disorder or recent psychosocial stressors.   HEMATOLOGY/LYMPHOLOGY:  Negative for prolonged bleeding, bruising easily or swollen nodes.    NEURO:   No history of syncope, paralysis, seizures or tremors.  All other reviewed and negative other than HPI.    OBJECTIVE:    BP (!) 149/74   Pulse 69   Ht 5' 5" (1.651 m)   Wt 74.8 kg (164 lb 14.5 oz)   BMI 27.44 kg/m²       Physical Exam    GENERAL: Well appearing, in no acute distress, alert and oriented x3.  PSYCH:  Mood and affect appropriate.  SKIN: Skin color, texture, turgor normal, no rashes or lesions.  HEAD/FACE:  Normocephalic, atraumatic. Cranial nerves grossly intact.    NECK:  pain to palpation over the cervical paraspinous muscles. Spurling Negative. pain with neck flexion, extension, or lateral flexion.   Normaltesting biceps, triceps and brachioradialis bilaterally.    NegativeHoffmann's bilaterally.    5/5 strength testing deltoid, biceps, triceps, wrist extensor, wrist flexor " and ulnar intrinsics bilaterally.    Normal  strength bilaterally    CV: RRR with palpation of the radial artery.  PULM: No evidence of respiratory difficulty, symmetric chest rise.  GI:  Soft and non-tender.      NEURO: Bilateral upper and lower extremity coordination and muscle stretch reflexes are physiologic and symmetric. No loss of sensation is noted.  GAIT: normal.    Imagin/26/22    MRI Lumbar Spine Without Contrast    COMPARISON:  None.    FINDINGS:  Visualized distal cord demonstrates normal signal intensity.  Tip of the conus medullaris terminates near the T12/L1 level.  Vertebral body heights are maintained.  There is mild grade 1 anterolisthesis of L4 on L5.  Mild associated disc space narrowing is also noted.  There is mild multilevel marginal spurring with lower lumbar facet arthropathy.  No acute fracture.  No marrow edema.  Mildly heterogeneous appearance of the osseous structures likely sequelae of degenerative change.    Probable subcentimeter right renal cyst.  Aneurysmal dilation of the abdominal aorta is seen measuring up to 5.1 cm maximally.    L1-L2: No spinal canal or neural foraminal encroachment.    L2-L3: Mild facet arthropathy.  Minimal posterior disc bulge.  No spinal canal or neural foraminal encroachment.    L3-L4: Mild facet arthropathy.  No significant spinal canal or neural foraminal encroachment.    L4-L5: Grade 1 anterolisthesis.  Moderate bilateral facet arthropathy.  Posterior disc osteophyte complex.  Bilateral narrowing of the lateral recesses.  Bilateral the inferior neural foraminal narrowing is seen, right greater than left.  Normal AP spinal canal diameter is maintained.    L5-S1: Moderate facet arthropathy.  Minimal uncovertebral hypertrophy.  Spinal canal is maintained.  Asymmetric mild-to-moderate right neural foraminal narrowing is noted.    Impression  Discogenic degenerative changes as discussed above.  Infrarenal abdominal aortic aneurysm measuring up  to 5.1 cm.    This report was flagged in Epic as abnormal.       08/11/20    X-Ray Cervical Spine AP And Lateral    FINDINGS:  The vertebral body heights are well maintained.  There is bridging ossification noted anteriorly at the C2-3, C3-4, and C4-5 levels with relative maintenance of the disc spaces.  Findings are most consistent with DISH.  Mild disc space narrowing and spondylosis present at the C6-7 level.  Multilevel bilateral facet arthropathy noted within the mid cervical spine greater on the right.  The C1-C2 articulation is within normal limits.      ASSESSMENT: 68 y.o. year old male with Neck, left arm, lower back and left leg pain, consistent with     1. Lumbar facet arthropathy        2. Lumbar spondylosis  Ambulatory referral/consult to [facility]      3. DDD (degenerative disc disease), lumbar        4. Cervical spondylosis  IR Facet Inj Cerv Thoracic 2nd Vert Bi    IR Facet Inj Cervical Thoracic 1st Vert Bi    Case Request-RAD/Other Procedure Area: Bilateral C5-7 MBB with RN IV sedation    X-Ray Cervical Spine 5 View W Flex Extxt      5. Cervical radiculopathy  gabapentin (NEURONTIN) 300 MG capsule            PLAN:   - Interventions:  Schedule for bilateral C4-6 medial branch block to see if this helps with axial neck pain. Explained the risks and benefits of the procedure in detail with the patient today in clinic along with alternative treatment options, and the patient elected to pursue the intervention at this time.      - Anticoagulation use: yes aspirin   Per ASA guidelines for primary prophylaxis, patient will need to pause aspirin 5 days prior to his injection.  Will obtain clearance from PCP, Dr. Lofton     report:  Reviewed and consistent with medication use as prescribed.    - Medications:  --  We have discussed increasing the patient's gabapentin to a more therapeutic goal.  Patient will increase his medication according to the following algorithm.  We have reviewed potential  side effects of this medication including daytime somnolence, weight gain and peripheral edema  -Gabapentin  300 mg in the morning and 600 mg in the evening  -3 mo supply given      - Therapy:   We discussed continuing physician directed physical therapy exercises at home to help manage the patient/s painful condition. The patient was counseled that muscle strengthening will improve the long term prognosis in regards to pain and may also help increase range of motion and mobility.     - Imaging: Reviewed available imaging with patient and answered any questions they had regarding study. Cervical x-ray to better evaluate pain      - Follow up visit: return to clinic in 4-6 weeks      The above plan and management options were discussed at length with patient. Patient is in agreement with the above and verbalized understanding.    - I discussed the goals of interventional chronic pain management with the patient on today's visit. We discussed a multimodal and systematic approach to pain.  This includes diagnostic and therapeutic injections, adjuvant pharmacologic treatment, physical therapy, and at times psychiatry.  I emphasized the importance of regular exercise, core strengthening and stretching, diet and weight loss as a cornerstone of long-term pain management.    - This condition does not require this patient to take time off of work, and the primary goal of our Pain Management services is to improve the patient's functional capacity.  - Patient Questions: Answered all of the patient's questions regarding diagnoses, therapy, treatment and next steps        Rudolph Burgos MD  Interventional Pain Management  [facility]    Disclaimer:  This note was prepared using voice recognition system and is likely to have sound alike errors that may have been overlooked even after proof reading.  Please call me with any questions

## 2023-04-04 NOTE — TELEPHONE ENCOUNTER
Outgoing call regarding xeljanz refill; per pt's daughter, he has about 4 days on hand; informed her that a refill request was sent to md, and once approved [facility] will follow up to schedule delivery

## 2023-04-04 NOTE — H&P (VIEW-ONLY)
"New Patient Chronic Pain Note (Initial Visit)    Referring Physician: Nati Cisse PA-C    PCP: Kate Lofton MD    Chief Complaint:   Chief Complaint   Patient presents with    Neck Pain    Knee Pain        SUBJECTIVE:    Deborah White is a 68 y.o. male with past medical history significant for diffuse idiopathic skeletal hyperostosis (dish), coronary artery disease, peripheral arterial disease, hypertension, hyperlipidemia, latent TB, rheumatoid arthritis, ankylosing spondylitis, nicotine dependence who presents to the clinic for the evaluation of   Neck and lower back pain.  Today patient's daughter is in the room to facilitate translation as well as use of the Timothy.  She reports his pain is most severe in the neck.  Patient reports pain is constant and today is rated a 7/10, at its best is a 5/10 and at its worse is a 10/10.  Pain is described as "inside" or deep in nature.  Patient reports pain in the neck which radiates down the left upper extremity to the hand in C6 dermatomal distribution.  Pain is exacerbated with lifting exceeding 5 lb and cervical lateral flexion.  Patient denies significant weakness in the left upper extremity but does report numbness and tingling in the hand which can affect hand  strength or dexterity.  Pain is improved with rest, gabapentin which she is taking 300 mg in the morning and 300 mg in the evening as well as with prior cervical medial branch block.  Patient reports he received 1 year of relief following prior right-sided C4-6 cervical medial branch block with Dr. Barnes.   Patient has completed conventional physical therapy in November 2020 without any improvement in his pain.    Patient also reports lower back pain which radiates down the left lower extremity to the knee.  Pain is described as numbness and tingling in nature.  Pain can be exacerbated with standing or with ambulation.  Patient's daughter also reports some associated weakness in the left lower " extremity.    Patient reports significant motor weakness and loss of sensations.  Patient denies night fever/night sweats, urinary incontinence, bowel incontinence, and significant weight loss.      Pain Disability Index Review:   No flowsheet data found.    Non-Pharmacologic Treatments:  Physical Therapy/Home Exercise: yes  Ice/Heat:yes  TENS: no  Acupuncture: no  Massage: no  Chiropractic: no    Other: no      Pain Medications:  - Adjuvant Medications: Neurontin (Gabapentin), Xeljanz  - Anti-Coagulants: Aspirin,, cilostazol    Pain Procedures:   - 18/20 20: Right-sided C4-6 medial branch blocks; Dr. Barnes    Past Medical History:   Diagnosis Date    Coronary artery disease     Hyperlipidemia     Hypertension      Past Surgical History:   Procedure Laterality Date    INJECTION OF ANESTHETIC AGENT AROUND MEDIAL BRANCH NERVES INNERVATING CERVICAL FACET JOINT Right 11/18/2020    Procedure: Block-nerve-medial branch-cervical Right C3, 4, 5with RN IV sedation;  Surgeon: Martín Barnes MD;  Location: [facility] T;  Service: Pain Management;  Laterality: Right;     Review of patient's allergies indicates:  No Known Allergies    Current Outpatient Medications   Medication Sig    amLODIPine (NORVASC) 5 MG tablet Take 1 tablet (5 mg total) by mouth once daily.    aspirin (ECOTRIN) 81 MG EC tablet Take 81 mg by mouth once daily.    benazepriL (LOTENSIN) 40 MG tablet Take 1 tablet (40 mg total) by mouth once daily.    cilostazoL (PLETAL) 50 MG Tab Take 1 tablet (50 mg total) by mouth 2 (two) times daily.    cloNIDine (CATAPRES) 0.2 MG tablet Take 1 tablet (0.2 mg total) by mouth 2 (two) times daily.    ezetimibe (ZETIA) 10 mg tablet Take 1 tablet (10 mg total) by mouth once daily.    hydroCHLOROthiazide (HYDRODIURIL) 25 MG tablet Take 1 tablet (25 mg total) by mouth once daily.    linaCLOtide (LINZESS) 72 mcg Cap capsule Take 1 capsule (72 mcg total) by mouth before breakfast.    rosuvastatin (CRESTOR) 20 MG tablet Take 1  "tablet (20 mg total) by mouth once daily.    tofacitinib (XELJANZ) 5 mg Tab Take 5 mg by mouth once daily.    gabapentin (NEURONTIN) 300 MG capsule Take 1 capsule (300 mg total) by mouth daily with breakfast AND 2 capsules (600 mg total) every evening.    sildenafil (REVATIO) 20 mg Tab Take 1 tablet (20 mg total) by mouth daily as needed (pulm htn). (Patient not taking: Reported on 4/5/2023)     No current facility-administered medications for this visit.       Review of Systems     GENERAL:  No weight loss, malaise or fevers.  HEENT:   No recent changes in vision or hearing  NECK:  Negative for lumps, no difficulty with swallowing.  RESPIRATORY:  Negative for cough, wheezing or shortness of breath, patient denies any recent URI.  CARDIOVASCULAR:  Negative for chest pain or palpitations.  GI:  Negative for abdominal discomfort, blood in stools or black stools or change in bowel habits.  MUSCULOSKELETAL:  See HPI.  SKIN:  Negative for lesions, rash, and itching.  PSYCH:  No mood disorder or recent psychosocial stressors.   HEMATOLOGY/LYMPHOLOGY:  Negative for prolonged bleeding, bruising easily or swollen nodes.    NEURO:   No history of syncope, paralysis, seizures or tremors.  All other reviewed and negative other than HPI.    OBJECTIVE:    BP (!) 149/74   Pulse 69   Ht 5' 5" (1.651 m)   Wt 74.8 kg (164 lb 14.5 oz)   BMI 27.44 kg/m²       Physical Exam    GENERAL: Well appearing, in no acute distress, alert and oriented x3.  PSYCH:  Mood and affect appropriate.  SKIN: Skin color, texture, turgor normal, no rashes or lesions.  HEAD/FACE:  Normocephalic, atraumatic. Cranial nerves grossly intact.    NECK:  pain to palpation over the cervical paraspinous muscles. Spurling Negative. pain with neck flexion, extension, or lateral flexion.   Normaltesting biceps, triceps and brachioradialis bilaterally.    NegativeHoffmann's bilaterally.    5/5 strength testing deltoid, biceps, triceps, wrist extensor, wrist flexor " and ulnar intrinsics bilaterally.    Normal  strength bilaterally    CV: RRR with palpation of the radial artery.  PULM: No evidence of respiratory difficulty, symmetric chest rise.  GI:  Soft and non-tender.      NEURO: Bilateral upper and lower extremity coordination and muscle stretch reflexes are physiologic and symmetric. No loss of sensation is noted.  GAIT: normal.    Imagin/26/22    MRI Lumbar Spine Without Contrast    COMPARISON:  None.    FINDINGS:  Visualized distal cord demonstrates normal signal intensity.  Tip of the conus medullaris terminates near the T12/L1 level.  Vertebral body heights are maintained.  There is mild grade 1 anterolisthesis of L4 on L5.  Mild associated disc space narrowing is also noted.  There is mild multilevel marginal spurring with lower lumbar facet arthropathy.  No acute fracture.  No marrow edema.  Mildly heterogeneous appearance of the osseous structures likely sequelae of degenerative change.    Probable subcentimeter right renal cyst.  Aneurysmal dilation of the abdominal aorta is seen measuring up to 5.1 cm maximally.    L1-L2: No spinal canal or neural foraminal encroachment.    L2-L3: Mild facet arthropathy.  Minimal posterior disc bulge.  No spinal canal or neural foraminal encroachment.    L3-L4: Mild facet arthropathy.  No significant spinal canal or neural foraminal encroachment.    L4-L5: Grade 1 anterolisthesis.  Moderate bilateral facet arthropathy.  Posterior disc osteophyte complex.  Bilateral narrowing of the lateral recesses.  Bilateral the inferior neural foraminal narrowing is seen, right greater than left.  Normal AP spinal canal diameter is maintained.    L5-S1: Moderate facet arthropathy.  Minimal uncovertebral hypertrophy.  Spinal canal is maintained.  Asymmetric mild-to-moderate right neural foraminal narrowing is noted.    Impression  Discogenic degenerative changes as discussed above.  Infrarenal abdominal aortic aneurysm measuring up  to 5.1 cm.    This report was flagged in Epic as abnormal.       08/11/20    X-Ray Cervical Spine AP And Lateral    FINDINGS:  The vertebral body heights are well maintained.  There is bridging ossification noted anteriorly at the C2-3, C3-4, and C4-5 levels with relative maintenance of the disc spaces.  Findings are most consistent with DISH.  Mild disc space narrowing and spondylosis present at the C6-7 level.  Multilevel bilateral facet arthropathy noted within the mid cervical spine greater on the right.  The C1-C2 articulation is within normal limits.      ASSESSMENT: 68 y.o. year old male with Neck, left arm, lower back and left leg pain, consistent with     1. Lumbar facet arthropathy        2. Lumbar spondylosis  Ambulatory referral/consult to [facility]      3. DDD (degenerative disc disease), lumbar        4. Cervical spondylosis  IR Facet Inj Cerv Thoracic 2nd Vert Bi    IR Facet Inj Cervical Thoracic 1st Vert Bi    Case Request-RAD/Other Procedure Area: Bilateral C5-7 MBB with RN IV sedation    X-Ray Cervical Spine 5 View W Flex Extxt      5. Cervical radiculopathy  gabapentin (NEURONTIN) 300 MG capsule            PLAN:   - Interventions:  Schedule for bilateral C4-6 medial branch block to see if this helps with axial neck pain. Explained the risks and benefits of the procedure in detail with the patient today in clinic along with alternative treatment options, and the patient elected to pursue the intervention at this time.      - Anticoagulation use: yes aspirin   Per ASA guidelines for primary prophylaxis, patient will need to pause aspirin 5 days prior to his injection.  Will obtain clearance from PCP, Dr. Lofton     report:  Reviewed and consistent with medication use as prescribed.    - Medications:  --  We have discussed increasing the patient's gabapentin to a more therapeutic goal.  Patient will increase his medication according to the following algorithm.  We have reviewed potential  side effects of this medication including daytime somnolence, weight gain and peripheral edema  -Gabapentin  300 mg in the morning and 600 mg in the evening  -3 mo supply given      - Therapy:   We discussed continuing physician directed physical therapy exercises at home to help manage the patient/s painful condition. The patient was counseled that muscle strengthening will improve the long term prognosis in regards to pain and may also help increase range of motion and mobility.     - Imaging: Reviewed available imaging with patient and answered any questions they had regarding study. Cervical x-ray to better evaluate pain      - Follow up visit: return to clinic in 4-6 weeks      The above plan and management options were discussed at length with patient. Patient is in agreement with the above and verbalized understanding.    - I discussed the goals of interventional chronic pain management with the patient on today's visit. We discussed a multimodal and systematic approach to pain.  This includes diagnostic and therapeutic injections, adjuvant pharmacologic treatment, physical therapy, and at times psychiatry.  I emphasized the importance of regular exercise, core strengthening and stretching, diet and weight loss as a cornerstone of long-term pain management.    - This condition does not require this patient to take time off of work, and the primary goal of our Pain Management services is to improve the patient's functional capacity.  - Patient Questions: Answered all of the patient's questions regarding diagnoses, therapy, treatment and next steps        Rudolph Burgos MD  Interventional Pain Management  [facility]    Disclaimer:  This note was prepared using voice recognition system and is likely to have sound alike errors that may have been overlooked even after proof reading.  Please call me with any questions

## 2023-04-05 ENCOUNTER — PATIENT MESSAGE (OUTPATIENT)
Dept: INTERNAL MEDICINE | Facility: CLINIC | Age: 68
End: 2023-04-05

## 2023-04-05 ENCOUNTER — OFFICE VISIT (OUTPATIENT)
Dept: INTERNAL MEDICINE | Facility: CLINIC | Age: 68
End: 2023-04-05
Payer: MEDICARE

## 2023-04-05 ENCOUNTER — OFFICE VISIT (OUTPATIENT)
Dept: PAIN MEDICINE | Facility: CLINIC | Age: 68
End: 2023-04-05
Payer: MEDICARE

## 2023-04-05 ENCOUNTER — HOSPITAL ENCOUNTER (OUTPATIENT)
Dept: RADIOLOGY | Facility: HOSPITAL | Age: 68
Discharge: HOME | End: 2023-04-05
Attending: ANESTHESIOLOGY
Payer: MEDICARE

## 2023-04-05 ENCOUNTER — PATIENT MESSAGE (OUTPATIENT)
Dept: RHEUMATOLOGY | Facility: CLINIC | Age: 68
End: 2023-04-05
Payer: MEDICARE

## 2023-04-05 VITALS
SYSTOLIC BLOOD PRESSURE: 149 MMHG | BODY MASS INDEX: 27.47 KG/M2 | HEART RATE: 69 BPM | HEIGHT: 65 IN | DIASTOLIC BLOOD PRESSURE: 74 MMHG | WEIGHT: 164.88 LBS

## 2023-04-05 VITALS
TEMPERATURE: 98 F | OXYGEN SATURATION: 97 % | WEIGHT: 163.13 LBS | SYSTOLIC BLOOD PRESSURE: 132 MMHG | HEART RATE: 71 BPM | HEIGHT: 65 IN | DIASTOLIC BLOOD PRESSURE: 74 MMHG | BODY MASS INDEX: 27.18 KG/M2 | RESPIRATION RATE: 18 BRPM

## 2023-04-05 DIAGNOSIS — M47.816 LUMBAR SPONDYLOSIS: ICD-10-CM

## 2023-04-05 DIAGNOSIS — M47.812 CERVICAL SPONDYLOSIS: ICD-10-CM

## 2023-04-05 DIAGNOSIS — M51.36 DDD (DEGENERATIVE DISC DISEASE), LUMBAR: ICD-10-CM

## 2023-04-05 DIAGNOSIS — M54.12 CERVICAL RADICULOPATHY: ICD-10-CM

## 2023-04-05 DIAGNOSIS — R20.0 ANESTHESIA OF SKIN: Primary | ICD-10-CM

## 2023-04-05 DIAGNOSIS — R20.0 FACIAL NUMBNESS: ICD-10-CM

## 2023-04-05 DIAGNOSIS — M47.816 LUMBAR FACET ARTHROPATHY: Primary | ICD-10-CM

## 2023-04-05 PROCEDURE — 99999 PR PBB SHADOW E&M-EST. PATIENT-LVL V: CPT | Mod: PBBFAC,,, | Performed by: ANESTHESIOLOGY

## 2023-04-05 PROCEDURE — 99214 PR OFFICE/OUTPT VISIT, EST, LEVL IV, 30-39 MIN: ICD-10-PCS | Mod: S$PBB,,, | Performed by: ANESTHESIOLOGY

## 2023-04-05 PROCEDURE — 99215 OFFICE O/P EST HI 40 MIN: CPT | Mod: PBBFAC | Performed by: ANESTHESIOLOGY

## 2023-04-05 PROCEDURE — 99214 PR OFFICE/OUTPT VISIT, EST, LEVL IV, 30-39 MIN: ICD-10-PCS | Mod: S$PBB,,, | Performed by: NURSE PRACTITIONER

## 2023-04-05 PROCEDURE — 99999 PR PBB SHADOW E&M-EST. PATIENT-LVL V: CPT | Mod: PBBFAC,,, | Performed by: NURSE PRACTITIONER

## 2023-04-05 PROCEDURE — 99999 PR PBB SHADOW E&M-EST. PATIENT-LVL V: ICD-10-PCS | Mod: PBBFAC,,, | Performed by: ANESTHESIOLOGY

## 2023-04-05 PROCEDURE — 72052 XR CERVICAL SPINE 5 VIEW WITH FLEX AND EXT: ICD-10-PCS | Mod: 26,,, | Performed by: RADIOLOGY

## 2023-04-05 PROCEDURE — 99215 OFFICE O/P EST HI 40 MIN: CPT | Mod: 25,27,PBBFAC | Performed by: NURSE PRACTITIONER

## 2023-04-05 PROCEDURE — 72052 X-RAY EXAM NECK SPINE 6/>VWS: CPT | Mod: TC

## 2023-04-05 PROCEDURE — 72052 X-RAY EXAM NECK SPINE 6/>VWS: CPT | Mod: 26,,, | Performed by: RADIOLOGY

## 2023-04-05 PROCEDURE — 99214 OFFICE O/P EST MOD 30 MIN: CPT | Mod: S$PBB,,, | Performed by: NURSE PRACTITIONER

## 2023-04-05 PROCEDURE — 99214 OFFICE O/P EST MOD 30 MIN: CPT | Mod: S$PBB,,, | Performed by: ANESTHESIOLOGY

## 2023-04-05 PROCEDURE — 99999 PR PBB SHADOW E&M-EST. PATIENT-LVL V: ICD-10-PCS | Mod: PBBFAC,,, | Performed by: NURSE PRACTITIONER

## 2023-04-05 RX ORDER — GABAPENTIN 300 MG/1
CAPSULE ORAL
Qty: 90 CAPSULE | Refills: 2 | Status: SHIPPED | OUTPATIENT
Start: 2023-04-05 | End: 2023-05-31 | Stop reason: DRUGHIGH

## 2023-04-05 RX ORDER — TOFACITINIB 5 MG/1
5 TABLET, FILM COATED ORAL DAILY
Qty: 30 TABLET | Refills: 11 | Status: SHIPPED | OUTPATIENT
Start: 2023-04-05

## 2023-04-05 NOTE — PROGRESS NOTES
Deborah White  04/17/2023  [id number]    Kate Lofton MD  Patient Care Team:  Kate Lofton MD as PCP - General (Internal Medicine)  Didier Maldonado MD (Family Medicine)  Ana Luisa Nelson MD (Cardiology)          Visit Type:an urgent visit for a new problem    Chief Complaint:  Chief Complaint   Patient presents with    Hypertension       History of Present Illness:    67 yo male presents with co right sided facial numbness for two -three days that last 10-20 secs. Denies facial droop, weakness in arm, or leg, especially on one side of the body. Sudden confusion, trouble speaking, or difficulty understanding speech. Sudden trouble seeing in one or both eyes. Sudden trouble walking, dizziness, loss of balance, or lack of coordination.      History:  Past Medical History:   Diagnosis Date    Coronary artery disease     Hyperlipidemia     Hypertension      Past Surgical History:   Procedure Laterality Date    INJECTION OF ANESTHETIC AGENT AROUND MEDIAL BRANCH NERVES INNERVATING CERVICAL FACET JOINT Right 11/18/2020    Procedure: Block-nerve-medial branch-cervical Right C3, 4, 5with RN IV sedation;  Surgeon: Martín Barnes MD;  Location: [facility];  Service: Pain Management;  Laterality: Right;     Family History   Problem Relation Age of Onset    Cancer Mother      Social History     Socioeconomic History    Marital status:    Tobacco Use    Smoking status: Every Day     Types: Cigarettes    Smokeless tobacco: Never   Substance and Sexual Activity    Alcohol use: Never     Social Determinants of Health     Financial Resource Strain: Low Risk     Difficulty of Paying Living Expenses: Not very hard   Food Insecurity: No Food Insecurity    Worried About Running Out of Food in the Last Year: Never true    Ran Out of Food in the Last Year: Never true   Transportation Needs: No Transportation Needs    Lack of Transportation (Medical): No    Lack of Transportation (Non-Medical): No   Physical Activity:  Insufficiently Active    Days of Exercise per Week: 3 days    Minutes of Exercise per Session: 30 min   Stress: Stress Concern Present    Feeling of Stress : Rather much   Social Connections: Unknown    Frequency of Communication with Friends and Family: Twice a week    Frequency of Social Gatherings with Friends and Family: Once a week    Active Member of Clubs or Organizations: No    Attends Club or Organization Meetings: Never    Marital Status:    Housing Stability: Low Risk     Unable to Pay for Housing in the Last Year: No    Number of Places Lived in the Last Year: 1    Unstable Housing in the Last Year: No     Patient Active Problem List   Diagnosis    Ankylosing hyperostosis of cervical region    Bilateral carpal tunnel syndrome    Elevated sed rate    Hypertension, essential    Hyperlipidemia    Ankylosing spondylitis of multiple sites in spine    Cervical facet joint syndrome    Coronary artery disease of native artery of native heart with stable angina pectoris    PAD (peripheral artery disease)    Smoking    Positive QuantiFERON-TB Gold test    Encounter for medication monitoring    Immunocompromised    Long term current use of immunosuppressive drug    DISH (diffuse idiopathic skeletal hyperostosis)    TB lung, latent    Seronegative rheumatoid arthritis     Review of patient's allergies indicates:  No Known Allergies    The following were reviewed at this visit: active problem list, medication list, allergies, family history, social history, and health maintenance.    Medications:  Current Outpatient Medications on File Prior to Visit   Medication Sig Dispense Refill    amLODIPine (NORVASC) 5 MG tablet Take 1 tablet (5 mg total) by mouth once daily. 90 tablet 1    aspirin (ECOTRIN) 81 MG EC tablet Take 81 mg by mouth once daily.      benazepriL (LOTENSIN) 40 MG tablet Take 1 tablet (40 mg total) by mouth once daily. 90 tablet 1    cilostazoL (PLETAL) 50 MG Tab Take 1 tablet (50 mg total) by  mouth 2 (two) times daily. 60 tablet 9    ezetimibe (ZETIA) 10 mg tablet Take 1 tablet (10 mg total) by mouth once daily. 90 tablet 1    gabapentin (NEURONTIN) 300 MG capsule Take 1 capsule (300 mg total) by mouth daily with breakfast AND 2 capsules (600 mg total) every evening. 90 capsule 2    hydroCHLOROthiazide (HYDRODIURIL) 25 MG tablet Take 1 tablet (25 mg total) by mouth once daily. 90 tablet 1    linaCLOtide (LINZESS) 72 mcg Cap capsule Take 1 capsule (72 mcg total) by mouth before breakfast. 30 capsule 3    rosuvastatin (CRESTOR) 20 MG tablet Take 1 tablet (20 mg total) by mouth once daily. 90 tablet 1    sildenafil (REVATIO) 20 mg Tab Take 1 tablet (20 mg total) by mouth daily as needed (pulm htn). 90 tablet 0    tofacitinib (XELJANZ) 5 mg Tab Take 5 mg by mouth once daily. 30 tablet 11     No current facility-administered medications on file prior to visit.       Medications have been reviewed and reconciled with patient at this visit.  Barriers to medications reviewed with patient.    Adverse reactions to current medications reviewed with patient..    Over the counter medications reviewed and reconciled with patient.    Exam:  Wt Readings from Last 3 Encounters:   04/05/23 74 kg (163 lb 2.3 oz)   04/05/23 74.8 kg (164 lb 14.5 oz)   03/28/23 75.4 kg (166 lb 3.6 oz)     Temp Readings from Last 3 Encounters:   04/05/23 97.7 °F (36.5 °C) (Tympanic)   06/24/22 97.8 °F (36.6 °C) (Temporal)   05/12/22 98.1 °F (36.7 °C) (Oral)     BP Readings from Last 3 Encounters:   04/05/23 132/74   04/05/23 (!) 149/74   03/28/23 (!) 151/79     Pulse Readings from Last 3 Encounters:   04/05/23 71   04/05/23 69   03/28/23 67     Body mass index is 27.15 kg/m².      Review of Systems   Constitutional:  Negative for fever.   Respiratory:  Negative for cough, shortness of breath and wheezing.    Cardiovascular:  Negative for chest pain and palpitations.   Gastrointestinal:  Negative for nausea.   Neurological:  Positive for  sensory change. Negative for speech change, focal weakness, loss of consciousness, weakness and headaches.   All other systems reviewed and are negative.  Physical Exam  Vitals and nursing note reviewed.   Constitutional:       Appearance: Normal appearance. He is obese.   HENT:      Head: Normocephalic and atraumatic.      Right Ear: Tympanic membrane, ear canal and external ear normal.      Left Ear: Tympanic membrane, ear canal and external ear normal.      Nose: Nose normal.      Mouth/Throat:      Mouth: Mucous membranes are moist.      Pharynx: Oropharynx is clear.   Eyes:      General: No visual field deficit.     Extraocular Movements: Extraocular movements intact.      Conjunctiva/sclera: Conjunctivae normal.      Pupils: Pupils are equal, round, and reactive to light.   Cardiovascular:      Rate and Rhythm: Normal rate and regular rhythm.      Pulses: Normal pulses.      Heart sounds: Normal heart sounds.   Pulmonary:      Effort: Pulmonary effort is normal.      Breath sounds: Normal breath sounds.   Abdominal:      General: Bowel sounds are normal.      Palpations: Abdomen is soft.   Musculoskeletal:         General: Normal range of motion.      Cervical back: Normal range of motion and neck supple.   Skin:     General: Skin is warm and dry.      Capillary Refill: Capillary refill takes less than 2 seconds.   Neurological:      General: No focal deficit present.      Mental Status: He is alert and oriented to person, place, and time.      GCS: GCS eye subscore is 4. GCS verbal subscore is 5. GCS motor subscore is 6.      Cranial Nerves: No cranial nerve deficit, dysarthria or facial asymmetry.      Sensory: Sensation is intact.      Motor: No weakness, tremor, atrophy, abnormal muscle tone, seizure activity or pronator drift.      Coordination: Romberg sign negative. Coordination normal. Finger-Nose-Finger Test and Heel to Shin Test normal. Rapid alternating movements normal.      Gait: Gait and tandem  walk normal.   Psychiatric:         Mood and Affect: Mood normal.         Behavior: Behavior normal.         Thought Content: Thought content normal.         Judgment: Judgment normal.       Laboratory Reviewed ({Yes)  Lab Results   Component Value Date    WBC 7.7 03/02/2023    HGB 12.0 (L) 03/02/2023    HCT 36.9 (L) 03/02/2023     03/02/2023    CHOL 120 07/06/2022    TRIG 102 07/06/2022    HDL 47 07/06/2022    ALT 18 03/02/2023    AST 17 03/02/2023     03/02/2023    K 3.5 03/02/2023     12/23/2022    CREATININE 1.54 (H) 03/02/2023    BUN 17.0 03/02/2023    CO2 25 03/02/2023    TSH 0.572 12/11/2019    PSA 0.34 07/06/2022    HGBA1C 6.2 (H) 07/06/2022       Deborah was seen today for hypertension.    Diagnoses and all orders for this visit:    Anesthesia of skin  -     CT Head Without Contrast; Future  -     Ambulatory referral/consult to Neurology; Future    Facial numbness      Norvasc 10 qd for two weeks then follow up   Care Plan/Goals: Reviewed    Goals    None       Deborah was seen today for hypertension.    Diagnoses and all orders for this visit:    Anesthesia of skin  -     CT Head Without Contrast; Future  -     Ambulatory referral/consult to Neurology; Future    Facial numbness       Follow up: Follow up in about 2 years (around 4/18/2025) for BP F/U.    After visit summary was printed and given to patient upon discharge today.  Patient goals and care plan are included in After Visit Summary.

## 2023-04-05 NOTE — TELEPHONE ENCOUNTER
Specialty Pharmacy - Refill Coordination    Specialty Medication Orders Linked to Encounter      Flowsheet Row Most Recent Value   Medication #1 tofacitinib (XELJANZ) 5 mg Tab (Order#[id number], Rx#[id number])            Refill Questions - Documented Responses      Flowsheet Row Most Recent Value   Patient Availability and HIPAA Verification    Does patient want to proceed with activity? Yes   HIPAA/medical authority confirmed? Yes   Relationship to patient of person spoken to? Child   Refill Screening Questions    Changes to allergies? No   Changes to medications? No   New conditions since last clinic visit? No   Unplanned office visit, urgent care, ED, or hospital admission in the last 4 weeks? No   How does patient/caregiver feel medication is working? Good   Financial problems or insurance changes? No   How many doses of your specialty medications were missed in the last 4 weeks? 0   Would patient like to speak to a pharmacist? No   When does the patient need to receive the medication? 04/10/23   Refill Delivery Questions    How will the patient receive the medication? [facility]   When does the patient need to receive the medication? 04/10/23   Shipping Address Prescription   Address in [facility] confirmed and updated if neccessary? Yes   Expected Copay ($) 10.35   Is the patient able to afford the medication copay? Yes   Payment Method CC on file   Days supply of Refill 30   Supplies needed? No supplies needed   Refill activity completed? Yes   Refill activity plan Refill scheduled   Shipment/Pickup Date: 04/06/23            Current Outpatient Medications   Medication Sig    amLODIPine (NORVASC) 5 MG tablet Take 1 tablet (5 mg total) by mouth once daily.    aspirin (ECOTRIN) 81 MG EC tablet Take 81 mg by mouth once daily.    benazepriL (LOTENSIN) 40 MG tablet Take 1 tablet (40 mg total) by mouth once daily.    cilostazoL (PLETAL) 50 MG Tab Take 1 tablet (50 mg total) by mouth 2 (two) times daily.     cloNIDine (CATAPRES) 0.2 MG tablet Take 1 tablet (0.2 mg total) by mouth 2 (two) times daily.    ezetimibe (ZETIA) 10 mg tablet Take 1 tablet (10 mg total) by mouth once daily.    gabapentin (NEURONTIN) 300 MG capsule Take 1 capsule (300 mg total) by mouth daily with breakfast AND 2 capsules (600 mg total) every evening.    hydroCHLOROthiazide (HYDRODIURIL) 25 MG tablet Take 1 tablet (25 mg total) by mouth once daily.    linaCLOtide (LINZESS) 72 mcg Cap capsule Take 1 capsule (72 mcg total) by mouth before breakfast.    rosuvastatin (CRESTOR) 20 MG tablet Take 1 tablet (20 mg total) by mouth once daily.    sildenafil (REVATIO) 20 mg Tab Take 1 tablet (20 mg total) by mouth daily as needed (pulm htn). (Patient not taking: Reported on 4/5/2023)    tofacitinib (XELJANZ) 5 mg Tab Take 5 mg by mouth once daily.   Last reviewed on 4/5/2023  8:06 AM by Ana Paula Trevizo LPN    Review of patient's allergies indicates:  No Known Allergies Last reviewed on  4/5/2023 8:08 AM by Ana Paula Trevizo      Tasks added this encounter   5/3/2023 - Refill Call (Auto Added)   Tasks due within next 3 months   No tasks due.     [facility]  [address] [facility] [address]  Phone: [phone]  Fax: [phone]

## 2023-04-05 NOTE — PATIENT INSTRUCTIONS
General Neck and Back Pain    Both neck and back pain are usually caused by injury to the muscles or ligaments of the spine. Sometimes the disks that separate each bone of the spine may cause pain by pressing on a nearby nerve. Back and neck pain may appear after a sudden twisting or bending force (such as in a car accident), or sometimes after a simple awkward movement. In either case, muscle spasm is often present and adds to the pain.  Acute neck and back pain usually gets better in 1 to 2 weeks. Pain related to disk disease, arthritis in the spinal joints or spinal stenosis (narrowing of the spinal canal) can become chronic and last for months or years.  Back and neck pain are common problems. Most people feel better in 1 or 2 weeks, and most of the rest in 1 to 2 months. Most people can remain active.  People experience and describe pain differently.  Pain can be sharp, stabbing, shooting, aching, cramping, or burning  Movement, standing, bending, lifting, sitting, or walking may worsen the pain  Pain can be localized to one spot or area, or it can be more generalized  Pain can spread or radiate upwards, downwards, to the front, or go down your arms  Muscle spasm may occur.  Most of the time mechanical problems with the muscles or spine cause the pain. it is usually caused by an injury, whether known or not, to the muscles or ligaments. While illnesses can cause back pain, it is usually not caused by a serious illness. Pain is usually related to physical activity, whether sports, exercise, work, or normal activity. Sometimes it can occur without an identifiable cause. This can happen simply by stretching or moving wrong, without noting pain at the time. Other causes include:  Overexertion, lifting, pushing, pulling incorrectly or too aggressively.  Sudden twisting, bending or stretching from an accident (car or fall), or accidental movement.  Poor posture  Poor conditioning, lack of regular exercise  Spinal  disc disease or arthritis  Stress  Pregnancy, or illness like appendicitis, bladder or kidney infection, pelvic infections   Home care  For neck pain: Use a comfortable pillow that supports the head and keeps the spine in a neutral position. The position of the head should not be tilted forward or backward.  When in bed, try to find a position of comfort. A firm mattress is best. Try lying flat on your back with pillows under your knees. You can also try lying on your side with your knees bent up towards your chest and a pillow between your knees.  At first, do not try to stretch out the sore spots. If there is a strain, it is not like the good soreness you get after exercising without an injury. In this case, stretching may make it worse.  Avoid prolonged sitting, long car rides or travel. This puts more stress on the lower back than standing or walking.  During the first 24 to 72 hours after an injury, apply an ice pack to the painful area for 20 minutes and then remove it for 20 minutes over a period of 60 to 90 minutes or several times a day.   You can alternate ice and heat therapies. Talk with your healthcare provider about the best treatment for your back or neck pain. As a safety precaution, do not use a heating pad at bedtime. Sleeping with a heating pad can lead to skin burns or tissue damage.  Therapeutic massage can help relax the back and neck muscles without stretching them.  Be aware of safe lifting methods and do not lift anything over 15 pounds until all the pain is gone.  Medications  Talk to your healthcare provider before using medicine, especially if you have other medical problems or are taking other medicines.  You may use over-the-counter medicine to control pain, unless another pain medicine was prescribed. If you have chronic conditions like diabetes, liver or kidney disease, stomach ulcers,  gastrointestinal bleeding, or are taking blood thinner medicines.  Be careful if you are given pain  medicines, narcotics, or medicine for muscle spasm. They can cause drowsiness, and can affect your coordination, reflexes, and judgment. Do not drive or operate heavy machinery.  Follow-up care  Follow up with your healthcare provider, or as advised. Physical therapy or further tests may be needed.  If X-rays were taken, you will be notified of any new findings that may affect your care.  Call 911  Seek emergency medical care if any of the following occur:  Trouble breathing  Confusion  Very drowsy or trouble awakening  Fainting or loss of consciousness  Rapid or very slow heart rate  Loss of bowel or bladder control  When to seek medical advice  Call your healthcare provider right away if any of these occur:  Pain becomes worse or spreads into your arms or legs  Weakness, numbness or pain in one or both arms or legs  Numbness in the groin area  Difficulty walking  Fever of 100.4ºF (38ºC) or higher, or as directed by your healthcare provider  Date Last Reviewed: 7/1/2016  © 8709-5169 [employer]. [address] [place] [address]. All rights reserved. This information is not intended as a substitute for professional medical care. Always follow your healthcare professional's instructions.       Understanding Cervical Radiculopathy    Cervical radiculopathy is irritation or inflammation of a nerve root in the neck. It causes neck pain and other symptoms that may spread into the chest or down the arm. To understand this condition, it helps to understand the parts of the spine:  Vertebrae. These are bones that stack to form the spine. The cervical spine contains the 7 vertebrae in the neck.  Disks. These are soft pads of tissue between the vertebrae. They act as shock absorbers for the spine.  The spinal canal. This is a tunnel formed within the stacked vertebrae. The spinal cord runs through this canal.  Nerves. These branch off the spinal cord. As they leave the spinal canal, nerves pass through  openings between the vertebrae. The nerve root is the part of the nerve that is closest to the spinal cord.   With cervical radiculopathy, nerve roots in the neck become irritated. This leads to pain and symptoms that can travel to the nerves that go from the spinal cord down the arms and into the torso.  What causes cervical radiculopathy?  Aging, injury, poor posture, and other issues can lead to problems in the neck. These problems may then irritate nerve roots. These include:  Damage to a disk in the cervical spine. The damaged disk may then press on nearby nerve roots.  Degeneration from wear and tear, and aging. This can lead to narrowing (stenosis) of the openings between the vertebrae. The narrowed openings press on nerve roots as they leave the spinal canal.  An unstable spine. This is when a vertebra slips forward. It can then press on a nerve root.  There are other, less common causes of pressure on nerves in the neck. These include infection, cysts, and tumors.  Symptoms of cervical radiculopathy  These include:  Neck pain  Pain, numbness, tingling, or weakness that travels down the arm  Loss of neck movement  Muscle spasms  Treatment for cervical radiculopathy  In most cases, your healthcare provider will first try treatments that help relieve symptoms. These may include:  Prescription or over-the-counter pain medicines. These help relieve pain and swelling.  Cold packs. These help reduce pain.  Resting. This involves avoiding positions and activities that increase pain.  Neck brace (cervical collar). This can help relieve inflammation and pain.  Physical therapy, including exercises and stretches. This can help decrease pain and increase movement and function.  Shots of medicinesaround the nerve roots. This is done to help relieve symptoms for a time.  In some cases, your healthcare provider may advise surgery to fix the underlying problem. This depends on the cause, the symptoms, and how long the pain  has lasted.  Possible complications  Over time, an irritated and inflamed nerve may become damaged. This may lead to long-lasting (permanent) numbness or weakness. If symptoms change suddenly or get worse, be sure to let your healthcare provider know.     When to call your healthcare provider  Call your healthcare provider right away if you have any of these:  New pain or pain that gets worse  New or increasing weakness, numbness, or tingling in your arm or hand  Bowel or bladder changes   Date Last Reviewed: 3/10/2016  © 7858-9601 [employer]. [address] [place] [address]. All rights reserved. This information is not intended as a substitute for professional medical care. Always follow your healthcare professional's instructions.       Exercises: Neck Isometrics  To start, sit in a chair with your feet flat on the floor. Your weight should be slightly forward so that youre balanced evenly on your buttocks. Relax your shoulders and keep your head level. Using a chair with arms may help you keep your balance.       Press your palm against your forehead. Resist with your neck muscles. Hold for 10 seconds. Relax. Repeat 5 times.  Do the exercise again, pressing on the side of your head. Repeat 5 times. Switch sides.  Do the exercise again, pressing on the back of your head. Repeat 5 times.  For your safety, check with your healthcare provider before starting an exercise program.   Date Last Reviewed: 8/16/2015 © 2000-2017 [employer]. [address] [place] [address]. All rights reserved. This information is not intended as a substitute for professional medical care. Always follow your healthcare professional's instructions.

## 2023-04-06 ENCOUNTER — PATIENT MESSAGE (OUTPATIENT)
Dept: INTERNAL MEDICINE | Facility: CLINIC | Age: 68
End: 2023-04-06
Payer: MEDICARE

## 2023-04-06 ENCOUNTER — HOSPITAL ENCOUNTER (OUTPATIENT)
Dept: RADIOLOGY | Facility: HOSPITAL | Age: 68
Discharge: HOME | End: 2023-04-06
Attending: NURSE PRACTITIONER
Payer: MEDICARE

## 2023-04-06 DIAGNOSIS — R20.0 ANESTHESIA OF SKIN: ICD-10-CM

## 2023-04-06 PROCEDURE — 70450 CT HEAD/BRAIN W/O DYE: CPT | Mod: 26,,, | Performed by: RADIOLOGY

## 2023-04-06 PROCEDURE — 70450 CT HEAD/BRAIN W/O DYE: CPT | Mod: TC

## 2023-04-06 PROCEDURE — 70450 CT HEAD WITHOUT CONTRAST: ICD-10-PCS | Mod: 26,,, | Performed by: RADIOLOGY

## 2023-04-10 ENCOUNTER — TELEPHONE (OUTPATIENT)
Dept: PAIN MEDICINE | Facility: CLINIC | Age: 68
End: 2023-04-10
Payer: MEDICARE

## 2023-04-10 ENCOUNTER — PATIENT MESSAGE (OUTPATIENT)
Dept: CARDIOTHORACIC SURGERY | Facility: CLINIC | Age: 68
End: 2023-04-10
Payer: MEDICARE

## 2023-04-10 DIAGNOSIS — I71.40 ABDOMINAL AORTIC ANEURYSM (AAA) WITHOUT RUPTURE, UNSPECIFIED PART: Primary | ICD-10-CM

## 2023-04-11 ENCOUNTER — PATIENT MESSAGE (OUTPATIENT)
Dept: NEPHROLOGY | Facility: CLINIC | Age: 68
End: 2023-04-11
Payer: MEDICARE

## 2023-04-13 ENCOUNTER — PATIENT MESSAGE (OUTPATIENT)
Dept: CARDIOTHORACIC SURGERY | Facility: CLINIC | Age: 68
End: 2023-04-13

## 2023-04-13 ENCOUNTER — OFFICE VISIT (OUTPATIENT)
Dept: CARDIOTHORACIC SURGERY | Facility: CLINIC | Age: 68
End: 2023-04-13
Payer: MEDICARE

## 2023-04-13 DIAGNOSIS — I71.40 ABDOMINAL AORTIC ANEURYSM (AAA) WITHOUT RUPTURE, UNSPECIFIED PART: Primary | ICD-10-CM

## 2023-04-13 PROCEDURE — 99212 OFFICE O/P EST SF 10 MIN: CPT | Mod: 95,,, | Performed by: THORACIC SURGERY (CARDIOTHORACIC VASCULAR SURGERY)

## 2023-04-13 PROCEDURE — 99212 PR OFFICE/OUTPT VISIT, EST, LEVL II, 10-19 MIN: ICD-10-PCS | Mod: 95,,, | Performed by: THORACIC SURGERY (CARDIOTHORACIC VASCULAR SURGERY)

## 2023-04-13 NOTE — PROGRESS NOTES
Subjective:      Patient ID: Deborah White is a 68 y.o. male.    Chief Complaint: No chief complaint on file.    HPI:  68-year-old male with a history of hypertension chronic insufficiency renal and an asymptomatic infrarenal abdominal aortic aneurysm 5.3 cm is on a tele visit for follow-up.  The patient's CT scan is six-month old he does not have any symptoms of abdominal pain and his blood pressure control has been good no new symptoms    Review of patient's allergies indicates:  No Known Allergies    Review of Systems   Constitutional:  Negative for activity change and appetite change.   HENT:  Negative for dental problem, nosebleeds and sore throat.    Eyes:  Negative for discharge and visual disturbance.   Respiratory:  Negative for cough, chest tightness and stridor.    Cardiovascular:  Negative for leg swelling.   Gastrointestinal:  Negative for abdominal distention and abdominal pain.   Genitourinary:  Negative for difficulty urinating and dysuria.   Musculoskeletal:  Negative for arthralgias, back pain and joint swelling.   Allergic/Immunologic: Negative for environmental allergies.   Neurological:  Negative for dizziness, syncope and headaches.   Hematological:  Does not bruise/bleed easily.   Psychiatric/Behavioral:  Negative for behavioral problems.      Detailed physical examination could not be done since this is a tele visit       Objective:   There were no vitals taken for this visit.    CT Head Without Contrast  Narrative: EXAM: CT HEAD WITHOUT CONTRAST    CLINICAL INDICATION:  Numbness tingling paresthesia    TECHNIQUE: Axial and multiplanar 2-D reformations provided  Comparison 06/10/2021    FINDINGS:  No acute intracranial hemorrhage or mass effect.  Chronic findings stable.  No change in ventricular caliber.  No adverse bony finding  Impression:  No acute findings are identified    All CT scans at [this location] are performed using dose modulation techniques as appropriate to a performed exam  including the following: automated exposure control; adjustment of the mA and/or kV according to patient size (this includes techniques or standardized protocols for targeted exams where dose is matched to indication / reason for exam; i.e. extremities or head); use of iterative reconstruction technique.    Finalized on: 4/6/2023 2:16 PM By:  Hilary Resendiz MD  [facility]# [id number]      2023-04-06 14:18:16.913    [facility]         Physical Exam    Assessment:   Infrarenal abdominal aortic aneurysm asymptomatic  Hypertension  Chronic renal insufficiency        Plan   Tight blood pressure control with a systolic and diastolic goal of 130 below and 80 below  Patient will have CT scan of the abdomen and pelvis and follow-up in a month's time with a tele visit.          Lenore Julien MD  [facility]  [place]

## 2023-04-19 NOTE — PRE-PROCEDURE INSTRUCTIONS
Spoke with patient regarding procedure scheduled on 4.28     Arrival time 0800     Has patient been sick with fever or on antibiotics within the last 7 days? No     Does the patient have any open wounds, sores or rashes? No     Does the patient have any recent fractures? no     Has patient received a vaccination within the last 7 days? No     Received the COVID vaccination?      Has the patient stopped all medications as directed? Asa 5 days     Does patient have a pacemaker and or defibrillator? no     Does the patient have a ride to and from procedure and someone reliable to remain with patient? daughter     Is the patient diabetic? no     Does the patient have sleep apnea? Or use O2 at home? no     Is the patient receiving sedation?      Is the patient instructed to remain NPO beginning at midnight the night before their procedure? yes     Procedure location confirmed with patient? Yes     Covid- Denies signs/symptoms. Instructed to notify PAT/MD if any changes.

## 2023-04-24 ENCOUNTER — LAB VISIT (OUTPATIENT)
Dept: LAB | Facility: HOSPITAL | Age: 68
End: 2023-04-24
Attending: INTERNAL MEDICINE
Payer: MEDICARE

## 2023-04-24 DIAGNOSIS — I10 HYPERTENSION, ESSENTIAL: ICD-10-CM

## 2023-04-24 LAB
ALBUMIN SERPL-MCNC: 3.9 G/DL (ref 3.4–4.8)
APPEARANCE UR: CLEAR
BACTERIA #/AREA URNS AUTO: ABNORMAL /HPF
BASOPHILS # BLD AUTO: 0.08 X10(3)/MCL (ref 0–0.2)
BASOPHILS NFR BLD AUTO: 0.9 %
BILIRUB UR QL STRIP.AUTO: NEGATIVE MG/DL
BUN SERPL-MCNC: 17 MG/DL (ref 8.4–25.7)
CALCIUM SERPL-MCNC: 9.7 MG/DL (ref 8.8–10)
CHLORIDE SERPL-SCNC: 106 MMOL/L (ref 98–107)
CO2 SERPL-SCNC: 25 MMOL/L (ref 23–31)
COLOR UR AUTO: YELLOW
CREAT SERPL-MCNC: 1.28 MG/DL (ref 0.73–1.18)
CREAT UR-MCNC: 100.8 MG/DL (ref 63–166)
EOSINOPHIL # BLD AUTO: 0.1 X10(3)/MCL (ref 0–0.9)
EOSINOPHIL NFR BLD AUTO: 1.1 %
ERYTHROCYTE [DISTWIDTH] IN BLOOD BY AUTOMATED COUNT: 13.7 % (ref 11.5–17)
GFR SERPLBLD CREATININE-BSD FMLA CKD-EPI: >60 MLS/MIN/1.73/M2
GLUCOSE SERPL-MCNC: 110 MG/DL (ref 82–115)
GLUCOSE UR QL STRIP.AUTO: NEGATIVE MG/DL
HCT VFR BLD AUTO: 39.6 % (ref 42–52)
HGB BLD-MCNC: 12.6 G/DL (ref 14–18)
IMM GRANULOCYTES # BLD AUTO: 0.1 X10(3)/MCL (ref 0–0.04)
IMM GRANULOCYTES NFR BLD AUTO: 1.1 %
KETONES UR QL STRIP.AUTO: NEGATIVE MG/DL
LEUKOCYTE ESTERASE UR QL STRIP.AUTO: NEGATIVE UNIT/L
LYMPHOCYTES # BLD AUTO: 2.35 X10(3)/MCL (ref 0.6–4.6)
LYMPHOCYTES NFR BLD AUTO: 26.7 %
MCH RBC QN AUTO: 29.2 PG (ref 27–31)
MCHC RBC AUTO-ENTMCNC: 31.8 G/DL (ref 33–36)
MCV RBC AUTO: 91.7 FL (ref 80–94)
MONOCYTES # BLD AUTO: 1.1 X10(3)/MCL (ref 0.1–1.3)
MONOCYTES NFR BLD AUTO: 12.5 %
NEUTROPHILS # BLD AUTO: 5.07 X10(3)/MCL (ref 2.1–9.2)
NEUTROPHILS NFR BLD AUTO: 57.7 %
NITRITE UR QL STRIP.AUTO: NEGATIVE
PH UR STRIP.AUTO: 5 [PH]
PHOSPHATE SERPL-MCNC: 3.4 MG/DL (ref 2.3–4.7)
PLATELET # BLD AUTO: 308 X10(3)/MCL (ref 130–400)
PMV BLD AUTO: 9.3 FL (ref 7.4–10.4)
POTASSIUM SERPL-SCNC: 3.9 MMOL/L (ref 3.5–5.1)
PROT UR QL STRIP.AUTO: ABNORMAL MG/DL
PROT UR STRIP-MCNC: 56.7 MG/DL
RBC # BLD AUTO: 4.32 X10(6)/MCL (ref 4.7–6.1)
RBC #/AREA URNS AUTO: ABNORMAL /HPF
RBC UR QL AUTO: NEGATIVE UNIT/L
SODIUM SERPL-SCNC: 142 MMOL/L (ref 136–145)
SP GR UR STRIP.AUTO: 1.02
SQUAMOUS #/AREA URNS AUTO: ABNORMAL /HPF
URINE PROTEIN/CREATININE RATIO (OHS): 0.6
UROBILINOGEN UR STRIP-ACNC: 0.2 MG/DL
WBC # SPEC AUTO: 8.8 X10(3)/MCL (ref 4.5–11.5)
WBC #/AREA URNS AUTO: ABNORMAL /HPF

## 2023-04-24 PROCEDURE — 82570 ASSAY OF URINE CREATININE: CPT

## 2023-04-24 PROCEDURE — 80069 RENAL FUNCTION PANEL: CPT

## 2023-04-24 PROCEDURE — 81001 URINALYSIS AUTO W/SCOPE: CPT

## 2023-04-24 PROCEDURE — 36415 COLL VENOUS BLD VENIPUNCTURE: CPT

## 2023-04-24 PROCEDURE — 85025 COMPLETE CBC W/AUTO DIFF WBC: CPT

## 2023-04-27 ENCOUNTER — PATIENT MESSAGE (OUTPATIENT)
Dept: CARDIOTHORACIC SURGERY | Facility: CLINIC | Age: 68
End: 2023-04-27
Payer: MEDICARE

## 2023-04-27 ENCOUNTER — HOSPITAL ENCOUNTER (OUTPATIENT)
Dept: RADIOLOGY | Facility: HOSPITAL | Age: 68
Discharge: HOME | End: 2023-04-27
Attending: THORACIC SURGERY (CARDIOTHORACIC VASCULAR SURGERY)
Payer: MEDICARE

## 2023-04-27 ENCOUNTER — OFFICE VISIT (OUTPATIENT)
Dept: NEPHROLOGY | Facility: CLINIC | Age: 68
End: 2023-04-27
Payer: MEDICARE

## 2023-04-27 VITALS
HEART RATE: 61 BPM | DIASTOLIC BLOOD PRESSURE: 66 MMHG | WEIGHT: 166 LBS | RESPIRATION RATE: 18 BRPM | HEIGHT: 65 IN | BODY MASS INDEX: 27.66 KG/M2 | SYSTOLIC BLOOD PRESSURE: 138 MMHG

## 2023-04-27 DIAGNOSIS — N18.30 STAGE 3 CHRONIC KIDNEY DISEASE, UNSPECIFIED WHETHER STAGE 3A OR 3B CKD: Primary | ICD-10-CM

## 2023-04-27 DIAGNOSIS — I71.40 ABDOMINAL AORTIC ANEURYSM (AAA) WITHOUT RUPTURE, UNSPECIFIED PART: ICD-10-CM

## 2023-04-27 PROCEDURE — 74176 CT ABDOMEN PELVIS WITHOUT CONTRAST: ICD-10-PCS | Mod: 26,,, | Performed by: RADIOLOGY

## 2023-04-27 PROCEDURE — 99213 OFFICE O/P EST LOW 20 MIN: CPT | Mod: PBBFAC,25 | Performed by: INTERNAL MEDICINE

## 2023-04-27 PROCEDURE — 99999 PR PBB SHADOW E&M-EST. PATIENT-LVL III: ICD-10-PCS | Mod: PBBFAC,,, | Performed by: INTERNAL MEDICINE

## 2023-04-27 PROCEDURE — 99205 PR OFFICE/OUTPT VISIT, NEW, LEVL V, 60-74 MIN: ICD-10-PCS | Mod: S$PBB,,, | Performed by: INTERNAL MEDICINE

## 2023-04-27 PROCEDURE — 74176 CT ABD & PELVIS W/O CONTRAST: CPT | Mod: TC

## 2023-04-27 PROCEDURE — 99205 OFFICE O/P NEW HI 60 MIN: CPT | Mod: S$PBB,,, | Performed by: INTERNAL MEDICINE

## 2023-04-27 PROCEDURE — 99999 PR PBB SHADOW E&M-EST. PATIENT-LVL III: CPT | Mod: PBBFAC,,, | Performed by: INTERNAL MEDICINE

## 2023-04-27 PROCEDURE — 74176 CT ABD & PELVIS W/O CONTRAST: CPT | Mod: 26,,, | Performed by: RADIOLOGY

## 2023-04-27 NOTE — PROGRESS NOTES
Deborah White is a 68 y.o. male for whom nephrology consult has been requested to evaluate and give opinion.   Renal clinic consult note:  Date of consult: 4/27/23  Reason for consult: abnormal Cr. CKD  Referring physician: Dr. Lofton    HPI: Thank you for referring the pt to us. H/o and chart were reviewed. Pt was seen and examined. Pt is a 69 y/o  male with PMH of HTN who was referred to renal clinic for Cr 1.2. Pt presented with his daughter. Pt requested Cantonese translation, which was provided by [employer]. Pt has no new or active c/o's, no discomfort. Labs were reviewed with pt. S Cr is currently close to 1.3, has been stable in the past 4 years (up to 1.5). Noted Cr was 0.9 five years ago. Noted u/a shows some proteinuria, o/w bland u/a. Pt is an active smoker, 1/2 ppd, previously a heavier smoker.    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    SOCIAL HISTORY:  He  reports that he has been smoking cigarettes. He has never used smokeless tobacco. He reports that he does not drink alcohol.    FAMILY MEDICAL HISTORY:  His family history includes Cancer in his mother.    Review of patient's allergies indicates:  No Known Allergies        Prior to Admission medications    Medication Sig Start Date End Date Taking? Authorizing Provider   amLODIPine (NORVASC) 5 MG tablet Take 1 tablet (5 mg total) by mouth once daily. 11/8/22 11/8/23 Yes Kate Lofton MD   aspirin (ECOTRIN) 81 MG EC tablet Take 81 mg by mouth once daily.   Yes Historical Provider   benazepriL (LOTENSIN) 40 MG tablet Take 1 tablet (40 mg total) by mouth once daily. 11/8/22  Yes Kate Lofton MD   cilostazoL (PLETAL) 50 MG Tab Take 1 tablet (50 mg total) by mouth 2 (two) times daily. 12/31/22  Yes Naeem Aguilar MD   cloNIDine  "(CATAPRES) 0.2 MG tablet Take 1 tablet (0.2 mg total) by mouth 2 (two) times daily. 4/11/23  Yes Naeem Aguilar MD   ezetimibe (ZETIA) 10 mg tablet Take 1 tablet (10 mg total) by mouth once daily. 11/8/22  Yes Kate Lofton MD   gabapentin (NEURONTIN) 300 MG capsule Take 1 capsule (300 mg total) by mouth daily with breakfast AND 2 capsules (600 mg total) every evening. 4/5/23 5/5/23 Yes Rudolph Burgos MD   hydroCHLOROthiazide (HYDRODIURIL) 25 MG tablet Take 1 tablet (25 mg total) by mouth once daily. 11/8/22 11/8/23 Yes Kate Lofton MD   linaCLOtide (LINZESS) 72 mcg Cap capsule Take 1 capsule (72 mcg total) by mouth before breakfast. 3/3/23  Yes Kate Lofton MD   rosuvastatin (CRESTOR) 20 MG tablet Take 1 tablet (20 mg total) by mouth once daily. 11/8/22  Yes Kate Lofton MD   tofacitinib (XELJANZ) 5 mg Tab Take 5 mg by mouth once daily. 4/5/23  Yes Nati Cisse PA-C   sildenafil (REVATIO) 20 mg Tab Take 1 tablet (20 mg total) by mouth daily as needed (pulm htn).  Patient not taking: Reported on 4/27/2023 8/16/22 8/16/23  Kate Lofton MD        REVIEW OF SYSTEMS:  Patient has no fever, fatigue, visual changes, chest pain, edema, cough, dyspnea, nausea, vomiting, constipation, diarrhea, arthralgias, pruritis, dizziness, weakness, depression, confusion.    PHYSICAL EXAM:   height is 5' 5" (1.651 m) and weight is 75.3 kg (166 lb 0.1 oz). His blood pressure is 138/66 and his pulse is 61. His respiration is 18.   Gen: WDWN male in no apparent distress  Psych: Normal mood and affect  Skin: No rashes or ulcers  Neck: No JVD  Chest: Clear with no rales, rhonchi, wheezing with normal effort  CV: Regular with no murmurs, gallops or rubs  Abd: Soft, nontender, no distension  Ext: No edema    Labs reviewed  BMP  Lab Results   Component Value Date     04/24/2023    K 3.9 04/24/2023     12/23/2022    CO2 25 04/24/2023    BUN 17.0 04/24/2023    CREATININE 1.28 (H) 04/24/2023    CALCIUM 9.7 04/24/2023 "    ANIONGAP 12 12/23/2022    EGFRNORACEVR >60 04/24/2023     Lab Results   Component Value Date    WBC 8.8 04/24/2023    HGB 12.6 (L) 04/24/2023    HCT 39.6 (L) 04/24/2023    MCV 91.7 04/24/2023     04/24/2023       U/a: 2+ protein, no blood, no RBC's, no casts  Urine p/Cr 600 mg      IMPRESSION AND RECOMMENDATIONS: 69 y/o male with h/o of HTN and active smoking presented with evaluation of abnormal Cr:    Renal: Cr is currently within normal range. Noted mild fluctuations in the past 4 years  Cr fluctuations likely related to hemodynamic factors, fluids, taking diuretics (HCTZ)  Has mild borderline low K and mild metabolic alkalosis c/w HCTZ use  Mild CKD likely due to h/o of HTN and smoking    Proteinuria, mild. Noted.  No hematuria  No casts reported  O/w bland urine  The cause of proteinuria is not clear  May be raled to long standing smoking h/o (pt said he used to smoke 2 ppd).  Smoking can cause nodular glomerulonephrosclerosis  Elective kidney biopsy may be done    2. HTN: BP controlled  Meds reviewed    3. Life style, general healthy:  Reviewed with pt, advised healthy life style  Advised to quit smoking  Eat healthy, avoid salty foods    4. Medical chart reviewed.  Noted h/o of latent TB  H/o of ankylosing spondylitis    Plans and recommendations:  As discussed above  Total time spent 60 minutes including time needed to review the records, the   patient evaluation, documentation, face-to-face discussion with the patient,   more than 50% of the time was spent on coordination of care and counseling.    Level V visit.  RTC 1 year for f/u and repeat labs.    Shola Cordoba MD

## 2023-04-28 ENCOUNTER — HOSPITAL ENCOUNTER (OUTPATIENT)
Facility: HOSPITAL | Age: 68
Discharge: HOME | End: 2023-04-28
Attending: ANESTHESIOLOGY | Admitting: ANESTHESIOLOGY
Payer: MEDICARE

## 2023-04-28 ENCOUNTER — PATIENT MESSAGE (OUTPATIENT)
Dept: INTERNAL MEDICINE | Facility: CLINIC | Age: 68
End: 2023-04-28
Payer: MEDICARE

## 2023-04-28 VITALS
TEMPERATURE: 98 F | SYSTOLIC BLOOD PRESSURE: 158 MMHG | HEART RATE: 61 BPM | WEIGHT: 165.13 LBS | DIASTOLIC BLOOD PRESSURE: 74 MMHG | OXYGEN SATURATION: 99 % | BODY MASS INDEX: 27.51 KG/M2 | RESPIRATION RATE: 17 BRPM | HEIGHT: 65 IN

## 2023-04-28 DIAGNOSIS — M47.812 CERVICAL SPONDYLOSIS: ICD-10-CM

## 2023-04-28 PROCEDURE — 64491 INJ PARAVERT F JNT C/T 2 LEV: CPT | Mod: 50,,, | Performed by: ANESTHESIOLOGY

## 2023-04-28 PROCEDURE — 64490 PR INJ DX/THER AGNT PARAVERT FACET JOINT,IMG GUIDE,CERV/THORAC, 1ST LEVEL: ICD-10-PCS | Mod: 50,,, | Performed by: ANESTHESIOLOGY

## 2023-04-28 PROCEDURE — 63600175 PHARM REV CODE 636 W HCPCS: Performed by: ANESTHESIOLOGY

## 2023-04-28 PROCEDURE — 64490 INJ PARAVERT F JNT C/T 1 LEV: CPT | Mod: 50,,, | Performed by: ANESTHESIOLOGY

## 2023-04-28 PROCEDURE — 64490 INJ PARAVERT F JNT C/T 1 LEV: CPT | Mod: RT | Performed by: ANESTHESIOLOGY

## 2023-04-28 PROCEDURE — 64491 INJ PARAVERT F JNT C/T 2 LEV: CPT | Mod: RT | Performed by: ANESTHESIOLOGY

## 2023-04-28 PROCEDURE — 25000003 PHARM REV CODE 250: Performed by: ANESTHESIOLOGY

## 2023-04-28 PROCEDURE — 64491 PR INJ DX/THER AGNT PARAVERT FACET JOINT,IMG GUIDE,CERV/THORAC, 2ND LEVEL: ICD-10-PCS | Mod: 50,,, | Performed by: ANESTHESIOLOGY

## 2023-04-28 PROCEDURE — 99152 MOD SED SAME PHYS/QHP 5/>YRS: CPT | Performed by: ANESTHESIOLOGY

## 2023-04-28 RX ORDER — MIDAZOLAM HYDROCHLORIDE 1 MG/ML
INJECTION, SOLUTION INTRAMUSCULAR; INTRAVENOUS
Status: DISCONTINUED | OUTPATIENT
Start: 2023-04-28 | End: 2023-04-28 | Stop reason: HOSPADM

## 2023-04-28 RX ORDER — BUPIVACAINE HYDROCHLORIDE 5 MG/ML
INJECTION, SOLUTION EPIDURAL; INTRACAUDAL
Status: DISCONTINUED | OUTPATIENT
Start: 2023-04-28 | End: 2023-04-28 | Stop reason: HOSPADM

## 2023-04-28 RX ORDER — AMLODIPINE BESYLATE 10 MG/1
10 TABLET ORAL DAILY
Qty: 30 TABLET | Refills: 11 | Status: SHIPPED | OUTPATIENT
Start: 2023-04-28 | End: 2024-04-27

## 2023-04-28 RX ORDER — DEXAMETHASONE SODIUM PHOSPHATE 10 MG/ML
INJECTION INTRAMUSCULAR; INTRAVENOUS
Status: DISCONTINUED | OUTPATIENT
Start: 2023-04-28 | End: 2023-04-28 | Stop reason: HOSPADM

## 2023-04-28 RX ORDER — INDOMETHACIN 25 MG/1
CAPSULE ORAL
Status: DISCONTINUED | OUTPATIENT
Start: 2023-04-28 | End: 2023-04-28 | Stop reason: HOSPADM

## 2023-04-28 RX ORDER — FENTANYL CITRATE 50 UG/ML
INJECTION, SOLUTION INTRAMUSCULAR; INTRAVENOUS
Status: DISCONTINUED | OUTPATIENT
Start: 2023-04-28 | End: 2023-04-28 | Stop reason: HOSPADM

## 2023-04-28 NOTE — OP NOTE
CERVICAL Medial Branch Block Under Fluoroscopy  Time-out taken to identify patient and procedure side prior to starting the procedure.        Date of Procedure: 04/28/2023                                                      PROCEDURE:  bilateral medial branch block at the transverse processes of C4, C5, C6    Pre Procedure Diagnosis:  Cervical spondylosis [M47.812]  Post Procedure Diagnosis: Same    PHYSICIAN: Rudolph Burgos MD    ASSISTANTS: None    Anesthesia:   Conscious sedation provided by M.D    The patient was monitored with continuous pulse oximetry, EKG, and intermittent blood pressure monitors.  The patient was hemodynamically stable throughout the entire process was responsive to voice, and breathing spontaneously.  Supplemental O2 was provided at 2L/min via nasal cannula.  Patient was comfortable for the duration of the procedure. (See nurse documentation and case log for sedation time)    There was a total of 2mg IV Midazolam and 50mcg Fentanyl titrated for the procedure      MEDICATIONS INJECTED:  0.25% Bupivicaine total 1.5mL  LOCAL ANESTHETIC USED:   Xylocaine 1% 1mL / site    SEDATION MEDICATIONS: None    ESTIMATED BLOOD LOSS:  None.    COMPLICATIONS:  None.    TECHNIQUE:   Laying in a bilateral lateral decubitus  position, the patient was prepped and draped in the usual sterile fashion using ChloraPrep and fenestrated drape.  The level was determined under fluoroscopic guidance.  Local anesthetic was given by going down to the hub of the 27-gauge 1.25in needle and raising a wheel.  A 22-gauge 3.5inch needle was introduced to the anatomic local of the medial branch at each of the stated above levels using fluoroscopy. Then after negative aspiration, a total of 1.5 cc of .25% bupivacaine and 10 mg dexamethasone was injected in divided doses at the three levels. The patient tolerated the procedure well.     The patient was monitored after the procedure.  Patient was given post procedure and discharge  instructions to follow at home.  We will see the patient back in two weeks or the patient may call to inform of status. The patient was discharged in a stable condition.    Rudolph Burgos

## 2023-04-28 NOTE — DISCHARGE SUMMARY
Discharge Note  Short Stay      SUMMARY     Admit Date: 4/28/2023    Attending Physician: Rudolph Burgos MD        Discharge Physician: Rudolph Burgos MD        Discharge Date: 4/28/2023 9:20 AM    Procedure(s) (LRB):  Bilateral C4-6 MBB with RN IV sedation (Bilateral)    Final Diagnosis: Cervical spondylosis [M47.812]    Disposition: Home or self care    Patient Instructions:   Current Discharge Medication List        CONTINUE these medications which have NOT CHANGED    Details   amLODIPine (NORVASC) 5 MG tablet Take 1 tablet (5 mg total) by mouth once daily.  Qty: 90 tablet, Refills: 1    Comments: .  Associated Diagnoses: Hypertension, essential      benazepriL (LOTENSIN) 40 MG tablet Take 1 tablet (40 mg total) by mouth once daily.  Qty: 90 tablet, Refills: 1    Comments: .  Associated Diagnoses: Hypertension, essential      cilostazoL (PLETAL) 50 MG Tab Take 1 tablet (50 mg total) by mouth 2 (two) times daily.  Qty: 60 tablet, Refills: 9    Associated Diagnoses: Hyperlipidemia, unspecified hyperlipidemia type      cloNIDine (CATAPRES) 0.2 MG tablet Take 1 tablet (0.2 mg total) by mouth 2 (two) times daily.  Qty: 180 tablet, Refills: 3      gabapentin (NEURONTIN) 300 MG capsule Take 1 capsule (300 mg total) by mouth daily with breakfast AND 2 capsules (600 mg total) every evening.  Qty: 90 capsule, Refills: 2    Associated Diagnoses: Cervical radiculopathy      hydroCHLOROthiazide (HYDRODIURIL) 25 MG tablet Take 1 tablet (25 mg total) by mouth once daily.  Qty: 90 tablet, Refills: 1    Comments: .  Associated Diagnoses: Hypertension, essential      rosuvastatin (CRESTOR) 20 MG tablet Take 1 tablet (20 mg total) by mouth once daily.  Qty: 90 tablet, Refills: 1    Associated Diagnoses: Hyperlipidemia, unspecified hyperlipidemia type      aspirin (ECOTRIN) 81 MG EC tablet Take 81 mg by mouth once daily.      ezetimibe (ZETIA) 10 mg tablet Take 1 tablet (10 mg total) by mouth once daily.  Qty: 90 tablet, Refills: 1     Associated Diagnoses: Hyperlipidemia, unspecified hyperlipidemia type      linaCLOtide (LINZESS) 72 mcg Cap capsule Take 1 capsule (72 mcg total) by mouth before breakfast.  Qty: 30 capsule, Refills: 3    Associated Diagnoses: Constipation, unspecified constipation type      sildenafil (REVATIO) 20 mg Tab Take 1 tablet (20 mg total) by mouth daily as needed (pulm htn).  Qty: 90 tablet, Refills: 0    Associated Diagnoses: Pulmonary HTN      tofacitinib (XELJANZ) 5 mg Tab Take 5 mg by mouth once daily.  Qty: 30 tablet, Refills: 11    Comments: Could not tolerate TNF alpha inhibitor- Remicade.  Associated Diagnoses: Ankylosing spondylitis of multiple sites in spine                 Discharge Diagnosis: Cervical spondylosis [M47.812]  Condition on Discharge: Stable with no complications to procedure   Diet on Discharge: Same as before.  Activity: as per instruction sheet.  Discharge to: Home with a responsible adult.  Follow up: 2-4 weeks       Please call the office at [phone] if you experience any weakness or loss of sensation, fever > 101.5, pain uncontrolled with oral medications, persistent nausea/vomiting/or diarrhea, redness or drainage from the incisions, or any other worrisome concerns. If physician on call was not reached or could not communicate with our office for any reason please go to the nearest emergency department

## 2023-04-28 NOTE — DISCHARGE INSTRUCTIONS

## 2023-05-03 ENCOUNTER — SPECIALTY PHARMACY (OUTPATIENT)
Dept: PHARMACY | Facility: CLINIC | Age: 68
End: 2023-05-03
Payer: MEDICARE

## 2023-05-03 NOTE — TELEPHONE ENCOUNTER
Specialty Pharmacy - Refill Coordination    Specialty Medication Orders Linked to Encounter      Flowsheet Row Most Recent Value   Medication #1 tofacitinib (XELJANZ) 5 mg Tab (Order#[id number], Rx#[id number])            Refill Questions - Documented Responses      Flowsheet Row Most Recent Value   Patient Availability and HIPAA Verification    Does patient want to proceed with activity? Yes   HIPAA/medical authority confirmed? Yes   Relationship to patient of person spoken to? Child   Refill Screening Questions    Changes to allergies? No   Changes to medications? No   New conditions since last clinic visit? No   Unplanned office visit, urgent care, ED, or hospital admission in the last 4 weeks? No   How does patient/caregiver feel medication is working? Good   Financial problems or insurance changes? No   How many doses of your specialty medications were missed in the last 4 weeks? 0   Would patient like to speak to a pharmacist? No   When does the patient need to receive the medication? 05/07/23   Refill Delivery Questions    How will the patient receive the medication? [facility]   When does the patient need to receive the medication? 05/07/23   Shipping Address Prescription   Address in [facility] confirmed and updated if neccessary? Yes   Expected Copay ($) 0   Is the patient able to afford the medication copay? Yes   Payment Method zero copay   Days supply of Refill 30   Supplies needed? No supplies needed   Refill activity completed? Yes   Refill activity plan Refill scheduled   Shipment/Pickup Date: 05/04/23            Current Outpatient Medications   Medication Sig    amLODIPine (NORVASC) 10 MG tablet Take 1 tablet (10 mg total) by mouth once daily.    aspirin (ECOTRIN) 81 MG EC tablet Take 81 mg by mouth once daily.    benazepriL (LOTENSIN) 40 MG tablet Take 1 tablet (40 mg total) by mouth once daily.    cilostazoL (PLETAL) 50 MG Tab Take 1 tablet (50 mg total) by mouth 2 (two) times daily.    cloNIDine  (CATAPRES) 0.2 MG tablet Take 1 tablet (0.2 mg total) by mouth 2 (two) times daily.    ezetimibe (ZETIA) 10 mg tablet Take 1 tablet (10 mg total) by mouth once daily.    gabapentin (NEURONTIN) 300 MG capsule Take 1 capsule (300 mg total) by mouth daily with breakfast AND 2 capsules (600 mg total) every evening.    hydroCHLOROthiazide (HYDRODIURIL) 25 MG tablet Take 1 tablet (25 mg total) by mouth once daily.    linaCLOtide (LINZESS) 72 mcg Cap capsule Take 1 capsule (72 mcg total) by mouth before breakfast.    rosuvastatin (CRESTOR) 20 MG tablet Take 1 tablet (20 mg total) by mouth once daily.    sildenafil (REVATIO) 20 mg Tab Take 1 tablet (20 mg total) by mouth daily as needed (pulm htn). (Patient not taking: Reported on 4/27/2023)    tofacitinib (XELJANZ) 5 mg Tab Take 5 mg by mouth once daily.   Last reviewed on 4/28/2023  8:17 AM by Landy Bucio RN    Review of patient's allergies indicates:  No Known Allergies Last reviewed on  4/28/2023 8:16 AM by Landy Bucio      Tasks added this encounter   No tasks added.   Tasks due within next 3 months   5/3/2023 - Refill Coordination Outreach (1 time occurrence)     [facility]  [address] [place], [place] [address]  [facility] [address]  Phone: [phone]  Fax: [phone]

## 2023-05-05 ENCOUNTER — TELEPHONE (OUTPATIENT)
Dept: VASCULAR SURGERY | Facility: CLINIC | Age: 68
End: 2023-05-05
Payer: MEDICARE

## 2023-05-05 DIAGNOSIS — I71.43 INFRARENAL ABDOMINAL AORTIC ANEURYSM (AAA) WITHOUT RUPTURE: ICD-10-CM

## 2023-05-05 DIAGNOSIS — Z01.818 ENCOUNTER FOR OTHER PREPROCEDURAL EXAMINATION: Primary | ICD-10-CM

## 2023-05-05 NOTE — TELEPHONE ENCOUNTER
Contacted pt's daughter Lea who states she assists pt with appointments and healthcare as father speaks only Cantonese. After discussing referral and recent labs with daughter nurse sent message to Dr. Denton to discuss elevated creatinine and GFR and CTA order. Notified daughter that nurse will contact her with Dr. Denton's order. Daughter verbalized understanding.----- Message from Caesar Denton MD sent at 5/1/2023 10:26 AM CDT -----  Regarding: FW: AAA  Needs cta and appt  ----- Message -----  From: Lenore Julien MD  Sent: 4/28/2023   1:33 PM CDT  To: Rina Torres MA, Caesar Denton MD  Subject: AAA                                              Dr Denton,  I am referring you this patient with 5.5 cms infra renal AAA for furthur management. His aneurysm is slowly grown over the past 6 months.    Thank you very much  [facility] [place]

## 2023-05-15 DIAGNOSIS — E78.5 HYPERLIPIDEMIA, UNSPECIFIED HYPERLIPIDEMIA TYPE: ICD-10-CM

## 2023-05-15 RX ORDER — EZETIMIBE 10 MG/1
10 TABLET ORAL DAILY
Qty: 90 TABLET | Refills: 0 | Status: SHIPPED | OUTPATIENT
Start: 2023-05-15 | End: 2023-08-15 | Stop reason: SDUPTHER

## 2023-05-15 NOTE — TELEPHONE ENCOUNTER
Refill Decision Note   Deborah White  is requesting a refill authorization.  Brief Assessment and Rationale for Refill:  Approve     Medication Therapy Plan:       Medication Reconciliation Completed: No   Comments:     Provider Staff:     Action is required for this patient.   Please see care gap opportunities below in Care Due Message.     Thanks!  [facility]     Appointments      Date Provider   Last Visit   6/24/2022 Kate Lofton MD   Next Visit   6/9/2023 Kate Lofton MD     Note composed:8:52 AM 05/15/2023           Note composed:8:52 AM 05/15/2023

## 2023-05-15 NOTE — TELEPHONE ENCOUNTER
Care Due:                  Date            Visit Type   Department     Provider  --------------------------------------------------------------------------------                                MYCHART                              ANNUAL                              CHECKUP/PHY  [facility] INTER[facility]  Last Visit: 06-      S            MEDICINE       Kate  Lofton                              MYCHART                              ANNUAL                              CHECKUP/PHY  [facility] INTER[facility]  Next Visit: 06-      S            MEDICINE       Kate  Lofton                                                            Last  Test          Frequency    Reason                     Performed    Due Date  --------------------------------------------------------------------------------    Lipid Panel.  12 months..  ezetimibe, rosuvastatin..  07- 07-    [facility] Embedded Care Due Messages. Reference number: [id number].   5/15/2023 6:42:08 AM CDT

## 2023-05-21 DIAGNOSIS — E78.5 HYPERLIPIDEMIA, UNSPECIFIED HYPERLIPIDEMIA TYPE: ICD-10-CM

## 2023-05-21 DIAGNOSIS — I10 HYPERTENSION, ESSENTIAL: ICD-10-CM

## 2023-05-22 DIAGNOSIS — E78.5 HYPERLIPIDEMIA, UNSPECIFIED HYPERLIPIDEMIA TYPE: ICD-10-CM

## 2023-05-22 RX ORDER — HYDROCHLOROTHIAZIDE 25 MG/1
25 TABLET ORAL DAILY
Qty: 90 TABLET | Refills: 1 | Status: SHIPPED | OUTPATIENT
Start: 2023-05-22 | End: 2023-11-10 | Stop reason: SDUPTHER

## 2023-05-22 RX ORDER — BENAZEPRIL HYDROCHLORIDE 40 MG/1
40 TABLET ORAL DAILY
Qty: 90 TABLET | Refills: 1 | Status: SHIPPED | OUTPATIENT
Start: 2023-05-22 | End: 2023-11-10 | Stop reason: SDUPTHER

## 2023-05-22 RX ORDER — ROSUVASTATIN CALCIUM 20 MG/1
20 TABLET, COATED ORAL DAILY
Qty: 90 TABLET | Refills: 1 | Status: SHIPPED | OUTPATIENT
Start: 2023-05-22 | End: 2023-08-15 | Stop reason: SDUPTHER

## 2023-05-22 NOTE — TELEPHONE ENCOUNTER
No care due was identified.  [facility] Embedded Care Due Messages. Reference number: [id number].   5/21/2023 7:55:00 PM CDT

## 2023-05-22 NOTE — TELEPHONE ENCOUNTER
No care due was identified.  [facility] Embedded Care Due Messages. Reference number: [id number].   5/22/2023 5:41:32 PM CDT

## 2023-05-23 RX ORDER — ROSUVASTATIN CALCIUM 20 MG/1
TABLET, COATED ORAL
Qty: 90 TABLET | Refills: 0 | OUTPATIENT
Start: 2023-05-23

## 2023-05-23 NOTE — TELEPHONE ENCOUNTER
Quick DC. Request already responded to by other means (e.g. phone or fax)   Refill Authorization Note   Deborah White  is requesting a refill authorization.  Brief Assessment and Rationale for Refill:  Quick Discontinue  Medication Therapy Plan:  Receipt confirmed by pharmacy (5/22/2023  8:42 PM CDT)    Medication Reconciliation Completed:  No      Comments:     Note composed:4:07 AM 05/23/2023

## 2023-05-29 ENCOUNTER — SPECIALTY PHARMACY (OUTPATIENT)
Dept: PHARMACY | Facility: CLINIC | Age: 68
End: 2023-05-29
Payer: MEDICARE

## 2023-05-29 NOTE — TELEPHONE ENCOUNTER
Specialty Pharmacy - Refill Coordination    Specialty Medication Orders Linked to Encounter      Flowsheet Row Most Recent Value   Medication #1 tofacitinib (XELJANZ) 5 mg Tab (Order#[id number], Rx#[id number])            Refill Questions - Documented Responses      Flowsheet Row Most Recent Value   Patient Availability and HIPAA Verification    Does patient want to proceed with activity? Yes   HIPAA/medical authority confirmed? Yes   Relationship to patient of person spoken to? Child   Refill Screening Questions    Changes to allergies? No   Changes to medications? No   New conditions since last clinic visit? No   Unplanned office visit, urgent care, ED, or hospital admission in the last 4 weeks? No   How does patient/caregiver feel medication is working? Good   Financial problems or insurance changes? No   How many doses of your specialty medications were missed in the last 4 weeks? 0   Would patient like to speak to a pharmacist? No   When does the patient need to receive the medication? 06/04/23   Refill Delivery Questions    How will the patient receive the medication? [facility]   When does the patient need to receive the medication? 06/04/23   Shipping Address Home   Address in [facility] confirmed and updated if neccessary? Yes   Expected Copay ($) 0   Is the patient able to afford the medication copay? Yes   Payment Method zero copay   Days supply of Refill 30   Supplies needed? No supplies needed   Refill activity completed? Yes   Refill activity plan Refill scheduled   Shipment/Pickup Date: 05/31/23            Current Outpatient Medications   Medication Sig    amLODIPine (NORVASC) 10 MG tablet Take 1 tablet (10 mg total) by mouth once daily.    aspirin (ECOTRIN) 81 MG EC tablet Take 81 mg by mouth once daily.    benazepriL (LOTENSIN) 40 MG tablet Take 1 tablet (40 mg total) by mouth once daily.    cilostazoL (PLETAL) 50 MG Tab Take 1 tablet (50 mg total) by mouth 2 (two) times daily.    cloNIDine  (CATAPRES) 0.2 MG tablet Take 1 tablet (0.2 mg total) by mouth 2 (two) times daily.    ezetimibe (ZETIA) 10 mg tablet Take 1 tablet (10 mg total) by mouth once daily.    gabapentin (NEURONTIN) 300 MG capsule Take 1 capsule (300 mg total) by mouth daily with breakfast AND 2 capsules (600 mg total) every evening.    hydroCHLOROthiazide (HYDRODIURIL) 25 MG tablet Take 1 tablet (25 mg total) by mouth once daily.    linaCLOtide (LINZESS) 72 mcg Cap capsule Take 1 capsule (72 mcg total) by mouth before breakfast.    rosuvastatin (CRESTOR) 20 MG tablet Take 1 tablet (20 mg total) by mouth once daily.    sildenafil (REVATIO) 20 mg Tab Take 1 tablet (20 mg total) by mouth daily as needed (pulm htn). (Patient not taking: Reported on 4/27/2023)    tofacitinib (XELJANZ) 5 mg Tab Take 5 mg by mouth once daily.   Last reviewed on 4/28/2023  8:17 AM by Landy Bucio RN    Review of patient's allergies indicates:  No Known Allergies Last reviewed on  4/28/2023 8:16 AM by Landy Bucio      Tasks added this encounter   No tasks added.   Tasks due within next 3 months   No tasks due.     [facility]  [address] [place] [id number]  Phone: [phone]  Fax: [phone]

## 2023-05-30 DIAGNOSIS — I25.118 CORONARY ARTERY DISEASE OF NATIVE ARTERY OF NATIVE HEART WITH STABLE ANGINA PECTORIS: ICD-10-CM

## 2023-05-30 DIAGNOSIS — I10 HYPERTENSION, ESSENTIAL: Primary | ICD-10-CM

## 2023-05-30 DIAGNOSIS — I73.9 PAD (PERIPHERAL ARTERY DISEASE): ICD-10-CM

## 2023-05-30 DIAGNOSIS — E78.2 MIXED HYPERLIPIDEMIA: ICD-10-CM

## 2023-05-30 NOTE — PROGRESS NOTES
Established  Patient Chronic Pain Note     Referring Physician: No ref. provider found    PCP: Kate Lofton MD    Chief Complaint:   No chief complaint on file.       SUBJECTIVE:  Interval Hx: 05/31/2023  [employer] translation services used to help translate (Cantonese).    Pt presents s/p bilateral C4-6 MBB 04/28/2023.  Patient reports 80% sustained relief in bilateral axial neck pain following cervical medial branch block.  Today he reports primarily radicular pain in the arms and in the legs.  Pain is intermittent and today is rated a 5/10.  Patient reports pain which radiates into bilateral shoulders in C6-7 dermatomal distribution and further down into the upper extremities in no particular dermatomal distribution to the hands.  Patient also reports lower back pain which radiates down bilateral lower extremities but predominantly on the left into the knee.  This pain is exacerbated with standing and with ambulation.  Patient does endorse noticeable weakness in the upper and lower extremities associated with use.  Patient has continued gabapentin 300 mg twice daily with noticeable improvement in his pain.  Patient is requesting a refill of this medication.  Patient has been performing physician directed physical therapy exercises over the last 8 weeks with mild improvement in his symptoms.    HPI 04/05/2023  Deborah White is a 68 y.o. male with past medical history significant for diffuse idiopathic skeletal hyperostosis (dish), coronary artery disease, peripheral arterial disease, hypertension, hyperlipidemia, latent TB, rheumatoid arthritis, ankylosing spondylitis, nicotine dependence who presents to the clinic for the evaluation of   Neck and lower back pain.  Today patient's daughter is in the room to facilitate translation as well as use of the Timothy.  She reports his pain is most severe in the neck.  Patient reports pain is constant and today is rated a 7/10, at its best is a 5/10 and at its worse is a 10/10.   "Pain is described as "inside" or deep in nature.  Patient reports pain in the neck which radiates down the left upper extremity to the hand in C6 dermatomal distribution.  Pain is exacerbated with lifting exceeding 5 lb and cervical lateral flexion.  Patient denies significant weakness in the left upper extremity but does report numbness and tingling in the hand which can affect hand  strength or dexterity.  Pain is improved with rest, gabapentin which she is taking 300 mg in the morning and 300 mg in the evening as well as with prior cervical medial branch block.  Patient reports he received 1 year of relief following prior right-sided C4-6 cervical medial branch block with Dr. Barnes.   Patient has completed conventional physical therapy in November 2020 without any improvement in his pain.    Patient also reports lower back pain which radiates down the left lower extremity to the knee.  Pain is described as numbness and tingling in nature.  Pain can be exacerbated with standing or with ambulation.  Patient's daughter also reports some associated weakness in the left lower extremity.    Patient reports significant motor weakness and loss of sensations.  Patient denies night fever/night sweats, urinary incontinence, bowel incontinence, and significant weight loss.      Pain Disability Index Review:   No flowsheet data found.    Non-Pharmacologic Treatments:  Physical Therapy/Home Exercise: yes  Ice/Heat:yes  TENS: no  Acupuncture: no  Massage: no  Chiropractic: no    Other: no      Pain Medications:  - Adjuvant Medications: Neurontin (Gabapentin), Xeljanz  - Anti-Coagulants: Aspirin,, cilostazol    Pain Procedures:   -04/28/2023: bilateral C4-6 MBB   -11/18/2020: Right-sided C4-6 medial branch blocks; Dr. Barnes    Past Medical History:   Diagnosis Date    Coronary artery disease     Hyperlipidemia     Hypertension     Personal history of colonic polyps      Past Surgical History:   Procedure Laterality Date    " INJECTION OF ANESTHETIC AGENT AROUND MEDIAL BRANCH NERVES INNERVATING CERVICAL FACET JOINT Right 11/18/2020    Procedure: Block-nerve-medial branch-cervical Right C3, 4, 5with RN IV sedation;  Surgeon: Martín Barnes MD;  Location: [facility];  Service: Pain Management;  Laterality: Right;    INJECTION OF ANESTHETIC AGENT AROUND MEDIAL BRANCH NERVES INNERVATING CERVICAL FACET JOINT Bilateral 4/28/2023    Procedure: Bilateral C4-6 MBB with RN IV sedation;  Surgeon: Rudolph Burgos MD;  Location: [facility];  Service: Pain Management;  Laterality: Bilateral;     Review of patient's allergies indicates:  No Known Allergies    Current Outpatient Medications   Medication Sig    amLODIPine (NORVASC) 10 MG tablet Take 1 tablet (10 mg total) by mouth once daily.    aspirin (ECOTRIN) 81 MG EC tablet Take 81 mg by mouth once daily.    benazepriL (LOTENSIN) 40 MG tablet Take 1 tablet (40 mg total) by mouth once daily.    cilostazoL (PLETAL) 50 MG Tab Take 1 tablet (50 mg total) by mouth 2 (two) times daily.    cloNIDine (CATAPRES) 0.2 MG tablet Take 1 tablet (0.2 mg total) by mouth 2 (two) times daily.    ezetimibe (ZETIA) 10 mg tablet Take 1 tablet (10 mg total) by mouth once daily.    hydroCHLOROthiazide (HYDRODIURIL) 25 MG tablet Take 1 tablet (25 mg total) by mouth once daily.    linaCLOtide (LINZESS) 72 mcg Cap capsule Take 1 capsule (72 mcg total) by mouth before breakfast.    rosuvastatin (CRESTOR) 20 MG tablet Take 1 tablet (20 mg total) by mouth once daily.    tofacitinib (XELJANZ) 5 mg Tab Take 5 mg by mouth once daily.    gabapentin (NEURONTIN) 300 MG capsule Take 2 capsules (600 mg total) by mouth 2 (two) times daily.    sildenafil (REVATIO) 20 mg Tab Take 1 tablet (20 mg total) by mouth daily as needed (pulm htn). (Patient not taking: Reported on 4/27/2023)     No current facility-administered medications for this visit.       Review of Systems     GENERAL:  No weight loss, malaise or fevers.  HEENT:   No  "recent changes in vision or hearing  NECK:  Negative for lumps, no difficulty with swallowing.  RESPIRATORY:  Negative for cough, wheezing or shortness of breath, patient denies any recent URI.  CARDIOVASCULAR:  Negative for chest pain or palpitations.  GI:  Negative for abdominal discomfort, blood in stools or black stools or change in bowel habits.  MUSCULOSKELETAL:  See HPI.  SKIN:  Negative for lesions, rash, and itching.  PSYCH:  No mood disorder or recent psychosocial stressors.   HEMATOLOGY/LYMPHOLOGY:  Negative for prolonged bleeding, bruising easily or swollen nodes.    NEURO:   No history of syncope, paralysis, seizures or tremors.  All other reviewed and negative other than HPI.    OBJECTIVE:    BP (!) 154/79   Pulse (!) 56   Ht 5' 5" (1.651 m)   Wt 74.7 kg (164 lb 10.9 oz)   BMI 27.40 kg/m²       Physical Exam    GENERAL: Well appearing, in no acute distress, alert and oriented x3.  PSYCH:  Mood and affect appropriate.  SKIN: Skin color, texture, turgor normal, no rashes or lesions.  HEAD/FACE:  Normocephalic, atraumatic. Cranial nerves grossly intact.    NECK:  pain to palpation over the cervical paraspinous muscles. Spurling Negative. pain with neck flexion, extension, or lateral flexion.   Normaltesting biceps, triceps and brachioradialis bilaterally.    NegativeHoffmann's bilaterally.    5/5 strength testing deltoid, biceps, triceps, wrist extensor, wrist flexor and ulnar intrinsics bilaterally.    Normal  strength bilaterally    CV: RRR with palpation of the radial artery.  PULM: No evidence of respiratory difficulty, symmetric chest rise.  GI:  Soft and non-tender.      NEURO: Bilateral upper and lower extremity coordination and muscle stretch reflexes are physiologic and symmetric. No loss of sensation is noted.  GAIT: normal.    Imagin/26/22    MRI Lumbar Spine Without Contrast    COMPARISON:  None.    FINDINGS:  Visualized distal cord demonstrates normal signal intensity.  Tip of " the conus medullaris terminates near the T12/L1 level.  Vertebral body heights are maintained.  There is mild grade 1 anterolisthesis of L4 on L5.  Mild associated disc space narrowing is also noted.  There is mild multilevel marginal spurring with lower lumbar facet arthropathy.  No acute fracture.  No marrow edema.  Mildly heterogeneous appearance of the osseous structures likely sequelae of degenerative change.    Probable subcentimeter right renal cyst.  Aneurysmal dilation of the abdominal aorta is seen measuring up to 5.1 cm maximally.    L1-L2: No spinal canal or neural foraminal encroachment.    L2-L3: Mild facet arthropathy.  Minimal posterior disc bulge.  No spinal canal or neural foraminal encroachment.    L3-L4: Mild facet arthropathy.  No significant spinal canal or neural foraminal encroachment.    L4-L5: Grade 1 anterolisthesis.  Moderate bilateral facet arthropathy.  Posterior disc osteophyte complex.  Bilateral narrowing of the lateral recesses.  Bilateral the inferior neural foraminal narrowing is seen, right greater than left.  Normal AP spinal canal diameter is maintained.    L5-S1: Moderate facet arthropathy.  Minimal uncovertebral hypertrophy.  Spinal canal is maintained.  Asymmetric mild-to-moderate right neural foraminal narrowing is noted.    Impression  Discogenic degenerative changes as discussed above.  Infrarenal abdominal aortic aneurysm measuring up to 5.1 cm.    This report was flagged in Epic as abnormal.      04/05/2023    X-Ray Cervical Spine AP And Lateral    FINDINGS:  No fracture, prevertebral soft tissue swelling or other acute abnormality is identified.  Cervical spine alignment is normal.  No significant abnormal motion seen on flexion or extension.  Multilevel DISH-type changes with bridging anterior syndesmophytes.  Multilevel cervical endplate spurring and bridging disc calcifications without significant disc height loss as a benign C6-7.  Multilevel hypertrophic facet  arthropathy.  Bilateral C3-4 through C6-7 foraminal stenosis.      ASSESSMENT: 68 y.o. year old male with Neck, left arm, lower back and left leg pain, consistent with     1. Cervical spondylosis        2. Dorsalgia, unspecified        3. Radiculopathy, cervical region  MRI Cervical Spine Without Contrast      4. Lumbar radiculopathy  MRI Lumbar Spine Without Contrast            PLAN:   - Interventions:  Patient has sustained 80% relief in bilateral axial neck pain following bilateral C4-6 medial branch block.  We have discussed repeating this injection in the future should symptoms exacerbate.  Patient continues to report cervical and lumbar radiculopathy.  We have discussed considering cervical or lumbar epidural to address these symptoms.  We will 1st review cervical and lumbar MRI.  Explained the risks and benefits of the procedure in detail with the patient today in clinic along with alternative treatment options, and the patient elected to pursue the intervention at this time.      - Anticoagulation use: yes aspirin  PCP, Dr. Lofton     report:  Reviewed and consistent with medication use as prescribed.    - Medications:  --  We have discussed increasing the patient's gabapentin to a more therapeutic goal.  Patient will increase his medication according to the following algorithm.  We have reviewed potential side effects of this medication including daytime somnolence, weight gain and peripheral edema  -Gabapentin  600 mg b.i.d.  -3 mo supply given      - Therapy:   We discussed continuing physician directed physical therapy exercises at home to help manage the patient/s painful condition. The patient was counseled that muscle strengthening will improve the long term prognosis in regards to pain and may also help increase range of motion and mobility.     - Imaging: Reviewed available imaging with patient and answered any questions they had regarding study.  MRI cervical and lumbar spine to better evaluate  pain and weakness    - Follow up visit: return to clinic in 4-6 weeks      The above plan and management options were discussed at length with patient. Patient is in agreement with the above and verbalized understanding.    - I discussed the goals of interventional chronic pain management with the patient on today's visit. We discussed a multimodal and systematic approach to pain.  This includes diagnostic and therapeutic injections, adjuvant pharmacologic treatment, physical therapy, and at times psychiatry.  I emphasized the importance of regular exercise, core strengthening and stretching, diet and weight loss as a cornerstone of long-term pain management.    - This condition does not require this patient to take time off of work, and the primary goal of our Pain Management services is to improve the patient's functional capacity.  - Patient Questions: Answered all of the patient's questions regarding diagnoses, therapy, treatment and next steps        Rudolph Burgos MD  Interventional Pain Management  [facility]    Disclaimer:  This note was prepared using voice recognition system and is likely to have sound alike errors that may have been overlooked even after proof reading.  Please call me with any questions

## 2023-05-31 ENCOUNTER — OFFICE VISIT (OUTPATIENT)
Dept: CARDIOLOGY | Facility: CLINIC | Age: 68
End: 2023-05-31
Payer: MEDICARE

## 2023-05-31 ENCOUNTER — HOSPITAL ENCOUNTER (OUTPATIENT)
Dept: CARDIOLOGY | Facility: HOSPITAL | Age: 68
Discharge: HOME | End: 2023-05-31
Attending: STUDENT IN AN ORGANIZED HEALTH CARE EDUCATION/TRAINING PROGRAM
Payer: MEDICARE

## 2023-05-31 ENCOUNTER — OFFICE VISIT (OUTPATIENT)
Dept: PAIN MEDICINE | Facility: CLINIC | Age: 68
End: 2023-05-31
Payer: MEDICARE

## 2023-05-31 VITALS
BODY MASS INDEX: 27.44 KG/M2 | OXYGEN SATURATION: 98 % | WEIGHT: 164.69 LBS | HEIGHT: 65 IN | HEART RATE: 52 BPM | SYSTOLIC BLOOD PRESSURE: 134 MMHG | DIASTOLIC BLOOD PRESSURE: 68 MMHG

## 2023-05-31 VITALS
BODY MASS INDEX: 27.44 KG/M2 | DIASTOLIC BLOOD PRESSURE: 79 MMHG | WEIGHT: 164.69 LBS | SYSTOLIC BLOOD PRESSURE: 154 MMHG | HEIGHT: 65 IN | HEART RATE: 56 BPM

## 2023-05-31 DIAGNOSIS — M47.812 CERVICAL SPONDYLOSIS: Primary | ICD-10-CM

## 2023-05-31 DIAGNOSIS — M45.0 ANKYLOSING SPONDYLITIS OF MULTIPLE SITES IN SPINE: ICD-10-CM

## 2023-05-31 DIAGNOSIS — R94.31 ABNORMAL EKG: Primary | ICD-10-CM

## 2023-05-31 DIAGNOSIS — M54.12 RADICULOPATHY, CERVICAL REGION: ICD-10-CM

## 2023-05-31 DIAGNOSIS — I25.118 CORONARY ARTERY DISEASE OF NATIVE ARTERY OF NATIVE HEART WITH STABLE ANGINA PECTORIS: ICD-10-CM

## 2023-05-31 DIAGNOSIS — I10 HYPERTENSION, ESSENTIAL: ICD-10-CM

## 2023-05-31 DIAGNOSIS — I73.9 PAD (PERIPHERAL ARTERY DISEASE): ICD-10-CM

## 2023-05-31 DIAGNOSIS — E78.2 MIXED HYPERLIPIDEMIA: ICD-10-CM

## 2023-05-31 DIAGNOSIS — M54.9 DORSALGIA, UNSPECIFIED: ICD-10-CM

## 2023-05-31 DIAGNOSIS — M54.16 LUMBAR RADICULOPATHY: ICD-10-CM

## 2023-05-31 PROBLEM — Z72.0 TOBACCO ABUSE: Status: ACTIVE | Noted: 2020-12-28

## 2023-05-31 PROCEDURE — 99214 OFFICE O/P EST MOD 30 MIN: CPT | Mod: PBBFAC,27 | Performed by: STUDENT IN AN ORGANIZED HEALTH CARE EDUCATION/TRAINING PROGRAM

## 2023-05-31 PROCEDURE — 93010 EKG 12-LEAD: ICD-10-PCS | Mod: ,,, | Performed by: INTERNAL MEDICINE

## 2023-05-31 PROCEDURE — 99214 OFFICE O/P EST MOD 30 MIN: CPT | Mod: S$PBB,,, | Performed by: STUDENT IN AN ORGANIZED HEALTH CARE EDUCATION/TRAINING PROGRAM

## 2023-05-31 PROCEDURE — 99999 PR PBB SHADOW E&M-EST. PATIENT-LVL III: ICD-10-PCS | Mod: PBBFAC,,, | Performed by: ANESTHESIOLOGY

## 2023-05-31 PROCEDURE — 99214 OFFICE O/P EST MOD 30 MIN: CPT | Mod: S$PBB,,, | Performed by: ANESTHESIOLOGY

## 2023-05-31 PROCEDURE — 93010 ELECTROCARDIOGRAM REPORT: CPT | Mod: ,,, | Performed by: INTERNAL MEDICINE

## 2023-05-31 PROCEDURE — 93005 ELECTROCARDIOGRAM TRACING: CPT

## 2023-05-31 PROCEDURE — 99999 PR PBB SHADOW E&M-EST. PATIENT-LVL IV: ICD-10-PCS | Mod: PBBFAC,,, | Performed by: STUDENT IN AN ORGANIZED HEALTH CARE EDUCATION/TRAINING PROGRAM

## 2023-05-31 PROCEDURE — 99213 OFFICE O/P EST LOW 20 MIN: CPT | Mod: PBBFAC,25,27 | Performed by: ANESTHESIOLOGY

## 2023-05-31 PROCEDURE — 99999 PR PBB SHADOW E&M-EST. PATIENT-LVL IV: CPT | Mod: PBBFAC,,, | Performed by: STUDENT IN AN ORGANIZED HEALTH CARE EDUCATION/TRAINING PROGRAM

## 2023-05-31 PROCEDURE — 99214 PR OFFICE/OUTPT VISIT, EST, LEVL IV, 30-39 MIN: ICD-10-PCS | Mod: S$PBB,,, | Performed by: ANESTHESIOLOGY

## 2023-05-31 PROCEDURE — 99214 PR OFFICE/OUTPT VISIT, EST, LEVL IV, 30-39 MIN: ICD-10-PCS | Mod: S$PBB,,, | Performed by: STUDENT IN AN ORGANIZED HEALTH CARE EDUCATION/TRAINING PROGRAM

## 2023-05-31 PROCEDURE — 99999 PR PBB SHADOW E&M-EST. PATIENT-LVL III: CPT | Mod: PBBFAC,,, | Performed by: ANESTHESIOLOGY

## 2023-05-31 RX ORDER — GABAPENTIN 300 MG/1
600 CAPSULE ORAL 2 TIMES DAILY
Qty: 360 CAPSULE | Refills: 0 | Status: SHIPPED | OUTPATIENT
Start: 2023-05-31 | End: 2023-09-22 | Stop reason: SDUPTHER

## 2023-05-31 NOTE — PROGRESS NOTES
Subjective:   Patient ID:  Deborah White is a 68 y.o. male who presents for follow up of No chief complaint on file.      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 Smoking 1/2 PPD > 40 yrs, no drinking  Prior cardiologist DR FISHMAN AT [facility]  Walks at home  He denied chest pain, dyspnea on exertion, palpitation, fainting, PND, orthopnea, syncope  Exertional calf pain.    exercise KAL showed normal resting KAL. Mod to severe decreased exercise KAL to 0.5 left and 0.6 right. EKG NSR and LVH  Started Simvastatin in 1998 and developed body ache this year. Pt states that the ache resovled after d/c simvastatin.  IN . Now resumed Simvastatin AND NO RECURRENT BODY ACHE    08-202O ECHO DONE AT [facility] NORMAL EF, MILD MR AND MOD. LVH  , CR 1.1   LE arterial US no significant blockage  03/15/2021 Decrease Pletal to once a day due to headache  States that improved Claudication after added Pletal, walks longer distance    Interval history  Limited activity due to knee OA pain right arm pain  No chest pain dyspnea dizziness faint.  Smoking   EKG NSR. ABD CT pending for AAA        5/31/23          EKG 5/31/23 SB, ist deg AVB, nonspecific intraventricular block      Past Medical History:   Diagnosis Date    Coronary artery disease     Hyperlipidemia     Hypertension     Personal history of colonic polyps        Past Surgical History:   Procedure Laterality Date    INJECTION OF ANESTHETIC AGENT AROUND MEDIAL BRANCH NERVES INNERVATING CERVICAL FACET JOINT Right 11/18/2020    Procedure: Block-nerve-medial branch-cervical Right C3, 4, 5with RN IV sedation;  Surgeon: Martín Barnes MD;  Location: [facility];  Service: Pain Management;  Laterality: Right;    INJECTION OF ANESTHETIC AGENT AROUND MEDIAL BRANCH NERVES INNERVATING CERVICAL FACET JOINT Bilateral 4/28/2023    Procedure: Bilateral C4-6 MBB with RN IV sedation;   Surgeon: Rudolph Burgos MD;  Location: [facility];  Service: Pain Management;  Laterality: Bilateral;       Social History     Tobacco Use    Smoking status: Every Day     Types: Cigarettes    Smokeless tobacco: Never   Substance Use Topics    Alcohol use: Never       Family History   Problem Relation Age of Onset    Cancer Mother          Review of Systems   Constitutional: Negative for decreased appetite, diaphoresis, fever, malaise/fatigue and night sweats.   HENT:  Negative for nosebleeds.    Eyes:  Negative for blurred vision and double vision.   Cardiovascular:  Negative for chest pain, dyspnea on exertion, irregular heartbeat, leg swelling, near-syncope, orthopnea, palpitations, paroxysmal nocturnal dyspnea and syncope.   Respiratory:  Negative for cough, shortness of breath, sleep disturbances due to breathing, snoring, sputum production and wheezing.    Endocrine: Negative for cold intolerance and polyuria.   Hematologic/Lymphatic: Does not bruise/bleed easily.   Skin:  Negative for rash.   Musculoskeletal:  Negative for back pain, falls, joint pain, joint swelling and neck pain.   Gastrointestinal:  Negative for abdominal pain, heartburn, nausea and vomiting.   Genitourinary:  Negative for dysuria, frequency and hematuria.   Neurological:  Negative for difficulty with concentration, dizziness, focal weakness, headaches, light-headedness, numbness, seizures and weakness.   Psychiatric/Behavioral:  Negative for depression, memory loss and substance abuse. The patient does not have insomnia.    Allergic/Immunologic: Negative for HIV exposure and hives.     Objective:   Physical Exam  HENT:      Head: Normocephalic.   Eyes:      Pupils: Pupils are equal, round, and reactive to light.   Neck:      Thyroid: No thyromegaly.      Vascular: Normal carotid pulses. No carotid bruit or JVD.   Cardiovascular:      Rate and Rhythm: Normal rate and regular rhythm. No extrasystoles are present.     Chest Wall: PMI is  not displaced.      Pulses: Normal pulses.      Heart sounds: Normal heart sounds. No murmur heard.    No gallop. No S3 sounds.   Pulmonary:      Effort: No respiratory distress.      Breath sounds: Normal breath sounds. No stridor.   Abdominal:      General: Bowel sounds are normal.      Palpations: Abdomen is soft.      Tenderness: There is no abdominal tenderness. There is no rebound.   Musculoskeletal:         General: Normal range of motion.   Skin:     Findings: No rash.   Neurological:      Mental Status: He is alert and oriented to person, place, and time.   Psychiatric:         Behavior: Behavior normal.       Lab Results   Component Value Date    CHOL 120 07/06/2022    CHOL 131 06/04/2021    CHOL 285 (A) 08/05/2020     Lab Results   Component Value Date    HDL 47 07/06/2022    HDL 60 06/04/2021    HDL 56 08/05/2020     Lab Results   Component Value Date    LDLCALC 52.6 (L) 07/06/2022    LDLCALC 51.0 (L) 06/04/2021    LDLCALC 202 08/05/2020     Lab Results   Component Value Date    TRIG 102 07/06/2022    TRIG 100 06/04/2021    TRIG 133 08/05/2020     Lab Results   Component Value Date    CHOLHDL 39.2 07/06/2022    CHOLHDL 45.8 06/04/2021       Chemistry        Component Value Date/Time     04/24/2023 0936     12/23/2022 1215    K 3.9 04/24/2023 0936    K 3.8 12/23/2022 1215     12/23/2022 1215    CO2 25 04/24/2023 0936    CO2 27 12/23/2022 1215    BUN 17.0 04/24/2023 0936    BUN 22 12/23/2022 1215    CREATININE 1.28 (H) 04/24/2023 0936    CREATININE 1.4 12/23/2022 1215    GLU 91 12/23/2022 1215        Component Value Date/Time    CALCIUM 9.7 04/24/2023 0936    CALCIUM 9.6 12/23/2022 1215    ALKPHOS 51 03/02/2023 1235    ALKPHOS 59 08/30/2022 1600    AST 17 03/02/2023 1235    AST 17 08/30/2022 1600    ALT 18 03/02/2023 1235    ALT 20 08/30/2022 1600    BILITOT 0.2 03/02/2023 1235    BILITOT 0.2 08/30/2022 1600    ESTGFRAFRICA 51 (A) 07/06/2022 1147    ESTGFRAFRICA 56 (A) 07/06/2022 1147     EGFRNONAA 45 (A) 07/06/2022 1147    EGFRNONAA 48 (A) 07/06/2022 1147          Lab Results   Component Value Date    HGBA1C 6.2 (H) 07/06/2022     Lab Results   Component Value Date    TSH 0.572 12/11/2019     No results found for: INR, PROTIME  Lab Results   Component Value Date    WBC 8.8 04/24/2023    HGB 12.6 (L) 04/24/2023    HCT 39.6 (L) 04/24/2023    MCV 91.7 04/24/2023     04/24/2023     BMP  Sodium   Date Value Ref Range Status   12/23/2022 142 136 - 145 mmol/L Final     Sodium Level   Date Value Ref Range Status   04/24/2023 142 136 - 145 mmol/L Final     Potassium   Date Value Ref Range Status   12/23/2022 3.8 3.5 - 5.1 mmol/L Final     Potassium Level   Date Value Ref Range Status   04/24/2023 3.9 3.5 - 5.1 mmol/L Final     Chloride   Date Value Ref Range Status   12/23/2022 103 95 - 110 mmol/L Final     CO2   Date Value Ref Range Status   12/23/2022 27 23 - 29 mmol/L Final     Carbon Dioxide   Date Value Ref Range Status   04/24/2023 25 23 - 31 mmol/L Final     BUN   Date Value Ref Range Status   12/23/2022 22 8 - 23 mg/dL Final     Blood Urea Nitrogen   Date Value Ref Range Status   04/24/2023 17.0 8.4 - 25.7 mg/dL Final     Creatinine   Date Value Ref Range Status   04/24/2023 1.28 (H) 0.73 - 1.18 mg/dL Final   12/23/2022 1.4 0.5 - 1.4 mg/dL Final     Calcium   Date Value Ref Range Status   12/23/2022 9.6 8.7 - 10.5 mg/dL Final     Calcium Level Total   Date Value Ref Range Status   04/24/2023 9.7 8.8 - 10.0 mg/dL Final     Anion Gap   Date Value Ref Range Status   12/23/2022 12 8 - 16 mmol/L Final     eGFR if    Date Value Ref Range Status   07/06/2022 51 (A) >60 mL/min/1.73 m^2 Final   07/06/2022 56 (A) >60 mL/min/1.73 m^2 Final     eGFR if non    Date Value Ref Range Status   07/06/2022 45 (A) >60 mL/min/1.73 m^2 Final     Comment:     Calculation used to obtain the estimated glomerular filtration  rate (eGFR) is the CKD-EPI equation.      07/06/2022 48 (A) >60  mL/min/1.73 m^2 Final     Comment:     Calculation used to obtain the estimated glomerular filtration  rate (eGFR) is the CKD-EPI equation.        BNP  @LABRCNTIP(BNP,BNPTRIAGEBLO)@  @LABRCNTIP(troponini)@  CrCl cannot be calculated (Patient's most recent lab result is older than the maximum 7 days allowed.).  No results found in the last 24 hours.  No results found in the last 24 hours.  No results found in the last 24 hours.    Assessment:      1. PAD (peripheral artery disease)    2. Hypertension, essential    3. Mixed hyperlipidemia    4. Coronary artery disease of native artery of native heart with stable angina pectoris          Plan:     Continue ASA Pletal statin zetia amlodipine clonidine benazepril and HCTZ  Smoking cessation  Counseled DASH  LDL 53 as of 7/22    Recommend heart-healthy diet, weight control and regular exercise.  Quique. Risk modification.         I have reviewed all pertinent labs and cardiac studies independently. Plans and recommendations have been formulated under my direct supervision. All questions answered and patient voiced understanding.   If symptoms persist go to the ED  RTC in 12 months

## 2023-05-31 NOTE — PROGRESS NOTES
Section of Cardiology                  Cardiac Clinic Note    Chief Complaint/Reason for consultation:       HPI:   Deborah White is a 68 y.o. 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 Smoking 1/2 PPD > 40 yrs, no drinking  Prior cardiologist DR FISHMAN AT [facility]  Walks at home  He denied chest pain, dyspnea on exertion, palpitation, fainting, PND, orthopnea, syncope  Exertional calf pain.    exercise KAL showed normal resting KAL. Mod to severe decreased exercise KAL to 0.5 left and 0.6 right. EKG NSR and LVH  Started Simvastatin in 1998 and developed body ache this year. Pt states that the ache resovled after d/c simvastatin.  IN . Now resumed Simvastatin AND NO RECURRENT BODY ACHE    08-202O ECHO DONE AT [facility] NORMAL EF, MILD MR AND MOD. LVH  , CR 1.1   LE arterial US no significant blockage  03/15/2021 Decrease Pletal to once a day due to headache  States that improved Claudication after added Pletal, walks longer distance    Interval history  Limited activity due to knee OA pain right arm pain  No chest pain dyspnea dizziness faint.  Smoking   EKG NSR. ABD CT pending for AAA        5/31/23  Comes in with daughter  Switching providers  Has been having claudication symptoms in his left leg, minimal on the right   Prior arterial ultrasound of the legs without stenosis in 2021  Still smoking   Not very active at home  Abn ekg today compared to prior     Denies chest pain, shortness of breath, dizziness, syncope, PND        EKG 5/31/23 SB, 1st deg AVB, nonspecific intraventricular block        EKG NSR, no acute ST - T wave changes    ECHO  No results found for this or any previous visit.       STRESS TEST No results found for this or any previous visit.       LHC No results found for this or any previous visit.            ROS: All 10 systems reviewed. Please refer to the  HPI for pertinent positives. All other systems negative.     Past Medical History  Past Medical History:   Diagnosis Date    Coronary artery disease     Hyperlipidemia     Hypertension     Personal history of colonic polyps        Surgical History  Past Surgical History:   Procedure Laterality Date    INJECTION OF ANESTHETIC AGENT AROUND MEDIAL BRANCH NERVES INNERVATING CERVICAL FACET JOINT Right 11/18/2020    Procedure: Block-nerve-medial branch-cervical Right C3, 4, 5with RN IV sedation;  Surgeon: Martín Barnes MD;  Location: [facility];  Service: Pain Management;  Laterality: Right;    INJECTION OF ANESTHETIC AGENT AROUND MEDIAL BRANCH NERVES INNERVATING CERVICAL FACET JOINT Bilateral 4/28/2023    Procedure: Bilateral C4-6 MBB with RN IV sedation;  Surgeon: Rudolph Burgos MD;  Location: [facility];  Service: Pain Management;  Laterality: Bilateral;          Allergies:   Review of patient's allergies indicates:  No Known Allergies    Social History:  Social History     Socioeconomic History    Marital status:    Tobacco Use    Smoking status: Every Day     Types: Cigarettes    Smokeless tobacco: Never   Substance and Sexual Activity    Alcohol use: Never     Social Determinants of Health     Financial Resource Strain: Medium Risk    Difficulty of Paying Living Expenses: Somewhat hard   Food Insecurity: No Food Insecurity    Worried About Running Out of Food in the Last Year: Never true    Ran Out of Food in the Last Year: Never true   Transportation Needs: No Transportation Needs    Lack of Transportation (Medical): No    Lack of Transportation (Non-Medical): No   Physical Activity: Insufficiently Active    Days of Exercise per Week: 2 days    Minutes of Exercise per Session: 20 min   Stress: Stress Concern Present    Feeling of Stress : To some extent   Social Connections: Unknown    Frequency of Communication with Friends and Family: Twice a week    Frequency of Social Gatherings with Friends and  Family: Once a week    Active Member of Clubs or Organizations: No    Attends Club or Organization Meetings: Never    Marital Status:    Housing Stability: Low Risk     Unable to Pay for Housing in the Last Year: No    Number of Places Lived in the Last Year: 1    Unstable Housing in the Last Year: No       Family History:  family history includes Cancer in his mother.    Home Medications:  Current Outpatient Medications on File Prior to Visit   Medication Sig Dispense Refill    amLODIPine (NORVASC) 10 MG tablet Take 1 tablet (10 mg total) by mouth once daily. 30 tablet 11    aspirin (ECOTRIN) 81 MG EC tablet Take 81 mg by mouth once daily.      benazepriL (LOTENSIN) 40 MG tablet Take 1 tablet (40 mg total) by mouth once daily. 90 tablet 1    cilostazoL (PLETAL) 50 MG Tab Take 1 tablet (50 mg total) by mouth 2 (two) times daily. 60 tablet 9    cloNIDine (CATAPRES) 0.2 MG tablet Take 1 tablet (0.2 mg total) by mouth 2 (two) times daily. 180 tablet 3    ezetimibe (ZETIA) 10 mg tablet Take 1 tablet (10 mg total) by mouth once daily. 90 tablet 0    hydroCHLOROthiazide (HYDRODIURIL) 25 MG tablet Take 1 tablet (25 mg total) by mouth once daily. 90 tablet 1    linaCLOtide (LINZESS) 72 mcg Cap capsule Take 1 capsule (72 mcg total) by mouth before breakfast. 30 capsule 3    rosuvastatin (CRESTOR) 20 MG tablet Take 1 tablet (20 mg total) by mouth once daily. 90 tablet 1    tofacitinib (XELJANZ) 5 mg Tab Take 5 mg by mouth once daily. 30 tablet 11    sildenafil (REVATIO) 20 mg Tab Take 1 tablet (20 mg total) by mouth daily as needed (pulm htn). (Patient not taking: Reported on 4/27/2023) 90 tablet 0    [DISCONTINUED] gabapentin (NEURONTIN) 300 MG capsule Take 1 capsule (300 mg total) by mouth daily with breakfast AND 2 capsules (600 mg total) every evening. 90 capsule 2     No current facility-administered medications on file prior to visit.       Physical exam:  /68 (BP Location: Right arm, Patient Position:  "Sitting, BP Method: Large (Manual))   Pulse (!) 52   Ht 5' 5" (1.651 m)   Wt 74.7 kg (164 lb 10.9 oz)   SpO2 98%   BMI 27.40 kg/m²         General: Pt is a 68 y.o. year old male who is AAOx3, in NAD, is pleasant, well nourished, looks stated age  HEENT: PERRL, EOMI, Oral mucosa pink & moist  CVS  No abnormal cardiac pulsations noted on inspection. JVP not raised. The apical impulse is normal on palpation, and is located in the left 5th intercostal space in the mid - clavicular line. No palpable thrills or abnormal pulsations noted. RR, S1 - S2 heard, no murmurs, rubs or gallops appreciated.   PUL : CTA B/L. No wheezes/crackles heard   ABD : BS +, soft. No tenderness elicited   LE : No C/C/E. Distal Pulses palpable B/L         LABS:    Chemistry:   Lab Results   Component Value Date     04/24/2023     12/23/2022    K 3.9 04/24/2023    K 3.8 12/23/2022     12/23/2022    CO2 25 04/24/2023    CO2 27 12/23/2022    BUN 17.0 04/24/2023    BUN 22 12/23/2022    CREATININE 1.28 (H) 04/24/2023    CREATININE 1.4 12/23/2022    GLUCOSE 110 04/24/2023    CALCIUM 9.7 04/24/2023    CALCIUM 9.6 12/23/2022     Cardiac Markers:   Lab Results   Component Value Date    CKTOTAL 113 06/22/2020    CKMB 0.0 06/22/2020     Cardiac Markers (Last 3):   Lab Results   Component Value Date    CKTOTAL 113 06/22/2020    CKTOTAL 207 01/07/2020    CKMB 0.0 06/22/2020    CKMB 0.0 01/07/2020     CBC:   Lab Results   Component Value Date    WBC 8.8 04/24/2023    WBC 9.10 12/23/2022    HGB 12.6 (L) 04/24/2023    HGB 12.7 (L) 12/23/2022    HCT 39.6 (L) 04/24/2023    HCT 39.8 (L) 12/23/2022    MCV 91.7 04/24/2023    MCV 94 12/23/2022     04/24/2023     12/23/2022     Lipids:   Lab Results   Component Value Date    CHOL 120 07/06/2022    TRIG 102 07/06/2022    TRIG 133 08/05/2020    HDL 47 07/06/2022    HDL 56 08/05/2020     Coagulation: No results found for: PT, INR, APTT        Assessment        1. Abnormal EKG    2. " PAD (peripheral artery disease)    3. Hypertension, essential    4. Mixed hyperlipidemia    5. Coronary artery disease of native artery of native heart with stable angina pectoris    6. Ankylosing spondylitis of multiple sites in spine         Plan:    Abnormal EKG   First-degree AV block, nonspecific intraventricular conduction delay   Obtain echo    CAD  Non obs CAD  Denies chest pain  Continue aspirin, Zetia, Crestor    HLD  LDL 53 as 7/22  Continue statin    Claudication   Left > right  Obtain repeat arterial ultrasound lower extremity    Hypertension  Stable   Continue benazepril, clonidine, hydrochlorothiazide, amlodipine    Ankylosing spondylitis   Stable   Continue therapy    Tobacco abuse   Smoking cessation encouraged    Low salt, low fat diet  Exercise as tolerated, at least 30 min daily     This note was prepared using voice recognition system and is likely to have sound alike errors that may have been overlooked even after proofreading.     I have reviewed all pertinent chart information.  Plans and recommendations have been formulated under my direct supervision. All questions answered and patient voiced understanding.   If symptoms persist go to the ED.    RTC in 2-3 months        Ludin Maldonado MD  Cardiology

## 2023-06-02 ENCOUNTER — TELEPHONE (OUTPATIENT)
Dept: VASCULAR SURGERY | Facility: CLINIC | Age: 68
End: 2023-06-02
Payer: MEDICARE

## 2023-06-02 DIAGNOSIS — Z01.818 PRE-OP EVALUATION: Primary | ICD-10-CM

## 2023-06-02 NOTE — TELEPHONE ENCOUNTER
Contacted pt's daughter Lea with Dr. Denton's response that pt should have BMP after aggressive PO hydration to determine if CTA will be safe vs change in plan for imaging.instructions given for pt to drink 8 large glasses H2O in addition to usual fluid intake day before BMP. Lea repeated instructions and verbalized understanding. BMP, CTA (pending results of BMP), and clinic visit scheduled. Lea confirmed appts. Appt letter placed in mail.

## 2023-06-20 ENCOUNTER — HOSPITAL ENCOUNTER (OUTPATIENT)
Dept: RADIOLOGY | Facility: HOSPITAL | Age: 68
Discharge: HOME | End: 2023-06-20
Attending: ANESTHESIOLOGY
Payer: MEDICARE

## 2023-06-20 ENCOUNTER — OFFICE VISIT (OUTPATIENT)
Dept: INTERNAL MEDICINE | Facility: CLINIC | Age: 68
End: 2023-06-20
Payer: MEDICARE

## 2023-06-20 VITALS
OXYGEN SATURATION: 97 % | HEART RATE: 68 BPM | RESPIRATION RATE: 18 BRPM | DIASTOLIC BLOOD PRESSURE: 70 MMHG | SYSTOLIC BLOOD PRESSURE: 128 MMHG | HEIGHT: 65 IN | BODY MASS INDEX: 27.1 KG/M2 | WEIGHT: 162.69 LBS | TEMPERATURE: 98 F

## 2023-06-20 DIAGNOSIS — R79.9 ABNORMAL FINDING OF BLOOD CHEMISTRY, UNSPECIFIED: ICD-10-CM

## 2023-06-20 DIAGNOSIS — I10 HYPERTENSION, UNSPECIFIED TYPE: ICD-10-CM

## 2023-06-20 DIAGNOSIS — R73.03 PREDIABETES: ICD-10-CM

## 2023-06-20 DIAGNOSIS — M54.12 RADICULOPATHY, CERVICAL REGION: ICD-10-CM

## 2023-06-20 DIAGNOSIS — Z76.89 ENCOUNTER TO ESTABLISH CARE: ICD-10-CM

## 2023-06-20 DIAGNOSIS — Z12.5 ENCOUNTER FOR SCREENING FOR MALIGNANT NEOPLASM OF PROSTATE: ICD-10-CM

## 2023-06-20 DIAGNOSIS — Z00.00 ANNUAL PHYSICAL EXAM: Primary | ICD-10-CM

## 2023-06-20 DIAGNOSIS — M54.16 LUMBAR RADICULOPATHY: ICD-10-CM

## 2023-06-20 PROCEDURE — 72148 MRI LUMBAR SPINE W/O DYE: CPT | Mod: 26,,, | Performed by: RADIOLOGY

## 2023-06-20 PROCEDURE — 99999 PR PBB SHADOW E&M-EST. PATIENT-LVL IV: ICD-10-PCS | Mod: PBBFAC,,, | Performed by: NURSE PRACTITIONER

## 2023-06-20 PROCEDURE — 99214 OFFICE O/P EST MOD 30 MIN: CPT | Mod: S$PBB,,, | Performed by: NURSE PRACTITIONER

## 2023-06-20 PROCEDURE — 99214 PR OFFICE/OUTPT VISIT, EST, LEVL IV, 30-39 MIN: ICD-10-PCS | Mod: S$PBB,,, | Performed by: NURSE PRACTITIONER

## 2023-06-20 PROCEDURE — 72141 MRI NECK SPINE W/O DYE: CPT | Mod: 26,,, | Performed by: RADIOLOGY

## 2023-06-20 PROCEDURE — 72141 MRI CERVICAL SPINE WITHOUT CONTRAST: ICD-10-PCS | Mod: 26,,, | Performed by: RADIOLOGY

## 2023-06-20 PROCEDURE — 72141 MRI NECK SPINE W/O DYE: CPT | Mod: TC

## 2023-06-20 PROCEDURE — 72148 MRI LUMBAR SPINE W/O DYE: CPT | Mod: TC

## 2023-06-20 PROCEDURE — 99999 PR PBB SHADOW E&M-EST. PATIENT-LVL IV: CPT | Mod: PBBFAC,,, | Performed by: NURSE PRACTITIONER

## 2023-06-20 PROCEDURE — 99214 OFFICE O/P EST MOD 30 MIN: CPT | Mod: PBBFAC | Performed by: NURSE PRACTITIONER

## 2023-06-20 PROCEDURE — 72148 MRI LUMBAR SPINE WITHOUT CONTRAST: ICD-10-PCS | Mod: 26,,, | Performed by: RADIOLOGY

## 2023-06-20 RX ORDER — CLONIDINE HYDROCHLORIDE 0.2 MG/1
0.2 TABLET ORAL 2 TIMES DAILY
Qty: 180 TABLET | Refills: 3 | Status: SHIPPED | OUTPATIENT
Start: 2023-06-20

## 2023-06-20 NOTE — PROGRESS NOTES
Deborah White  06/20/2023  [id number]    Kate Lofton MD  Patient Care Team:  Kate Lofton MD as PCP - General (Internal Medicine)  Didier Maldonado MD (Family Medicine)  Ana Luisa Nelson MD (Cardiology)  Gianna Blake, TarahD as Pharmacist (Pharmacist)          Visit Type:an urgent visit for a new problem    Chief Complaint:  Chief Complaint   Patient presents with    Annual Exam       History of Present Illness:  68-year-old male presents today for annual physical exam.  Patient will return tomorrow to complete blood work.  Pt would like to establish care with Dr. Guzmán     History:  Past Medical History:   Diagnosis Date    Coronary artery disease     Hyperlipidemia     Hypertension     Personal history of colonic polyps      Past Surgical History:   Procedure Laterality Date    INJECTION OF ANESTHETIC AGENT AROUND MEDIAL BRANCH NERVES INNERVATING CERVICAL FACET JOINT Right 11/18/2020    Procedure: Block-nerve-medial branch-cervical Right C3, 4, 5with RN IV sedation;  Surgeon: Martín Barnes MD;  Location: [facility];  Service: Pain Management;  Laterality: Right;    INJECTION OF ANESTHETIC AGENT AROUND MEDIAL BRANCH NERVES INNERVATING CERVICAL FACET JOINT Bilateral 4/28/2023    Procedure: Bilateral C4-6 MBB with RN IV sedation;  Surgeon: Rudolph Burgos MD;  Location: [facility];  Service: Pain Management;  Laterality: Bilateral;     Family History   Problem Relation Age of Onset    Cancer Mother      Social History     Socioeconomic History    Marital status:    Tobacco Use    Smoking status: Every Day     Types: Cigarettes    Smokeless tobacco: Never   Substance and Sexual Activity    Alcohol use: Never     Social Determinants of Health     Financial Resource Strain: Medium Risk    Difficulty of Paying Living Expenses: Somewhat hard   Food Insecurity: No Food Insecurity    Worried About Running Out of Food in the Last Year: Never true    Ran Out of Food in the Last Year: Never true    Transportation Needs: No Transportation Needs    Lack of Transportation (Medical): No    Lack of Transportation (Non-Medical): No   Physical Activity: Insufficiently Active    Days of Exercise per Week: 2 days    Minutes of Exercise per Session: 20 min   Stress: Stress Concern Present    Feeling of Stress : To some extent   Social Connections: Unknown    Frequency of Communication with Friends and Family: Three times a week    Frequency of Social Gatherings with Friends and Family: Once a week    Active Member of Clubs or Organizations: No    Attends Club or Organization Meetings: Never    Marital Status:    Housing Stability: Low Risk     Unable to Pay for Housing in the Last Year: No    Number of Places Lived in the Last Year: 1    Unstable Housing in the Last Year: No     Patient Active Problem List   Diagnosis    Ankylosing hyperostosis of cervical region    Bilateral carpal tunnel syndrome    Elevated sed rate    Hypertension, essential    Hyperlipidemia    Ankylosing spondylitis of multiple sites in spine    Cervical facet joint syndrome    Coronary artery disease of native artery of native heart with stable angina pectoris    PAD (peripheral artery disease)    Tobacco abuse    Positive QuantiFERON-TB Gold test    Encounter for medication monitoring    Immunocompromised    Long term current use of immunosuppressive drug    DISH (diffuse idiopathic skeletal hyperostosis)    TB lung, latent    Seronegative rheumatoid arthritis    Cervical spondylosis     Review of patient's allergies indicates:  No Known Allergies    The following were reviewed at this visit: active problem list, medication list, allergies, family history, social history, and health maintenance.    Medications:  Current Outpatient Medications on File Prior to Visit   Medication Sig Dispense Refill    amLODIPine (NORVASC) 10 MG tablet Take 1 tablet (10 mg total) by mouth once daily. 30 tablet 11    aspirin (ECOTRIN) 81 MG EC tablet Take  81 mg by mouth once daily.      benazepriL (LOTENSIN) 40 MG tablet Take 1 tablet (40 mg total) by mouth once daily. 90 tablet 1    cilostazoL (PLETAL) 50 MG Tab Take 1 tablet (50 mg total) by mouth 2 (two) times daily. 60 tablet 9    ezetimibe (ZETIA) 10 mg tablet Take 1 tablet (10 mg total) by mouth once daily. 90 tablet 0    gabapentin (NEURONTIN) 300 MG capsule Take 2 capsules (600 mg total) by mouth 2 (two) times daily. 360 capsule 0    hydroCHLOROthiazide (HYDRODIURIL) 25 MG tablet Take 1 tablet (25 mg total) by mouth once daily. 90 tablet 1    linaCLOtide (LINZESS) 72 mcg Cap capsule Take 1 capsule (72 mcg total) by mouth before breakfast. 30 capsule 3    rosuvastatin (CRESTOR) 20 MG tablet Take 1 tablet (20 mg total) by mouth once daily. 90 tablet 1    tofacitinib (XELJANZ) 5 mg Tab Take 5 mg by mouth once daily. 30 tablet 11    [DISCONTINUED] cloNIDine (CATAPRES) 0.2 MG tablet Take 1 tablet (0.2 mg total) by mouth 2 (two) times daily. 180 tablet 3    sildenafil (REVATIO) 20 mg Tab Take 1 tablet (20 mg total) by mouth daily as needed (pulm htn). (Patient not taking: Reported on 4/27/2023) 90 tablet 0     No current facility-administered medications on file prior to visit.       Medications have been reviewed and reconciled with patient at this visit.  Barriers to medications reviewed with patient.    Adverse reactions to current medications reviewed with patient..    Over the counter medications reviewed and reconciled with patient.    Exam:  Wt Readings from Last 3 Encounters:   06/20/23 73.8 kg (162 lb 11.2 oz)   05/31/23 74.7 kg (164 lb 10.9 oz)   05/31/23 74.7 kg (164 lb 10.9 oz)     Temp Readings from Last 3 Encounters:   06/20/23 97.7 °F (36.5 °C) (Temporal)   04/28/23 98 °F (36.7 °C) (Temporal)   04/05/23 97.7 °F (36.5 °C) (Tympanic)     BP Readings from Last 3 Encounters:   06/20/23 128/70   05/31/23 134/68   05/31/23 (!) 154/79     Pulse Readings from Last 3 Encounters:   06/20/23 68   05/31/23 (!)  52   05/31/23 (!) 56     Body mass index is 27.07 kg/m².      Review of Systems   HENT:  Negative for hearing loss.    Eyes:  Negative for discharge.   Respiratory:  Negative for wheezing.    Cardiovascular:  Negative for chest pain and palpitations.   Gastrointestinal:  Negative for blood in stool, constipation, diarrhea and vomiting.   Genitourinary:  Negative for hematuria and urgency.   Musculoskeletal:  Negative for neck pain.   Neurological:  Negative for weakness and headaches.   Endo/Heme/Allergies:  Negative for polydipsia.   Physical Exam  Vitals and nursing note reviewed.   Constitutional:       Appearance: Normal appearance. He is obese.   HENT:      Head: Normocephalic and atraumatic.      Right Ear: Tympanic membrane, ear canal and external ear normal.      Left Ear: Tympanic membrane, ear canal and external ear normal.      Nose: Nose normal.      Mouth/Throat:      Mouth: Mucous membranes are moist.      Pharynx: Oropharynx is clear.   Eyes:      Extraocular Movements: Extraocular movements intact.      Conjunctiva/sclera: Conjunctivae normal.      Pupils: Pupils are equal, round, and reactive to light.   Cardiovascular:      Rate and Rhythm: Normal rate and regular rhythm.      Pulses: Normal pulses.      Heart sounds: Normal heart sounds.   Pulmonary:      Effort: Pulmonary effort is normal.      Breath sounds: Normal breath sounds.   Abdominal:      General: Bowel sounds are normal.      Palpations: Abdomen is soft.   Musculoskeletal:         General: Normal range of motion.      Cervical back: Normal range of motion and neck supple.   Skin:     General: Skin is warm and dry.      Capillary Refill: Capillary refill takes less than 2 seconds.   Neurological:      General: No focal deficit present.      Mental Status: He is alert and oriented to person, place, and time.   Psychiatric:         Mood and Affect: Mood normal.         Behavior: Behavior normal.         Thought Content: Thought content  normal.         Judgment: Judgment normal.       Laboratory Reviewed ({Yes)  Lab Results   Component Value Date    WBC 8.8 04/24/2023    HGB 12.6 (L) 04/24/2023    HCT 39.6 (L) 04/24/2023     04/24/2023    CHOL 120 07/06/2022    TRIG 102 07/06/2022    HDL 47 07/06/2022    ALT 18 03/02/2023    AST 17 03/02/2023     04/24/2023    K 3.9 04/24/2023     12/23/2022    CREATININE 1.28 (H) 04/24/2023    BUN 17.0 04/24/2023    CO2 25 04/24/2023    TSH 0.572 12/11/2019    PSA 0.34 07/06/2022    HGBA1C 6.2 (H) 07/06/2022       Deborah was seen today for annual exam.    Diagnoses and all orders for this visit:    Annual physical exam  -     Lipid Panel; Future  -     T4, Free; Future  -     TSH; Future  -     Hemoglobin A1C; Future  -     Comprehensive Metabolic Panel; Future  -     CBC Auto Differential; Future  -     PSA, SCREENING; Future    Abnormal finding of blood chemistry, unspecified  -     Lipid Panel; Future  -     Hemoglobin A1C; Future  -     CBC Auto Differential; Future    Prediabetes  -     T4, Free; Future  -     TSH; Future    Encounter for screening for malignant neoplasm of prostate  -     PSA, SCREENING; Future    Hypertension, unspecified type  -     cloNIDine (CATAPRES) 0.2 MG tablet; Take 1 tablet (0.2 mg total) by mouth 2 (two) times daily.    Encounter to establish care        The current medical regimen is effective;  continue present plan and medications.     Care Plan/Goals: Reviewed    Goals    None     Deborah was seen today for annual exam.    Diagnoses and all orders for this visit:    Annual physical exam  -     Lipid Panel; Future  -     T4, Free; Future  -     TSH; Future  -     Hemoglobin A1C; Future  -     Comprehensive Metabolic Panel; Future  -     CBC Auto Differential; Future  -     PSA, SCREENING; Future    Abnormal finding of blood chemistry, unspecified  -     Lipid Panel; Future  -     Hemoglobin A1C; Future  -     CBC Auto Differential; Future    Prediabetes  -      T4, Free; Future  -     TSH; Future    Encounter for screening for malignant neoplasm of prostate  -     PSA, SCREENING; Future    Hypertension, unspecified type  -     cloNIDine (CATAPRES) 0.2 MG tablet; Take 1 tablet (0.2 mg total) by mouth 2 (two) times daily.    Encounter to establish care         Follow up: Follow up for with new PCP to establish care .    After visit summary was printed and given to patient upon discharge today.  Patient goals and care plan are included in After Visit Summary.

## 2023-06-21 ENCOUNTER — HOSPITAL ENCOUNTER (OUTPATIENT)
Dept: CARDIOLOGY | Facility: HOSPITAL | Age: 68
Discharge: HOME | End: 2023-06-21
Attending: STUDENT IN AN ORGANIZED HEALTH CARE EDUCATION/TRAINING PROGRAM
Payer: MEDICARE

## 2023-06-21 VITALS
DIASTOLIC BLOOD PRESSURE: 68 MMHG | HEIGHT: 65 IN | HEIGHT: 65 IN | SYSTOLIC BLOOD PRESSURE: 134 MMHG | WEIGHT: 164 LBS | HEART RATE: 50 BPM | WEIGHT: 164 LBS | BODY MASS INDEX: 27.32 KG/M2 | BODY MASS INDEX: 27.32 KG/M2

## 2023-06-21 DIAGNOSIS — I25.118 CORONARY ARTERY DISEASE OF NATIVE ARTERY OF NATIVE HEART WITH STABLE ANGINA PECTORIS: ICD-10-CM

## 2023-06-21 DIAGNOSIS — I73.9 PAD (PERIPHERAL ARTERY DISEASE): ICD-10-CM

## 2023-06-21 DIAGNOSIS — I10 HYPERTENSION, ESSENTIAL: ICD-10-CM

## 2023-06-21 DIAGNOSIS — E78.2 MIXED HYPERLIPIDEMIA: ICD-10-CM

## 2023-06-21 DIAGNOSIS — R94.31 ABNORMAL EKG: ICD-10-CM

## 2023-06-21 LAB
AORTIC ROOT ANNULUS: 3.24 CM
ASCENDING AORTA: 2.67 CM
AV INDEX (PROSTH): 0.84
AV MEAN GRADIENT: 5 MMHG
AV PEAK GRADIENT: 12 MMHG
AV REGURGITATION PRESSURE HALF TIME: 705.84 MS
AV VALVE AREA: 2.57 CM2
AV VELOCITY RATIO: 0.74
BSA FOR ECHO PROCEDURE: 1.85 M2
CV ECHO LV RWT: 0.53 CM
DOP CALC AO PEAK VEL: 1.71 M/S
DOP CALC AO VTI: 37.4 CM
DOP CALC LVOT AREA: 3 CM2
DOP CALC LVOT DIAMETER: 1.97 CM
DOP CALC LVOT PEAK VEL: 1.26 M/S
DOP CALC LVOT STROKE VOLUME: 96.27 CM3
DOP CALC RVOT PEAK VEL: 0.6 M/S
DOP CALC RVOT VTI: 17.6 CM
DOP CALCLVOT PEAK VEL VTI: 31.6 CM
E WAVE DECELERATION TIME: 235.66 MSEC
E/A RATIO: 0.69
E/E' RATIO: 6.78 M/S
ECHO LV POSTERIOR WALL: 1.25 CM (ref 0.6–1.1)
EJECTION FRACTION: 65 %
FRACTIONAL SHORTENING: 35 % (ref 28–44)
INTERVENTRICULAR SEPTUM: 1.26 CM (ref 0.6–1.1)
IVRT: 99.9 MSEC
LA MAJOR: 4.5 CM
LA MINOR: 4.32 CM
LA WIDTH: 3.1 CM
LEFT ANT TIBIAL SYS PSV: 32 CM/S
LEFT ATRIUM SIZE: 3.25 CM
LEFT ATRIUM VOLUME INDEX MOD: 12.2 ML/M2
LEFT ATRIUM VOLUME INDEX: 20.7 ML/M2
LEFT ATRIUM VOLUME MOD: 22.24 CM3
LEFT ATRIUM VOLUME: 37.75 CM3
LEFT CFA PSV: 231 CM/S
LEFT INTERNAL DIMENSION IN SYSTOLE: 3.06 CM (ref 2.1–4)
LEFT PERONEAL SYS PSV: 49 CM/S
LEFT POPLITEAL PSV: 44 CM/S
LEFT POST TIBIAL SYS PSV: 55 CM/S
LEFT PROFUNDA SYS PSV: 94 CM/S
LEFT SUPER FEMORAL DIST SYS PSV: 55 CM/S
LEFT SUPER FEMORAL MID SYS PSV: 54 CM/S
LEFT SUPER FEMORAL OSTIAL SYS PSV: 125 CM/S
LEFT SUPER FEMORAL PROX SYS PSV: 123 CM/S
LEFT TIB/PER TRUNK SYS PSV: 49 CM/S
LEFT VENTRICLE DIASTOLIC VOLUME INDEX: 56.97 ML/M2
LEFT VENTRICLE DIASTOLIC VOLUME: 103.68 ML
LEFT VENTRICLE MASS INDEX: 125 G/M2
LEFT VENTRICLE SYSTOLIC VOLUME INDEX: 20.1 ML/M2
LEFT VENTRICLE SYSTOLIC VOLUME: 36.63 ML
LEFT VENTRICULAR INTERNAL DIMENSION IN DIASTOLE: 4.73 CM (ref 3.5–6)
LEFT VENTRICULAR MASS: 228.3 G
LV LATERAL E/E' RATIO: 6.78 M/S
LV SEPTAL E/E' RATIO: 6.78 M/S
LVOT MG: 3.25 MMHG
LVOT MV: 0.84 CM/S
MV PEAK A VEL: 0.88 M/S
MV PEAK E VEL: 0.61 M/S
MV STENOSIS PRESSURE HALF TIME: 68.34 MS
MV VALVE AREA P 1/2 METHOD: 3.22 CM2
OHS CV LEFT LOWER EXTREMITY ABI (NO CALC): 0.9
OHS CV RIGHT ABI LOWER EXTREMITY (NO CALC): 1
PISA AR MAX VEL: 4.68 M/S
PISA TR MAX VEL: 2.24 M/S
PULM VEIN S/D RATIO: 1.64
PV MEAN GRADIENT: 0.98 MMHG
PV PEAK D VEL: 0.39 M/S
PV PEAK S VEL: 0.64 M/S
PV PEAK VELOCITY: 1 CM/S
RA MAJOR: 3.49 CM
RA PRESSURE: 3 MMHG
RA WIDTH: 2.97 CM
RIGHT ANT TIBIAL SYS PSV: 63 CM/S
RIGHT CFA PSV: 123 CM/S
RIGHT PERONEAL SYS PSV: 140 CM/S
RIGHT POPLITEAL PSV: 59 CM/S
RIGHT POST TIBIAL SYS PSV: 87 CM/S
RIGHT PROFUNDA SYS PSV: 84 CM/S
RIGHT SUPER FEMORAL DIST SYS PSV: 108 CM/S
RIGHT SUPER FEMORAL MID SYS PSV: 66 CM/S
RIGHT SUPER FEMORAL OSTIAL SYS PSV: 82 CM/S
RIGHT SUPER FEMORAL PROX SYS PSV: 87 CM/S
RIGHT TIB/PER TRUNK SYS PSV: 56 CM/S
RIGHT VENTRICULAR END-DIASTOLIC DIMENSION: 3 CM
SINUS: 2.78 CM
STJ: 2.38 CM
TDI LATERAL: 0.09 M/S
TDI SEPTAL: 0.09 M/S
TDI: 0.09 M/S
TR MAX PG: 20 MMHG
TV REST PULMONARY ARTERY PRESSURE: 23 MMHG

## 2023-06-21 PROCEDURE — 93925 CV US DOPPLER ARTERIAL LEGS BILATERAL (CUPID ONLY): ICD-10-PCS | Mod: 26,,, | Performed by: INTERNAL MEDICINE

## 2023-06-21 PROCEDURE — 93925 LOWER EXTREMITY STUDY: CPT

## 2023-06-21 PROCEDURE — 93306 ECHO (CUPID ONLY): ICD-10-PCS | Mod: 26,,, | Performed by: INTERNAL MEDICINE

## 2023-06-21 PROCEDURE — 93306 TTE W/DOPPLER COMPLETE: CPT | Mod: 26,,, | Performed by: INTERNAL MEDICINE

## 2023-06-21 PROCEDURE — 93925 LOWER EXTREMITY STUDY: CPT | Mod: 26,,, | Performed by: INTERNAL MEDICINE

## 2023-06-21 PROCEDURE — 93306 TTE W/DOPPLER COMPLETE: CPT

## 2023-06-22 ENCOUNTER — PATIENT MESSAGE (OUTPATIENT)
Dept: VASCULAR SURGERY | Facility: CLINIC | Age: 68
End: 2023-06-22
Payer: MEDICARE

## 2023-06-23 ENCOUNTER — SPECIALTY PHARMACY (OUTPATIENT)
Dept: PHARMACY | Facility: CLINIC | Age: 68
End: 2023-06-23
Payer: MEDICARE

## 2023-06-23 NOTE — TELEPHONE ENCOUNTER
Specialty Pharmacy - Refill Coordination    Specialty Medication Orders Linked to Encounter      Flowsheet Row Most Recent Value   Medication #1 tofacitinib (XELJANZ) 5 mg Tab (Order#[id number], Rx#[id number])            Refill Questions - Documented Responses      Flowsheet Row Most Recent Value   Patient Availability and HIPAA Verification    Does patient want to proceed with activity? Yes   HIPAA/medical authority confirmed? Yes   Relationship to patient of person spoken to? Child   Refill Screening Questions    Changes to allergies? No   Changes to medications? No   New conditions since last clinic visit? No   Unplanned office visit, urgent care, ED, or hospital admission in the last 4 weeks? No   How does patient/caregiver feel medication is working? Good   Financial problems or insurance changes? No   How many doses of your specialty medications were missed in the last 4 weeks? 0   Would patient like to speak to a pharmacist? No   When does the patient need to receive the medication? 06/30/23   Refill Delivery Questions    How will the patient receive the medication? MEDRx   When does the patient need to receive the medication? 06/30/23   Shipping Address Home   Address in [facility] confirmed and updated if neccessary? Yes   Expected Copay ($) 0   Is the patient able to afford the medication copay? Yes   Payment Method zero copay   Days supply of Refill 30   Supplies needed? No supplies needed   Refill activity completed? Yes   Refill activity plan Refill scheduled   Shipment/Pickup Date: 06/29/23            Current Outpatient Medications   Medication Sig    amLODIPine (NORVASC) 10 MG tablet Take 1 tablet (10 mg total) by mouth once daily.    aspirin (ECOTRIN) 81 MG EC tablet Take 81 mg by mouth once daily.    benazepriL (LOTENSIN) 40 MG tablet Take 1 tablet (40 mg total) by mouth once daily.    cilostazoL (PLETAL) 50 MG Tab Take 1 tablet (50 mg total) by mouth 2 (two) times daily.    cloNIDine  (CATAPRES) 0.2 MG tablet Take 1 tablet (0.2 mg total) by mouth 2 (two) times daily.    ezetimibe (ZETIA) 10 mg tablet Take 1 tablet (10 mg total) by mouth once daily.    gabapentin (NEURONTIN) 300 MG capsule Take 2 capsules (600 mg total) by mouth 2 (two) times daily.    hydroCHLOROthiazide (HYDRODIURIL) 25 MG tablet Take 1 tablet (25 mg total) by mouth once daily.    linaCLOtide (LINZESS) 72 mcg Cap capsule Take 1 capsule (72 mcg total) by mouth before breakfast.    rosuvastatin (CRESTOR) 20 MG tablet Take 1 tablet (20 mg total) by mouth once daily.    sildenafil (REVATIO) 20 mg Tab Take 1 tablet (20 mg total) by mouth daily as needed (pulm htn). (Patient not taking: Reported on 4/27/2023)    tofacitinib (XELJANZ) 5 mg Tab Take 5 mg by mouth once daily.   Last reviewed on 6/20/2023  2:22 PM by Ekaterina Funes NP    Review of patient's allergies indicates:  No Known Allergies Last reviewed on  6/20/2023 2:30 PM by Dara Piña      Tasks added this encounter   No tasks added.   Tasks due within next 3 months   6/23/2023 - Refill Coordination Outreach (1 time occurrence)     [facility]  [address]  [facility] [address]  Phone: [phone]  Fax: [phone]

## 2023-06-28 DIAGNOSIS — Z01.818 PRE-OPERATIVE EXAMINATION: Primary | ICD-10-CM

## 2023-06-29 ENCOUNTER — HOSPITAL ENCOUNTER (OUTPATIENT)
Dept: RADIOLOGY | Facility: HOSPITAL | Age: 68
Discharge: HOME | End: 2023-06-29
Attending: SURGERY
Payer: MEDICARE

## 2023-06-29 DIAGNOSIS — I71.43 INFRARENAL ABDOMINAL AORTIC ANEURYSM (AAA) WITHOUT RUPTURE: ICD-10-CM

## 2023-06-29 DIAGNOSIS — Z01.818 ENCOUNTER FOR OTHER PREPROCEDURAL EXAMINATION: ICD-10-CM

## 2023-06-29 PROCEDURE — 74174 CTA ABD&PLVS W/CONTRAST: CPT | Mod: TC

## 2023-06-29 PROCEDURE — 74174 CTA ABDOMEN AND PELVIS: ICD-10-PCS | Mod: 26,,, | Performed by: RADIOLOGY

## 2023-06-29 PROCEDURE — 25500020 PHARM REV CODE 255: Performed by: SURGERY

## 2023-06-29 PROCEDURE — 74174 CTA ABD&PLVS W/CONTRAST: CPT | Mod: 26,,, | Performed by: RADIOLOGY

## 2023-06-29 RX ADMIN — IOHEXOL 100 ML: 350 INJECTION, SOLUTION INTRAVENOUS at 09:06

## 2023-07-03 ENCOUNTER — INITIAL CONSULT (OUTPATIENT)
Dept: VASCULAR SURGERY | Facility: CLINIC | Age: 68
End: 2023-07-03
Payer: MEDICARE

## 2023-07-03 VITALS
SYSTOLIC BLOOD PRESSURE: 137 MMHG | HEART RATE: 70 BPM | TEMPERATURE: 99 F | BODY MASS INDEX: 27.85 KG/M2 | WEIGHT: 163.13 LBS | DIASTOLIC BLOOD PRESSURE: 64 MMHG | HEIGHT: 64 IN

## 2023-07-03 DIAGNOSIS — I71.43 INFRARENAL ABDOMINAL AORTIC ANEURYSM (AAA) WITHOUT RUPTURE: Primary | ICD-10-CM

## 2023-07-03 PROCEDURE — 99203 PR OFFICE/OUTPT VISIT, NEW, LEVL III, 30-44 MIN: ICD-10-PCS | Mod: S$PBB,,, | Performed by: SURGERY

## 2023-07-03 PROCEDURE — 99213 OFFICE O/P EST LOW 20 MIN: CPT | Mod: PBBFAC | Performed by: SURGERY

## 2023-07-03 PROCEDURE — 99999 PR PBB SHADOW E&M-EST. PATIENT-LVL III: CPT | Mod: PBBFAC,,, | Performed by: SURGERY

## 2023-07-03 PROCEDURE — 99203 OFFICE O/P NEW LOW 30 MIN: CPT | Mod: S$PBB,,, | Performed by: SURGERY

## 2023-07-03 PROCEDURE — 99999 PR PBB SHADOW E&M-EST. PATIENT-LVL III: ICD-10-PCS | Mod: PBBFAC,,, | Performed by: SURGERY

## 2023-07-03 NOTE — PROGRESS NOTES
Vascular Surgery Clinic  History and Physical    Subjective:     Deborah White is a 68 y.o. male with h/o CAD, HTN, HLD who presents to clinic for AAA. This was discovered incidentally on a CT scan and has been surveyed for several years for US. On his most recent imaging, the aneurysm had grown and now measures 5.3 cm at its widest. He has never had symptoms associated with his AAA.     He currently smoked 9-10 cigarettes/day  He is on ASA and pletal  He denies a family history of aneurysm   Echo: EF 65%         PMH:   Past Medical History:   Diagnosis Date    Coronary artery disease     Hyperlipidemia     Hypertension     Personal history of colonic polyps        Past Surgical History:   Past Surgical History:   Procedure Laterality Date    INJECTION OF ANESTHETIC AGENT AROUND MEDIAL BRANCH NERVES INNERVATING CERVICAL FACET JOINT Right 11/18/2020    Procedure: Block-nerve-medial branch-cervical Right C3, 4, 5with RN IV sedation;  Surgeon: Martín Barnes MD;  Location: [facility];  Service: Pain Management;  Laterality: Right;    INJECTION OF ANESTHETIC AGENT AROUND MEDIAL BRANCH NERVES INNERVATING CERVICAL FACET JOINT Bilateral 4/28/2023    Procedure: Bilateral C4-6 MBB with RN IV sedation;  Surgeon: Rudolph Burgos MD;  Location: [facility];  Service: Pain Management;  Laterality: Bilateral;       Social History:  Social History     Socioeconomic History    Marital status:    Tobacco Use    Smoking status: Every Day     Types: Cigarettes    Smokeless tobacco: Never   Substance and Sexual Activity    Alcohol use: Never     Social Determinants of Health     Financial Resource Strain: Low Risk     Difficulty of Paying Living Expenses: Not very hard   Food Insecurity: No Food Insecurity    Worried About Running Out of Food in the Last Year: Never true    Ran Out of Food in the Last Year: Never true   Transportation Needs: No Transportation Needs    Lack of Transportation (Medical): No    Lack of Transportation  (Non-Medical): No   Physical Activity: Insufficiently Active    Days of Exercise per Week: 3 days    Minutes of Exercise per Session: 20 min   Stress: Stress Concern Present    Feeling of Stress : To some extent   Social Connections: Unknown    Frequency of Communication with Friends and Family: Twice a week    Frequency of Social Gatherings with Friends and Family: Once a week    Active Member of Clubs or Organizations: No    Attends Club or Organization Meetings: Never    Marital Status:    Housing Stability: Low Risk     Unable to Pay for Housing in the Last Year: No    Number of Places Lived in the Last Year: 1    Unstable Housing in the Last Year: No       Allergies: Review of patient's allergies indicates:  No Known Allergies    Medications:    Current Outpatient Medications on File Prior to Visit   Medication Sig Dispense Refill    amLODIPine (NORVASC) 10 MG tablet Take 1 tablet (10 mg total) by mouth once daily. 30 tablet 11    aspirin (ECOTRIN) 81 MG EC tablet Take 81 mg by mouth once daily.      benazepriL (LOTENSIN) 40 MG tablet Take 1 tablet (40 mg total) by mouth once daily. 90 tablet 1    cilostazoL (PLETAL) 50 MG Tab Take 1 tablet (50 mg total) by mouth 2 (two) times daily. 60 tablet 9    cloNIDine (CATAPRES) 0.2 MG tablet Take 1 tablet (0.2 mg total) by mouth 2 (two) times daily. 180 tablet 3    ezetimibe (ZETIA) 10 mg tablet Take 1 tablet (10 mg total) by mouth once daily. 90 tablet 0    gabapentin (NEURONTIN) 300 MG capsule Take 2 capsules (600 mg total) by mouth 2 (two) times daily. 360 capsule 0    hydroCHLOROthiazide (HYDRODIURIL) 25 MG tablet Take 1 tablet (25 mg total) by mouth once daily. 90 tablet 1    linaCLOtide (LINZESS) 72 mcg Cap capsule Take 1 capsule (72 mcg total) by mouth before breakfast. 30 capsule 3    rosuvastatin (CRESTOR) 20 MG tablet Take 1 tablet (20 mg total) by mouth once daily. 90 tablet 1    sildenafil (REVATIO) 20 mg Tab Take 1 tablet (20 mg total) by mouth daily  as needed (pulm htn). 90 tablet 0    tofacitinib (XELJANZ) 5 mg Tab Take 5 mg by mouth once daily. 30 tablet 11     No current facility-administered medications on file prior to visit.         Objective:     PHYSICAL EXAM:  Vital Signs (Most Recent)  Temp: 98.7 °F (37.1 °C) (07/03/23 1312)  Pulse: 70 (07/03/23 1312)  BP: 137/64 (07/03/23 1312)    ROS A 10+ review of systems was performed with pertinent positives and negatives noted above in the history of present illness.  Other systems were negative unless otherwise specified.    Physical Exam:  Physical Exam  Constitutional:       General: He is not in acute distress.  HENT:      Head: Normocephalic and atraumatic.   Eyes:      Pupils: Pupils are equal, round, and reactive to light.   Cardiovascular:      Rate and Rhythm: Normal rate and regular rhythm.      Heart sounds: No murmur heard.     Comments: Palpable radial, DP and PT pulses.  Pulmonary:      Effort: Pulmonary effort is normal. No respiratory distress.   Abdominal:      General: There is no distension.      Palpations: Abdomen is soft. There is no mass.      Tenderness: There is no abdominal tenderness.   Skin:     General: Skin is warm and dry.   Neurological:      General: No focal deficit present.      Mental Status: He is alert and oriented to person, place, and time.     Labs:  I have reviewed all pertinent lab results within the past 24 hours.  Cr: 1.4, stable from previous  A1c: 6.2, stable from previous      Imaging  The following imaging was reviewed:  CTA abd/pelvis 6/29/23  Impression:     1. There is an aneurysm in the distal aspect of the abdominal aorta that has an AP diameter of 5.3 cm on the current examination.  There is a moderate amount of plaque in this aneurysm.  The patent portion of this aneurysm has an AP diameter of 2.9 cm.  On the prior examination the abdominal aorta has an AP diameter of 5.2 cm at this level.  2. There is a 5 mm stone in the distal aspect of the right ureter.  There is no hydroureter or hydronephrosis proximal to this stone.  3. There is a moderate amount of atherosclerosis.  4. There is a 13 mm oval shaped hypodense area in the caudate lobe of the liver.  This area has a Hounsfield measurement of 6 on the precontrast examination.  This is characteristic of a cyst.  5. There is a small fat filled umbilical hernia. The width of the mouth of this hernia measures 10 mm.  All CT scans at this facility use dose modulation, iterative reconstruction, and/or weight base dosing when appropriate to reduce radiation dose when appropriate to reduce radiation dose to as low as reasonably achievable.          Assessment:     68 y.o. male with 5.3 cm asymptomatic AAA    Plan:     - Patient wishes to defer repair until December due to cultural reasons. Will have him follow up in November  - Counseled patient on smoking cessation       Diane Longoria MD   [facility]

## 2023-07-24 ENCOUNTER — SPECIALTY PHARMACY (OUTPATIENT)
Dept: PHARMACY | Facility: CLINIC | Age: 68
End: 2023-07-24
Payer: MEDICARE

## 2023-07-24 NOTE — TELEPHONE ENCOUNTER
Specialty Pharmacy - Refill Coordination    Specialty Medication Orders Linked to Encounter      Flowsheet Row Most Recent Value   Medication #1 tofacitinib (XELJANZ) 5 mg Tab (Order#[id number], Rx#[id number])            Refill Questions - Documented Responses      Flowsheet Row Most Recent Value   Patient Availability and HIPAA Verification    Does patient want to proceed with activity? Yes   HIPAA/medical authority confirmed? Yes   Relationship to patient of person spoken to? Child   Refill Screening Questions    Changes to allergies? No   Changes to medications? No   New conditions since last clinic visit? No   Unplanned office visit, urgent care, ED, or hospital admission in the last 4 weeks? No   How does patient/caregiver feel medication is working? Good   Financial problems or insurance changes? No   How many doses of your specialty medications were missed in the last 4 weeks? 0   Would patient like to speak to a pharmacist? No   When does the patient need to receive the medication? 07/28/23   Refill Delivery Questions    How will the patient receive the medication? MEDRx   When does the patient need to receive the medication? 07/28/23   Shipping Address Home   Address in [facility] confirmed and updated if neccessary? Yes   Expected Copay ($) 0   Is the patient able to afford the medication copay? Yes   Payment Method zero copay   Days supply of Refill 30   Supplies needed? No supplies needed   Refill activity completed? Yes   Refill activity plan Refill scheduled   Shipment/Pickup Date: 07/24/23            Current Outpatient Medications   Medication Sig    amLODIPine (NORVASC) 10 MG tablet Take 1 tablet (10 mg total) by mouth once daily.    aspirin (ECOTRIN) 81 MG EC tablet Take 81 mg by mouth once daily.    benazepriL (LOTENSIN) 40 MG tablet Take 1 tablet (40 mg total) by mouth once daily.    cilostazoL (PLETAL) 50 MG Tab Take 1 tablet (50 mg total) by mouth 2 (two) times daily.    cloNIDine  (CATAPRES) 0.2 MG tablet Take 1 tablet (0.2 mg total) by mouth 2 (two) times daily.    ezetimibe (ZETIA) 10 mg tablet Take 1 tablet (10 mg total) by mouth once daily.    gabapentin (NEURONTIN) 300 MG capsule Take 2 capsules (600 mg total) by mouth 2 (two) times daily.    hydroCHLOROthiazide (HYDRODIURIL) 25 MG tablet Take 1 tablet (25 mg total) by mouth once daily.    linaCLOtide (LINZESS) 72 mcg Cap capsule Take 1 capsule (72 mcg total) by mouth before breakfast.    rosuvastatin (CRESTOR) 20 MG tablet Take 1 tablet (20 mg total) by mouth once daily.    sildenafil (REVATIO) 20 mg Tab Take 1 tablet (20 mg total) by mouth daily as needed (pulm htn).    tofacitinib (XELJANZ) 5 mg Tab Take 5 mg by mouth once daily.   Last reviewed on 7/3/2023  1:13 PM by Jen Byrne MA    Review of patient's allergies indicates:  No Known Allergies Last reviewed on  7/3/2023 1:13 PM by Jen Byrne      Tasks added this encounter   No tasks added.   Tasks due within next 3 months   7/22/2023 - Refill Coordination Outreach (1 time occurrence)     [facility]ty Pharm[facility]  [address] [place], [place] [address]  [facility] [address]  Phone: [phone]  Fax: [phone]

## 2023-07-25 ENCOUNTER — PES CALL (OUTPATIENT)
Dept: ADMINISTRATIVE | Facility: CLINIC | Age: 68
End: 2023-07-25
Payer: MEDICARE

## 2023-08-14 ENCOUNTER — PATIENT MESSAGE (OUTPATIENT)
Dept: FAMILY MEDICINE | Facility: CLINIC | Age: 68
End: 2023-08-14
Payer: MEDICARE

## 2023-08-15 DIAGNOSIS — E78.5 HYPERLIPIDEMIA, UNSPECIFIED HYPERLIPIDEMIA TYPE: ICD-10-CM

## 2023-08-15 RX ORDER — ROSUVASTATIN CALCIUM 20 MG/1
20 TABLET, COATED ORAL DAILY
Qty: 90 TABLET | Refills: 1 | Status: SHIPPED | OUTPATIENT
Start: 2023-08-15 | End: 2023-11-10 | Stop reason: SDUPTHER

## 2023-08-15 RX ORDER — EZETIMIBE 10 MG/1
10 TABLET ORAL DAILY
Qty: 90 TABLET | Refills: 1 | Status: SHIPPED | OUTPATIENT
Start: 2023-08-15 | End: 2024-02-02 | Stop reason: SDUPTHER

## 2023-08-16 ENCOUNTER — SPECIALTY PHARMACY (OUTPATIENT)
Dept: PHARMACY | Facility: CLINIC | Age: 68
End: 2023-08-16
Payer: MEDICARE

## 2023-08-16 NOTE — TELEPHONE ENCOUNTER
Specialty Pharmacy - Refill Coordination    Specialty Medication Orders Linked to Encounter      Flowsheet Row Most Recent Value   Medication #1 tofacitinib (XELJANZ) 5 mg Tab (Order#[id number], Rx#[id number])            Refill Questions - Documented Responses      Flowsheet Row Most Recent Value   Patient Availability and HIPAA Verification    Does patient want to proceed with activity? Yes   HIPAA/medical authority confirmed? Yes   Relationship to patient of person spoken to? Family Member   Refill Screening Questions    Changes to allergies? No   Changes to medications? No   New conditions since last clinic visit? No   Unplanned office visit, urgent care, ED, or hospital admission in the last 4 weeks? No   How does patient/caregiver feel medication is working? Good   Financial problems or insurance changes? No   How many doses of your specialty medications were missed in the last 4 weeks? 0   Would patient like to speak to a pharmacist? No   When does the patient need to receive the medication? 08/25/23   Refill Delivery Questions    How will the patient receive the medication? MEDRx   When does the patient need to receive the medication? 08/25/23   Shipping Address Prescription   Address in [facility] confirmed and updated if neccessary? Yes   Expected Copay ($) 0   Is the patient able to afford the medication copay? Yes   Payment Method zero copay   Days supply of Refill 30   Refill activity completed? Yes   Refill activity plan Refill scheduled   Shipment/Pickup Date: 08/23/23            Current Outpatient Medications   Medication Sig    amLODIPine (NORVASC) 10 MG tablet Take 1 tablet (10 mg total) by mouth once daily.    aspirin (ECOTRIN) 81 MG EC tablet Take 81 mg by mouth once daily.    benazepriL (LOTENSIN) 40 MG tablet Take 1 tablet (40 mg total) by mouth once daily.    cilostazoL (PLETAL) 50 MG Tab Take 1 tablet (50 mg total) by mouth 2 (two) times daily.    cloNIDine (CATAPRES) 0.2 MG tablet Take  1 tablet (0.2 mg total) by mouth 2 (two) times daily.    ezetimibe (ZETIA) 10 mg tablet Take 1 tablet (10 mg total) by mouth once daily.    gabapentin (NEURONTIN) 300 MG capsule Take 2 capsules (600 mg total) by mouth 2 (two) times daily.    hydroCHLOROthiazide (HYDRODIURIL) 25 MG tablet Take 1 tablet (25 mg total) by mouth once daily.    linaCLOtide (LINZESS) 72 mcg Cap capsule Take 1 capsule (72 mcg total) by mouth before breakfast.    rosuvastatin (CRESTOR) 20 MG tablet Take 1 tablet (20 mg total) by mouth once daily.    sildenafil (REVATIO) 20 mg Tab Take 1 tablet (20 mg total) by mouth daily as needed (pulm htn).    tofacitinib (XELJANZ) 5 mg Tab Take 1 tablet (5 mg) by mouth once daily.   Last reviewed on 7/3/2023  1:13 PM by Jen Byrne MA    Review of patient's allergies indicates:  No Known Allergies Last reviewed on  8/15/2023 10:35 AM by Ekaterina Funes      Tasks added this encounter   No tasks added.   Tasks due within next 3 months   11/7/2023 - Clinical Assessment (1 year recurrence)     [facility]  [address] [place] [id number]  Phone: [phone]  Fax: [phone]

## 2023-08-25 ENCOUNTER — TELEPHONE (OUTPATIENT)
Dept: PAIN MEDICINE | Facility: CLINIC | Age: 68
End: 2023-08-25
Payer: MEDICARE

## 2023-09-18 ENCOUNTER — OFFICE VISIT (OUTPATIENT)
Dept: INTERNAL MEDICINE | Facility: CLINIC | Age: 68
End: 2023-09-18
Payer: MEDICARE

## 2023-09-18 VITALS
SYSTOLIC BLOOD PRESSURE: 132 MMHG | DIASTOLIC BLOOD PRESSURE: 62 MMHG | HEART RATE: 73 BPM | OXYGEN SATURATION: 97 % | TEMPERATURE: 97 F | WEIGHT: 162.5 LBS | BODY MASS INDEX: 27.89 KG/M2

## 2023-09-18 DIAGNOSIS — R12 CHRONIC HEARTBURN: Primary | ICD-10-CM

## 2023-09-18 PROBLEM — Z51.81 ENCOUNTER FOR MEDICATION MONITORING: Status: RESOLVED | Noted: 2021-02-23 | Resolved: 2023-09-18

## 2023-09-18 PROBLEM — Z79.899 LONG TERM CURRENT USE OF IMMUNOSUPPRESSIVE DRUG: Status: RESOLVED | Noted: 2021-02-23 | Resolved: 2023-09-18

## 2023-09-18 PROBLEM — Z79.60 LONG TERM CURRENT USE OF IMMUNOSUPPRESSIVE DRUG: Status: RESOLVED | Noted: 2021-02-23 | Resolved: 2023-09-18

## 2023-09-18 PROCEDURE — 99999 PR PBB SHADOW E&M-EST. PATIENT-LVL V: ICD-10-PCS | Mod: PBBFAC,,, | Performed by: PHYSICIAN ASSISTANT

## 2023-09-18 PROCEDURE — 99213 OFFICE O/P EST LOW 20 MIN: CPT | Mod: S$PBB,,, | Performed by: PHYSICIAN ASSISTANT

## 2023-09-18 PROCEDURE — 99215 OFFICE O/P EST HI 40 MIN: CPT | Mod: PBBFAC | Performed by: PHYSICIAN ASSISTANT

## 2023-09-18 PROCEDURE — 99999 PR PBB SHADOW E&M-EST. PATIENT-LVL V: CPT | Mod: PBBFAC,,, | Performed by: PHYSICIAN ASSISTANT

## 2023-09-18 PROCEDURE — 99213 PR OFFICE/OUTPT VISIT, EST, LEVL III, 20-29 MIN: ICD-10-PCS | Mod: S$PBB,,, | Performed by: PHYSICIAN ASSISTANT

## 2023-09-18 RX ORDER — OMEPRAZOLE 20 MG/1
20 CAPSULE, DELAYED RELEASE ORAL DAILY
Qty: 90 CAPSULE | Refills: 3 | Status: SHIPPED | OUTPATIENT
Start: 2023-09-18 | End: 2024-02-27

## 2023-09-18 NOTE — PATIENT INSTRUCTIONS
"Tips to Control Acid Reflux  To control acid reflux, youll need to make some basic diet and lifestyle changes. The simple steps outlined below may be all youll need to relieve discomfort.  Watch What You Eat    Avoid fatty foods and spicy foods.  Eat fewer acidic foods, such as citrus and tomato-based foods. These can increase symptoms.  Limit drinking alcohol, caffeine, and fizzy beverages. All increase acid reflux.  Try limiting chocolate, peppermint, and spearmint. These can worsen acid reflux in some people.  Watch When You Eat  Avoid lying down for 3 hours after eating.  Do not snack before going to bed.  Raise Your Head    Raising your head and upper body by 4" to 6" helps limit reflux when youre lying down. Put blocks under the head of the bed frame to raise it.  Other Changes   Lose weight, if you need to.  Dont work out near bedtime.  Avoid tight-fitting clothes.  Limit aspirin and ibuprofen.  Stop smoking.    © 8473-3271 [employer], [address] [place] [address], [place] [address]. All rights reserved. This information is not intended as a substitute for professional medical care. Always follow your healthcare professional's instructions.   "

## 2023-09-20 NOTE — PROGRESS NOTES
Subjective:       Patient ID: Deborah White is a 68 y.o. male.    Chief Complaint: Gastroesophageal Reflux (Patient stated that he has had this problem for more than 10 years but last week he was eating and started feeling a burning sensation and had to regurgitate what he had eaten. )      Gastroesophageal Reflux  He reports no abdominal pain or no nausea. Pertinent negatives include no fatigue.     JANELL  Cortes #[id number]    68-year-old male with history of GERD, hypertension, CAD, PAD, HLD presents with complaints of chronic acid reflux symptoms such as heartburn.  Patient reports occasionally he will feel a tightening in his sternal chest area where it feels like food gets stuck and then he will either have to vomit or wait a minute before food will go down.  He denies any coffee-ground emesis or hematemesis.    Review of Systems   Constitutional:  Negative for appetite change, chills, fatigue and fever.   Respiratory:  Negative for shortness of breath.    Gastrointestinal:  Negative for abdominal pain, constipation, diarrhea, nausea and vomiting.       Objective:      Physical Exam  Vitals and nursing note reviewed.   Constitutional:       General: He is not in acute distress.     Appearance: He is well-developed.   HENT:      Head: Normocephalic and atraumatic.   Eyes:      General: Lids are normal. No scleral icterus.     Extraocular Movements: Extraocular movements intact.      Conjunctiva/sclera: Conjunctivae normal.   Cardiovascular:      Rate and Rhythm: Normal rate and regular rhythm.   Pulmonary:      Effort: Pulmonary effort is normal.      Breath sounds: Normal breath sounds. No decreased breath sounds, wheezing, rhonchi or rales.   Neurological:      Mental Status: He is alert.      Cranial Nerves: No cranial nerve deficit.   Psychiatric:         Mood and Affect: Mood and affect normal.         Assessment:       1. Chronic heartburn        Plan:   1. Chronic heartburn  -     Ambulatory  referral/consult to Gastroenterology; Future; Expected date: 09/25/2023  -     omeprazole (PRILOSEC) 20 MG capsule; Take 1 capsule (20 mg total) by mouth once daily.  Dispense: 90 capsule; Refill: 3      Referred to GI for C-scope and EGD, pt declines the order for those today and wishes to discuss risks of procedure.   Start PPI, GERD precautions given  ED precautions given

## 2023-09-22 RX ORDER — GABAPENTIN 300 MG/1
600 CAPSULE ORAL 2 TIMES DAILY
Qty: 360 CAPSULE | Refills: 0 | Status: SHIPPED | OUTPATIENT
Start: 2023-09-22 | End: 2024-01-10 | Stop reason: SDUPTHER

## 2023-09-22 NOTE — TELEPHONE ENCOUNTER
Good Morning/Afternoon,     Pt is requesting for a refill of: Gabapentin 300 mg  Last filed: 5/31/23  Last encounter: 5/31/23  Up coming apt: N/A  Pharmacy: [id number]  Is this something you can do?      Armaan Byrne  Medical Assistant

## 2023-10-09 DIAGNOSIS — E78.5 HYPERLIPIDEMIA, UNSPECIFIED HYPERLIPIDEMIA TYPE: ICD-10-CM

## 2023-10-10 RX ORDER — CILOSTAZOL 50 MG/1
50 TABLET ORAL 2 TIMES DAILY
Qty: 60 TABLET | Refills: 9 | Status: SHIPPED | OUTPATIENT
Start: 2023-10-10

## 2023-10-25 ENCOUNTER — TELEPHONE (OUTPATIENT)
Dept: RHEUMATOLOGY | Facility: CLINIC | Age: 68
End: 2023-10-25
Payer: MEDICARE

## 2023-10-25 ENCOUNTER — PATIENT MESSAGE (OUTPATIENT)
Dept: RHEUMATOLOGY | Facility: CLINIC | Age: 68
End: 2023-10-25
Payer: MEDICARE

## 2023-10-25 NOTE — TELEPHONE ENCOUNTER
Spoke to daughter to get his appointment rescheduled. I will send a link to schedule so she can be there.  I offered VV 10/25/2023 at 8:30, she was on her way to work. She couldn't do it.

## 2023-11-09 ENCOUNTER — PATIENT MESSAGE (OUTPATIENT)
Dept: FAMILY MEDICINE | Facility: CLINIC | Age: 68
End: 2023-11-09
Payer: MEDICARE

## 2023-11-09 DIAGNOSIS — E78.5 HYPERLIPIDEMIA, UNSPECIFIED HYPERLIPIDEMIA TYPE: ICD-10-CM

## 2023-11-09 DIAGNOSIS — I10 HYPERTENSION, ESSENTIAL: ICD-10-CM

## 2023-11-10 RX ORDER — HYDROCHLOROTHIAZIDE 25 MG/1
25 TABLET ORAL DAILY
Qty: 90 TABLET | Refills: 2 | Status: SHIPPED | OUTPATIENT
Start: 2023-11-10 | End: 2024-02-02 | Stop reason: SDUPTHER

## 2023-11-10 RX ORDER — BENAZEPRIL HYDROCHLORIDE 40 MG/1
40 TABLET ORAL DAILY
Qty: 90 TABLET | Refills: 2 | Status: SHIPPED | OUTPATIENT
Start: 2023-11-10

## 2023-11-10 RX ORDER — ROSUVASTATIN CALCIUM 20 MG/1
20 TABLET, COATED ORAL DAILY
Qty: 90 TABLET | Refills: 2 | Status: SHIPPED | OUTPATIENT
Start: 2023-11-10 | End: 2024-02-03 | Stop reason: SDUPTHER

## 2023-11-14 ENCOUNTER — LAB VISIT (OUTPATIENT)
Dept: LAB | Facility: HOSPITAL | Age: 68
End: 2023-11-14
Attending: PHYSICIAN ASSISTANT
Payer: MEDICARE

## 2023-11-14 ENCOUNTER — OFFICE VISIT (OUTPATIENT)
Dept: RHEUMATOLOGY | Facility: CLINIC | Age: 68
End: 2023-11-14
Payer: MEDICARE

## 2023-11-14 VITALS
HEIGHT: 64 IN | SYSTOLIC BLOOD PRESSURE: 143 MMHG | HEART RATE: 59 BPM | DIASTOLIC BLOOD PRESSURE: 69 MMHG | WEIGHT: 164.88 LBS | BODY MASS INDEX: 28.15 KG/M2

## 2023-11-14 DIAGNOSIS — D84.821 IMMUNOCOMPROMISED STATE DUE TO DRUG THERAPY: ICD-10-CM

## 2023-11-14 DIAGNOSIS — Z79.899 IMMUNOCOMPROMISED STATE DUE TO DRUG THERAPY: ICD-10-CM

## 2023-11-14 DIAGNOSIS — Z51.81 ENCOUNTER FOR MEDICATION MONITORING: ICD-10-CM

## 2023-11-14 DIAGNOSIS — M47.812 CERVICAL SPONDYLOSIS: ICD-10-CM

## 2023-11-14 DIAGNOSIS — M48.02 NEURAL FORAMINAL STENOSIS OF CERVICAL SPINE: ICD-10-CM

## 2023-11-14 DIAGNOSIS — Z79.899 HIGH RISK MEDICATION USE: ICD-10-CM

## 2023-11-14 DIAGNOSIS — N18.30 STAGE 3 CHRONIC KIDNEY DISEASE, UNSPECIFIED WHETHER STAGE 3A OR 3B CKD: ICD-10-CM

## 2023-11-14 DIAGNOSIS — M48.10 DISH (DIFFUSE IDIOPATHIC SKELETAL HYPEROSTOSIS): ICD-10-CM

## 2023-11-14 DIAGNOSIS — M45.0 ANKYLOSING SPONDYLITIS OF MULTIPLE SITES IN SPINE: Primary | ICD-10-CM

## 2023-11-14 DIAGNOSIS — Z51.81 MEDICATION MONITORING ENCOUNTER: ICD-10-CM

## 2023-11-14 LAB
ALBUMIN SERPL BCP-MCNC: 4.1 G/DL (ref 3.5–5.2)
ALP SERPL-CCNC: 56 U/L (ref 55–135)
ALT SERPL W/O P-5'-P-CCNC: 22 U/L (ref 10–44)
ANION GAP SERPL CALC-SCNC: 10 MMOL/L (ref 8–16)
AST SERPL-CCNC: 21 U/L (ref 10–40)
BASOPHILS # BLD AUTO: 0.06 K/UL (ref 0–0.2)
BASOPHILS NFR BLD: 0.9 % (ref 0–1.9)
BILIRUB SERPL-MCNC: 0.3 MG/DL (ref 0.1–1)
BUN SERPL-MCNC: 19 MG/DL (ref 8–23)
CALCIUM SERPL-MCNC: 9.8 MG/DL (ref 8.7–10.5)
CHLORIDE SERPL-SCNC: 105 MMOL/L (ref 95–110)
CO2 SERPL-SCNC: 27 MMOL/L (ref 23–29)
CREAT SERPL-MCNC: 1.6 MG/DL (ref 0.5–1.4)
CRP SERPL-MCNC: 1.4 MG/L (ref 0–8.2)
DIFFERENTIAL METHOD: ABNORMAL
EOSINOPHIL # BLD AUTO: 0.1 K/UL (ref 0–0.5)
EOSINOPHIL NFR BLD: 1.4 % (ref 0–8)
ERYTHROCYTE [DISTWIDTH] IN BLOOD BY AUTOMATED COUNT: 14.1 % (ref 11.5–14.5)
ERYTHROCYTE [SEDIMENTATION RATE] IN BLOOD BY PHOTOMETRIC METHOD: 17 MM/HR (ref 0–23)
EST. GFR  (NO RACE VARIABLE): 47 ML/MIN/1.73 M^2
GLUCOSE SERPL-MCNC: 106 MG/DL (ref 70–110)
HCT VFR BLD AUTO: 39 % (ref 40–54)
HGB BLD-MCNC: 12.8 G/DL (ref 14–18)
IMM GRANULOCYTES # BLD AUTO: 0.06 K/UL (ref 0–0.04)
IMM GRANULOCYTES NFR BLD AUTO: 0.9 % (ref 0–0.5)
LYMPHOCYTES # BLD AUTO: 2.7 K/UL (ref 1–4.8)
LYMPHOCYTES NFR BLD: 39.5 % (ref 18–48)
MCH RBC QN AUTO: 29.6 PG (ref 27–31)
MCHC RBC AUTO-ENTMCNC: 32.8 G/DL (ref 32–36)
MCV RBC AUTO: 90 FL (ref 82–98)
MONOCYTES # BLD AUTO: 0.7 K/UL (ref 0.3–1)
MONOCYTES NFR BLD: 10.5 % (ref 4–15)
NEUTROPHILS # BLD AUTO: 3.2 K/UL (ref 1.8–7.7)
NEUTROPHILS NFR BLD: 46.8 % (ref 38–73)
NRBC BLD-RTO: 0 /100 WBC
PLATELET # BLD AUTO: 316 K/UL (ref 150–450)
PMV BLD AUTO: 9.2 FL (ref 9.2–12.9)
POTASSIUM SERPL-SCNC: 4.4 MMOL/L (ref 3.5–5.1)
PROT SERPL-MCNC: 7.9 G/DL (ref 6–8.4)
RBC # BLD AUTO: 4.32 M/UL (ref 4.6–6.2)
SODIUM SERPL-SCNC: 142 MMOL/L (ref 136–145)
WBC # BLD AUTO: 6.92 K/UL (ref 3.9–12.7)

## 2023-11-14 PROCEDURE — 85652 RBC SED RATE AUTOMATED: CPT | Performed by: PHYSICIAN ASSISTANT

## 2023-11-14 PROCEDURE — 86140 C-REACTIVE PROTEIN: CPT | Performed by: PHYSICIAN ASSISTANT

## 2023-11-14 PROCEDURE — 99999 PR PBB SHADOW E&M-EST. PATIENT-LVL IV: ICD-10-PCS | Mod: PBBFAC,,, | Performed by: PHYSICIAN ASSISTANT

## 2023-11-14 PROCEDURE — 80053 COMPREHEN METABOLIC PANEL: CPT | Performed by: PHYSICIAN ASSISTANT

## 2023-11-14 PROCEDURE — 36415 COLL VENOUS BLD VENIPUNCTURE: CPT | Performed by: PHYSICIAN ASSISTANT

## 2023-11-14 PROCEDURE — 99215 OFFICE O/P EST HI 40 MIN: CPT | Mod: S$PBB,,, | Performed by: PHYSICIAN ASSISTANT

## 2023-11-14 PROCEDURE — 85025 COMPLETE CBC W/AUTO DIFF WBC: CPT | Performed by: PHYSICIAN ASSISTANT

## 2023-11-14 PROCEDURE — 99215 PR OFFICE/OUTPT VISIT, EST, LEVL V, 40-54 MIN: ICD-10-PCS | Mod: S$PBB,,, | Performed by: PHYSICIAN ASSISTANT

## 2023-11-14 PROCEDURE — 99214 OFFICE O/P EST MOD 30 MIN: CPT | Mod: PBBFAC | Performed by: PHYSICIAN ASSISTANT

## 2023-11-14 PROCEDURE — 99999 PR PBB SHADOW E&M-EST. PATIENT-LVL IV: CPT | Mod: PBBFAC,,, | Performed by: PHYSICIAN ASSISTANT

## 2023-11-14 NOTE — PROGRESS NOTES
Subjective:      Patient ID: Deborah White is a 68 y.o. male.    Chief Complaint: Disease Management      HPI   Deborah White  is a 68 y.o. male seen today for follow-up ankylosing spondylitis.  Patient also has severe foraminal stenosis of the cervical region as well as cervical spondylosis.  At times he has difficulty differentiating pain from ankylosing spondylitis of the cervical spine with the foraminal stenosis and arthritis.  Currently taking Xeljanz 5 mg daily.  There was consideration of moving him to 5 mg b.i.d., but with reduced kidney function, it was decided to keep him at 5 mg.  He tolerates well.  Still complains of pain that is about 8/10 in severity.  In past has been reluctant to change medication therapy.  No known history of DVT, MI, CVA, TIA, PE, nor diverticulitis.      Initially saw Dr. GATES for evaluation of chronic neck pain.  Also has known DISH syndrome of the neck.  Prior to initiation of immunosuppressive therapy, patient had elevated inflammatory markers.  Inflammatory markers and pain improved with a prednisone trial.    Also a patient of the interventional pain management department.  Has not followed up with Dr. Burgos since he had some injections earlier this year.  Today is complaining of pain radiating from the left shoulder down to the left elbow.  Tells me he has had injections in the neck which helped alleviate those symptoms.    Patient denies fevers, chills, photosensitivity, eye pain, shortness of breath, chest pain, hematuria, blood in the stool, rash, sicca symptoms, raynauds, finger ulcerations.  Rheumatologic systems otherwise negative.    Serologies/Labs:  +CARMEN 1:80 speckled, neg profile  Neg RF, CCP  Current Treatment:  Xeljanz 5 mg   Previous Treatment:   SSZ  Remicade (elevated BP during tx)  MTX      Current Outpatient Medications:     amLODIPine (NORVASC) 10 MG tablet, Take 1 tablet (10 mg total) by mouth once daily., Disp: 30 tablet, Rfl: 11    aspirin (ECOTRIN) 81 MG EC  tablet, Take 81 mg by mouth once daily., Disp: , Rfl:     benazepriL (LOTENSIN) 40 MG tablet, Take 1 tablet (40 mg total) by mouth once daily., Disp: 90 tablet, Rfl: 2    cilostazoL (PLETAL) 50 MG Tab, Take 1 tablet (50 mg total) by mouth 2 (two) times daily., Disp: 60 tablet, Rfl: 9    cloNIDine (CATAPRES) 0.2 MG tablet, Take 1 tablet (0.2 mg total) by mouth 2 (two) times daily., Disp: 180 tablet, Rfl: 3    ezetimibe (ZETIA) 10 mg tablet, Take 1 tablet (10 mg total) by mouth once daily., Disp: 90 tablet, Rfl: 1    gabapentin (NEURONTIN) 300 MG capsule, Take 2 capsules (600 mg total) by mouth 2 (two) times daily., Disp: 360 capsule, Rfl: 0    hydroCHLOROthiazide (HYDRODIURIL) 25 MG tablet, Take 1 tablet (25 mg total) by mouth once daily., Disp: 90 tablet, Rfl: 2    linaCLOtide (LINZESS) 72 mcg Cap capsule, Take 1 capsule (72 mcg total) by mouth before breakfast., Disp: 30 capsule, Rfl: 3    omeprazole (PRILOSEC) 20 MG capsule, Take 1 capsule (20 mg total) by mouth once daily., Disp: 90 capsule, Rfl: 3    rosuvastatin (CRESTOR) 20 MG tablet, Take 1 tablet (20 mg total) by mouth once daily., Disp: 90 tablet, Rfl: 2    tofacitinib (XELJANZ) 5 mg Tab, Take 1 tablet (5 mg) by mouth once daily., Disp: 30 tablet, Rfl: 11    sildenafil (REVATIO) 20 mg Tab, Take 1 tablet (20 mg total) by mouth daily as needed (pulm htn)., Disp: 90 tablet, Rfl: 0    Past Medical History:   Diagnosis Date    Coronary artery disease     Hyperlipidemia     Hypertension     Personal history of colonic polyps      Family History   Problem Relation Age of Onset    Cancer Mother     Cancer Father     Hypertension Father     Early death Brother         flu    Heart disease Brother     Hypertension Brother      Social History     Socioeconomic History    Marital status:    Tobacco Use    Smoking status: Every Day     Current packs/day: 0.50     Average packs/day: 0.5 packs/day for 45.0 years (22.5 ttl pk-yrs)     Types: Cigarettes    Smokeless  "tobacco: Never   Substance and Sexual Activity    Alcohol use: Not Currently    Drug use: Never    Sexual activity: Not Currently     Partners: Female     Social Determinants of Health     Financial Resource Strain: Medium Risk (9/18/2023)    Overall Financial Resource Strain (CARDIA)     Difficulty of Paying Living Expenses: Somewhat hard   Food Insecurity: Food Insecurity Present (9/18/2023)    Hunger Vital Sign     Worried About Running Out of Food in the Last Year: Sometimes true     Ran Out of Food in the Last Year: Never true   Transportation Needs: No Transportation Needs (9/18/2023)    PRAPARE - Transportation     Lack of Transportation (Medical): No     Lack of Transportation (Non-Medical): No   Physical Activity: Insufficiently Active (9/18/2023)    Exercise Vital Sign     Days of Exercise per Week: 2 days     Minutes of Exercise per Session: 20 min   Stress: Stress Concern Present (9/18/2023)    [employer] - Occupational Stress Questionnaire     Feeling of Stress : To some extent   Social Connections: Unknown (9/18/2023)    Social Connection and Isolation Panel [NHANES]     Frequency of Communication with Friends and Family: Once a week     Frequency of Social Gatherings with Friends and Family: Once a week     Active Member of Clubs or Organizations: No     Attends Club or Organization Meetings: Never     Marital Status:    Housing Stability: Low Risk  (9/18/2023)    Housing Stability Vital Sign     Unable to Pay for Housing in the Last Year: No     Number of Places Lived in the Last Year: 1     Unstable Housing in the Last Year: No     Review of patient's allergies indicates:  No Known Allergies    Objective:   BP (!) 143/69   Pulse (!) 59   Ht 5' 4" (1.626 m)   Wt 74.8 kg (164 lb 14.5 oz)   BMI 28.31 kg/m²   Immunization History   Administered Date(s) Administered    COVID-19, MRNA, LN-S, PF (MODERNA FULL 0.5 ML DOSE) 02/03/2021, 03/04/2021    COVID-19, MRNA, " LN-S, PF (Pfizer) (Purple Cap) 11/23/2021    COVID-19, mRNA, LNP-S, bivalent booster, PF (PFIZER OMICRON) 09/11/2023    Influenza (FLUAD) - Quadrivalent - Adjuvanted - PF *Preferred* (65+) 10/05/2022, 09/11/2023    Influenza - Quadrivalent - PF *Preferred* (6 months and older) 10/09/2017, 09/03/2019, 09/15/2020    Influenza Split 10/12/2009, 10/14/2021    Pneumococcal Polysaccharide - 23 Valent 12/11/2019       Physical Exam   Constitutional: He is oriented to person, place, and time. No distress.   HENT:   Head: Normocephalic and atraumatic.   Pulmonary/Chest: Effort normal.   Musculoskeletal:         General: Normal range of motion.   Neurological: He is alert and oriented to person, place, and time.   Psychiatric: His behavior is normal. Mood normal.   Nursing note and vitals reviewed.    No synovitis, no dactylitis, no enthesitis  No effusions of large or small joints  100% fist formation  Decreased ROM neck    Recent Results (from the past 672 hour(s))   CBC Auto Differential    Collection Time: 11/14/23  2:06 PM   Result Value Ref Range    WBC 6.92 3.90 - 12.70 K/uL    RBC 4.32 (L) 4.60 - 6.20 M/uL    Hemoglobin 12.8 (L) 14.0 - 18.0 g/dL    Hematocrit 39.0 (L) 40.0 - 54.0 %    MCV 90 82 - 98 fL    MCH 29.6 27.0 - 31.0 pg    MCHC 32.8 32.0 - 36.0 g/dL    RDW 14.1 11.5 - 14.5 %    Platelets 316 150 - 450 K/uL    MPV 9.2 9.2 - 12.9 fL    Immature Granulocytes 0.9 (H) 0.0 - 0.5 %    Gran # (ANC) 3.2 1.8 - 7.7 K/uL    Immature Grans (Abs) 0.06 (H) 0.00 - 0.04 K/uL    Lymph # 2.7 1.0 - 4.8 K/uL    Mono # 0.7 0.3 - 1.0 K/uL    Eos # 0.1 0.0 - 0.5 K/uL    Baso # 0.06 0.00 - 0.20 K/uL    nRBC 0 0 /100 WBC    Gran % 46.8 38.0 - 73.0 %    Lymph % 39.5 18.0 - 48.0 %    Mono % 10.5 4.0 - 15.0 %    Eosinophil % 1.4 0.0 - 8.0 %    Basophil % 0.9 0.0 - 1.9 %    Differential Method Automated    Comprehensive Metabolic Panel    Collection Time: 11/14/23  2:06 PM   Result Value Ref Range    Sodium 142 136 - 145 mmol/L     Potassium 4.4 3.5 - 5.1 mmol/L    Chloride 105 95 - 110 mmol/L    CO2 27 23 - 29 mmol/L    Glucose 106 70 - 110 mg/dL    BUN 19 8 - 23 mg/dL    Creatinine 1.6 (H) 0.5 - 1.4 mg/dL    Calcium 9.8 8.7 - 10.5 mg/dL    Total Protein 7.9 6.0 - 8.4 g/dL    Albumin 4.1 3.5 - 5.2 g/dL    Total Bilirubin 0.3 0.1 - 1.0 mg/dL    Alkaline Phosphatase 56 55 - 135 U/L    AST 21 10 - 40 U/L    ALT 22 10 - 44 U/L    eGFR 47 (A) >60 mL/min/1.73 m^2    Anion Gap 10 8 - 16 mmol/L   Sedimentation rate    Collection Time: 11/14/23  2:06 PM   Result Value Ref Range    Sed Rate 17 0 - 23 mm/Hr   C-Reactive Protein    Collection Time: 11/14/23  2:06 PM   Result Value Ref Range    CRP 1.4 0.0 - 8.2 mg/L         Lab Results   Component Value Date    TBGOLDPLUS Positive (A) 01/05/2022      Lab Results   Component Value Date    HEPBCAB Non-reactive 12/09/2022    HEPCAB Non-reactive 12/09/2022        Imaging  I have personally reviewed images and reports as below.  I agree with the interpretation.  MRI Cervical Spine Without Contrast  Order: [id number]  Status: Final result       Visible to patient: Yes (seen)       Next appt: 11/30/2023 at 11:00 AM in Vascular Surgery (VASCULAR, LAB)       Dx: Radiculopathy, cervical region    0 Result Notes  Details    Reading Physician Reading Date Result Priority   Terry Ha MD  [phone] 6/20/2023 Routine     Narrative & Impression  EXAMINATION:  MRI CERVICAL SPINE WITHOUT CONTRAST     CLINICAL HISTORY:  Cervical radiculopathy; Radiculopathy, cervical region     TECHNIQUE:  Multiplanar, multisequence MR images of the cervical spine were performed without the administration of contrast.     COMPARISON:  Cervical radiograph 04/05/2023     FINDINGS:  Alignment: Pain mild straightening of the normal cervical lordosis.     Vertebrae: Cervical vertebral body heights are maintained.  Minor multilevel endplate degenerative changes.  No significant endplate edema.  No evidence of an acute fracture.  Edema  involving the right C3-C4 facet joint.  There appears to be osseous fusion of the right C4-C5 facet joint.  Marrow signal is otherwise within normal limits.     Discs: Disc desiccation throughout the cervical spine.  Disc heights appear maintained.     Cord: C5-C6 prominent cord deformity with small associated area cord signal change concerning for myelomalacia.     Skull base and craniocervical junction: Within normal limits.     Degenerative findings:     C2-C3: Minimal broad-based disc osteophyte complex.  No ventral cord contact or spinal canal stenosis.  Right greater than left facet arthropathy contributing to mild-to-moderate right and mild left-sided neural foraminal stenosis.     C3-C4: Central disc osteophyte complex contacts and deforms the ventral cord.  Significant right-sided facet arthropathy with ligamentum flavum hypertrophy contributes to mild spinal canal stenosis.  There is severe right and mild-to-moderate left-sided neural foraminal stenosis.     C4-C5: Small central disc osteophyte complex contacts and slightly deforms the ventral cord without significant spinal canal stenosis.  Significant right-sided facet arthropathy with fusion of the facet joint.  Uncovertebral joint spurring contributes to severe right and mild-to-moderate left-sided neural foraminal stenosis.     C5-C6: Broad-based disc osteophyte complex contacts and flattens the ventral cord.  Right greater than left facet arthropathy and ligamentum flavum hypertrophy contributes to moderate spinal canal stenosis.  Uncovertebral joint spurring contributes to moderate to severe right and severe left-sided neural foraminal stenosis.     C6-C7: Mild broad-based disc osteophyte complex contacts and flattens the ventral cord.  Mild bilateral facet arthropathy, ligamentum flavum hypertrophy and uncovertebral joint spurring contribute to mild spinal canal stenosis and severe bilateral neural foraminal stenosis.     C7-T1: No significant disc  osteophyte complex, spinal canal stenosis or neural foraminal stenosis.     T1-T2: No significant disc osteophyte complex, spinal canal stenosis or neural foraminal stenosis.     Paraspinal muscles & soft tissues: Within normal limits.     Impression:     Multilevel degenerative changes of the cervical spine are most pronounced at C5-C6 with moderate spinal canal stenosis, severe left and moderate to severe right-sided neural foraminal stenosis.  Cord signal changes at this level concerning for myelomalacia.     Mild C3-C4 and C6-C7 spinal canal stenosis.  Severe bilateral C6-C7 and severe right-sided C3-C4 neural foraminal stenosis.     Right C3-C4 facet edema likely contributes to neck pain.     Right C4-C5 facet joint osseous fusion.        Electronically signed by: Terry Ha  Date:                                            06/20/2023  Time:                                           15:45       Assessment:     1. Ankylosing spondylitis of multiple sites in spine    2. Encounter for medication monitoring    3. Immunocompromised state due to drug therapy    4. Medication monitoring encounter    5. High risk medication use    6. Stage 3 chronic kidney disease, unspecified whether stage 3a or 3b CKD    7. Cervical spondylosis    8. Neural foraminal stenosis of cervical spine    9. DISH (diffuse idiopathic skeletal hyperostosis)        Plan:     Deborah was seen today for disease management.    Diagnoses and all orders for this visit:    Ankylosing spondylitis of multiple sites in spine  -     CBC Auto Differential; Standing  -     Sedimentation rate; Standing  -     C-Reactive Protein; Standing    Encounter for medication monitoring    Immunocompromised state due to drug therapy    Medication monitoring encounter  -     CBC Auto Differential; Standing  -     Sedimentation rate; Standing  -     C-Reactive Protein; Standing    High risk medication use  -     CBC Auto Differential; Standing  -     Sedimentation  rate; Standing  -     C-Reactive Protein; Standing    Stage 3 chronic kidney disease, unspecified whether stage 3a or 3b CKD    Cervical spondylosis    Neural foraminal stenosis of cervical spine    DISH (diffuse idiopathic skeletal hyperostosis)    Ankylosing spondylitis cervical spine w h/o DISH  Labs reviewed today as above  TB positive  Completed therapy for Latent TB prior to initiation of immunosuppressants for AS  ESR/CRP wnl  CBC stable  CMP w decreased renal function  CrCl 48 mL/min  Avoid higher doses of xeljanz  Discussed at great length alternative treatments to help improve symptoms including trial of Cosentyx versus trial of Rinvoq.  Patient initially deferred wanting to see what renal function looked like.  He is hoping to be able to go Xeljanz 5 mg b.i.d..  With review of the labs after the patient left, creatinine clearance was 48 mL/min.  Would not recommend increasing Xeljanz dosing  I spoke with Dr. GATES who recommended trial of Rinvoq over Cosentyx.  Discussed risks and benefits of both  Literature provided to the patient  11/15/2023 I contacted the patient through the portal and advised him of the above.  Will submit Rinvoq if pt in agreement  Update hepatitis labs next visit  Drug therapy requiring intensive monitoring for toxicity  High Risk Medication Monitoring encounter  No current medication related issues, no evidence of toxicity  I ordered labs for toxicity monitoring, have personally reviewed the findings, and discussed them with the patient.  Pending labs will be sent via the portal  Compromised immune system secondary to autoimmune disease and/or use of immunosuppressive drugs.  Monitor carefully for infections.  Advised patient to get immediate medical care if any infection arises.  Also advised strict adherence age-appropriate vaccinations and cancer screenings with PCP.  Patient advised to hold DMARD and/or biologic therapy for signs of infection or for surgery. If you are unsure  what to do please call our office for instruction.[facility] [phone]  Return to clinic: 4 mos w reg 4 same day    40 minutes of total time spent on the encounter, which includes face to face time and non-face to face time preparing to see the patient (eg, review of tests), Obtaining and/or reviewing separately obtained history, Documenting clinical information in the electronic or other health record, Independently interpreting results (not separately reported) and communicating results to the patient/family/caregiver, or Care coordination (not separately reported).     [place]  available throughout the duration of the visit via the [facility] system    Follow up in about 4 months (around 3/14/2024).    The patient understands, chooses and consents to this plan and accepts all the risks which include but are not limited to the risks mentioned above.     Disclaimer: This note was prepared using a voice recognition system and is likely to have sound alike errors within the text.

## 2023-11-15 ENCOUNTER — TELEPHONE (OUTPATIENT)
Dept: RHEUMATOLOGY | Facility: CLINIC | Age: 68
End: 2023-11-15
Payer: MEDICARE

## 2023-11-15 PROBLEM — M48.02 NEURAL FORAMINAL STENOSIS OF CERVICAL SPINE: Status: ACTIVE | Noted: 2023-11-15

## 2023-11-15 NOTE — TELEPHONE ENCOUNTER
----- Message from Kory Lopez MD sent at 11/14/2023  5:19 PM CST -----  Try rinvoq  ----- Message -----  From: Rebecca Marie PA-C  Sent: 11/14/2023   3:50 PM CST  To: Kory Lopez MD    He is not, thus we had discussions regarding alternatives.  He was hoping kidney function would be such that he could go to 5 mg b.i.d..  He has difficulty differentiating cervical radiculopathy, foraminal stenosis and ankylosing spondylitis pain from each other.  In past, injections were helpful, but he has not even been seen back in pain since May 2023.  They are going to schedule an appointment to follow-up with Dr. Burgos.    ----- Message -----  From: Kory Lopez MD  Sent: 11/14/2023   3:38 PM CST  To: Rebecca Marie PA-C    No need to change the medications if he is doing well on 5 mg.   ----- Message -----  From: Rebecca Marie PA-C  Sent: 11/14/2023   3:23 PM CST  To: Kory Lopez MD    Pt on Xeljanz 5 mg daily for AS c spine.  Prior to following with Nati, you were seeing him.  Today was my 1st visit with him.  Best I can ascertain is that Xeljanz was kept 5 mg daily due to chronic kidney disease.  Creatinine today is 1.6 and GFR 47.  Literature basically says to avoid 10 mg daily if moderate kidney disease but does not give any defining characteristics.  I would assume creatinine at 1.6 would be considered moderate?  We briefly discussed possibility of Cosentyx.  He is leery of medication change.

## 2023-11-22 ENCOUNTER — OFFICE VISIT (OUTPATIENT)
Dept: FAMILY MEDICINE | Facility: CLINIC | Age: 68
End: 2023-11-22
Attending: FAMILY MEDICINE
Payer: MEDICARE

## 2023-11-22 VITALS
TEMPERATURE: 97 F | BODY MASS INDEX: 28.15 KG/M2 | WEIGHT: 164.88 LBS | OXYGEN SATURATION: 97 % | RESPIRATION RATE: 18 BRPM | HEIGHT: 64 IN | HEART RATE: 61 BPM | SYSTOLIC BLOOD PRESSURE: 130 MMHG | DIASTOLIC BLOOD PRESSURE: 70 MMHG

## 2023-11-22 DIAGNOSIS — I10 HYPERTENSION, ESSENTIAL: ICD-10-CM

## 2023-11-22 DIAGNOSIS — J40 SINOBRONCHITIS: Primary | ICD-10-CM

## 2023-11-22 DIAGNOSIS — E66.3 OVERWEIGHT (BMI 25.0-29.9): ICD-10-CM

## 2023-11-22 DIAGNOSIS — J40 SINOBRONCHITIS: ICD-10-CM

## 2023-11-22 DIAGNOSIS — R05.8 OTHER COUGH: ICD-10-CM

## 2023-11-22 DIAGNOSIS — J32.9 SINOBRONCHITIS: Primary | ICD-10-CM

## 2023-11-22 DIAGNOSIS — Z72.0 TOBACCO ABUSE: ICD-10-CM

## 2023-11-22 DIAGNOSIS — J32.9 SINOBRONCHITIS: ICD-10-CM

## 2023-11-22 DIAGNOSIS — I25.118 CORONARY ARTERY DISEASE OF NATIVE ARTERY OF NATIVE HEART WITH STABLE ANGINA PECTORIS: ICD-10-CM

## 2023-11-22 LAB
CTP QC/QA: YES
SARS-COV-2 RDRP RESP QL NAA+PROBE: NEGATIVE

## 2023-11-22 PROCEDURE — 99214 OFFICE O/P EST MOD 30 MIN: CPT | Mod: PBBFAC,PO | Performed by: FAMILY MEDICINE

## 2023-11-22 PROCEDURE — 99214 OFFICE O/P EST MOD 30 MIN: CPT | Mod: 25,S$PBB,, | Performed by: FAMILY MEDICINE

## 2023-11-22 PROCEDURE — 99999PBSHW: ICD-10-PCS | Mod: PBBFAC,,,

## 2023-11-22 PROCEDURE — 99214 PR OFFICE/OUTPT VISIT, EST, LEVL IV, 30-39 MIN: ICD-10-PCS | Mod: 25,S$PBB,, | Performed by: FAMILY MEDICINE

## 2023-11-22 PROCEDURE — 87635 SARS-COV-2 COVID-19 AMP PRB: CPT | Mod: PBBFAC,PO | Performed by: FAMILY MEDICINE

## 2023-11-22 PROCEDURE — 99999 PR PBB SHADOW E&M-EST. PATIENT-LVL IV: ICD-10-PCS | Mod: PBBFAC,,, | Performed by: FAMILY MEDICINE

## 2023-11-22 PROCEDURE — 99999 PR PBB SHADOW E&M-EST. PATIENT-LVL IV: CPT | Mod: PBBFAC,,, | Performed by: FAMILY MEDICINE

## 2023-11-22 PROCEDURE — 99999PBSHW: Mod: PBBFAC,,,

## 2023-11-22 RX ORDER — FLUTICASONE PROPIONATE 50 MCG
SPRAY, SUSPENSION (ML) NASAL
Qty: 48 G | OUTPATIENT
Start: 2023-11-22

## 2023-11-22 RX ORDER — FLUTICASONE PROPIONATE 50 MCG
2 SPRAY, SUSPENSION (ML) NASAL DAILY
Qty: 16 G | Refills: 0 | Status: SHIPPED | OUTPATIENT
Start: 2023-11-22 | End: 2024-02-15

## 2023-11-22 RX ORDER — HYDROCHLOROTHIAZIDE 25 MG/1
25 TABLET ORAL DAILY
Qty: 90 TABLET | Refills: 2 | Status: CANCELLED | OUTPATIENT
Start: 2023-11-22 | End: 2024-11-21

## 2023-11-22 RX ORDER — PROMETHAZINE HYDROCHLORIDE AND DEXTROMETHORPHAN HYDROBROMIDE 6.25; 15 MG/5ML; MG/5ML
5 SYRUP ORAL
Qty: 240 ML | Refills: 0 | Status: SHIPPED | OUTPATIENT
Start: 2023-11-22 | End: 2024-02-15

## 2023-11-22 RX ORDER — DOXYCYCLINE HYCLATE 100 MG
100 TABLET ORAL EVERY 12 HOURS
Qty: 20 TABLET | Refills: 0 | Status: SHIPPED | OUTPATIENT
Start: 2023-11-22 | End: 2023-12-02

## 2023-11-22 NOTE — PROGRESS NOTES
Deborah White    Chief Complaint   Patient presents with    Cough    Nasal Congestion       History of Present Illness:   Mr. White, a smoker, comes in today with complaint of having cough and congestion.  He is accompanied by his daughter Lea who interprets for him as he speaks very little English.  She states he has been experiencing nasal congestion, cough, productive of yellow mucus (more so at night) for 3 days.  She states he drinks warm whiskey at night for cough with little help.  She states others in his family have had similar symptoms (without COVID infection noted).  She states his wife was recently tested for COVID (negative) and treated for bronchitis at urgent care.  She states he lives in [place], [place], and wanted to be checked before leaving [place] today.  She states he has received COVID vaccine series with boosters (including updated booster) and received flu shot this fall.    Otherwise, he denies having fever, chills, fatigue, appetite changes; shortness of breath, wheezing; chest pain, palpitations, leg swelling; other sinus  or ocular symptoms; abdominal pain, nausea, vomiting, diarrhea, constipation; unusual urinary symptoms; polydipsia, polyphagia, polyuria, hot or cold intolerance; back pain; headache; anxiety, depression, homicidal or suicidal thoughts.     She states he has been followed by PCP Dr. Kate Lofton and desires to establish care with me.      POCT COVID-19 Rapid Screening ordered today - (-).              Current Outpatient Medications   Medication Sig    amLODIPine (NORVASC) 10 MG tablet Take 1 tablet (10 mg total) by mouth once daily.    aspirin (ECOTRIN) 81 MG EC tablet Take 81 mg by mouth once daily.    benazepriL (LOTENSIN) 40 MG tablet Take 1 tablet (40 mg total) by mouth once daily.    cilostazoL (PLETAL) 50 MG Tab Take 1 tablet (50 mg total) by mouth 2 (two) times daily.    cloNIDine (CATAPRES) 0.2 MG tablet Take 1 tablet (0.2 mg total) by mouth 2  (two) times daily.    ezetimibe (ZETIA) 10 mg tablet Take 1 tablet (10 mg total) by mouth once daily.    gabapentin (NEURONTIN) 300 MG capsule Take 2 capsules (600 mg total) by mouth 2 (two) times daily.    hydroCHLOROthiazide (HYDRODIURIL) 25 MG tablet Take 1 tablet (25 mg total) by mouth once daily.    linaCLOtide (LINZESS) 72 mcg Cap capsule Take 1 capsule (72 mcg total) by mouth before breakfast.    omeprazole (PRILOSEC) 20 MG capsule Take 1 capsule (20 mg total) by mouth once daily.    rosuvastatin (CRESTOR) 20 MG tablet Take 1 tablet (20 mg total) by mouth once daily.    tofacitinib (XELJANZ) 5 mg Tab Take 1 tablet (5 mg) by mouth once daily.       Review of Systems   Constitutional:  Negative for activity change, appetite change, chills, fatigue and fever.   HENT:  Positive for congestion. Negative for postnasal drip, rhinorrhea, sinus pressure, sinus pain, sneezing and sore throat.    Eyes:  Negative for pain, discharge, redness and itching.   Respiratory:  Positive for cough. Negative for shortness of breath and wheezing.    Cardiovascular:  Negative for chest pain, palpitations and leg swelling.   Gastrointestinal:  Negative for abdominal pain, constipation, diarrhea, nausea and vomiting.   Endocrine: Negative for cold intolerance, heat intolerance, polydipsia, polyphagia and polyuria.   Genitourinary:  Negative for difficulty urinating.   Musculoskeletal:  Negative for back pain.   Neurological:  Negative for headaches.   Psychiatric/Behavioral:  Negative for dysphoric mood and suicidal ideas. The patient is not nervous/anxious.         Negative for homicidal ideas.       Objective:  Physical Exam  Vitals reviewed.   Constitutional:       General: He is not in acute distress.     Appearance: Normal appearance. He is not ill-appearing, toxic-appearing or diaphoretic.      Comments: Pleasant.   HENT:      Head: Normocephalic and atraumatic.      Comments: Non tender sinuses.     Right Ear: Tympanic  membrane, ear canal and external ear normal. There is no impacted cerumen.      Left Ear: Tympanic membrane, ear canal and external ear normal. There is no impacted cerumen.      Nose: Nose normal. No congestion or rhinorrhea.      Mouth/Throat:      Mouth: Mucous membranes are moist.      Pharynx: Oropharynx is clear. No oropharyngeal exudate or posterior oropharyngeal erythema.   Eyes:      General:         Right eye: No discharge.         Left eye: No discharge.      Extraocular Movements: Extraocular movements intact.      Conjunctiva/sclera: Conjunctivae normal.      Pupils: Pupils are equal, round, and reactive to light.      Comments: He wears glasses.   Cardiovascular:      Rate and Rhythm: Normal rate and regular rhythm.      Pulses: Normal pulses.      Heart sounds: No murmur heard.  Pulmonary:      Effort: Pulmonary effort is normal. No respiratory distress.      Breath sounds: Normal breath sounds. No wheezing.   Abdominal:      General: Bowel sounds are normal. There is no distension.      Palpations: Abdomen is soft. There is no mass.      Tenderness: There is no abdominal tenderness. There is no guarding or rebound.   Musculoskeletal:         General: No swelling or tenderness. Normal range of motion.      Cervical back: Normal range of motion and neck supple. No tenderness.      Comments: He is ambulatory without problems.   Lymphadenopathy:      Cervical: No cervical adenopathy.   Skin:     General: Skin is warm.   Neurological:      General: No focal deficit present.      Mental Status: He is alert and oriented to person, place, and time.   Psychiatric:         Mood and Affect: Mood normal.         Behavior: Behavior normal.         Thought Content: Thought content normal.         Judgment: Judgment normal.         ASSESSMENT:  1. Sinobronchitis    2. Other cough    3. Tobacco abuse    4. Hypertension, essential    5. Coronary artery disease of native artery of native heart with stable angina  pectoris    6. Overweight (BMI 25.0-29.9)        PLAN:  Deborah was seen today for cough and nasal congestion.    Diagnoses and all orders for this visit:    Sinobronchitis  -     doxycycline (VIBRA-TABS) 100 MG tablet; Take 1 tablet (100 mg total) by mouth every 12 (twelve) hours. for 10 days  -     fluticasone propionate (FLONASE) 50 mcg/actuation nasal spray; 2 sprays (100 mcg total) by Each Nostril route once daily.    Other cough  -     POCT COVID-19 Rapid Screening  -     promethazine-dextromethorphan (PROMETHAZINE-DM) 6.25-15 mg/5 mL Syrp; Take 5 mLs by mouth every 4 to 6 hours as needed (cough).    Tobacco abuse    Hypertension, essential    Coronary artery disease of native artery of native heart with stable angina pectoris    Overweight (BMI 25.0-29.9)        Continue current medications, follow low sodium, low cholesterol, low carb diet, daily walks.  Doxycycline 100 mg twice daily x 10 days; medication precautions discussed with patient.  Promethazine-DM for cough as directed; medication precautions discussed with patient.  Flonase nasal spray for nasal symptoms as directed; medication precautions discussed with patient.  Smoking cessation advised.  Follow up if symptoms worsen or fail to improve.

## 2023-11-22 NOTE — TELEPHONE ENCOUNTER
Refill Decision Note   Deborah White  is requesting a refill authorization.  Brief Assessment and Rationale for Refill:  Quick Discontinue     Medication Therapy Plan:  E-Prescribing Status: Receipt confirmed by pharmacy (11/22/2023  9:18 AM CST)      Comments:     Note composed:11:14 AM 11/22/2023             Appointments     Last Visit   11/22/2023 Chastity Guzmán MD   Next Visit   Visit date not found Chastity Guzmán MD           Appointments     Last Visit   11/22/2023 Chastity Guzmán MD   Next Visit   Visit date not found Chastity Guzmán MD

## 2023-11-22 NOTE — TELEPHONE ENCOUNTER
No care due was identified.  [facility] Embedded Care Due Messages. Reference number: [id number].   11/22/2023 9:40:46 AM CST

## 2023-12-01 DIAGNOSIS — R05.8 OTHER COUGH: ICD-10-CM

## 2023-12-01 RX ORDER — PROMETHAZINE HYDROCHLORIDE AND DEXTROMETHORPHAN HYDROBROMIDE 6.25; 15 MG/5ML; MG/5ML
5 SYRUP ORAL
Qty: 240 ML | Refills: 0 | Status: CANCELLED | OUTPATIENT
Start: 2023-12-01

## 2023-12-07 ENCOUNTER — PATIENT MESSAGE (OUTPATIENT)
Dept: ADMINISTRATIVE | Facility: OTHER | Age: 68
End: 2023-12-07
Payer: MEDICARE

## 2023-12-13 PROBLEM — E66.3 OVERWEIGHT (BMI 25.0-29.9): Status: ACTIVE | Noted: 2023-12-13

## 2023-12-14 ENCOUNTER — OFFICE VISIT (OUTPATIENT)
Dept: VASCULAR SURGERY | Facility: CLINIC | Age: 68
End: 2023-12-14
Payer: MEDICARE

## 2023-12-14 ENCOUNTER — HOSPITAL ENCOUNTER (OUTPATIENT)
Dept: VASCULAR SURGERY | Facility: CLINIC | Age: 68
Discharge: HOME | End: 2023-12-14
Attending: SURGERY
Payer: MEDICARE

## 2023-12-14 VITALS
BODY MASS INDEX: 28.24 KG/M2 | HEART RATE: 58 BPM | DIASTOLIC BLOOD PRESSURE: 70 MMHG | SYSTOLIC BLOOD PRESSURE: 177 MMHG | WEIGHT: 165.38 LBS | HEIGHT: 64 IN | TEMPERATURE: 98 F

## 2023-12-14 DIAGNOSIS — Z01.818 PRE-OP EVALUATION: Primary | ICD-10-CM

## 2023-12-14 DIAGNOSIS — I71.43 INFRARENAL ABDOMINAL AORTIC ANEURYSM (AAA) WITHOUT RUPTURE: Primary | ICD-10-CM

## 2023-12-14 DIAGNOSIS — Z01.818 ENCOUNTER FOR OTHER PREPROCEDURAL EXAMINATION: Primary | ICD-10-CM

## 2023-12-14 DIAGNOSIS — I71.43 INFRARENAL ABDOMINAL AORTIC ANEURYSM (AAA) WITHOUT RUPTURE: ICD-10-CM

## 2023-12-14 PROCEDURE — 93978 VASCULAR STUDY: CPT | Mod: 26,S$PBB,, | Performed by: SURGERY

## 2023-12-14 PROCEDURE — 93978 PR DUPLEX LARGE VESSEL(S),COMPLETE: ICD-10-PCS | Mod: 26,S$PBB,, | Performed by: SURGERY

## 2023-12-14 PROCEDURE — 99212 PR OFFICE/OUTPT VISIT, EST, LEVL II, 10-19 MIN: ICD-10-PCS | Mod: S$PBB,,, | Performed by: SURGERY

## 2023-12-14 PROCEDURE — 93978 VASCULAR STUDY: CPT | Mod: PBBFAC | Performed by: SURGERY

## 2023-12-14 PROCEDURE — 99999 PR PBB SHADOW E&M-EST. PATIENT-LVL III: ICD-10-PCS | Mod: PBBFAC,,, | Performed by: SURGERY

## 2023-12-14 PROCEDURE — 99999 PR PBB SHADOW E&M-EST. PATIENT-LVL III: CPT | Mod: PBBFAC,,, | Performed by: SURGERY

## 2023-12-14 PROCEDURE — 99213 OFFICE O/P EST LOW 20 MIN: CPT | Mod: PBBFAC,25 | Performed by: SURGERY

## 2023-12-14 PROCEDURE — 99212 OFFICE O/P EST SF 10 MIN: CPT | Mod: S$PBB,,, | Performed by: SURGERY

## 2023-12-14 NOTE — PROGRESS NOTES
[facility]  History and Physical    Subjective:     Deborah White is a 68 y.o. male with h/o CAD, HTN, HLD who presents to clinic for AAA. This was discovered incidentally on a CT scan and has been surveyed for several years for US. On his most recent imaging, the aneurysm had grown and now measures 5.3 cm at its widest. He has never had symptoms associated with his AAA.     He currently smoked 9-10 cigarettes/day  He is on ASA and pletal  He denies a family history of aneurysm   Echo: EF 65%      Objective:     PHYSICAL EXAM:  Vital Signs (Most Recent)       ROS A 10+ review of systems was performed with pertinent positives and negatives noted above in the history of present illness.  Other systems were negative unless otherwise specified.    Physical Exam:  Physical Exam  Constitutional:       General: He is not in acute distress.  HENT:      Head: Normocephalic and atraumatic.   Eyes:      Pupils: Pupils are equal, round, and reactive to light.   Cardiovascular:      Rate and Rhythm: Normal rate and regular rhythm.      Heart sounds: No murmur heard.     Comments: Palpable radial, DP and PT pulses.  Pulmonary:      Effort: Pulmonary effort is normal. No respiratory distress.   Abdominal:      General: There is no distension.      Palpations: Abdomen is soft. There is no mass.      Tenderness: There is no abdominal tenderness.   Skin:     General: Skin is warm and dry.   Neurological:      General: No focal deficit present.      Mental Status: He is alert and oriented to person, place, and time.       Labs:  I have reviewed all pertinent lab results within the past 24 hours.  Cr: 1.4, stable from previous  A1c: 6.2, stable from previous          Assessment:     68 y.o. male with 5.3 cm asymptomatic AAA    Plan:     - Patient wishes to defer repair until after lunar new year due to cultural reasons. Will have him follow up in January  - Counseled patient on smoking cessation   - CTA C/A/P in [place]  - Will  pick a date for surgery then.      [place] PGY1  Vascular Surgery

## 2023-12-14 NOTE — PROGRESS NOTES
See the full note by the resident.  Patient is well known to us.  I seen him previously.  He has not a very tall robust gentleman he has a 5.2 cm 5.3 cm aneurysms.  He does need to get it repaired.  However he would like to wait till after Joaquina new year.  We will get a CTA in [place].  And he wants to have this done after Joaquina new year which is the beginning of February.  So we will see him back in January we will have the CT scan done already.  We will pick a date for surgery with him.  All of his questions were answered.  He understands thank you very much

## 2023-12-27 ENCOUNTER — HOSPITAL ENCOUNTER (OUTPATIENT)
Dept: RADIOLOGY | Facility: HOSPITAL | Age: 68
Discharge: HOME | End: 2023-12-27
Attending: SURGERY
Payer: MEDICARE

## 2023-12-27 DIAGNOSIS — Z01.818 ENCOUNTER FOR OTHER PREPROCEDURAL EXAMINATION: ICD-10-CM

## 2023-12-27 PROCEDURE — 25500020 PHARM REV CODE 255: Performed by: SURGERY

## 2023-12-27 PROCEDURE — 74174 CTA ABD&PLVS W/CONTRAST: CPT | Mod: 26,,, | Performed by: RADIOLOGY

## 2023-12-27 PROCEDURE — 74174 CTA ABD&PLVS W/CONTRAST: CPT | Mod: TC

## 2023-12-27 PROCEDURE — 74174 CTA ABDOMEN AND PELVIS: ICD-10-PCS | Mod: 26,,, | Performed by: RADIOLOGY

## 2023-12-27 RX ADMIN — IOHEXOL 100 ML: 350 INJECTION, SOLUTION INTRAVENOUS at 08:12

## 2023-12-28 ENCOUNTER — PATIENT OUTREACH (OUTPATIENT)
Dept: ADMINISTRATIVE | Facility: HOSPITAL | Age: 68
End: 2023-12-28
Payer: MEDICARE

## 2023-12-28 ENCOUNTER — PATIENT MESSAGE (OUTPATIENT)
Dept: ADMINISTRATIVE | Facility: HOSPITAL | Age: 68
End: 2023-12-28
Payer: MEDICARE

## 2023-12-28 NOTE — PROGRESS NOTES
Working HTN report:     Called to discuss scheduling appointment and to get updated BP reading. Pt saw PCP in November with a normal BP. Saw [facility] on 12/14/2023 with elevated reading. Called patient to get a remote reading. No answer, LVM and sent [employer] message.

## 2024-01-03 ENCOUNTER — OFFICE VISIT (OUTPATIENT)
Dept: VASCULAR SURGERY | Facility: CLINIC | Age: 69
End: 2024-01-03
Attending: SURGERY
Payer: MEDICARE

## 2024-01-03 VITALS
SYSTOLIC BLOOD PRESSURE: 124 MMHG | HEIGHT: 64 IN | OXYGEN SATURATION: 96 % | BODY MASS INDEX: 27.4 KG/M2 | DIASTOLIC BLOOD PRESSURE: 66 MMHG | HEART RATE: 64 BPM | WEIGHT: 160.5 LBS

## 2024-01-03 DIAGNOSIS — I71.43 INFRARENAL ABDOMINAL AORTIC ANEURYSM (AAA) WITHOUT RUPTURE: Primary | ICD-10-CM

## 2024-01-03 PROCEDURE — 99214 OFFICE O/P EST MOD 30 MIN: CPT | Mod: S$PBB,,, | Performed by: SURGERY

## 2024-01-03 PROCEDURE — 99213 OFFICE O/P EST LOW 20 MIN: CPT | Mod: PBBFAC,PN | Performed by: SURGERY

## 2024-01-03 PROCEDURE — 99999 PR PBB SHADOW E&M-EST. PATIENT-LVL III: CPT | Mod: PBBFAC,,, | Performed by: SURGERY

## 2024-01-03 NOTE — PROGRESS NOTES
Patient returns with a new CT scan.  His aneurysms do now increased to 5.9 cm.  He clearly needs an endovascular repair.  The only complications is that he has significant atherosclerotic disease.  It looks as though his left common iliac and possibly his right common iliac both have significant stenosis.  He may need a balloon angioplasty in order to expedite getting the device in the infrarenal area.  In addition to that his left common femoral artery has significant stenosis.  He has diminished pulses on the left side.  I have met with the patient his son or son-in-law and a .  I have drawn him pictures and explained that we will need to do a left common femoral artery cutdown and endarterectomy possible balloon angioplasty of both iliacs an endovascular aortic aneurism repair.  I have told him the risks benefits of the procedure included but not limited to bleeding infection and injury to the artery and failure to repair the aneurysms.  They understand these risks.  I have reviewed his medications and he has on a medication that I have not dealt with before XELJANZ.  We will have to see if this needs to be held prior to surgery.  It seems to have some immunologic inhibitory affects.  I believe he is on this for his rheumatoid arthritis.    Patient understands the procedure.  Was a smoker who has diminished only down to 10 cigarettes per day for 45 years.    No known allergies to contrast dye this should not be a problem.  We will plan the procedure after Joaquina jimenez and Timothy Duarte

## 2024-01-10 ENCOUNTER — PATIENT MESSAGE (OUTPATIENT)
Dept: RHEUMATOLOGY | Facility: CLINIC | Age: 69
End: 2024-01-10
Payer: MEDICARE

## 2024-01-10 NOTE — TELEPHONE ENCOUNTER
Pt is requesting for a refill of: gabapentin (NEURONTIN) 300 MG capsule   Last filed:9/22/23   Last encounter: 5/31/23   Up coming apt: 1/30/24   Pharmacy:[id number] - [place] - [address]   Is this something you can do?

## 2024-01-10 NOTE — TELEPHONE ENCOUNTER
----- Message from Sherri Nicholson sent at 1/10/2024  3:34 PM CST -----  Contact: [facility]  Type:  RX Refill Request    Who Called: [facility]  Refill or New Rx:Refill  RX Name and Strength:gabapentin (NEURONTIN) 300 MG capsule  How is the patient currently taking it? (ex. 1XDay):  Is this a 30 day or 90 day RX:  Preferred Pharmacy with phone number:    [id number] - [place] - [address]  [address] [place] [address]  Phone: [phone] Fax: [phone]     Local or Mail Order:Local  Ordering Provider:Aubrey Galdamez  Would the patient rather a call back or a response via [facility]? Callback  Best Call Back Number:[phone]  Additional Information:

## 2024-01-11 ENCOUNTER — OFFICE VISIT (OUTPATIENT)
Dept: CARDIOLOGY | Facility: CLINIC | Age: 69
End: 2024-01-11
Payer: MEDICARE

## 2024-01-11 VITALS
OXYGEN SATURATION: 97 % | BODY MASS INDEX: 28.07 KG/M2 | DIASTOLIC BLOOD PRESSURE: 78 MMHG | SYSTOLIC BLOOD PRESSURE: 122 MMHG | HEART RATE: 60 BPM | WEIGHT: 164.44 LBS | HEIGHT: 64 IN

## 2024-01-11 DIAGNOSIS — E78.2 MIXED HYPERLIPIDEMIA: ICD-10-CM

## 2024-01-11 DIAGNOSIS — I25.118 CORONARY ARTERY DISEASE OF NATIVE ARTERY OF NATIVE HEART WITH STABLE ANGINA PECTORIS: ICD-10-CM

## 2024-01-11 DIAGNOSIS — M45.0 ANKYLOSING SPONDYLITIS OF MULTIPLE SITES IN SPINE: ICD-10-CM

## 2024-01-11 DIAGNOSIS — Z00.00 ENCOUNTER FOR MEDICARE ANNUAL WELLNESS EXAM: ICD-10-CM

## 2024-01-11 DIAGNOSIS — I10 HYPERTENSION, ESSENTIAL: ICD-10-CM

## 2024-01-11 DIAGNOSIS — I73.9 PAD (PERIPHERAL ARTERY DISEASE): Primary | ICD-10-CM

## 2024-01-11 DIAGNOSIS — R94.31 ABNORMAL EKG: ICD-10-CM

## 2024-01-11 DIAGNOSIS — F17.200 SMOKING: ICD-10-CM

## 2024-01-11 DIAGNOSIS — Z72.0 TOBACCO ABUSE: ICD-10-CM

## 2024-01-11 PROCEDURE — 99214 OFFICE O/P EST MOD 30 MIN: CPT | Mod: S$PBB,25,, | Performed by: STUDENT IN AN ORGANIZED HEALTH CARE EDUCATION/TRAINING PROGRAM

## 2024-01-11 PROCEDURE — 99999 PR PBB SHADOW E&M-EST. PATIENT-LVL III: CPT | Mod: PBBFAC,,, | Performed by: STUDENT IN AN ORGANIZED HEALTH CARE EDUCATION/TRAINING PROGRAM

## 2024-01-11 PROCEDURE — 93010 ELECTROCARDIOGRAM REPORT: CPT | Mod: ,,, | Performed by: STUDENT IN AN ORGANIZED HEALTH CARE EDUCATION/TRAINING PROGRAM

## 2024-01-11 PROCEDURE — 93005 ELECTROCARDIOGRAM TRACING: CPT

## 2024-01-11 PROCEDURE — 99213 OFFICE O/P EST LOW 20 MIN: CPT | Mod: PBBFAC | Performed by: STUDENT IN AN ORGANIZED HEALTH CARE EDUCATION/TRAINING PROGRAM

## 2024-01-11 RX ORDER — GABAPENTIN 300 MG/1
600 CAPSULE ORAL 2 TIMES DAILY
Qty: 360 CAPSULE | Refills: 0 | Status: SHIPPED | OUTPATIENT
Start: 2024-01-11 | End: 2024-04-10

## 2024-01-11 NOTE — PROGRESS NOTES
Section of Cardiology                  Cardiac Clinic Note      HPI:   Deborah White is a 68 y.o. male with h/o CAD, PAT, HTN, HLD        Retired from restaurant bussiness  [facility] CAD h/o LHC in 1998, nonobstructive, PAD + claudication, HTN HLD. Smoking 1/2 PPD > 40 yrs, no drinking  Prior cardiologist DR FISHMAN AT [facility]  Walks at home  He denied chest pain, dyspnea on exertion, palpitation, fainting, PND, orthopnea, syncope  Exertional calf pain.    exercise KAL showed normal resting KAL. Mod to severe decreased exercise KAL to 0.5 left and 0.6 right. EKG NSR and LVH  Started Simvastatin in 1998 and developed body ache this year. Pt states that the ache resovled after d/c simvastatin.  IN . Now resumed Simvastatin AND NO RECURRENT BODY ACHE    08-202O ECHO DONE AT [facility] NORMAL EF, MILD MR AND MOD. LVH  , CR 1.1   LE arterial US no significant blockage  03/15/2021 Decrease Pletal to once a day due to headache  States that improved Claudication after added Pletal, walks longer distance    Interval history  Limited activity due to knee OA pain right arm pain  No chest pain dyspnea dizziness faint.  Smoking   EKG NSR. ABD CT pending for AAA        5/31/23  Comes in with daughter  Switching providers  Has been having claudication symptoms in his left leg, minimal on the right   Prior arterial ultrasound of the legs without stenosis in 2021  Still smoking   Not very active at home  Abn ekg today compared to prior     Denies chest pain, shortness of breath, dizziness, syncope, PND      1/11/24  Comes in with daughter  Will be having TEVAR and peripheral angiogram/angioplasty  Still not active  Having claudication  Still smoking  BP stable       EKG 1/11/24 SB, 1st deg AVB, nonspecific intraventricular block  EKG 5/31/23 SB, 1st deg AVB, nonspecific intraventricular block        EKG NSR, no acute ST - T wave changes    ECHO  No results found for this or any previous visit.        STRESS TEST No results found for this or any previous visit.       [facility] No results found for this or any previous visit.            ROS: All 10 systems reviewed. Please refer to the HPI for pertinent positives. All other systems negative.     Past Medical History  Past Medical History:   Diagnosis Date    Coronary artery disease     Hyperlipidemia     Hypertension     Personal history of colonic polyps        Surgical History  Past Surgical History:   Procedure Laterality Date    INJECTION OF ANESTHETIC AGENT AROUND MEDIAL BRANCH NERVES INNERVATING CERVICAL FACET JOINT Right 11/18/2020    Procedure: Block-nerve-medial branch-cervical Right C3, 4, 5with RN IV sedation;  Surgeon: Martín Barnes MD;  Location: [facility] PAIN [facility]GT;  Service: Pain Management;  Laterality: Right;    INJECTION OF ANESTHETIC AGENT AROUND MEDIAL BRANCH NERVES INNERVATING CERVICAL FACET JOINT Bilateral 04/28/2023    Procedure: Bilateral C4-6 MBB with RN IV sedation;  Surgeon: Rudolph Burgos MD;  Location: [facility] PAIN [facility]GT;  Service: Pain Management;  Laterality: Bilateral;          Allergies:   Review of patient's allergies indicates:  No Known Allergies    Social History:  Social History     Socioeconomic History    Marital status:    Tobacco Use    Smoking status: Every Day     Current packs/day: 0.50     Average packs/day: 0.5 packs/day for 45.0 years (22.5 ttl pk-yrs)     Types: Cigarettes    Smokeless tobacco: Never   Substance and Sexual Activity    Alcohol use: Not Currently    Drug use: Never    Sexual activity: Not Currently     Partners: Female     Social Determinants of Health     Financial Resource Strain: Medium Risk (11/21/2023)    Overall Financial Resource Strain (CARDIA)     Difficulty of Paying Living Expenses: Somewhat hard   Food Insecurity: Food Insecurity Present (11/21/2023)    Hunger Vital Sign     Worried About Running Out of Food in the Last Year: Sometimes true     Ran Out of Food in the Last Year: Sometimes true    Transportation Needs: No Transportation Needs (11/21/2023)    PRAPARE - Transportation     Lack of Transportation (Medical): No     Lack of Transportation (Non-Medical): No   Physical Activity: Insufficiently Active (11/21/2023)    Exercise Vital Sign     Days of Exercise per Week: 1 day     Minutes of Exercise per Session: 30 min   Stress: Stress Concern Present (11/21/2023)    [employer] - Occupational Stress Questionnaire     Feeling of Stress : To some extent   Social Connections: Unknown (11/21/2023)    Social Connection and Isolation Panel [NHANES]     Frequency of Communication with Friends and Family: Once a week     Frequency of Social Gatherings with Friends and Family: Once a week     Active Member of Clubs or Organizations: No     Attends Club or Organization Meetings: Never     Marital Status:    Housing Stability: Low Risk  (11/21/2023)    Housing Stability Vital Sign     Unable to Pay for Housing in the Last Year: No     Number of Places Lived in the Last Year: 1     Unstable Housing in the Last Year: No       Family History:  family history includes Cancer in his father and mother; Early death in his brother; Heart disease in his brother; Hypertension in his brother and father.    Home Medications:  Current Outpatient Medications on File Prior to Visit   Medication Sig Dispense Refill    amLODIPine (NORVASC) 10 MG tablet Take 1 tablet (10 mg total) by mouth once daily. 30 tablet 11    aspirin (ECOTRIN) 81 MG EC tablet Take 81 mg by mouth once daily.      benazepriL (LOTENSIN) 40 MG tablet Take 1 tablet (40 mg total) by mouth once daily. 90 tablet 2    cilostazoL (PLETAL) 50 MG Tab Take 1 tablet (50 mg total) by mouth 2 (two) times daily. 60 tablet 9    cloNIDine (CATAPRES) 0.2 MG tablet Take 1 tablet (0.2 mg total) by mouth 2 (two) times daily. 180 tablet 3    ezetimibe (ZETIA) 10 mg tablet Take 1 tablet (10 mg total) by mouth once daily. 90 tablet 1     "fluticasone propionate (FLONASE) 50 mcg/actuation nasal spray 2 sprays (100 mcg total) by Each Nostril route once daily. 16 g 0    gabapentin (NEURONTIN) 300 MG capsule Take 2 capsules (600 mg total) by mouth 2 (two) times daily. 360 capsule 0    hydroCHLOROthiazide (HYDRODIURIL) 25 MG tablet Take 1 tablet (25 mg total) by mouth once daily. 90 tablet 2    linaCLOtide (LINZESS) 72 mcg Cap capsule Take 1 capsule (72 mcg total) by mouth before breakfast. 30 capsule 3    omeprazole (PRILOSEC) 20 MG capsule Take 1 capsule (20 mg total) by mouth once daily. 90 capsule 3    promethazine-dextromethorphan (PROMETHAZINE-DM) 6.25-15 mg/5 mL Syrp Take 5 mLs by mouth every 4 to 6 hours as needed (cough). 240 mL 0    rosuvastatin (CRESTOR) 20 MG tablet Take 1 tablet (20 mg total) by mouth once daily. 90 tablet 2    tofacitinib (XELJANZ) 5 mg Tab Take 1 tablet (5 mg) by mouth once daily. 30 tablet 11     No current facility-administered medications on file prior to visit.       Physical exam:  /78 (BP Location: Right arm, Patient Position: Sitting, BP Method: Small (Manual))   Pulse 60   Ht 5' 4" (1.626 m)   Wt 74.6 kg (164 lb 7.4 oz)   SpO2 97%   BMI 28.23 kg/m²         General: Pt is a 68 y.o. year old male who is AAOx3, in NAD, is pleasant, well nourished, looks stated age  HEENT: PERRL, EOMI, Oral mucosa pink & moist  CVS  No abnormal cardiac pulsations noted on inspection. JVP not raised. The apical impulse is normal on palpation, and is located in the left 5th intercostal space in the mid - clavicular line. No palpable thrills or abnormal pulsations noted. RR, S1 - S2 heard, no murmurs, rubs or gallops appreciated.   PUL : CTA B/L. No wheezes/crackles heard   ABD : BS +, soft. No tenderness elicited   LE : No C/C/E. Distal Pulses palpable B/L         LABS:    Chemistry:   Lab Results   Component Value Date     12/14/2023    K 3.8 12/14/2023     12/14/2023    CO2 26 12/14/2023    BUN 17 12/14/2023    " "CREATININE 1.4 12/14/2023    GLUCOSE 110 04/24/2023    CALCIUM 9.3 12/14/2023     Cardiac Markers:   Lab Results   Component Value Date    CKTOTAL 113 06/22/2020    CKMB 0.0 06/22/2020     Cardiac Markers (Last 3):   Lab Results   Component Value Date    CKTOTAL 113 06/22/2020    CKTOTAL 207 01/07/2020    CKMB 0.0 06/22/2020    CKMB 0.0 01/07/2020     CBC:   Lab Results   Component Value Date    WBC 6.92 11/14/2023    HGB 12.8 (L) 11/14/2023    HCT 39.0 (L) 11/14/2023    MCV 90 11/14/2023     11/14/2023     Lipids:   Lab Results   Component Value Date    CHOL 107 (L) 06/21/2023    TRIG 97 06/21/2023    TRIG 133 08/05/2020    HDL 42 06/21/2023    HDL 56 08/05/2020     Coagulation: No results found for: "PT", "INR", "APTT"        Assessment        1. PAD (peripheral artery disease)    2. Abnormal EKG    3. Coronary artery disease of native artery of native heart with stable angina pectoris    4. Smoking    5. Hypertension, essential    6. Mixed hyperlipidemia    7. Ankylosing spondylitis of multiple sites in spine    8. Tobacco abuse           Plan:    Preop risk stratification  Moderate CV risk for vascular procedure  Echo 6/23 EF 65%  No further cardiac workup needed     Abnormal EKG   First-degree AV block, nonspecific intraventricular conduction delay   Echo 6/23 EF 65%    CAD  Non obs CAD  Denies chest pain  Continue aspirin, Zetia, Crestor    HLD  LDL 53->46 as 6/23  Continue statin    Claudication   Left > right  Continue pletal  Sees Vascular, Rt- scattered plaques, lt- mild disease (no significant stenosis)     Hypertension  Stable   Continue benazepril, clonidine, hydrochlorothiazide, amlodipine    Ankylosing spondylitis   Stable   Continue therapy    Tobacco abuse   Smoking cessation encouraged    Low salt, low fat diet  Exercise as tolerated, at least 30 min daily     This note was prepared using voice recognition system and is likely to have sound alike errors that may have been overlooked even " after proofreading.     I have reviewed all pertinent chart information.  Plans and recommendations have been formulated under my direct supervision. All questions answered and patient voiced understanding.   If symptoms persist go to the ED.    RTC in 6 months        Ludin Maldonado MD  Cardiology

## 2024-01-12 ENCOUNTER — PATIENT MESSAGE (OUTPATIENT)
Dept: VASCULAR SURGERY | Facility: CLINIC | Age: 69
End: 2024-01-12
Payer: MEDICARE

## 2024-01-17 ENCOUNTER — PATIENT MESSAGE (OUTPATIENT)
Dept: CARDIOLOGY | Facility: CLINIC | Age: 69
End: 2024-01-17
Payer: MEDICARE

## 2024-01-17 ENCOUNTER — TELEPHONE (OUTPATIENT)
Dept: VASCULAR SURGERY | Facility: CLINIC | Age: 69
End: 2024-01-17
Payer: MEDICARE

## 2024-01-17 ENCOUNTER — PATIENT MESSAGE (OUTPATIENT)
Dept: ADMINISTRATIVE | Facility: OTHER | Age: 69
End: 2024-01-17
Payer: MEDICARE

## 2024-01-17 DIAGNOSIS — Z01.818 PRE-OP EVALUATION: Primary | ICD-10-CM

## 2024-01-17 DIAGNOSIS — I71.43 INFRARENAL ABDOMINAL AORTIC ANEURYSM (AAA) WITHOUT RUPTURE: Primary | ICD-10-CM

## 2024-01-17 NOTE — TELEPHONE ENCOUNTER
Contacted pt's daughter Lea to schedule EVAR with Dr. Denton. Offered Tuesday 2/20/24 as pt would like to do this after Chinese New Year. Tila accepted and states she will confirm date with pt. Pre-op studies scheduled, Tila confirmed and states pt's cardiologist recommended pt have stress test and would like to know if this will be ok. Notified Tila that pt should proceed with stress test if recommended by cardiologist. Instructed Dr. Denton's recommendation to hold Xeljanz for three days before and for two weeks after surgery, Tila verbalized understanding. Pre-op instructions reviewed. Will contact Tila with time of arrival.

## 2024-01-30 ENCOUNTER — TELEPHONE (OUTPATIENT)
Dept: CARDIOLOGY | Facility: HOSPITAL | Age: 69
End: 2024-01-30
Payer: MEDICARE

## 2024-01-30 ENCOUNTER — PATIENT MESSAGE (OUTPATIENT)
Dept: VASCULAR SURGERY | Facility: CLINIC | Age: 69
End: 2024-01-30
Payer: MEDICARE

## 2024-01-30 DIAGNOSIS — I25.118 CORONARY ARTERY DISEASE OF NATIVE ARTERY OF NATIVE HEART WITH STABLE ANGINA PECTORIS: Primary | ICD-10-CM

## 2024-01-30 DIAGNOSIS — Z01.810 PREOP CARDIOVASCULAR EXAM: ICD-10-CM

## 2024-01-30 NOTE — PROGRESS NOTES
Will be having AAA repair under general anesthesia.  Patient is not active on a daily basis.  Given medical history, would recommend nuclear stress test for preop risk stratification.    Dr. Maldonado

## 2024-02-02 DIAGNOSIS — E78.5 HYPERLIPIDEMIA, UNSPECIFIED HYPERLIPIDEMIA TYPE: ICD-10-CM

## 2024-02-02 DIAGNOSIS — I10 HYPERTENSION, ESSENTIAL: ICD-10-CM

## 2024-02-02 RX ORDER — HYDROCHLOROTHIAZIDE 25 MG/1
25 TABLET ORAL DAILY
Qty: 90 TABLET | Refills: 2 | Status: SHIPPED | OUTPATIENT
Start: 2024-02-02 | End: 2025-02-01

## 2024-02-02 RX ORDER — EZETIMIBE 10 MG/1
10 TABLET ORAL DAILY
Qty: 90 TABLET | Refills: 1 | Status: SHIPPED | OUTPATIENT
Start: 2024-02-02 | End: 2024-02-09 | Stop reason: SDUPTHER

## 2024-02-03 DIAGNOSIS — E78.5 HYPERLIPIDEMIA, UNSPECIFIED HYPERLIPIDEMIA TYPE: ICD-10-CM

## 2024-02-06 RX ORDER — ROSUVASTATIN CALCIUM 20 MG/1
20 TABLET, COATED ORAL DAILY
Qty: 90 TABLET | Refills: 2 | Status: SHIPPED | OUTPATIENT
Start: 2024-02-06 | End: 2024-02-09 | Stop reason: SDUPTHER

## 2024-02-09 DIAGNOSIS — E78.5 HYPERLIPIDEMIA, UNSPECIFIED HYPERLIPIDEMIA TYPE: ICD-10-CM

## 2024-02-09 RX ORDER — ROSUVASTATIN CALCIUM 20 MG/1
20 TABLET, COATED ORAL DAILY
Qty: 90 TABLET | Refills: 2 | Status: SHIPPED | OUTPATIENT
Start: 2024-02-09

## 2024-02-09 RX ORDER — EZETIMIBE 10 MG/1
10 TABLET ORAL DAILY
Qty: 90 TABLET | Refills: 1 | Status: SHIPPED | OUTPATIENT
Start: 2024-02-09 | End: 2024-05-02 | Stop reason: SDUPTHER

## 2024-02-09 NOTE — TELEPHONE ENCOUNTER
No care due was identified.  [facility] Embedded Care Due Messages. Reference number: [id number].   2/09/2024 10:43:53 AM CST

## 2024-02-14 ENCOUNTER — HOSPITAL ENCOUNTER (OUTPATIENT)
Dept: RADIOLOGY | Facility: HOSPITAL | Age: 69
Discharge: HOME | End: 2024-02-14
Attending: STUDENT IN AN ORGANIZED HEALTH CARE EDUCATION/TRAINING PROGRAM
Payer: MEDICARE

## 2024-02-14 ENCOUNTER — HOSPITAL ENCOUNTER (OUTPATIENT)
Dept: CARDIOLOGY | Facility: HOSPITAL | Age: 69
Discharge: HOME | End: 2024-02-14
Attending: STUDENT IN AN ORGANIZED HEALTH CARE EDUCATION/TRAINING PROGRAM
Payer: MEDICARE

## 2024-02-14 DIAGNOSIS — Z01.810 PREOP CARDIOVASCULAR EXAM: ICD-10-CM

## 2024-02-14 DIAGNOSIS — I25.118 CORONARY ARTERY DISEASE OF NATIVE ARTERY OF NATIVE HEART WITH STABLE ANGINA PECTORIS: ICD-10-CM

## 2024-02-14 LAB
CV STRESS BASE HR: 60 BPM
DIASTOLIC BLOOD PRESSURE: 67 MMHG
NUC REST EJECTION FRACTION: 71
NUC STRESS EJECTION FRACTION: 72 %
OHS CV CPX 85 PERCENT MAX PREDICTED HEART RATE MALE: 131
OHS CV CPX MAX PREDICTED HEART RATE: 154
OHS CV CPX PATIENT IS FEMALE: 0
OHS CV CPX PATIENT IS MALE: 1
OHS CV CPX PEAK DIASTOLIC BLOOD PRESSURE: 54 MMHG
OHS CV CPX PEAK HEAR RATE: 91 BPM
OHS CV CPX PEAK RATE PRESSURE PRODUCT: NORMAL
OHS CV CPX PEAK SYSTOLIC BLOOD PRESSURE: 143 MMHG
OHS CV CPX PERCENT MAX PREDICTED HEART RATE ACHIEVED: 59
OHS CV CPX RATE PRESSURE PRODUCT PRESENTING: 9780
SYSTOLIC BLOOD PRESSURE: 163 MMHG

## 2024-02-14 PROCEDURE — A9502 TC99M TETROFOSMIN: HCPCS

## 2024-02-14 PROCEDURE — 93018 CV STRESS TEST I&R ONLY: CPT | Mod: ,,, | Performed by: INTERNAL MEDICINE

## 2024-02-14 PROCEDURE — 63600175 PHARM REV CODE 636 W HCPCS: Performed by: STUDENT IN AN ORGANIZED HEALTH CARE EDUCATION/TRAINING PROGRAM

## 2024-02-14 PROCEDURE — 93017 CV STRESS TEST TRACING ONLY: CPT

## 2024-02-14 PROCEDURE — 78452 HT MUSCLE IMAGE SPECT MULT: CPT | Mod: 26,,, | Performed by: INTERNAL MEDICINE

## 2024-02-14 PROCEDURE — 93016 CV STRESS TEST SUPVJ ONLY: CPT | Mod: ,,, | Performed by: INTERNAL MEDICINE

## 2024-02-14 RX ORDER — REGADENOSON 0.08 MG/ML
0.4 INJECTION, SOLUTION INTRAVENOUS ONCE
Status: COMPLETED | OUTPATIENT
Start: 2024-02-14 | End: 2024-02-14

## 2024-02-14 RX ADMIN — REGADENOSON 0.4 MG: 0.08 INJECTION, SOLUTION INTRAVENOUS at 01:02

## 2024-02-15 ENCOUNTER — HOSPITAL ENCOUNTER (OUTPATIENT)
Dept: RADIOLOGY | Facility: HOSPITAL | Age: 69
Discharge: HOME | End: 2024-02-15
Attending: SURGERY
Payer: MEDICARE

## 2024-02-15 ENCOUNTER — OFFICE VISIT (OUTPATIENT)
Dept: CARDIOLOGY | Facility: CLINIC | Age: 69
End: 2024-02-15
Payer: MEDICARE

## 2024-02-15 VITALS
HEART RATE: 63 BPM | OXYGEN SATURATION: 98 % | BODY MASS INDEX: 28.31 KG/M2 | WEIGHT: 165.81 LBS | SYSTOLIC BLOOD PRESSURE: 106 MMHG | HEIGHT: 64 IN | DIASTOLIC BLOOD PRESSURE: 58 MMHG

## 2024-02-15 DIAGNOSIS — Z01.810 PREOP CARDIOVASCULAR EXAM: ICD-10-CM

## 2024-02-15 DIAGNOSIS — Z72.0 TOBACCO ABUSE: ICD-10-CM

## 2024-02-15 DIAGNOSIS — I25.118 CORONARY ARTERY DISEASE OF NATIVE ARTERY OF NATIVE HEART WITH STABLE ANGINA PECTORIS: Primary | ICD-10-CM

## 2024-02-15 DIAGNOSIS — E78.2 MIXED HYPERLIPIDEMIA: ICD-10-CM

## 2024-02-15 DIAGNOSIS — R94.31 ABNORMAL EKG: ICD-10-CM

## 2024-02-15 DIAGNOSIS — F17.200 SMOKING: ICD-10-CM

## 2024-02-15 DIAGNOSIS — Z01.818 PRE-OP EVALUATION: ICD-10-CM

## 2024-02-15 DIAGNOSIS — I73.9 PAD (PERIPHERAL ARTERY DISEASE): ICD-10-CM

## 2024-02-15 DIAGNOSIS — I10 HYPERTENSION, ESSENTIAL: ICD-10-CM

## 2024-02-15 PROCEDURE — 99213 OFFICE O/P EST LOW 20 MIN: CPT | Mod: PBBFAC,25 | Performed by: STUDENT IN AN ORGANIZED HEALTH CARE EDUCATION/TRAINING PROGRAM

## 2024-02-15 PROCEDURE — 71045 X-RAY EXAM CHEST 1 VIEW: CPT | Mod: 26,,, | Performed by: RADIOLOGY

## 2024-02-15 PROCEDURE — 71045 X-RAY EXAM CHEST 1 VIEW: CPT | Mod: TC

## 2024-02-15 PROCEDURE — 99999 PR PBB SHADOW E&M-EST. PATIENT-LVL III: CPT | Mod: PBBFAC,,, | Performed by: STUDENT IN AN ORGANIZED HEALTH CARE EDUCATION/TRAINING PROGRAM

## 2024-02-15 PROCEDURE — 99214 OFFICE O/P EST MOD 30 MIN: CPT | Mod: S$PBB,,, | Performed by: STUDENT IN AN ORGANIZED HEALTH CARE EDUCATION/TRAINING PROGRAM

## 2024-02-15 NOTE — PROGRESS NOTES
Section of Cardiology                  Cardiac Clinic Note      HPI:   Deborah White is a 68 y.o. male with h/o CAD, PAT, HTN, HLD        Retired from restaurant bussiness  [facility] CAD h/o LHC in 1998, nonobstructive, PAD + claudication, HTN HLD. Smoking 1/2 PPD > 40 yrs, no drinking  Prior cardiologist DR FISHMAN AT [facility]  Walks at home  He denied chest pain, dyspnea on exertion, palpitation, fainting, PND, orthopnea, syncope  Exertional calf pain.    exercise KAL showed normal resting KAL. Mod to severe decreased exercise KAL to 0.5 left and 0.6 right. EKG NSR and LVH  Started Simvastatin in 1998 and developed body ache this year. Pt states that the ache resovled after d/c simvastatin.  IN . Now resumed Simvastatin AND NO RECURRENT BODY ACHE    08-202O ECHO DONE AT [facility] NORMAL EF, MILD MR AND MOD. LVH  , CR 1.1   LE arterial US no significant blockage  03/15/2021 Decrease Pletal to once a day due to headache  States that improved Claudication after added Pletal, walks longer distance    Interval history  Limited activity due to knee OA pain right arm pain  No chest pain dyspnea dizziness faint.  Smoking   EKG NSR. ABD CT pending for AAA        5/31/23  Comes in with daughter  Switching providers  Has been having claudication symptoms in his left leg, minimal on the right   Prior arterial ultrasound of the legs without stenosis in 2021  Still smoking   Not very active at home  Abn ekg today compared to prior     Denies chest pain, shortness of breath, dizziness, syncope, PND      1/11/24  Comes in with daughter  Will be having TEVAR and peripheral angiogram/angioplasty  Still not active  Having claudication  Still smoking  BP stable       2/15/24  Will be having TEVAR and peripheral angiogram/angioplasty  Procedure will be under general anesthesia  Lexiscan done yesterday- no ischemia   Still not active  Has chronic pain  Bp low today, no symptoms      Denies chest pain,  SOB, LE swelling      EKG 1/11/24 SB, 1st deg AVB, nonspecific intraventricular block  EKG 5/31/23 SB, 1st deg AVB, nonspecific intraventricular block        EKG NSR, no acute ST - T wave changes    ECHO  No results found for this or any previous visit.       STRESS TEST No results found for this or any previous visit.       [facility] No results found for this or any previous visit.            ROS: All 10 systems reviewed. Please refer to the HPI for pertinent positives. All other systems negative.     Past Medical History  Past Medical History:   Diagnosis Date    Coronary artery disease     Hyperlipidemia     Hypertension     Personal history of colonic polyps        Surgical History  Past Surgical History:   Procedure Laterality Date    INJECTION OF ANESTHETIC AGENT AROUND MEDIAL BRANCH NERVES INNERVATING CERVICAL FACET JOINT Right 11/18/2020    Procedure: Block-nerve-medial branch-cervical Right C3, 4, 5with RN IV sedation;  Surgeon: Martín Barnes MD;  Location: [facility] PAIN [facility]GT;  Service: Pain Management;  Laterality: Right;    INJECTION OF ANESTHETIC AGENT AROUND MEDIAL BRANCH NERVES INNERVATING CERVICAL FACET JOINT Bilateral 04/28/2023    Procedure: Bilateral C4-6 MBB with RN IV sedation;  Surgeon: Rudolph Burgos MD;  Location: [facility] PAIN MGT;  Service: Pain Management;  Laterality: Bilateral;          Allergies:   Review of patient's allergies indicates:  No Known Allergies    Social History:  Social History     Socioeconomic History    Marital status:    Tobacco Use    Smoking status: Every Day     Current packs/day: 0.50     Average packs/day: 0.5 packs/day for 45.0 years (22.5 ttl pk-yrs)     Types: Cigarettes    Smokeless tobacco: Never   Substance and Sexual Activity    Alcohol use: Not Currently    Drug use: Never    Sexual activity: Not Currently     Partners: Female     Social Determinants of Health     Financial Resource Strain: Medium Risk (11/21/2023)    Overall Financial Resource Strain (CARDIA)      Difficulty of Paying Living Expenses: Somewhat hard   Food Insecurity: Food Insecurity Present (11/21/2023)    Hunger Vital Sign     Worried About Running Out of Food in the Last Year: Sometimes true     Ran Out of Food in the Last Year: Sometimes true   Transportation Needs: No Transportation Needs (11/21/2023)    PRAPARE - Transportation     Lack of Transportation (Medical): No     Lack of Transportation (Non-Medical): No   Physical Activity: Insufficiently Active (11/21/2023)    Exercise Vital Sign     Days of Exercise per Week: 1 day     Minutes of Exercise per Session: 30 min   Stress: Stress Concern Present (11/21/2023)    [employer] - Occupational Stress Questionnaire     Feeling of Stress : To some extent   Social Connections: Unknown (11/21/2023)    Social Connection and Isolation Panel [NHANES]     Frequency of Communication with Friends and Family: Once a week     Frequency of Social Gatherings with Friends and Family: Once a week     Active Member of Clubs or Organizations: No     Attends Club or Organization Meetings: Never     Marital Status:    Housing Stability: Low Risk  (11/21/2023)    Housing Stability Vital Sign     Unable to Pay for Housing in the Last Year: No     Number of Places Lived in the Last Year: 1     Unstable Housing in the Last Year: No       Family History:  family history includes Cancer in his father and mother; Early death in his brother; Heart disease in his brother; Hypertension in his brother and father.    Home Medications:  Current Outpatient Medications on File Prior to Visit   Medication Sig Dispense Refill    amLODIPine (NORVASC) 10 MG tablet Take 1 tablet (10 mg total) by mouth once daily. 30 tablet 11    aspirin (ECOTRIN) 81 MG EC tablet Take 81 mg by mouth once daily.      benazepriL (LOTENSIN) 40 MG tablet Take 1 tablet (40 mg total) by mouth once daily. 90 tablet 2    cilostazoL (PLETAL) 50 MG Tab Take 1 tablet (50 mg total) by  "mouth 2 (two) times daily. 60 tablet 9    cloNIDine (CATAPRES) 0.2 MG tablet Take 1 tablet (0.2 mg total) by mouth 2 (two) times daily. 180 tablet 3    ezetimibe (ZETIA) 10 mg tablet Take 1 tablet (10 mg total) by mouth once daily. 90 tablet 1    fluticasone propionate (FLONASE) 50 mcg/actuation nasal spray 2 sprays (100 mcg total) by Each Nostril route once daily. 16 g 0    gabapentin (NEURONTIN) 300 MG capsule Take 2 capsules (600 mg total) by mouth 2 (two) times daily. 360 capsule 0    hydroCHLOROthiazide (HYDRODIURIL) 25 MG tablet Take 1 tablet (25 mg total) by mouth once daily. 90 tablet 2    linaCLOtide (LINZESS) 72 mcg Cap capsule Take 1 capsule (72 mcg total) by mouth before breakfast. 30 capsule 3    omeprazole (PRILOSEC) 20 MG capsule Take 1 capsule (20 mg total) by mouth once daily. 90 capsule 3    promethazine-dextromethorphan (PROMETHAZINE-DM) 6.25-15 mg/5 mL Syrp Take 5 mLs by mouth every 4 to 6 hours as needed (cough). 240 mL 0    rosuvastatin (CRESTOR) 20 MG tablet Take 1 tablet (20 mg total) by mouth once daily. 90 tablet 2    tofacitinib (XELJANZ) 5 mg Tab Take 1 tablet (5 mg) by mouth once daily. 30 tablet 11     No current facility-administered medications on file prior to visit.       Physical exam:  /78 (BP Location: Right arm, Patient Position: Sitting, BP Method: Small (Manual))   Pulse 60   Ht 5' 4" (1.626 m)   Wt 74.6 kg (164 lb 7.4 oz)   SpO2 97%   BMI 28.23 kg/m²         General: Pt is a 68 y.o. year old male who is AAOx3, in NAD, is pleasant, well nourished, looks stated age  HEENT: PERRL, EOMI, Oral mucosa pink & moist  CVS  No abnormal cardiac pulsations noted on inspection. JVP not raised. The apical impulse is normal on palpation, and is located in the left 5th intercostal space in the mid - clavicular line. No palpable thrills or abnormal pulsations noted. RR, S1 - S2 heard, no murmurs, rubs or gallops appreciated.   PUL : CTA B/L. No wheezes/crackles heard   ABD : BS +, " "soft. No tenderness elicited   LE : No C/C/E. Distal Pulses palpable B/L         LABS:    Chemistry:   Lab Results   Component Value Date     12/14/2023    K 3.8 12/14/2023     12/14/2023    CO2 26 12/14/2023    BUN 17 12/14/2023    CREATININE 1.4 12/14/2023    GLUCOSE 110 04/24/2023    CALCIUM 9.3 12/14/2023     Cardiac Markers:   Lab Results   Component Value Date    CKTOTAL 113 06/22/2020    CKMB 0.0 06/22/2020     Cardiac Markers (Last 3):   Lab Results   Component Value Date    CKTOTAL 113 06/22/2020    CKTOTAL 207 01/07/2020    CKMB 0.0 06/22/2020    CKMB 0.0 01/07/2020     CBC:   Lab Results   Component Value Date    WBC 6.92 11/14/2023    HGB 12.8 (L) 11/14/2023    HCT 39.0 (L) 11/14/2023    MCV 90 11/14/2023     11/14/2023     Lipids:   Lab Results   Component Value Date    CHOL 107 (L) 06/21/2023    TRIG 97 06/21/2023    TRIG 133 08/05/2020    HDL 42 06/21/2023    HDL 56 08/05/2020     Coagulation: No results found for: "PT", "INR", "APTT"        Assessment        1. PAD (peripheral artery disease)    2. Abnormal EKG    3. Coronary artery disease of native artery of native heart with stable angina pectoris    4. Smoking    5. Hypertension, essential    6. Mixed hyperlipidemia    7. Ankylosing spondylitis of multiple sites in spine    8. Tobacco abuse           Plan:    Preop risk stratification  Moderate CV risk for vascular/TEVAR procedure  Echo 6/23 EF 65%  Lexiscan 2/24 no ischemia, normal EF  No further cardiac workup needed     Abnormal EKG   First-degree AV block, nonspecific intraventricular conduction delay   Echo 6/23 EF 65%    CAD  Non obs CAD  Denies chest pain  Continue aspirin, Zetia, Crestor    HLD  LDL 53->46 as 6/23  Continue statin    Claudication   Left > right  Continue pletal  Sees Vascular, Rt- scattered plaques, Lt- mild disease (no significant stenosis)     Hypertension  Mildly low - no symptoms  Continue benazepril, clonidine, hydrochlorothiazide, " amlodipine    Ankylosing spondylitis   Stable   Continue therapy    Tobacco abuse   Smoking cessation encouraged    Low salt, low fat diet  Exercise as tolerated, at least 30 min daily     This note was prepared using voice recognition system and is likely to have sound alike errors that may have been overlooked even after proofreading.     I have reviewed all pertinent chart information.  Plans and recommendations have been formulated under my direct supervision. All questions answered and patient voiced understanding.   If symptoms persist go to the ED.    RTC in 6 months        Ludin Maldonado MD  Cardiology

## 2024-02-19 ENCOUNTER — TELEPHONE (OUTPATIENT)
Dept: VASCULAR SURGERY | Facility: CLINIC | Age: 69
End: 2024-02-19
Payer: MEDICARE

## 2024-02-19 ENCOUNTER — ANESTHESIA EVENT (OUTPATIENT)
Dept: SURGERY | Facility: HOSPITAL | Age: 69
DRG: 269 | End: 2024-02-19
Payer: MEDICARE

## 2024-02-19 ENCOUNTER — PATIENT MESSAGE (OUTPATIENT)
Dept: VASCULAR SURGERY | Facility: CLINIC | Age: 69
End: 2024-02-19
Payer: MEDICARE

## 2024-02-19 NOTE — ANESTHESIA PREPROCEDURE EVALUATION
[facility]  Anesthesia Pre-Operative Evaluation         Patient Name: Deborah White  YOB: 1955  MRN: [id number]    SUBJECTIVE:     Pre-operative evaluation for Procedure(s) (LRB):  REPAIR-ANEURYSM-ABDOMINAL AORTIC-ENDOVASCULAR (AAA) (N/A)     02/19/2024    Deborah White is a 68 y.o. male w/ a significant PMHx of CAD, HTN, tobacco use, with AAA.    Patient now presents for the above procedure(s).    Nuclear Stress 1/30/24    Normal myocardial perfusion scan. There is no evidence of myocardial ischemia or infarction.    The gated perfusion images showed an ejection fraction of 71% at rest. The gated perfusion images showed an ejection fraction of 72% post stress.    The ECG portion of the study is negative for ischemia.    The patient reported no chest pain during the stress test.    There were no arrhythmias during stress.      LDA: None documented.    Prev airway: None documented.    Drips: None documented.    Patient Active Problem List   Diagnosis    Ankylosing hyperostosis of cervical region    Bilateral carpal tunnel syndrome    Elevated sed rate    Hypertension, essential    Hyperlipidemia    Ankylosing spondylitis of multiple sites in spine    Cervical facet joint syndrome    Coronary artery disease of native artery of native heart with stable angina pectoris    PAD (peripheral artery disease)    Tobacco abuse    Positive QuantiFERON-TB Gold test    Immunocompromised    DISH (diffuse idiopathic skeletal hyperostosis)    TB lung, latent    Seronegative rheumatoid arthritis    Cervical spondylosis    Neural foraminal stenosis of cervical spine    Overweight (BMI 25.0-29.9)    Infrarenal abdominal aortic aneurysm (AAA) without rupture       Review of patient's allergies indicates:  No Known Allergies    Current Outpatient Medications:  No current facility-administered medications for this encounter.    Current Outpatient Medications:     amLODIPine (NORVASC) 10 MG tablet, Take 1 tablet  (10 mg total) by mouth once daily., Disp: 30 tablet, Rfl: 11    aspirin (ECOTRIN) 81 MG EC tablet, Take 81 mg by mouth once daily., Disp: , Rfl:     benazepriL (LOTENSIN) 40 MG tablet, Take 1 tablet (40 mg total) by mouth once daily., Disp: 90 tablet, Rfl: 2    cilostazoL (PLETAL) 50 MG Tab, Take 1 tablet (50 mg total) by mouth 2 (two) times daily., Disp: 60 tablet, Rfl: 9    cloNIDine (CATAPRES) 0.2 MG tablet, Take 1 tablet (0.2 mg total) by mouth 2 (two) times daily., Disp: 180 tablet, Rfl: 3    ezetimibe (ZETIA) 10 mg tablet, Take 1 tablet (10 mg total) by mouth once daily., Disp: 90 tablet, Rfl: 1    gabapentin (NEURONTIN) 300 MG capsule, Take 2 capsules (600 mg total) by mouth 2 (two) times daily., Disp: 360 capsule, Rfl: 0    hydroCHLOROthiazide (HYDRODIURIL) 25 MG tablet, Take 1 tablet (25 mg total) by mouth once daily., Disp: 90 tablet, Rfl: 2    linaCLOtide (LINZESS) 72 mcg Cap capsule, Take 1 capsule (72 mcg total) by mouth before breakfast., Disp: 30 capsule, Rfl: 3    omeprazole (PRILOSEC) 20 MG capsule, Take 1 capsule (20 mg total) by mouth once daily. (Patient not taking: Reported on 2/19/2024), Disp: 90 capsule, Rfl: 3    rosuvastatin (CRESTOR) 20 MG tablet, Take 1 tablet (20 mg total) by mouth once daily., Disp: 90 tablet, Rfl: 2    tofacitinib (XELJANZ) 5 mg Tab, Take 1 tablet (5 mg) by mouth once daily., Disp: 30 tablet, Rfl: 11    Past Surgical History:   Procedure Laterality Date    INJECTION OF ANESTHETIC AGENT AROUND MEDIAL BRANCH NERVES INNERVATING CERVICAL FACET JOINT Right 11/18/2020    Procedure: Block-nerve-medial branch-cervical Right C3, 4, 5with RN IV sedation;  Surgeon: Martín Barnes MD;  Location: [facility];  Service: Pain Management;  Laterality: Right;    INJECTION OF ANESTHETIC AGENT AROUND MEDIAL BRANCH NERVES INNERVATING CERVICAL FACET JOINT Bilateral 04/28/2023    Procedure: Bilateral C4-6 MBB with RN IV sedation;  Surgeon: Rudolph Burgos MD;  Location: [facility];   Service: Pain Management;  Laterality: Bilateral;       Social History     Socioeconomic History    Marital status:    Tobacco Use    Smoking status: Every Day     Current packs/day: 0.50     Average packs/day: 0.5 packs/day for 45.0 years (22.5 ttl pk-yrs)     Types: Cigarettes    Smokeless tobacco: Never   Substance and Sexual Activity    Alcohol use: Not Currently    Drug use: Never    Sexual activity: Not Currently     Partners: Female     Social Determinants of Health     Financial Resource Strain: Medium Risk (11/21/2023)    Overall Financial Resource Strain (CARDIA)     Difficulty of Paying Living Expenses: Somewhat hard   Food Insecurity: Food Insecurity Present (11/21/2023)    Hunger Vital Sign     Worried About Running Out of Food in the Last Year: Sometimes true     Ran Out of Food in the Last Year: Sometimes true   Transportation Needs: No Transportation Needs (11/21/2023)    PRAPARE - Transportation     Lack of Transportation (Medical): No     Lack of Transportation (Non-Medical): No   Physical Activity: Insufficiently Active (11/21/2023)    Exercise Vital Sign     Days of Exercise per Week: 1 day     Minutes of Exercise per Session: 30 min   Stress: Stress Concern Present (11/21/2023)    [place] [employer] - Occupational Stress Questionnaire     Feeling of Stress : To some extent   Social Connections: Unknown (11/21/2023)    Social Connection and Isolation Panel [NHANES]     Frequency of Communication with Friends and Family: Once a week     Frequency of Social Gatherings with Friends and Family: Once a week     Active Member of Clubs or Organizations: No     Attends Club or Organization Meetings: Never     Marital Status:    Housing Stability: Low Risk  (11/21/2023)    Housing Stability Vital Sign     Unable to Pay for Housing in the Last Year: No     Number of Places Lived in the Last Year: 1     Unstable Housing in the Last Year: No       OBJECTIVE:     Vital  Signs Range (Last 24H):         Significant Labs:  Lab Results   Component Value Date    WBC 6.46 02/15/2024    HGB 11.8 (L) 02/15/2024    HCT 37.1 (L) 02/15/2024     02/15/2024    CHOL 107 (L) 06/21/2023    TRIG 97 06/21/2023    HDL 42 06/21/2023    ALT 22 11/14/2023    AST 21 11/14/2023     02/15/2024    K 4.2 02/15/2024     02/15/2024    CREATININE 1.7 (H) 02/15/2024    BUN 20 02/15/2024    CO2 28 02/15/2024    TSH 0.863 06/21/2023    PSA 0.30 06/21/2023    HGBA1C 6.2 (H) 06/21/2023       Diagnostic Studies: No relevant studies.    EKG:   Results for orders placed or performed in visit on 01/11/24   EKG 12-lead    Collection Time: 01/11/24  5:25 PM    Narrative    Test Reason : R94.31,    Vent. Rate : 053 BPM     Atrial Rate : 053 BPM     P-R Int : 208 ms          QRS Dur : 122 ms      QT Int : 460 ms       P-R-T Axes : 052 047 055 degrees     QTc Int : 431 ms    Sinus bradycardia with Premature atrial complexes  Nonspecific intraventricular conduction delay  Borderline Abnormal ECG  When compared with ECG of 31-MAY-2023 09:59,  Premature atrial complexes are now Present  TX interval has decreased  ST elevation now present in Inferior leads  Confirmed by Ludin Maldonado MD ([id number]) on 1/13/2024 8:54:52 AM    Referred By: MD MALDONADO           Confirmed By:Ludin Maldonado MD       2D ECHO:  TTE:  Results for orders placed or performed during the hospital encounter of 06/21/23   Echo   Result Value Ref Range    BSA 1.85 m2    TDI SEPTAL 0.09 m/s    LV LATERAL E/E' RATIO 6.78 m/s    LV SEPTAL E/E' RATIO 6.78 m/s    LA WIDTH 3.10 cm    Left Ventricular Outflow Tract Mean Velocity 0.84 cm/s    Left Ventricular Outflow Tract Mean Gradient 3.25 mmHg    TDI LATERAL 0.09 m/s    PV PEAK VELOCITY 1.00 cm/s    LVIDd 4.73 3.5 - 6.0 cm    IVS 1.26 (A) 0.6 - 1.1 cm    Posterior Wall 1.25 (A) 0.6 - 1.1 cm    Ao root annulus 3.24 cm    LVIDs 3.06 2.1 - 4.0 cm    FS 35 28 - 44 %    LA volume 37.75 cm3    Sinus  2.78 cm    STJ 2.38 cm    Ascending aorta 2.67 cm    LV mass 228.30 g    LA size 3.25 cm    RVDD 3.00 cm    Left Ventricle Relative Wall Thickness 0.53 cm    AV regurgitation pressure 1/2 time [id number] ms    AV mean gradient 5 mmHg    AV valve area 2.57 cm2    AV Velocity Ratio 0.74     AV index (prosthetic) 0.84     MV valve area p 1/2 method 3.22 cm2    E/A ratio 0.69     Mean e' 0.09 m/s    E wave deceleration time 235.66 msec    IVRT 99.90 msec    Pulm vein S/D ratio 1.64     LVOT diameter 1.97 cm    LVOT area 3.0 cm2    LVOT peak bairon 1.26 m/s    LVOT peak VTI 31.60 cm    Ao peak bairon 1.71 m/s    Ao VTI 37.4 cm    RVOT peak bairon 0.60 m/s    RVOT peak VTI 17.6 cm    LVOT stroke volume 96.27 cm3    AV peak gradient 12 mmHg    PV mean gradient 0.98 mmHg    E/E' ratio 6.78 m/s    MV Peak E Bairon 0.61 m/s    AR Max Bairon 4.68 m/s    TR Max Bairon 2.24 m/s    MV stenosis pressure 1/2 time 68.34 ms    MV Peak A Bairon 0.88 m/s    PV Peak S Bairon 0.64 m/s    PV Peak D Bairon 0.39 m/s    LV Systolic Volume 36.63 mL    LV Systolic Volume Index 20.1 mL/m2    LV Diastolic Volume 103.68 mL    LV Diastolic Volume Index 56.97 mL/m2    LA Volume Index 20.7 mL/m2    LV Mass Index 125 g/m2    RA Major Axis 3.49 cm    Left Atrium Minor Axis 4.32 cm    Left Atrium Major Axis 4.50 cm    Triscuspid Valve Regurgitation Peak Gradient 20 mmHg    LA Volume Index (Mod) 12.2 mL/m2    LA volume (mod) 22.24 cm3    RA Width 2.97 cm    Right Atrial Pressure (from IVC) 3 mmHg    EF 65 %    TV resting pulmonary artery pressure 23 mmHg    Narrative    · The left ventricle is normal in size with mild concentric hypertrophy   and normal systolic function.  · The estimated ejection fraction is 65%.  · Normal left ventricular diastolic function.  · Normal right ventricular size with normal right ventricular systolic   function.  · Normal central venous pressure (3 mmHg).  · The estimated PA systolic pressure is 23 mmHg.          AMARJIT:  No results found for  this or any previous visit.    ASSESSMENT/PLAN:           Pre-op Assessment    I have reviewed the Patient Summary Reports.       I have reviewed the Medications.     Review of Systems  Anesthesia Hx:  No problems with previous Anesthesia   History of prior surgery of interest to airway management or planning:          Denies Family Hx of Anesthesia complications.    Denies Personal Hx of Anesthesia complications.                    Social:  Smoker       Hematology/Oncology:  Hematology Normal   Oncology Normal                                   Cardiovascular:     Hypertension   CAD               AAA                         Pulmonary:  Pulmonary Normal                       Renal/:  Renal/ Normal                 Hepatic/GI:  Hepatic/GI Normal                 Musculoskeletal:  Musculoskeletal Normal                Neurological:  Neurology Normal                                      Endocrine:  Endocrine Normal            Psych:  Psychiatric Normal                    Physical Exam  General: Well nourished, Cooperative, Alert and Oriented    Airway:  Mallampati: II   Mouth Opening: Normal  TM Distance: Normal  Neck ROM: Normal ROM    Dental:  Intact        Anesthesia Plan  Type of Anesthesia, risks & benefits discussed:    Anesthesia Type: Gen ETT  Intra-op Monitoring Plan: Standard ASA Monitors and Art Line  Post Op Pain Control Plan: multimodal analgesia and IV/PO Opioids PRN  Induction:  IV  Airway Plan: Direct, Post-Induction  Informed Consent: Informed consent signed with the Patient and all parties understand the risks and agree with anesthesia plan.  All questions answered.   ASA Score: 4  Day of Surgery Review of History & Physical: H&P Update referred to the surgeon/provider.    Ready For Surgery From Anesthesia Perspective.     .

## 2024-02-19 NOTE — PRE-PROCEDURE INSTRUCTIONS
PreOp Instructions given: To Pt's Daughter Natividad Tate  - Verbal medication information (what to hold and what to take)   - NPO guidelines 2400  - Arrival place directions given; time to be given the day before procedure by the   Surgeon's Office DOSC  - Bathing with antibacterial soap   - Don't wear any jewelry or bring any valuables AM of surgery   - No makeup or moisturizer to face   - No perfume/cologne, powder, lotions or aftershave   Pt. verbalized understanding.   Pt denies any h/o Anesthesia/Sedation complications or side effects.  Patient does not know arrival time.  Explained that this information comes from the surgeon's office and if they haven't heard from them by 2 or 3 pm to call the office.  Patient stated an understanding.

## 2024-02-20 ENCOUNTER — ANESTHESIA (OUTPATIENT)
Dept: SURGERY | Facility: HOSPITAL | Age: 69
DRG: 269 | End: 2024-02-20
Payer: MEDICARE

## 2024-02-20 ENCOUNTER — HOSPITAL ENCOUNTER (INPATIENT)
Facility: HOSPITAL | Age: 69
LOS: 2 days | Discharge: HOME | DRG: 269 | End: 2024-02-22
Attending: SURGERY | Admitting: SURGERY
Payer: MEDICARE

## 2024-02-20 DIAGNOSIS — I71.40 ABDOMINAL AORTIC ANEURYSM: ICD-10-CM

## 2024-02-20 DIAGNOSIS — I73.9 PAD (PERIPHERAL ARTERY DISEASE): ICD-10-CM

## 2024-02-20 DIAGNOSIS — R12 CHRONIC HEARTBURN: ICD-10-CM

## 2024-02-20 DIAGNOSIS — Z72.0 TOBACCO ABUSE: Primary | ICD-10-CM

## 2024-02-20 LAB
ABO + RH BLD: NORMAL
ALBUMIN SERPL BCP-MCNC: 3.1 G/DL (ref 3.5–5.2)
ALP SERPL-CCNC: 58 U/L (ref 55–135)
ALT SERPL W/O P-5'-P-CCNC: 22 U/L (ref 10–44)
ANION GAP SERPL CALC-SCNC: 14 MMOL/L (ref 8–16)
APTT PPP: 27.1 SEC (ref 21–32)
AST SERPL-CCNC: 19 U/L (ref 10–40)
BASOPHILS # BLD AUTO: 0.05 K/UL (ref 0–0.2)
BASOPHILS NFR BLD: 0.5 % (ref 0–1.9)
BILIRUB SERPL-MCNC: 0.3 MG/DL (ref 0.1–1)
BLD GP AB SCN CELLS X3 SERPL QL: NORMAL
BUN SERPL-MCNC: 17 MG/DL (ref 8–23)
CALCIUM SERPL-MCNC: 8.1 MG/DL (ref 8.7–10.5)
CHLORIDE SERPL-SCNC: 110 MMOL/L (ref 95–110)
CO2 SERPL-SCNC: 17 MMOL/L (ref 23–29)
CREAT SERPL-MCNC: 1.7 MG/DL (ref 0.5–1.4)
DIFFERENTIAL METHOD BLD: ABNORMAL
EOSINOPHIL # BLD AUTO: 0 K/UL (ref 0–0.5)
EOSINOPHIL NFR BLD: 0.2 % (ref 0–8)
ERYTHROCYTE [DISTWIDTH] IN BLOOD BY AUTOMATED COUNT: 14.2 % (ref 11.5–14.5)
EST. GFR  (NO RACE VARIABLE): 43.9 ML/MIN/1.73 M^2
GLUCOSE SERPL-MCNC: 157 MG/DL (ref 70–110)
HCT VFR BLD AUTO: 33.6 % (ref 40–54)
HGB BLD-MCNC: 10.8 G/DL (ref 14–18)
IMM GRANULOCYTES # BLD AUTO: 0.07 K/UL (ref 0–0.04)
IMM GRANULOCYTES NFR BLD AUTO: 0.8 % (ref 0–0.5)
INR PPP: 1 (ref 0.8–1.2)
LACTATE SERPL-SCNC: 1.8 MMOL/L (ref 0.5–2.2)
LACTATE SERPL-SCNC: 6.2 MMOL/L (ref 0.5–2.2)
LYMPHOCYTES # BLD AUTO: 0.9 K/UL (ref 1–4.8)
LYMPHOCYTES NFR BLD: 9.8 % (ref 18–48)
MAGNESIUM SERPL-MCNC: 2.2 MG/DL (ref 1.6–2.6)
MCH RBC QN AUTO: 29.5 PG (ref 27–31)
MCHC RBC AUTO-ENTMCNC: 32.1 G/DL (ref 32–36)
MCV RBC AUTO: 92 FL (ref 82–98)
MONOCYTES # BLD AUTO: 0.2 K/UL (ref 0.3–1)
MONOCYTES NFR BLD: 1.9 % (ref 4–15)
NEUTROPHILS # BLD AUTO: 8.1 K/UL (ref 1.8–7.7)
NEUTROPHILS NFR BLD: 86.8 % (ref 38–73)
NRBC BLD-RTO: 0 /100 WBC
PHOSPHATE SERPL-MCNC: 3.9 MG/DL (ref 2.7–4.5)
PLATELET # BLD AUTO: 259 K/UL (ref 150–450)
PMV BLD AUTO: 9.4 FL (ref 9.2–12.9)
POCT GLUCOSE: 160 MG/DL (ref 70–110)
POTASSIUM SERPL-SCNC: 3.8 MMOL/L (ref 3.5–5.1)
PROT SERPL-MCNC: 6.1 G/DL (ref 6–8.4)
PROTHROMBIN TIME: 11.1 SEC (ref 9–12.5)
RBC # BLD AUTO: 3.66 M/UL (ref 4.6–6.2)
SODIUM SERPL-SCNC: 141 MMOL/L (ref 136–145)
SPECIMEN OUTDATE: NORMAL
WBC # BLD AUTO: 9.31 K/UL (ref 3.9–12.7)

## 2024-02-20 PROCEDURE — 63600175 PHARM REV CODE 636 W HCPCS: Performed by: STUDENT IN AN ORGANIZED HEALTH CARE EDUCATION/TRAINING PROGRAM

## 2024-02-20 PROCEDURE — 85730 THROMBOPLASTIN TIME PARTIAL: CPT | Performed by: STUDENT IN AN ORGANIZED HEALTH CARE EDUCATION/TRAINING PROGRAM

## 2024-02-20 PROCEDURE — 85025 COMPLETE CBC W/AUTO DIFF WBC: CPT | Performed by: STUDENT IN AN ORGANIZED HEALTH CARE EDUCATION/TRAINING PROGRAM

## 2024-02-20 PROCEDURE — 25000003 PHARM REV CODE 250: Performed by: STUDENT IN AN ORGANIZED HEALTH CARE EDUCATION/TRAINING PROGRAM

## 2024-02-20 PROCEDURE — 80053 COMPREHEN METABOLIC PANEL: CPT | Performed by: STUDENT IN AN ORGANIZED HEALTH CARE EDUCATION/TRAINING PROGRAM

## 2024-02-20 PROCEDURE — 63600175 PHARM REV CODE 636 W HCPCS

## 2024-02-20 PROCEDURE — 83735 ASSAY OF MAGNESIUM: CPT | Performed by: STUDENT IN AN ORGANIZED HEALTH CARE EDUCATION/TRAINING PROGRAM

## 2024-02-20 PROCEDURE — C1769 GUIDE WIRE: HCPCS | Performed by: SURGERY

## 2024-02-20 PROCEDURE — D9220A PRA ANESTHESIA: Mod: ,,, | Performed by: ANESTHESIOLOGY

## 2024-02-20 PROCEDURE — 27201423 OPTIME MED/SURG SUP & DEVICES STERILE SUPPLY: Performed by: SURGERY

## 2024-02-20 PROCEDURE — C1768 GRAFT, VASCULAR: HCPCS | Performed by: SURGERY

## 2024-02-20 PROCEDURE — C1725 CATH, TRANSLUMIN NON-LASER: HCPCS | Performed by: SURGERY

## 2024-02-20 PROCEDURE — 37000009 HC ANESTHESIA EA ADD 15 MINS: Performed by: SURGERY

## 2024-02-20 PROCEDURE — 37000008 HC ANESTHESIA 1ST 15 MINUTES: Performed by: SURGERY

## 2024-02-20 PROCEDURE — 86920 COMPATIBILITY TEST SPIN: CPT

## 2024-02-20 PROCEDURE — 36415 COLL VENOUS BLD VENIPUNCTURE: CPT

## 2024-02-20 PROCEDURE — 20000000 HC ICU ROOM

## 2024-02-20 PROCEDURE — 27800903 OPTIME MED/SURG SUP & DEVICES OTHER IMPLANTS: Performed by: SURGERY

## 2024-02-20 PROCEDURE — 83605 ASSAY OF LACTIC ACID: CPT | Mod: 91 | Performed by: SURGERY

## 2024-02-20 PROCEDURE — 86850 RBC ANTIBODY SCREEN: CPT

## 2024-02-20 PROCEDURE — 99291 CRITICAL CARE FIRST HOUR: CPT | Mod: ,,, | Performed by: ANESTHESIOLOGY

## 2024-02-20 PROCEDURE — 63600175 PHARM REV CODE 636 W HCPCS: Performed by: SURGERY

## 2024-02-20 PROCEDURE — 36000707: Performed by: SURGERY

## 2024-02-20 PROCEDURE — C1894 INTRO/SHEATH, NON-LASER: HCPCS | Performed by: SURGERY

## 2024-02-20 PROCEDURE — 35371 RECHANNELING OF ARTERY: CPT | Mod: LT,GC,, | Performed by: SURGERY

## 2024-02-20 PROCEDURE — 34705 EVAC RPR A-BIILIAC NDGFT: CPT | Mod: GC,,, | Performed by: SURGERY

## 2024-02-20 PROCEDURE — 83605 ASSAY OF LACTIC ACID: CPT | Performed by: STUDENT IN AN ORGANIZED HEALTH CARE EDUCATION/TRAINING PROGRAM

## 2024-02-20 PROCEDURE — 36620 INSERTION CATHETER ARTERY: CPT | Mod: 59,GC,, | Performed by: ANESTHESIOLOGY

## 2024-02-20 PROCEDURE — C2628 CATHETER, OCCLUSION: HCPCS | Performed by: SURGERY

## 2024-02-20 PROCEDURE — 36000706: Performed by: SURGERY

## 2024-02-20 PROCEDURE — 34713 PERQ ACCESS & CLSR FEM ART: CPT | Mod: 59,RT,GC, | Performed by: SURGERY

## 2024-02-20 PROCEDURE — 04V03DZ RESTRICTION OF ABDOMINAL AORTA WITH INTRALUMINAL DEVICE, PERCUTANEOUS APPROACH: ICD-10-PCS | Performed by: SURGERY

## 2024-02-20 PROCEDURE — C1760 CLOSURE DEV, VASC: HCPCS | Performed by: SURGERY

## 2024-02-20 PROCEDURE — 25000003 PHARM REV CODE 250

## 2024-02-20 PROCEDURE — 85610 PROTHROMBIN TIME: CPT | Performed by: STUDENT IN AN ORGANIZED HEALTH CARE EDUCATION/TRAINING PROGRAM

## 2024-02-20 PROCEDURE — 63600175 PHARM REV CODE 636 W HCPCS: Performed by: ANESTHESIOLOGY

## 2024-02-20 PROCEDURE — 84100 ASSAY OF PHOSPHORUS: CPT | Performed by: STUDENT IN AN ORGANIZED HEALTH CARE EDUCATION/TRAINING PROGRAM

## 2024-02-20 DEVICE — IMPLANTABLE DEVICE: Type: IMPLANTABLE DEVICE | Site: ARTERIAL | Status: FUNCTIONAL

## 2024-02-20 DEVICE — EXCLUDER AAA ENDO CONTRA LEG 16MMX12MMX10CM 12FR
Type: IMPLANTABLE DEVICE | Site: ARTERIAL | Status: FUNCTIONAL
Brand: GORE EXCLUDER AAA ENDOPROSTHESIS

## 2024-02-20 DEVICE — VASCU-GUARD IS PREPARED FROM BOVINE PERICARDIUM CROSS-LINKED WITH GLUTARALDEHYDE, AND TREATED WITH 1 MOLAR SODIUM HYDROXIDE FOR A MINIMUM OF 60 MINUTES AT 20-25 DEGREES C. VASCU-GUARD IS TERMINALLY STERILIZED USING GAMMA IRRADIATION. AND PACKAGED WITHIN A DOUBLE-POUCH SYSTEM. THE CONTENTS OF THE UNOPENED, UNDAMAGED OUTER POUCH ARE STERILE.
Type: IMPLANTABLE DEVICE | Site: ARTERIAL | Status: FUNCTIONAL
Brand: VASCU-GUARD

## 2024-02-20 RX ORDER — ONDANSETRON HYDROCHLORIDE 2 MG/ML
4 INJECTION, SOLUTION INTRAVENOUS EVERY 6 HOURS PRN
Status: DISCONTINUED | OUTPATIENT
Start: 2024-02-20 | End: 2024-02-22 | Stop reason: HOSPADM

## 2024-02-20 RX ORDER — PROPOFOL 10 MG/ML
VIAL (ML) INTRAVENOUS
Status: DISCONTINUED | OUTPATIENT
Start: 2024-02-20 | End: 2024-02-20

## 2024-02-20 RX ORDER — NICARDIPINE HYDROCHLORIDE 0.2 MG/ML
0-15 INJECTION INTRAVENOUS CONTINUOUS
Status: DISCONTINUED | OUTPATIENT
Start: 2024-02-20 | End: 2024-02-21

## 2024-02-20 RX ORDER — PROTAMINE SULFATE 10 MG/ML
INJECTION, SOLUTION INTRAVENOUS
Status: DISCONTINUED | OUTPATIENT
Start: 2024-02-20 | End: 2024-02-20

## 2024-02-20 RX ORDER — CEFAZOLIN SODIUM 1 G/3ML
INJECTION, POWDER, FOR SOLUTION INTRAMUSCULAR; INTRAVENOUS
Status: DISCONTINUED | OUTPATIENT
Start: 2024-02-20 | End: 2024-02-20

## 2024-02-20 RX ORDER — DEXAMETHASONE SODIUM PHOSPHATE 4 MG/ML
INJECTION, SOLUTION INTRA-ARTICULAR; INTRALESIONAL; INTRAMUSCULAR; INTRAVENOUS; SOFT TISSUE
Status: DISCONTINUED | OUTPATIENT
Start: 2024-02-20 | End: 2024-02-20

## 2024-02-20 RX ORDER — SODIUM CHLORIDE 9 MG/ML
INJECTION, SOLUTION INTRAVENOUS CONTINUOUS
Status: DISCONTINUED | OUTPATIENT
Start: 2024-02-20 | End: 2024-02-21

## 2024-02-20 RX ORDER — ENOXAPARIN SODIUM 100 MG/ML
40 INJECTION SUBCUTANEOUS EVERY 24 HOURS
Status: DISCONTINUED | OUTPATIENT
Start: 2024-02-20 | End: 2024-02-22 | Stop reason: HOSPADM

## 2024-02-20 RX ORDER — ONDANSETRON HYDROCHLORIDE 2 MG/ML
INJECTION, SOLUTION INTRAVENOUS
Status: DISCONTINUED | OUTPATIENT
Start: 2024-02-20 | End: 2024-02-20

## 2024-02-20 RX ORDER — HALOPERIDOL 5 MG/ML
0.5 INJECTION INTRAMUSCULAR EVERY 10 MIN PRN
Status: DISCONTINUED | OUTPATIENT
Start: 2024-02-20 | End: 2024-02-20 | Stop reason: HOSPADM

## 2024-02-20 RX ORDER — SODIUM CHLORIDE 9 MG/ML
INJECTION, SOLUTION INTRAVENOUS CONTINUOUS
Status: DISCONTINUED | OUTPATIENT
Start: 2024-02-20 | End: 2024-02-22 | Stop reason: HOSPADM

## 2024-02-20 RX ORDER — LIDOCAINE HYDROCHLORIDE 10 MG/ML
1 INJECTION, SOLUTION EPIDURAL; INFILTRATION; INTRACAUDAL; PERINEURAL ONCE AS NEEDED
Status: DISCONTINUED | OUTPATIENT
Start: 2024-02-20 | End: 2024-02-20 | Stop reason: HOSPADM

## 2024-02-20 RX ORDER — OXYCODONE HYDROCHLORIDE 5 MG/1
5 TABLET ORAL EVERY 6 HOURS PRN
Status: DISCONTINUED | OUTPATIENT
Start: 2024-02-20 | End: 2024-02-22 | Stop reason: HOSPADM

## 2024-02-20 RX ORDER — ASPIRIN 81 MG/1
81 TABLET ORAL DAILY
Status: DISCONTINUED | OUTPATIENT
Start: 2024-02-20 | End: 2024-02-22 | Stop reason: HOSPADM

## 2024-02-20 RX ORDER — FENTANYL CITRATE 50 UG/ML
INJECTION, SOLUTION INTRAMUSCULAR; INTRAVENOUS
Status: DISCONTINUED | OUTPATIENT
Start: 2024-02-20 | End: 2024-02-20

## 2024-02-20 RX ORDER — ACETAMINOPHEN 325 MG/1
650 TABLET ORAL EVERY 6 HOURS
Status: DISCONTINUED | OUTPATIENT
Start: 2024-02-20 | End: 2024-02-22 | Stop reason: HOSPADM

## 2024-02-20 RX ORDER — LIDOCAINE HYDROCHLORIDE 20 MG/ML
INJECTION, SOLUTION EPIDURAL; INFILTRATION; INTRACAUDAL; PERINEURAL
Status: DISCONTINUED | OUTPATIENT
Start: 2024-02-20 | End: 2024-02-20

## 2024-02-20 RX ORDER — MIDAZOLAM HYDROCHLORIDE 1 MG/ML
INJECTION, SOLUTION INTRAMUSCULAR; INTRAVENOUS
Status: DISCONTINUED | OUTPATIENT
Start: 2024-02-20 | End: 2024-02-20

## 2024-02-20 RX ORDER — ROCURONIUM BROMIDE 10 MG/ML
INJECTION, SOLUTION INTRAVENOUS
Status: DISCONTINUED | OUTPATIENT
Start: 2024-02-20 | End: 2024-02-20

## 2024-02-20 RX ORDER — HEPARIN SODIUM 1000 [USP'U]/ML
INJECTION, SOLUTION INTRAVENOUS; SUBCUTANEOUS
Status: DISCONTINUED | OUTPATIENT
Start: 2024-02-20 | End: 2024-02-20

## 2024-02-20 RX ORDER — METOPROLOL TARTRATE 1 MG/ML
INJECTION, SOLUTION INTRAVENOUS
Status: DISCONTINUED | OUTPATIENT
Start: 2024-02-20 | End: 2024-02-20

## 2024-02-20 RX ORDER — HYDROMORPHONE HYDROCHLORIDE 1 MG/ML
0.2 INJECTION, SOLUTION INTRAMUSCULAR; INTRAVENOUS; SUBCUTANEOUS EVERY 5 MIN PRN
Status: DISCONTINUED | OUTPATIENT
Start: 2024-02-20 | End: 2024-02-20 | Stop reason: HOSPADM

## 2024-02-20 RX ORDER — HEPARIN SODIUM 1000 [USP'U]/ML
INJECTION, SOLUTION INTRAVENOUS; SUBCUTANEOUS
Status: DISCONTINUED | OUTPATIENT
Start: 2024-02-20 | End: 2024-02-20 | Stop reason: HOSPADM

## 2024-02-20 RX ORDER — GABAPENTIN 300 MG/1
300 CAPSULE ORAL 2 TIMES DAILY
Status: DISCONTINUED | OUTPATIENT
Start: 2024-02-20 | End: 2024-02-21

## 2024-02-20 RX ORDER — AMLODIPINE BESYLATE 10 MG/1
10 TABLET ORAL DAILY
Status: DISCONTINUED | OUTPATIENT
Start: 2024-02-20 | End: 2024-02-22 | Stop reason: HOSPADM

## 2024-02-20 RX ORDER — SODIUM CHLORIDE 0.9 % (FLUSH) 0.9 %
10 SYRINGE (ML) INJECTION
Status: DISCONTINUED | OUTPATIENT
Start: 2024-02-20 | End: 2024-02-20 | Stop reason: HOSPADM

## 2024-02-20 RX ORDER — DEXMEDETOMIDINE HYDROCHLORIDE 4 UG/ML
0-1.4 INJECTION, SOLUTION INTRAVENOUS CONTINUOUS
Status: DISCONTINUED | OUTPATIENT
Start: 2024-02-20 | End: 2024-02-20

## 2024-02-20 RX ADMIN — FENTANYL CITRATE 25 MCG: 50 INJECTION INTRAMUSCULAR; INTRAVENOUS at 01:02

## 2024-02-20 RX ADMIN — AMLODIPINE BESYLATE 10 MG: 10 TABLET ORAL at 09:02

## 2024-02-20 RX ADMIN — NICARDIPINE HYDROCHLORIDE 7.5 MG/HR: 0.2 INJECTION, SOLUTION INTRAVENOUS at 11:02

## 2024-02-20 RX ADMIN — DEXAMETHASONE SODIUM PHOSPHATE 4 MG: 4 INJECTION INTRA-ARTICULAR; INTRALESIONAL; INTRAMUSCULAR; INTRAVENOUS; SOFT TISSUE at 10:02

## 2024-02-20 RX ADMIN — NICARDIPINE HYDROCHLORIDE 2.5 MG/HR: 0.2 INJECTION, SOLUTION INTRAVENOUS at 06:02

## 2024-02-20 RX ADMIN — PROTAMINE SULFATE 5 MG: 10 INJECTION, SOLUTION INTRAVENOUS at 01:02

## 2024-02-20 RX ADMIN — ROCURONIUM BROMIDE 20 MG: 10 INJECTION, SOLUTION INTRAVENOUS at 11:02

## 2024-02-20 RX ADMIN — LIDOCAINE HYDROCHLORIDE 60 MG: 20 INJECTION, SOLUTION EPIDURAL; INFILTRATION; INTRACAUDAL; PERINEURAL at 09:02

## 2024-02-20 RX ADMIN — METOROPROLOL TARTRATE 2 MG: 5 INJECTION, SOLUTION INTRAVENOUS at 01:02

## 2024-02-20 RX ADMIN — ONDANSETRON 4 MG: 2 INJECTION INTRAMUSCULAR; INTRAVENOUS at 01:02

## 2024-02-20 RX ADMIN — CEFAZOLIN 2 G: 330 INJECTION, POWDER, FOR SOLUTION INTRAMUSCULAR; INTRAVENOUS at 10:02

## 2024-02-20 RX ADMIN — SODIUM CHLORIDE, SODIUM GLUCONATE, SODIUM ACETATE, POTASSIUM CHLORIDE, MAGNESIUM CHLORIDE, SODIUM PHOSPHATE, DIBASIC, AND POTASSIUM PHOSPHATE: .53; .5; .37; .037; .03; .012; .00082 INJECTION, SOLUTION INTRAVENOUS at 09:02

## 2024-02-20 RX ADMIN — PROPOFOL 200 MG: 10 INJECTION, EMULSION INTRAVENOUS at 09:02

## 2024-02-20 RX ADMIN — ENOXAPARIN SODIUM 40 MG: 40 INJECTION SUBCUTANEOUS at 05:02

## 2024-02-20 RX ADMIN — ASPIRIN 81 MG: 81 TABLET, COATED ORAL at 09:02

## 2024-02-20 RX ADMIN — OXYCODONE 5 MG: 5 TABLET ORAL at 11:02

## 2024-02-20 RX ADMIN — PROTAMINE SULFATE 35 MG: 10 INJECTION, SOLUTION INTRAVENOUS at 01:02

## 2024-02-20 RX ADMIN — SODIUM CHLORIDE, POTASSIUM CHLORIDE, SODIUM LACTATE AND CALCIUM CHLORIDE 500 ML: 600; 310; 30; 20 INJECTION, SOLUTION INTRAVENOUS at 05:02

## 2024-02-20 RX ADMIN — NICARDIPINE HYDROCHLORIDE 2.5 MCG/KG/MIN: 0.2 INJECTION INTRAVENOUS at 01:02

## 2024-02-20 RX ADMIN — ROCURONIUM BROMIDE 30 MG: 10 INJECTION, SOLUTION INTRAVENOUS at 10:02

## 2024-02-20 RX ADMIN — HEPARIN SODIUM 1000 UNITS: 1000 INJECTION, SOLUTION INTRAVENOUS; SUBCUTANEOUS at 12:02

## 2024-02-20 RX ADMIN — HEPARIN SODIUM 7000 UNITS: 1000 INJECTION, SOLUTION INTRAVENOUS; SUBCUTANEOUS at 10:02

## 2024-02-20 RX ADMIN — FENTANYL CITRATE 25 MCG: 50 INJECTION INTRAMUSCULAR; INTRAVENOUS at 02:02

## 2024-02-20 RX ADMIN — DEXMEDETOMIDINE HYDROCHLORIDE 0.2 MCG/KG/HR: 4 INJECTION INTRAVENOUS at 03:02

## 2024-02-20 RX ADMIN — MIDAZOLAM HYDROCHLORIDE 2 MG: 1 INJECTION, SOLUTION INTRAMUSCULAR; INTRAVENOUS at 09:02

## 2024-02-20 RX ADMIN — FENTANYL CITRATE 100 MCG: 50 INJECTION INTRAMUSCULAR; INTRAVENOUS at 09:02

## 2024-02-20 RX ADMIN — GABAPENTIN 300 MG: 300 CAPSULE ORAL at 09:02

## 2024-02-20 RX ADMIN — ROCURONIUM BROMIDE 50 MG: 10 INJECTION, SOLUTION INTRAVENOUS at 09:02

## 2024-02-20 RX ADMIN — SODIUM CHLORIDE: 0.9 INJECTION, SOLUTION INTRAVENOUS at 09:02

## 2024-02-20 RX ADMIN — ROCURONIUM BROMIDE 30 MG: 10 INJECTION, SOLUTION INTRAVENOUS at 12:02

## 2024-02-20 RX ADMIN — CEFAZOLIN 2 G: 2 INJECTION, POWDER, FOR SOLUTION INTRAMUSCULAR; INTRAVENOUS at 05:02

## 2024-02-20 RX ADMIN — SODIUM CHLORIDE 0.25 MCG/KG/MIN: 9 INJECTION, SOLUTION INTRAVENOUS at 09:02

## 2024-02-20 RX ADMIN — SUGAMMADEX 200 MG: 100 INJECTION, SOLUTION INTRAVENOUS at 01:02

## 2024-02-20 RX ADMIN — HEPARIN SODIUM 1000 UNITS: 1000 INJECTION, SOLUTION INTRAVENOUS; SUBCUTANEOUS at 11:02

## 2024-02-20 RX ADMIN — ACETAMINOPHEN 650 MG: 325 TABLET ORAL at 11:02

## 2024-02-20 NOTE — EICU
Nurses Note -- 4 Eyes      2/20/2024   2:51 PM      Skin assessed during: Admit      [x] No Altered Skin Integrity Present    []Prevention Measures Documented      [] Yes- Altered Skin Integrity Present or Discovered   [] LDA Added if Not in Epic (Describe Wound)   [] New Altered Skin Integrity was Present on Admit and Documented in LDA   [] Wound Image Taken    Wound Care Consulted? No    Attending Nurse:  Salvador Du RN/Staff Member:   Celia

## 2024-02-20 NOTE — SUBJECTIVE & OBJECTIVE
Follow-up For: Procedure(s) (LRB):  REPAIR-ANEURYSM-ABDOMINAL AORTIC-ENDOVASCULAR (AAA) (N/A)    Post-Operative Day: Day of Surgery     Past Medical History:   Diagnosis Date    Coronary artery disease     Hyperlipidemia     Hypertension     Personal history of colonic polyps        Past Surgical History:   Procedure Laterality Date    INJECTION OF ANESTHETIC AGENT AROUND MEDIAL BRANCH NERVES INNERVATING CERVICAL FACET JOINT Right 11/18/2020    Procedure: Block-nerve-medial branch-cervical Right C3, 4, 5with RN IV sedation;  Surgeon: Martín Barnes MD;  Location: [facility];  Service: Pain Management;  Laterality: Right;    INJECTION OF ANESTHETIC AGENT AROUND MEDIAL BRANCH NERVES INNERVATING CERVICAL FACET JOINT Bilateral 04/28/2023    Procedure: Bilateral C4-6 MBB with RN IV sedation;  Surgeon: Rudolph Burgos MD;  Location: [facility];  Service: Pain Management;  Laterality: Bilateral;       Review of patient's allergies indicates:  No Known Allergies    Family History       Problem Relation (Age of Onset)    Cancer Mother, Father    Early death Brother    Heart disease Brother    Hypertension Father, Brother          Tobacco Use    Smoking status: Every Day     Current packs/day: 0.50     Average packs/day: 0.5 packs/day for 45.0 years (22.5 ttl pk-yrs)     Types: Cigarettes    Smokeless tobacco: Never   Substance and Sexual Activity    Alcohol use: Not Currently    Drug use: Never    Sexual activity: Not Currently     Partners: Female      Review of Systems   Constitutional:  Negative for chills, fatigue and fever.   HENT:  Negative for congestion, rhinorrhea and sore throat.    Respiratory:  Negative for chest tightness and shortness of breath.    Cardiovascular:  Negative for chest pain.   Gastrointestinal:  Negative for abdominal pain, constipation, diarrhea, nausea and vomiting.   Genitourinary:  Positive for urgency. Negative for dysuria and frequency.   Musculoskeletal:  Negative for arthralgias and  neck pain.   Neurological:  Negative for dizziness, weakness, numbness and headaches.     Objective:     Vital Signs (Most Recent):  Temp: 97.7 °F (36.5 °C) (02/20/24 0758)  Pulse: 62 (02/20/24 0758)  Resp: 20 (02/20/24 0758)  BP: (!) 168/76 (02/20/24 0758)  SpO2: 99 % (02/20/24 0758) Vital Signs (24h Range):  Temp:  [97.7 °F (36.5 °C)] 97.7 °F (36.5 °C)  Pulse:  [62] 62  Resp:  [20] 20  SpO2:  [99 %] 99 %  BP: (168)/(76) 168/76     Weight: 75.2 kg (165 lb 12.6 oz)  Body mass index is 28.46 kg/m².      Intake/Output Summary (Last 24 hours) at 2/20/2024 1441  Last data filed at 2/20/2024 1126  Gross per 24 hour   Intake 1250 ml   Output --   Net 1250 ml          Physical Exam  Vitals and nursing note reviewed.   Constitutional:       General: He is not in acute distress.     Appearance: Normal appearance. He is normal weight. He is not ill-appearing or toxic-appearing.      Comments: agitated   HENT:      Head: Normocephalic and atraumatic.      Nose: Nose normal. No congestion or rhinorrhea.      Mouth/Throat:      Mouth: Mucous membranes are moist.      Pharynx: Oropharynx is clear. No oropharyngeal exudate or posterior oropharyngeal erythema.   Eyes:      Extraocular Movements: Extraocular movements intact.      Conjunctiva/sclera: Conjunctivae normal.      Pupils: Pupils are equal, round, and reactive to light.   Cardiovascular:      Rate and Rhythm: Normal rate and regular rhythm.      Pulses: Normal pulses.      Heart sounds: Normal heart sounds. No murmur heard.     No friction rub. No gallop.   Pulmonary:      Effort: Pulmonary effort is normal. No respiratory distress.      Breath sounds: Normal breath sounds. No wheezing, rhonchi or rales.   Abdominal:      General: Abdomen is flat. Bowel sounds are normal. There is no distension.      Palpations: Abdomen is soft.      Tenderness: There is no abdominal tenderness. There is no guarding.      Comments: Bilateral groin sites dressed. Right groin appears  "slightly more swollen due to hematoma. Bilateral femoral and pedal pulses palpable   Musculoskeletal:         General: No swelling or tenderness. Normal range of motion.      Cervical back: Normal range of motion.      Right lower leg: No edema.      Left lower leg: No edema.   Skin:     General: Skin is warm and dry.      Capillary Refill: Capillary refill takes less than 2 seconds.   Neurological:      General: No focal deficit present.      Mental Status: He is oriented to person, place, and time. Mental status is at baseline.   Psychiatric:         Mood and Affect: Mood normal.         Behavior: Behavior normal.            Vents:       Lines/Drains/Airways       Drain  Duration                  Urethral Catheter 02/20/24 0950 Straight-tip 16 Fr. <1 day              Arterial Line  Duration             Arterial Line 02/20/24 1018 Left Radial <1 day              Peripheral Intravenous Line  Duration                  Peripheral IV - Single Lumen 02/20/24 0800 18 G Posterior;Right Hand <1 day         Peripheral IV - Single Lumen 02/20/24 0951 18 G Left Hand <1 day                    Significant Labs:    CBC/Anemia Profile:  No results for input(s): "WBC", "HGB", "HCT", "PLT", "MCV", "RDW", "IRON", "FERRITIN", "RETIC", "FOLATE", "[id number]", "OCCULTBLOOD" in the last 48 hours.     Chemistries:  No results for input(s): "NA", "K", "CL", "CO2", "BUN", "CREATININE", "CALCIUM", "ALBUMIN", "PROT", "BILITOT", "ALKPHOS", "ALT", "AST", "GLUCOSE", "MG", "PHOS" in the last 48 hours.    All pertinent labs within the past 24 hours have been reviewed.    Significant Imaging: I have reviewed all pertinent imaging results/findings within the past 24 hours.  "

## 2024-02-20 NOTE — BRIEF OP NOTE
[facility] - Surgical Intensive Care  Brief Operative Note    SUMMARY     Surgery Date: 2/20/2024     Surgeon(s) and Role:     * Caesar Denton MD - Primary     * Jacob Powers MD - Fellow    Assisting Surgeon: None    Pre-op Diagnosis:  Infrarenal abdominal aortic aneurysm (AAA) without rupture [I71.43]    Post-op Diagnosis:  Post-Op Diagnosis Codes:     * Infrarenal abdominal aortic aneurysm (AAA) without rupture [I71.43]    Procedures:   1) Endovascular AAA repair (23 x 12 x 120cm Sandy Excluder, extended bilaterally with 12mm iliac extenders)  2) Left common femoral artery endarterectomy patch angioplasty  3) PTA bilateral common iliac artery     Anesthesia: General    Implants:  Implant Name Type Inv. Item Serial No.  Lot No. LRB No. Used Action   GRAFT EXCLUDER [id number] 12CM C3 - [id number]  GRAFT EXCLUDER [id number] 12CM C3  [employer] [id number] N/A 1 Implanted   GRAFT EXCLUDER [id number] - [id number]  GRAFT EXCLUDER [id number] [employer]  N/A 1 Implanted   Sandy Excluder AAAEndoprosthesis     [id number] N/A 1 Implanted   GRAFT VAS GUARD 0.8X8CM BOVINE - [id number]  GRAFT VAS GUARD 0.8X8CM BOVINE  [employer] [id number] N/A 1 Implanted       Operative Findings: 75% stenosis bilateral common iliac origin, improved to 40% after angioplasty. Biphasic signals bilateral DP and PT.     Estimated Blood Loss: 100cc           Specimens:   Specimen (24h ago, onward)      None            [id number]

## 2024-02-20 NOTE — ANESTHESIA POSTPROCEDURE EVALUATION
Anesthesia Post Evaluation    Patient: Deborah White    Procedure(s) Performed: Procedure(s) (LRB):  REPAIR-ANEURYSM-ABDOMINAL AORTIC-ENDOVASCULAR (AAA) (N/A)    Final Anesthesia Type: general      Level of consciousness: awake and alert  Post-procedure vital signs: reviewed and stable  Pain control: Pain has been treated.  Airway patency: patent    PONV status: Absent or treated.  Anesthetic complications: no      Cardiovascular status: hemodynamically stable  Respiratory status: unassisted  Hydration status: euvolemic                Vitals Value Taken Time   /56 02/20/24 1510   Temp 36.4 °C (97.5 °F) 02/20/24 1510   Pulse 58 02/20/24 1553   Resp 9 02/20/24 1553   SpO2 96 % 02/20/24 1553   Vitals shown include unvalidated device data.      No case tracking events are documented in the log.      Pain/Amrita Score: No data recorded

## 2024-02-20 NOTE — HPI
67 y/o M PMHx HTN, HLD, CAD s/p EVAR and L femoral endarterectomy with Dr Denton on 2/20/24. Patient admitted to SICU post-op for close BP control. Arrived extubated, neuro intact with bilateral palpable pulses. After anesthesia wore off, patient became agitated and required a precedex infusion. On cardene gtt for SBP < 140.

## 2024-02-20 NOTE — ASSESSMENT & PLAN NOTE
  Neuro/Psych:   -- Sedation: precedex while lying flat   -- Pain: tylenol, oxy prn             Cards:   -- HDS  -- Sys goal 100 - 140  -- Continue home amlodipine  -- Cardene as needed      Pulm:   -- Goal O2 sat > 90%  -- Wean supplemental O2 as able  -- IS, OOBTC when able      Renal:  -- Keep lemus for strict I/O  -- Monitor BUN/Cr      FEN / GI:   -- Replace lytes as needed  -- Nutrition: NPO until able to sit up then cardiac diet      ID:   -- Tm: afebrile; WBC pending  -- Abx: periop ancef      Heme/Onc:   -- Daily CBC  -- ASA daily      Endo:   -- Gluc goal 140-180  -- No history of diabetes, will monitor glucose and start insulin if needed      PPx:   Feeding: NPO  Analgesia/Sedation: tylenol, oxy / precedex  Thromboembolic prevention: lovenox  HOB >30: N  Stress Ulcer ppx: not indicated  Glucose control: Critical care goal 140-180 g/dl    Lines/Drains/Airway: L radial arterial line, lemus      Dispo/Code Status/Palliative:   -- SICU / Full Code

## 2024-02-20 NOTE — ASSESSMENT & PLAN NOTE
Neuro/Psych:   -- Sedation: precedex while lying flat   -- Pain: tylenol, oxy prn             Cards:   -- HDS  -- Sys goal 100 - 140  -- Continue home amlodipine  -- Cardene as needed      Pulm:   -- Goal O2 sat > 90%  -- Wean supplemental O2 as able  -- IS, OOBTC when able      Renal:  -- Keep lemus for strict I/O  -- Monitor BUN/Cr      FEN / GI:   -- Replace lytes as needed  -- Nutrition: NPO until able to sit up then cardiac diet      ID:   -- Tm: afebrile; WBC pending  -- Abx: periop ancef      Heme/Onc:   -- Daily CBC  -- ASA daily      Endo:   -- Gluc goal 140-180  -- No history of diabetes, will monitor glucose and start insulin if needed      PPx:   Feeding: NPO  Analgesia/Sedation: tylenol, oxy / precedex  Thromboembolic prevention: lovenox  HOB >30: N  Stress Ulcer ppx: not indicated  Glucose control: Critical care goal 140-180 g/dl    Lines/Drains/Airway: L radial arterial line, lemus      Dispo/Code Status/Palliative:   -- SICU / Full Code

## 2024-02-20 NOTE — H&P
[facility] - Surgical Intensive Care  Critical Care - Surgery  History & Physical    Patient Name: Deborah White  MRN: [id number]  Admission Date: 2/20/2024  Code Status: Full Code  Attending Physician: Caesar Denton MD   Primary Care Provider: Chastity Guzmán MD   Principal Problem: Infrarenal abdominal aortic aneurysm (AAA) without rupture    Subjective:     HPI:  67 y/o M PMHx HTN, HLD, CAD s/p EVAR with Dr Denton on 2/20/24. Patient admitted to SICU post-op for close BP control. Arrived extubated, neuro intact with bilateral palpable pulses. After anesthesia wore off, patient became agitated and required a precedex infusion. On cardene gtt for SBP < 140.    Hospital/ICU Course:  No notes on file    Follow-up For: Procedure(s) (LRB):  REPAIR-ANEURYSM-ABDOMINAL AORTIC-ENDOVASCULAR (AAA) (N/A)    Post-Operative Day: Day of Surgery     Past Medical History:   Diagnosis Date    Coronary artery disease     Hyperlipidemia     Hypertension     Personal history of colonic polyps        Past Surgical History:   Procedure Laterality Date    INJECTION OF ANESTHETIC AGENT AROUND MEDIAL BRANCH NERVES INNERVATING CERVICAL FACET JOINT Right 11/18/2020    Procedure: Block-nerve-medial branch-cervical Right C3, 4, 5with RN IV sedation;  Surgeon: Martín Barnes MD;  Location: [facility];  Service: Pain Management;  Laterality: Right;    INJECTION OF ANESTHETIC AGENT AROUND MEDIAL BRANCH NERVES INNERVATING CERVICAL FACET JOINT Bilateral 04/28/2023    Procedure: Bilateral C4-6 MBB with RN IV sedation;  Surgeon: Rudolph Burgos MD;  Location: [facility];  Service: Pain Management;  Laterality: Bilateral;       Review of patient's allergies indicates:  No Known Allergies    Family History       Problem Relation (Age of Onset)    Cancer Mother, Father    Early death Brother    Heart disease Brother    Hypertension Father, Brother          Tobacco Use    Smoking status: Every Day     Current packs/day: 0.50     Average packs/day: 0.5  packs/day for 45.0 years (22.5 ttl pk-yrs)     Types: Cigarettes    Smokeless tobacco: Never   Substance and Sexual Activity    Alcohol use: Not Currently    Drug use: Never    Sexual activity: Not Currently     Partners: Female      Review of Systems   Constitutional:  Negative for chills, fatigue and fever.   HENT:  Negative for congestion, rhinorrhea and sore throat.    Respiratory:  Negative for chest tightness and shortness of breath.    Cardiovascular:  Negative for chest pain.   Gastrointestinal:  Negative for abdominal pain, constipation, diarrhea, nausea and vomiting.   Genitourinary:  Positive for urgency. Negative for dysuria and frequency.   Musculoskeletal:  Negative for arthralgias and neck pain.   Neurological:  Negative for dizziness, weakness, numbness and headaches.     Objective:     Vital Signs (Most Recent):  Temp: 97.7 °F (36.5 °C) (02/20/24 0758)  Pulse: 62 (02/20/24 0758)  Resp: 20 (02/20/24 0758)  BP: (!) 168/76 (02/20/24 0758)  SpO2: 99 % (02/20/24 0758) Vital Signs (24h Range):  Temp:  [97.7 °F (36.5 °C)] 97.7 °F (36.5 °C)  Pulse:  [62] 62  Resp:  [20] 20  SpO2:  [99 %] 99 %  BP: (168)/(76) 168/76     Weight: 75.2 kg (165 lb 12.6 oz)  Body mass index is 28.46 kg/m².      Intake/Output Summary (Last 24 hours) at 2/20/2024 1456  Last data filed at 2/20/2024 1126  Gross per 24 hour   Intake 1250 ml   Output --   Net 1250 ml            Physical Exam  Vitals and nursing note reviewed.   Constitutional:       General: He is not in acute distress.     Appearance: Normal appearance. He is normal weight. He is not ill-appearing or toxic-appearing.      Comments: agitated   HENT:      Head: Normocephalic and atraumatic.      Nose: Nose normal. No congestion or rhinorrhea.      Mouth/Throat:      Mouth: Mucous membranes are moist.      Pharynx: Oropharynx is clear. No oropharyngeal exudate or posterior oropharyngeal erythema.   Eyes:      Extraocular Movements: Extraocular movements intact.       "Conjunctiva/sclera: Conjunctivae normal.      Pupils: Pupils are equal, round, and reactive to light.   Cardiovascular:      Rate and Rhythm: Normal rate and regular rhythm.      Pulses: Normal pulses.      Heart sounds: Normal heart sounds. No murmur heard.     No friction rub. No gallop.   Pulmonary:      Effort: Pulmonary effort is normal. No respiratory distress.      Breath sounds: Normal breath sounds. No wheezing, rhonchi or rales.   Abdominal:      General: Abdomen is flat. Bowel sounds are normal. There is no distension.      Palpations: Abdomen is soft.      Tenderness: There is no abdominal tenderness. There is no guarding.      Comments: Bilateral groin sites dressed. Right groin appears slightly more swollen due to hematoma. Bilateral femoral and pedal pulses palpable   Musculoskeletal:         General: No swelling or tenderness. Normal range of motion.      Cervical back: Normal range of motion.      Right lower leg: No edema.      Left lower leg: No edema.   Skin:     General: Skin is warm and dry.      Capillary Refill: Capillary refill takes less than 2 seconds.   Neurological:      General: No focal deficit present.      Mental Status: He is oriented to person, place, and time. Mental status is at baseline.   Psychiatric:         Mood and Affect: Mood normal.         Behavior: Behavior normal.            Vents:       Lines/Drains/Airways       Drain  Duration                  Urethral Catheter 02/20/24 0950 Straight-tip 16 Fr. <1 day              Arterial Line  Duration             Arterial Line 02/20/24 1018 Left Radial <1 day              Peripheral Intravenous Line  Duration                  Peripheral IV - Single Lumen 02/20/24 0800 18 G Posterior;Right Hand <1 day         Peripheral IV - Single Lumen 02/20/24 0951 18 G Left Hand <1 day                    Significant Labs:    CBC/Anemia Profile:  No results for input(s): "WBC", "HGB", "HCT", "PLT", "MCV", "RDW", "IRON", "FERRITIN", "RETIC", " ""FOLATE", "[id number]", "OCCULTBLOOD" in the last 48 hours.     Chemistries:  No results for input(s): "NA", "K", "CL", "CO2", "BUN", "CREATININE", "CALCIUM", "ALBUMIN", "PROT", "BILITOT", "ALKPHOS", "ALT", "AST", "GLUCOSE", "MG", "PHOS" in the last 48 hours.    All pertinent labs within the past 24 hours have been reviewed.    Significant Imaging: I have reviewed all pertinent imaging results/findings within the past 24 hours.  Assessment/Plan:     Cardiac/Vascular  * Infrarenal abdominal aortic aneurysm (AAA) without rupture    Neuro/Psych:   -- Sedation: precedex while lying flat   -- Pain: tylenol, oxy prn             Cards:   -- HDS  -- Sys goal 100 - 140  -- Continue home amlodipine  -- Cardene as needed      Pulm:   -- Goal O2 sat > 90%  -- Wean supplemental O2 as able  -- IS, OOBTC when able      Renal:  -- Keep lemus for strict I/O  -- Monitor BUN/Cr      FEN / GI:   -- Replace lytes as needed  -- Nutrition: NPO until able to sit up then cardiac diet      ID:   -- Tm: afebrile; WBC pending  -- Abx: periop ancef      Heme/Onc:   -- Daily CBC  -- ASA daily      Endo:   -- Gluc goal 140-180  -- No history of diabetes, will monitor glucose and start insulin if needed      PPx:   Feeding: NPO  Analgesia/Sedation: tylenol, oxy / precedex  Thromboembolic prevention: lovenox  HOB >30: N  Stress Ulcer ppx: not indicated  Glucose control: Critical care goal 140-180 g/dl    Lines/Drains/Airway: L radial arterial line, lemus      Dispo/Code Status/Palliative:   -- SICU / Full Code       Roya Aponte MD  Critical Care - Surgery  [facility] - Surgical Intensive Care  "

## 2024-02-20 NOTE — ANESTHESIA PROCEDURE NOTES
Intubation    Date/Time: 2/20/2024 9:45 AM    Performed by: Omari Fajardo MD  Authorized by: Omari Fajardo MD    Intubation:     Induction:  Intravenous    Intubated:  Postinduction    Mask Ventilation:  Easy with oral airway    Attempts:  1    Attempted By:  Staff anesthesiologist    Method of Intubation:  Video laryngoscopy and bougie    Blade:  Berg 3    Laryngeal View Grade: Grade IIb - only the arytenoids and epiglottis seen      Difficult Airway Encountered?: No      Complications:  None    Airway Device:  Oral endotracheal tube    Airway Device Size:  7.5    Style/Cuff Inflation:  Cuffed    Inflation Amount (mL):  8    Tube secured:  24    Secured at:  The lips    Placement Verified By:  Capnometry    Complicating Factors:  None    Findings Post-Intubation:  BS equal bilateral and atraumatic/condition of teeth unchanged

## 2024-02-20 NOTE — TRANSFER OF CARE
"Anesthesia Transfer of Care Note    Patient: Deborah White    Procedure(s) Performed: Procedure(s) (LRB):  REPAIR-ANEURYSM-ABDOMINAL AORTIC-ENDOVASCULAR (AAA) (N/A)    Patient location: ICU    Anesthesia Type: general    Transport from OR: Transported from OR on 6-10 L/min O2 by face mask with adequate spontaneous ventilation    Post pain: adequate analgesia    Post assessment: no apparent anesthetic complications    Post vital signs: stable    Level of consciousness: awake and alert    Nausea/Vomiting: no nausea/vomiting    Complications: none    Transfer of care protocol was followed      Last vitals: Visit Vitals  BP (!) 168/76 (BP Location: Left arm, Patient Position: Lying)   Pulse 62   Temp 36.5 °C (97.7 °F) (Oral)   Resp 20   Ht 5' 4" (1.626 m)   Wt 75.2 kg (165 lb 12.6 oz)   SpO2 99%   BMI 28.46 kg/m²     "

## 2024-02-20 NOTE — H&P
Vascular Surgery - H and P    Subjective:     Deborah White is a 68 y.o. male with known AAA that has  now increased to 5.9 cm.  He clearly needs an endovascular repair.  The only complications is that he has significant atherosclerotic disease.  It looks as though his left common iliac and possibly his right common iliac both have significant stenosis.  He may need a balloon angioplasty in order to expedite getting the device in the infrarenal area.  In addition to that his left common femoral artery has significant stenosis.  He has diminished pulses on the left side.  I have met with the patient his son or son-in-law and a .  I have drawn him pictures and explained that we will need to do a left common femoral artery cutdown and endarterectomy possible balloon angioplasty of both iliacs an endovascular aortic aneurism repair.  I have told him the risks benefits of the procedure included but not limited to bleeding infection and injury to the artery and failure to repair the aneurysms.  They understand these risks.     Patient understands the procedure.  Was a smoker who has diminished only down to 10 cigarettes per day for 45 years.     No known allergies to contrast dye this should not be a problem.     PMH:   Past Medical History:   Diagnosis Date    Coronary artery disease     Hyperlipidemia     Hypertension     Personal history of colonic polyps        Past Surgical History:   Past Surgical History:   Procedure Laterality Date    INJECTION OF ANESTHETIC AGENT AROUND MEDIAL BRANCH NERVES INNERVATING CERVICAL FACET JOINT Right 11/18/2020    Procedure: Block-nerve-medial branch-cervical Right C3, 4, 5with RN IV sedation;  Surgeon: Martín Barnes MD;  Location: [facility];  Service: Pain Management;  Laterality: Right;    INJECTION OF ANESTHETIC AGENT AROUND MEDIAL BRANCH NERVES INNERVATING CERVICAL FACET JOINT Bilateral 04/28/2023    Procedure: Bilateral C4-6 MBB with RN IV sedation;  Surgeon: Rudolph MORELAND  MD Aubrey;  Location: [facility];  Service: Pain Management;  Laterality: Bilateral;       Social History:  Social History     Socioeconomic History    Marital status:    Tobacco Use    Smoking status: Every Day     Current packs/day: 0.50     Average packs/day: 0.5 packs/day for 45.0 years (22.5 ttl pk-yrs)     Types: Cigarettes    Smokeless tobacco: Never   Substance and Sexual Activity    Alcohol use: Not Currently    Drug use: Never    Sexual activity: Not Currently     Partners: Female     Social Determinants of Health     Financial Resource Strain: Medium Risk (2/20/2024)    Overall Financial Resource Strain (CARDIA)     Difficulty of Paying Living Expenses: Somewhat hard   Food Insecurity: No Food Insecurity (2/20/2024)    Hunger Vital Sign     Worried About Running Out of Food in the Last Year: Never true     Ran Out of Food in the Last Year: Never true   Transportation Needs: No Transportation Needs (2/20/2024)    PRAPARE - Transportation     Lack of Transportation (Medical): No     Lack of Transportation (Non-Medical): No   Physical Activity: Insufficiently Active (2/20/2024)    Exercise Vital Sign     Days of Exercise per Week: 3 days     Minutes of Exercise per Session: 10 min   Stress: Stress Concern Present (2/20/2024)    [employer] - Occupational Stress Questionnaire     Feeling of Stress : To some extent   Social Connections: Unknown (2/20/2024)    Social Connection and Isolation Panel [NHANES]     Frequency of Communication with Friends and Family: Twice a week     Frequency of Social Gatherings with Friends and Family: Once a week     Active Member of Clubs or Organizations: No     Attends Club or Organization Meetings: Never     Marital Status:    Housing Stability: Low Risk  (2/20/2024)    Housing Stability Vital Sign     Unable to Pay for Housing in the Last Year: No     Number of Places Lived in the Last Year: 1     Unstable Housing in the Last Year:  No       Allergies: Review of patient's allergies indicates:  No Known Allergies    Medications:  Current Facility-Administered Medications:     0.9%  NaCl infusion, , Intravenous, Continuous, Caesar Denton MD    LIDOcaine (PF) 10 mg/ml (1%) injection 10 mg, 1 mL, Intradermal, Once PRN, Caesar Denton MD    No current facility-administered medications on file prior to encounter.     Current Outpatient Medications on File Prior to Encounter   Medication Sig Dispense Refill    amLODIPine (NORVASC) 10 MG tablet Take 1 tablet (10 mg total) by mouth once daily. 30 tablet 11    aspirin (ECOTRIN) 81 MG EC tablet Take 81 mg by mouth once daily.      benazepriL (LOTENSIN) 40 MG tablet Take 1 tablet (40 mg total) by mouth once daily. 90 tablet 2    cilostazoL (PLETAL) 50 MG Tab Take 1 tablet (50 mg total) by mouth 2 (two) times daily. 60 tablet 9    cloNIDine (CATAPRES) 0.2 MG tablet Take 1 tablet (0.2 mg total) by mouth 2 (two) times daily. 180 tablet 3    gabapentin (NEURONTIN) 300 MG capsule Take 2 capsules (600 mg total) by mouth 2 (two) times daily. 360 capsule 0    linaCLOtide (LINZESS) 72 mcg Cap capsule Take 1 capsule (72 mcg total) by mouth before breakfast. 30 capsule 3    tofacitinib (XELJANZ) 5 mg Tab Take 1 tablet (5 mg) by mouth once daily. 30 tablet 11    omeprazole (PRILOSEC) 20 MG capsule Take 1 capsule (20 mg total) by mouth once daily. (Patient not taking: Reported on 2/19/2024) 90 capsule 3         Objective:     PHYSICAL EXAM:  Vital Signs (Most Recent)       ROS A 10+ review of systems was performed with pertinent positives and negatives noted above in the history of present illness.  Other systems were negative unless otherwise specified.    Physical Exam  Vitals and nursing note reviewed.   Constitutional:       Appearance: Normal appearance.   HENT:      Head: Normocephalic and atraumatic.   Cardiovascular:      Rate and Rhythm: Normal rate.      Pulses:           Dorsalis pedis pulses are  detected w/ Doppler on the right side and detected w/ Doppler on the left side.        Posterior tibial pulses are detected w/ Doppler on the right side and detected w/ Doppler on the left side.   Pulmonary:      Effort: Pulmonary effort is normal.   Abdominal:      General: Abdomen is flat.      Palpations: Abdomen is soft.   Skin:     General: Skin is warm and dry.      Capillary Refill: Capillary refill takes less than 2 seconds.   Neurological:      General: No focal deficit present.      Mental Status: He is alert and oriented to person, place, and time.   Psychiatric:         Mood and Affect: Mood normal.         Behavior: Behavior normal.            Specimens (From admission, onward)      None                   Assessment:     68 y.o. male with AAA increased in size to 5.9 cm on most recent scan, presents today for endovascular AAA repair    Plan:     - Proceed to OR  - Consents obtained in clinic    Petty Mares MD   [facility]

## 2024-02-20 NOTE — ANESTHESIA PROCEDURE NOTES
Arterial    Diagnosis: AAA    Patient location during procedure: done in OR    Staffing  Authorizing Provider: Omari Fajardo MD  Performing Provider: Gena Espinosa MD    Staffing  Performed by: Gena Espinosa MD  Authorized by: Omari Fajardo MD    Anesthesiologist was present at the time of the procedure.    Preanesthetic Checklist  Completed: patient identified, IV checked, site marked, risks and benefits discussed, surgical consent, monitors and equipment checked, pre-op evaluation, timeout performed and anesthesia consent givenArterial  Skin Prep: chlorhexidine gluconate  Local Infiltration: none  Orientation: left  Location: radial    Catheter Size: 20 G  Catheter placement by Ultrasound guidance. Heme positive aspiration all ports.   Vessel Caliber: small, patent, compressibility normal  Vascular Doppler:  not done  Needle advanced into vessel with real time Ultrasound guidance.Insertion Attempts: 1  Assessment  Dressing: secured with tape and tegaderm  Patient: Tolerated well

## 2024-02-20 NOTE — SUBJECTIVE & OBJECTIVE
Follow-up For: Procedure(s) (LRB):  REPAIR-ANEURYSM-ABDOMINAL AORTIC-ENDOVASCULAR (AAA) (N/A)    Post-Operative Day: Day of Surgery     Past Medical History:   Diagnosis Date    Coronary artery disease     Hyperlipidemia     Hypertension     Personal history of colonic polyps        Past Surgical History:   Procedure Laterality Date    INJECTION OF ANESTHETIC AGENT AROUND MEDIAL BRANCH NERVES INNERVATING CERVICAL FACET JOINT Right 11/18/2020    Procedure: Block-nerve-medial branch-cervical Right C3, 4, 5with RN IV sedation;  Surgeon: Martín Barnes MD;  Location: [facility];  Service: Pain Management;  Laterality: Right;    INJECTION OF ANESTHETIC AGENT AROUND MEDIAL BRANCH NERVES INNERVATING CERVICAL FACET JOINT Bilateral 04/28/2023    Procedure: Bilateral C4-6 MBB with RN IV sedation;  Surgeon: Rudolph Burgos MD;  Location: [facility];  Service: Pain Management;  Laterality: Bilateral;       Review of patient's allergies indicates:  No Known Allergies    Family History       Problem Relation (Age of Onset)    Cancer Mother, Father    Early death Brother    Heart disease Brother    Hypertension Father, Brother          Tobacco Use    Smoking status: Every Day     Current packs/day: 0.50     Average packs/day: 0.5 packs/day for 45.0 years (22.5 ttl pk-yrs)     Types: Cigarettes    Smokeless tobacco: Never   Substance and Sexual Activity    Alcohol use: Not Currently    Drug use: Never    Sexual activity: Not Currently     Partners: Female      Review of Systems   Constitutional:  Negative for chills, fatigue and fever.   HENT:  Negative for congestion, rhinorrhea and sore throat.    Respiratory:  Negative for chest tightness and shortness of breath.    Cardiovascular:  Negative for chest pain.   Gastrointestinal:  Negative for abdominal pain, constipation, diarrhea, nausea and vomiting.   Genitourinary:  Positive for urgency. Negative for dysuria and frequency.   Musculoskeletal:  Negative for arthralgias and  neck pain.   Neurological:  Negative for dizziness, weakness, numbness and headaches.     Objective:     Vital Signs (Most Recent):  Temp: 97.7 °F (36.5 °C) (02/20/24 0758)  Pulse: 62 (02/20/24 0758)  Resp: 20 (02/20/24 0758)  BP: (!) 168/76 (02/20/24 0758)  SpO2: 99 % (02/20/24 0758) Vital Signs (24h Range):  Temp:  [97.7 °F (36.5 °C)] 97.7 °F (36.5 °C)  Pulse:  [62] 62  Resp:  [20] 20  SpO2:  [99 %] 99 %  BP: (168)/(76) 168/76     Weight: 75.2 kg (165 lb 12.6 oz)  Body mass index is 28.46 kg/m².      Intake/Output Summary (Last 24 hours) at 2/20/2024 1456  Last data filed at 2/20/2024 1126  Gross per 24 hour   Intake 1250 ml   Output --   Net 1250 ml            Physical Exam  Vitals and nursing note reviewed.   Constitutional:       General: He is not in acute distress.     Appearance: Normal appearance. He is normal weight. He is not ill-appearing or toxic-appearing.      Comments: agitated   HENT:      Head: Normocephalic and atraumatic.      Nose: Nose normal. No congestion or rhinorrhea.      Mouth/Throat:      Mouth: Mucous membranes are moist.      Pharynx: Oropharynx is clear. No oropharyngeal exudate or posterior oropharyngeal erythema.   Eyes:      Extraocular Movements: Extraocular movements intact.      Conjunctiva/sclera: Conjunctivae normal.      Pupils: Pupils are equal, round, and reactive to light.   Cardiovascular:      Rate and Rhythm: Normal rate and regular rhythm.      Pulses: Normal pulses.      Heart sounds: Normal heart sounds. No murmur heard.     No friction rub. No gallop.   Pulmonary:      Effort: Pulmonary effort is normal. No respiratory distress.      Breath sounds: Normal breath sounds. No wheezing, rhonchi or rales.   Abdominal:      General: Abdomen is flat. Bowel sounds are normal. There is no distension.      Palpations: Abdomen is soft.      Tenderness: There is no abdominal tenderness. There is no guarding.      Comments: Bilateral groin sites dressed. Right groin appears  "slightly more swollen due to hematoma. Bilateral femoral and pedal pulses palpable   Musculoskeletal:         General: No swelling or tenderness. Normal range of motion.      Cervical back: Normal range of motion.      Right lower leg: No edema.      Left lower leg: No edema.   Skin:     General: Skin is warm and dry.      Capillary Refill: Capillary refill takes less than 2 seconds.   Neurological:      General: No focal deficit present.      Mental Status: He is oriented to person, place, and time. Mental status is at baseline.   Psychiatric:         Mood and Affect: Mood normal.         Behavior: Behavior normal.            Vents:       Lines/Drains/Airways       Drain  Duration                  Urethral Catheter 02/20/24 0950 Straight-tip 16 Fr. <1 day              Arterial Line  Duration             Arterial Line 02/20/24 1018 Left Radial <1 day              Peripheral Intravenous Line  Duration                  Peripheral IV - Single Lumen 02/20/24 0800 18 G Posterior;Right Hand <1 day         Peripheral IV - Single Lumen 02/20/24 0951 18 G Left Hand <1 day                    Significant Labs:    CBC/Anemia Profile:  No results for input(s): "WBC", "HGB", "HCT", "PLT", "MCV", "RDW", "IRON", "FERRITIN", "RETIC", "FOLATE", "[id number]", "OCCULTBLOOD" in the last 48 hours.     Chemistries:  No results for input(s): "NA", "K", "CL", "CO2", "BUN", "CREATININE", "CALCIUM", "ALBUMIN", "PROT", "BILITOT", "ALKPHOS", "ALT", "AST", "GLUCOSE", "MG", "PHOS" in the last 48 hours.    All pertinent labs within the past 24 hours have been reviewed.    Significant Imaging: I have reviewed all pertinent imaging results/findings within the past 24 hours.  "

## 2024-02-20 NOTE — PROGRESS NOTES
[facility]  Critical Care - Surgery  Progress Note    Patient Name: Deborah White  MRN: [id number]  Admission Date: 2/20/2024  Hospital Length of Stay: 0 days  Code Status: Full Code  Attending Provider: Caesar Denton MD  Primary Care Provider: Chastity Guzmán MD   Principal Problem: Infrarenal abdominal aortic aneurysm (AAA) without rupture    Subjective:     Hospital/ICU Course:  No notes on file    Follow-up For: Procedure(s) (LRB):  REPAIR-ANEURYSM-ABDOMINAL AORTIC-ENDOVASCULAR (AAA) (N/A)    Post-Operative Day: Day of Surgery     Past Medical History:   Diagnosis Date    Coronary artery disease     Hyperlipidemia     Hypertension     Personal history of colonic polyps        Past Surgical History:   Procedure Laterality Date    INJECTION OF ANESTHETIC AGENT AROUND MEDIAL BRANCH NERVES INNERVATING CERVICAL FACET JOINT Right 11/18/2020    Procedure: Block-nerve-medial branch-cervical Right C3, 4, 5with RN IV sedation;  Surgeon: Martín Barnes MD;  Location: [facility];  Service: Pain Management;  Laterality: Right;    INJECTION OF ANESTHETIC AGENT AROUND MEDIAL BRANCH NERVES INNERVATING CERVICAL FACET JOINT Bilateral 04/28/2023    Procedure: Bilateral C4-6 MBB with RN IV sedation;  Surgeon: Rudolph Burgos MD;  Location: [facility];  Service: Pain Management;  Laterality: Bilateral;       Review of patient's allergies indicates:  No Known Allergies    Family History       Problem Relation (Age of Onset)    Cancer Mother, Father    Early death Brother    Heart disease Brother    Hypertension Father, Brother          Tobacco Use    Smoking status: Every Day     Current packs/day: 0.50     Average packs/day: 0.5 packs/day for 45.0 years (22.5 ttl pk-yrs)     Types: Cigarettes    Smokeless tobacco: Never   Substance and Sexual Activity    Alcohol use: Not Currently    Drug use: Never    Sexual activity: Not Currently     Partners: Female      Review of Systems   Constitutional:  Negative  for chills, fatigue and fever.   HENT:  Negative for congestion, rhinorrhea and sore throat.    Respiratory:  Negative for chest tightness and shortness of breath.    Cardiovascular:  Negative for chest pain.   Gastrointestinal:  Negative for abdominal pain, constipation, diarrhea, nausea and vomiting.   Genitourinary:  Positive for urgency. Negative for dysuria and frequency.   Musculoskeletal:  Negative for arthralgias and neck pain.   Neurological:  Negative for dizziness, weakness, numbness and headaches.     Objective:     Vital Signs (Most Recent):  Temp: 97.7 °F (36.5 °C) (02/20/24 0758)  Pulse: 62 (02/20/24 0758)  Resp: 20 (02/20/24 0758)  BP: (!) 168/76 (02/20/24 0758)  SpO2: 99 % (02/20/24 0758) Vital Signs (24h Range):  Temp:  [97.7 °F (36.5 °C)] 97.7 °F (36.5 °C)  Pulse:  [62] 62  Resp:  [20] 20  SpO2:  [99 %] 99 %  BP: (168)/(76) 168/76     Weight: 75.2 kg (165 lb 12.6 oz)  Body mass index is 28.46 kg/m².      Intake/Output Summary (Last 24 hours) at 2/20/2024 1441  Last data filed at 2/20/2024 1126  Gross per 24 hour   Intake 1250 ml   Output --   Net 1250 ml          Physical Exam  Vitals and nursing note reviewed.   Constitutional:       General: He is not in acute distress.     Appearance: Normal appearance. He is normal weight. He is not ill-appearing or toxic-appearing.      Comments: agitated   HENT:      Head: Normocephalic and atraumatic.      Nose: Nose normal. No congestion or rhinorrhea.      Mouth/Throat:      Mouth: Mucous membranes are moist.      Pharynx: Oropharynx is clear. No oropharyngeal exudate or posterior oropharyngeal erythema.   Eyes:      Extraocular Movements: Extraocular movements intact.      Conjunctiva/sclera: Conjunctivae normal.      Pupils: Pupils are equal, round, and reactive to light.   Cardiovascular:      Rate and Rhythm: Normal rate and regular rhythm.      Pulses: Normal pulses.      Heart sounds: Normal heart sounds. No murmur heard.     No friction rub. No  "gallop.   Pulmonary:      Effort: Pulmonary effort is normal. No respiratory distress.      Breath sounds: Normal breath sounds. No wheezing, rhonchi or rales.   Abdominal:      General: Abdomen is flat. Bowel sounds are normal. There is no distension.      Palpations: Abdomen is soft.      Tenderness: There is no abdominal tenderness. There is no guarding.      Comments: Bilateral groin sites dressed. Right groin appears slightly more swollen due to hematoma. Bilateral femoral and pedal pulses palpable   Musculoskeletal:         General: No swelling or tenderness. Normal range of motion.      Cervical back: Normal range of motion.      Right lower leg: No edema.      Left lower leg: No edema.   Skin:     General: Skin is warm and dry.      Capillary Refill: Capillary refill takes less than 2 seconds.   Neurological:      General: No focal deficit present.      Mental Status: He is oriented to person, place, and time. Mental status is at baseline.   Psychiatric:         Mood and Affect: Mood normal.         Behavior: Behavior normal.            Vents:       Lines/Drains/Airways       Drain  Duration                  Urethral Catheter 02/20/24 0950 Straight-tip 16 Fr. <1 day              Arterial Line  Duration             Arterial Line 02/20/24 1018 Left Radial <1 day              Peripheral Intravenous Line  Duration                  Peripheral IV - Single Lumen 02/20/24 0800 18 G Posterior;Right Hand <1 day         Peripheral IV - Single Lumen 02/20/24 0951 18 G Left Hand <1 day                    Significant Labs:    CBC/Anemia Profile:  No results for input(s): "WBC", "HGB", "HCT", "PLT", "MCV", "RDW", "IRON", "FERRITIN", "RETIC", "FOLATE", "[id number]", "OCCULTBLOOD" in the last 48 hours.     Chemistries:  No results for input(s): "NA", "K", "CL", "CO2", "BUN", "CREATININE", "CALCIUM", "ALBUMIN", "PROT", "BILITOT", "ALKPHOS", "ALT", "AST", "GLUCOSE", "MG", "PHOS" in the last 48 hours.    All pertinent labs " within the past 24 hours have been reviewed.    Significant Imaging: I have reviewed all pertinent imaging results/findings within the past 24 hours.  Assessment/Plan:     Cardiac/Vascular  * Infrarenal abdominal aortic aneurysm (AAA) without rupture    Neuro/Psych:   -- Sedation: precedex while lying flat   -- Pain: tylenol, oxy prn             Cards:   -- HDS  -- Sys goal 100 - 140  -- Continue home amlodipine  -- Cardene as needed      Pulm:   -- Goal O2 sat > 90%  -- Wean supplemental O2 as able  -- IS, OOBTC when able      Renal:  -- Keep lemus for strict I/O  -- Monitor BUN/Cr      FEN / GI:   -- Replace lytes as needed  -- Nutrition: NPO until able to sit up then cardiac diet      ID:   -- Tm: afebrile; WBC pending  -- Abx: periop ancef      Heme/Onc:   -- Daily CBC  -- ASA daily      Endo:   -- Gluc goal 140-180  -- No history of diabetes, will monitor glucose and start insulin if needed      PPx:   Feeding: NPO  Analgesia/Sedation: tylenol, oxy / precedex  Thromboembolic prevention: lovenox  HOB >30: N  Stress Ulcer ppx: not indicated  Glucose control: Critical care goal 140-180 g/dl    Lines/Drains/Airway: L radial arterial line, lemus      Dispo/Code Status/Palliative:   -- SICU / Full Code       Roya Aponte MD  Critical Care - Surgery  [facility]

## 2024-02-21 LAB
ALBUMIN SERPL BCP-MCNC: 3 G/DL (ref 3.5–5.2)
ALP SERPL-CCNC: 54 U/L (ref 55–135)
ALT SERPL W/O P-5'-P-CCNC: 18 U/L (ref 10–44)
ANION GAP SERPL CALC-SCNC: 9 MMOL/L (ref 8–16)
AST SERPL-CCNC: 18 U/L (ref 10–40)
BILIRUB SERPL-MCNC: 0.2 MG/DL (ref 0.1–1)
BUN SERPL-MCNC: 19 MG/DL (ref 8–23)
CALCIUM SERPL-MCNC: 8.2 MG/DL (ref 8.7–10.5)
CHLORIDE SERPL-SCNC: 110 MMOL/L (ref 95–110)
CO2 SERPL-SCNC: 22 MMOL/L (ref 23–29)
CREAT SERPL-MCNC: 1.4 MG/DL (ref 0.5–1.4)
ERYTHROCYTE [DISTWIDTH] IN BLOOD BY AUTOMATED COUNT: 14.2 % (ref 11.5–14.5)
EST. GFR  (NO RACE VARIABLE): 55.4 ML/MIN/1.73 M^2
GLUCOSE SERPL-MCNC: 118 MG/DL (ref 70–110)
HCT VFR BLD AUTO: 29 % (ref 40–54)
HGB BLD-MCNC: 9.6 G/DL (ref 14–18)
MAGNESIUM SERPL-MCNC: 2.1 MG/DL (ref 1.6–2.6)
MCH RBC QN AUTO: 29.8 PG (ref 27–31)
MCHC RBC AUTO-ENTMCNC: 33.1 G/DL (ref 32–36)
MCV RBC AUTO: 90 FL (ref 82–98)
PHOSPHATE SERPL-MCNC: 3.5 MG/DL (ref 2.7–4.5)
PLATELET # BLD AUTO: 246 K/UL (ref 150–450)
PMV BLD AUTO: 10 FL (ref 9.2–12.9)
POTASSIUM SERPL-SCNC: 3.5 MMOL/L (ref 3.5–5.1)
PROT SERPL-MCNC: 5.8 G/DL (ref 6–8.4)
RBC # BLD AUTO: 3.22 M/UL (ref 4.6–6.2)
SODIUM SERPL-SCNC: 141 MMOL/L (ref 136–145)
WBC # BLD AUTO: 10.31 K/UL (ref 3.9–12.7)

## 2024-02-21 PROCEDURE — 80053 COMPREHEN METABOLIC PANEL: CPT | Performed by: STUDENT IN AN ORGANIZED HEALTH CARE EDUCATION/TRAINING PROGRAM

## 2024-02-21 PROCEDURE — 97165 OT EVAL LOW COMPLEX 30 MIN: CPT

## 2024-02-21 PROCEDURE — 25000003 PHARM REV CODE 250

## 2024-02-21 PROCEDURE — 63600175 PHARM REV CODE 636 W HCPCS: Performed by: STUDENT IN AN ORGANIZED HEALTH CARE EDUCATION/TRAINING PROGRAM

## 2024-02-21 PROCEDURE — 84100 ASSAY OF PHOSPHORUS: CPT | Performed by: STUDENT IN AN ORGANIZED HEALTH CARE EDUCATION/TRAINING PROGRAM

## 2024-02-21 PROCEDURE — 85027 COMPLETE CBC AUTOMATED: CPT | Performed by: STUDENT IN AN ORGANIZED HEALTH CARE EDUCATION/TRAINING PROGRAM

## 2024-02-21 PROCEDURE — 83735 ASSAY OF MAGNESIUM: CPT | Performed by: STUDENT IN AN ORGANIZED HEALTH CARE EDUCATION/TRAINING PROGRAM

## 2024-02-21 PROCEDURE — 97535 SELF CARE MNGMENT TRAINING: CPT

## 2024-02-21 PROCEDURE — 94761 N-INVAS EAR/PLS OXIMETRY MLT: CPT

## 2024-02-21 PROCEDURE — 25000003 PHARM REV CODE 250: Performed by: STUDENT IN AN ORGANIZED HEALTH CARE EDUCATION/TRAINING PROGRAM

## 2024-02-21 PROCEDURE — 20600001 HC STEP DOWN PRIVATE ROOM

## 2024-02-21 PROCEDURE — 97116 GAIT TRAINING THERAPY: CPT

## 2024-02-21 PROCEDURE — 97162 PT EVAL MOD COMPLEX 30 MIN: CPT

## 2024-02-21 PROCEDURE — 99291 CRITICAL CARE FIRST HOUR: CPT | Mod: ,,, | Performed by: ANESTHESIOLOGY

## 2024-02-21 RX ORDER — LISINOPRIL 10 MG/1
40 TABLET ORAL ONCE
Status: COMPLETED | OUTPATIENT
Start: 2024-02-21 | End: 2024-02-21

## 2024-02-21 RX ORDER — POTASSIUM CHLORIDE 20 MEQ/1
40 TABLET, EXTENDED RELEASE ORAL ONCE
Status: COMPLETED | OUTPATIENT
Start: 2024-02-21 | End: 2024-02-21

## 2024-02-21 RX ORDER — AMOXICILLIN 250 MG
1 CAPSULE ORAL DAILY
Status: DISCONTINUED | OUTPATIENT
Start: 2024-02-21 | End: 2024-02-22 | Stop reason: HOSPADM

## 2024-02-21 RX ORDER — LIDOCAINE 50 MG/G
1 PATCH TOPICAL
Status: DISCONTINUED | OUTPATIENT
Start: 2024-02-21 | End: 2024-02-22 | Stop reason: HOSPADM

## 2024-02-21 RX ORDER — POLYETHYLENE GLYCOL 3350 17 G/17G
17 POWDER, FOR SOLUTION ORAL DAILY
Status: DISCONTINUED | OUTPATIENT
Start: 2024-02-21 | End: 2024-02-22 | Stop reason: HOSPADM

## 2024-02-21 RX ORDER — ATORVASTATIN CALCIUM 40 MG/1
80 TABLET, FILM COATED ORAL DAILY
Status: DISCONTINUED | OUTPATIENT
Start: 2024-02-21 | End: 2024-02-22 | Stop reason: HOSPADM

## 2024-02-21 RX ORDER — GABAPENTIN 300 MG/1
600 CAPSULE ORAL 2 TIMES DAILY
Status: DISCONTINUED | OUTPATIENT
Start: 2024-02-21 | End: 2024-02-22 | Stop reason: HOSPADM

## 2024-02-21 RX ORDER — CLONIDINE HYDROCHLORIDE 0.1 MG/1
0.2 TABLET ORAL 2 TIMES DAILY
Status: DISCONTINUED | OUTPATIENT
Start: 2024-02-21 | End: 2024-02-22 | Stop reason: HOSPADM

## 2024-02-21 RX ORDER — GABAPENTIN 300 MG/1
300 CAPSULE ORAL 2 TIMES DAILY
Status: DISCONTINUED | OUTPATIENT
Start: 2024-02-21 | End: 2024-02-21

## 2024-02-21 RX ORDER — HYDROCHLOROTHIAZIDE 12.5 MG/1
25 TABLET ORAL DAILY
Status: DISCONTINUED | OUTPATIENT
Start: 2024-02-21 | End: 2024-02-22 | Stop reason: HOSPADM

## 2024-02-21 RX ADMIN — ATORVASTATIN CALCIUM 80 MG: 40 TABLET, FILM COATED ORAL at 12:02

## 2024-02-21 RX ADMIN — ACETAMINOPHEN 650 MG: 325 TABLET ORAL at 12:02

## 2024-02-21 RX ADMIN — CEFAZOLIN 2 G: 2 INJECTION, POWDER, FOR SOLUTION INTRAMUSCULAR; INTRAVENOUS at 09:02

## 2024-02-21 RX ADMIN — AMLODIPINE BESYLATE 10 MG: 10 TABLET ORAL at 08:02

## 2024-02-21 RX ADMIN — LISINOPRIL 40 MG: 10 TABLET ORAL at 04:02

## 2024-02-21 RX ADMIN — ASPIRIN 81 MG: 81 TABLET, COATED ORAL at 08:02

## 2024-02-21 RX ADMIN — CLONIDINE HYDROCHLORIDE 0.2 MG: 0.1 TABLET ORAL at 08:02

## 2024-02-21 RX ADMIN — CEFAZOLIN 2 G: 2 INJECTION, POWDER, FOR SOLUTION INTRAMUSCULAR; INTRAVENOUS at 02:02

## 2024-02-21 RX ADMIN — LIDOCAINE 5% 1 PATCH: 700 PATCH TOPICAL at 01:02

## 2024-02-21 RX ADMIN — ACETAMINOPHEN 650 MG: 325 TABLET ORAL at 06:02

## 2024-02-21 RX ADMIN — GABAPENTIN 300 MG: 300 CAPSULE ORAL at 08:02

## 2024-02-21 RX ADMIN — ENOXAPARIN SODIUM 40 MG: 40 INJECTION SUBCUTANEOUS at 04:02

## 2024-02-21 RX ADMIN — POTASSIUM CHLORIDE 40 MEQ: 1500 TABLET, EXTENDED RELEASE ORAL at 06:02

## 2024-02-21 RX ADMIN — ACETAMINOPHEN 650 MG: 325 TABLET ORAL at 05:02

## 2024-02-21 RX ADMIN — GABAPENTIN 600 MG: 300 CAPSULE ORAL at 08:02

## 2024-02-21 RX ADMIN — HYDROCHLOROTHIAZIDE 25 MG: 12.5 TABLET ORAL at 08:02

## 2024-02-21 RX ADMIN — SENNOSIDES AND DOCUSATE SODIUM 1 TABLET: 8.6; 5 TABLET ORAL at 01:02

## 2024-02-21 NOTE — PROGRESS NOTES
[facility]  Vascular Surgery  Progress Note    Patient Name: Deborah White  MRN: [id number]  Admission Date: 2/20/2024  Primary Care Provider: Chastity Guzmán MD    Subjective:     Interval History: Spoke with patient this morning using , states he has minimal pain to legs.  Labs stable this morning, HDS- BP 130s-140s/ 60s, HR 80s.  Patient appropriate for step down to floor.     Post-Op Info:  Procedure(s) (LRB):  REPAIR-ANEURYSM-ABDOMINAL AORTIC-ENDOVASCULAR (AAA) (N/A)   1 Day Post-Op     Medications:  Continuous Infusions:   sodium chloride 0.9%      sodium chloride 0.9%      nicardipine 5 mg/hr (02/21/24 0700)     Scheduled Meds:   acetaminophen  650 mg Oral Q6H    amLODIPine  10 mg Oral Daily    aspirin  81 mg Oral Daily    ceFAZolin (Ancef) IV (PEDS and ADULTS)  2 g Intravenous Q8H    cloNIDine  0.2 mg Oral BID    enoxparin  40 mg Subcutaneous Q24H (prophylaxis, 1700)    gabapentin  300 mg Oral BID    hydroCHLOROthiazide  25 mg Oral Daily     PRN Meds:ondansetron, oxyCODONE     Objective:     Vital Signs (Most Recent):  Temp: 98.8 °F (37.1 °C) (02/21/24 0300)  Pulse: 83 (02/21/24 0600)  Resp: 17 (02/21/24 0600)  BP: (!) 150/68 (02/21/24 0600)  SpO2: 96 % (02/21/24 0600) Vital Signs (24h Range):  Temp:  [97.5 °F (36.4 °C)-99.1 °F (37.3 °C)] 98.8 °F (37.1 °C)  Pulse:  [53-94] 83  Resp:  [8-31] 17  SpO2:  [93 %-100 %] 96 %  BP: (108-168)/(55-76) 150/68  Arterial Line BP: (109-181)/(47-63) 153/47     Date 02/21/24 0700 - 02/22/24 0659   Shift 1212-9325 6017-0596 3708-3075 24 Hour Total   INTAKE   I.V.(mL/kg) 25(0.3)   25(0.3)   Shift Total(mL/kg) 25(0.3)   25(0.3)   OUTPUT   Shift Total(mL/kg)       Weight (kg) 75.4 75.4 75.4 75.4        Physical Exam  HENT:      Head: Normocephalic.      Nose: Nose normal.      Mouth/Throat:      Mouth: Mucous membranes are moist.   Eyes:      Pupils: Pupils are equal, round, and reactive to light.   Cardiovascular:      Rate and Rhythm: Normal  rate.      Comments: R DP palpable  L DP biphasic  Pulmonary:      Effort: Pulmonary effort is normal.   Abdominal:      Palpations: Abdomen is soft.   Musculoskeletal:         General: Normal range of motion.      Cervical back: Normal range of motion.   Skin:     General: Skin is warm and dry.      Capillary Refill: Capillary refill takes less than 2 seconds.      Comments: Bilateral groin sites covered with surgical dressing, soft on palpation   Neurological:      Mental Status: He is alert and oriented to person, place, and time.   Psychiatric:         Mood and Affect: Mood normal.          Significant Labs:  CBC:   Recent Labs   Lab 02/21/24  0312   WBC 10.31   RBC 3.22*   HGB 9.6*   HCT 29.0*      MCV 90   MCH 29.8   MCHC 33.1     CMP:   Recent Labs   Lab 02/21/24 0312   *   CALCIUM 8.2*   ALBUMIN 3.0*   PROT 5.8*      K 3.5   CO2 22*      BUN 19   CREATININE 1.4   ALKPHOS 54*   ALT 18   AST 18   BILITOT 0.2       Significant Diagnostics:  I have reviewed all pertinent imaging results/findings within the past 24 hours.  Assessment/Plan:     * Infrarenal abdominal aortic aneurysm (AAA) without rupture  Mr. White is a 68-year old male with PMH of AAA @ 5.9cm with significant atherosclerotic disease through his L and R common iliac with significant stenosis bilaterally.  He is now s/p EVAR, L CFA endarterectomy and PTA of bilateral common iliacs on 2/20.    -DC bedrest, shawna lemus  -SMITHOB  -PT/OT ordered, appreciate DC recs  -Aspirin  -MMPC  -Groins soft, R dp palp, L dp biphasic  -OK for stepdown from vascular perspective  -Appreciate SICU assistance         Lauren Dsouza, IVANNA  Vascular Surgery  [facility]

## 2024-02-21 NOTE — SUBJECTIVE & OBJECTIVE
Interval History/Significant Events: NAEON. No new complaints. HDS. Restarting home antihypertensives per vascular surgery. Planning for step down.     Follow-up For: Procedure(s) (LRB):  REPAIR-ANEURYSM-ABDOMINAL AORTIC-ENDOVASCULAR (AAA) (N/A)    Post-Operative Day: 1 Day Post-Op    Objective:     Vital Signs (Most Recent):  Temp: 98.8 °F (37.1 °C) (02/21/24 0300)  Pulse: 83 (02/21/24 0600)  Resp: 17 (02/21/24 0600)  BP: (!) 150/68 (02/21/24 0600)  SpO2: 96 % (02/21/24 0600) Vital Signs (24h Range):  Temp:  [97.5 °F (36.4 °C)-99.1 °F (37.3 °C)] 98.8 °F (37.1 °C)  Pulse:  [53-94] 83  Resp:  [8-31] 17  SpO2:  [93 %-100 %] 96 %  BP: (108-162)/(55-70) 150/68  Arterial Line BP: (109-181)/(47-63) 153/47     Weight: 75.4 kg (166 lb 3.6 oz)  Body mass index is 28.53 kg/m².      Intake/Output Summary (Last 24 hours) at 2/21/2024 0806  Last data filed at 2/21/2024 0700  Gross per 24 hour   Intake 2372.88 ml   Output 1535 ml   Net 837.88 ml          Physical Exam  Vitals and nursing note reviewed.   Constitutional:       General: He is not in acute distress.  HENT:      Head: Normocephalic and atraumatic.   Eyes:      Extraocular Movements: Extraocular movements intact.      Pupils: Pupils are equal, round, and reactive to light.   Cardiovascular:      Rate and Rhythm: Normal rate and regular rhythm.      Pulses: Normal pulses.   Pulmonary:      Effort: Pulmonary effort is normal. No respiratory distress.      Breath sounds: Normal breath sounds.   Abdominal:      General: Abdomen is flat.      Palpations: Abdomen is soft.      Tenderness: There is no abdominal tenderness.   Musculoskeletal:      Right lower leg: No edema.      Left lower leg: No edema.   Skin:     General: Skin is warm and dry.      Comments: Groin sites non tender and soft, no significant hematoma    Neurological:      General: No focal deficit present.      Mental Status: He is alert and oriented to person, place, and time.            Vents:        Lines/Drains/Airways       Drain  Duration                  Urethral Catheter 02/20/24 0950 Straight-tip 16 Fr. <1 day              Arterial Line  Duration             Arterial Line 02/20/24 1018 Left Radial <1 day              Peripheral Intravenous Line  Duration                  Peripheral IV - Single Lumen 02/20/24 0800 18 G Posterior;Right Hand 1 day         Peripheral IV - Single Lumen 02/20/24 0951 18 G Left Hand <1 day                    Significant Labs:    CBC/Anemia Profile:  Recent Labs   Lab 02/20/24  1456 02/21/24  0312   WBC 9.31 10.31   HGB 10.8* 9.6*   HCT 33.6* 29.0*    246   MCV 92 90   RDW 14.2 14.2        Chemistries:  Recent Labs   Lab 02/20/24  1456 02/21/24  0312    141   K 3.8 3.5    110   CO2 17* 22*   BUN 17 19   CREATININE 1.7* 1.4   CALCIUM 8.1* 8.2*   ALBUMIN 3.1* 3.0*   PROT 6.1 5.8*   BILITOT 0.3 0.2   ALKPHOS 58 54*   ALT 22 18   AST 19 18   MG 2.2 2.1   PHOS 3.9 3.5       CMP:   Recent Labs   Lab 02/20/24  1456 02/21/24  0312    141   K 3.8 3.5    110   CO2 17* 22*   * 118*   BUN 17 19   CREATININE 1.7* 1.4   CALCIUM 8.1* 8.2*   PROT 6.1 5.8*   ALBUMIN 3.1* 3.0*   BILITOT 0.3 0.2   ALKPHOS 58 54*   AST 19 18   ALT 22 18   ANIONGAP 14 9       Significant Imaging:  I have reviewed all pertinent imaging results/findings within the past 24 hours.

## 2024-02-21 NOTE — PLAN OF CARE
Problem: Occupational Therapy  Goal: Occupational Therapy Goal  Description: Goals to be met by: 3/21/24    Patient will increase functional independence with ADLs by performing:    UE Dressing with [place].  LE Dressing with [place].  Grooming while standing at sink with [place].  Toileting from toilet with [place] for hygiene and clothing management.   Toilet transfer to toilet with [place].    Outcome: Ongoing, Progressing

## 2024-02-21 NOTE — ASSESSMENT & PLAN NOTE
Neuro/Psych:   -- Sedation: none  -- Pain: tylenol, oxy prn, gabapentin             Cards:   -- HDS  -- Sys goal 100 - 140  -- Continue home amlodipine. Clonidine   -- Cardene as needed      Pulm:   -- Goal O2 sat > 90%  -- Wean supplemental O2 as able  -- IS,OOBTC      Renal:  -- Keep lemus for strict I/O  -- Monitor BUN/Cr      FEN / GI:   -- Replace lytes as needed  -- Nutrition:  cardiac diet      ID:   -- Tm: afebrile; WBC stable  -- Abx: periop ancef      Heme/Onc:   -- Daily CBC  -- ASA daily      Endo:   -- Gluc goal 140-180  -- No history of diabetes, will monitor glucose and start insulin if needed      PPx:   Feeding: cardiac diet   Analgesia/Sedation: tylenol, oxy   Thromboembolic prevention: lovenox  HOB >30: N  Stress Ulcer ppx: not indicated  Glucose control: Critical care goal 140-180 g/dl    Lines/Drains/Airway: PIV      Dispo/Code Status/Palliative:   -- SICU / Full Code/ step down

## 2024-02-21 NOTE — OP NOTE
[facility]  Operative Note     SUMMARY      Surgery Date: 2/20/2024      Surgeon(s) and Role:     * Caesar Denton MD - Primary     * Jacob Powers MD - Fellow     Assisting Surgeon: None     Pre-op Diagnosis:  Infrarenal abdominal aortic aneurysm (AAA) without rupture [I71.43]     Post-op Diagnosis:  Post-Op Diagnosis Codes:     * Infrarenal abdominal aortic aneurysm (AAA) without rupture [I71.43]     Procedures:   1) Endovascular AAA repair (23 x 12 x 120cm [place] Excluder, extended bilaterally with 12mm iliac extenders)  2) Left common femoral artery endarterectomy patch angioplasty  3) PTA bilateral common iliac artery      Anesthesia: General     Implants:  Implant Name Type Inv. Item Serial No.  Lot No. LRB No. Used Action   GRAFT EXCLUDER [id number] 12CM C3 - [id number]   GRAFT EXCLUDER [id number] 12CM C3   [employer] [id number] N/A 1 Implanted   GRAFT EXCLUDER [id number] - [id number]   GRAFT EXCLUDER [id number] [employer]   N/A 1 Implanted   [place] Excluder AAAEndoprosthesis         [id number] N/A 1 Implanted   GRAFT VAS GUARD 0.8X8CM BOVINE - [id number]   GRAFT VAS GUARD 0.8X8CM BOVINE   [employer] [id number] N/A 1 Implanted         Operative Findings: 75% stenosis bilateral common iliac origin, improved to 40% after angioplasty. Biphasic signals bilateral DP and PT.      Estimated Blood Loss: 100cc            Specimens:   Specimen (24h ago, onward)        None                [id number]    Indications;   Patient found to have infrarenal AAA and high grade stenosis in bilateral common iliac arteries and suffered from claudication. His left common femoral artery was severely diseased and required open access and endarterectomy. The patient was consented for endovascular repair of the AAA (EVAR) and treatment of his left common femoral artery at the same time.     Procedure Details:   After obtaining signed consent for the above procedure the patient was  brought to the operating theater and placed in supine position. General endotracheal anesthesia was induced, preop antibiotics were administered.  Bilateral groins were prepped and draped in the usual sterile fashion.  Surgical time-out was performed confirming patient identity, laterality, procedure.  We started by making a vertical incision over the left common femoral artery which was carried down through the subcutaneous tissues with electrocautery.  Bridging lymphatics and veins were ligated with silk ties and divided.  The common femoral artery was exposed circumferentially as well as the profunda femoral artery in the superficial femoral artery.  Umbilical tape was placed circumferentially around the common femoral artery and vessel loops around the superficial femoral and profunda femoral arteries for later use.  We then gained access to the right common femoral artery under ultrasound guidance with a micropuncture needle followed by 0.018 in wire and microcatheter.  Sheathogram was performed confirming common femoral artery access location.  A 0.035 in glidewire was advanced into the infrarenal aorta and the microcatheter exchanged for a 6 [place] sheath.  Two ProGlide sutures were placed at the 10:00 a.m. and 12 o'clock position for later use and then exchanged our sheath for a 10 [place]ian sheath..  Patient was systemically anticoagulated with IV heparin.  The left common femoral artery was controlled proximal and distal.  A longitudinal arteriotomy was made with 11 blade scalpel and extended with the Weldon scissors.  An endarterectomy was performed extending down into the superficial femoral and profunda femoral artery and up into the external iliac artery.  Through this we placed a 10 [place]ian sheath and advanced a wire up into the infrarenal aorta.  Advanced a 035 inch glidewire into the descending thoracic aorta, exchanged the wire for a Lunderquist, and upsized to a an 16 [place] DrySeal sheath.  Then in a  similar fashion exchanged our right sheath for a 12 [place] DrySeal sheath.  There was 75% stenosis in bilateral common iliac origins which was improved to 40% with angioplasty using 6 x 40 mm balloon.  A pigtail was advanced up the right common femoral sheath over the stiff wire we advanced a 23 x 12 x 120 cm [place] excluder of the left access.  Was performed identifying the lowest renal artery.  The main body was then deployed issue.  A aortogram was performed confirming infrarenal position without encroachment on the renal artery.  Through our right access, a 0.035 in wire was advanced through the gate and cannulation confirmed with pigtail spin within the main body.  We then performed a angiogram retrograde through the sheath identifying the hypogastric origin and deployed a 12 mm by 100 mm iliac extender without issue.  We completed deployment of the main body graft and in a similar fashion performed a pelvic angiogram of the left side identifying hypogastric origin.  We then extended with a 12 mm x 100mm iliac extender.  Coda balloon was then advanced and used to mold the graft at the proximal seal zone, and in each of the iliac limb overlaps and distal seal zones.  Aortogram was performed identifying good seal and no endoleak.  Right sheath was removed and arteriotomy closed with ProGlides without any additional required.  The left sheath was removed our arteriotomy closed with a bovine pericardial patch with 5 0 Prolene sutures.  Prior to completion, the vessels were allowed to bleed and the lumen was irrigated with heparinized saline.  Signals were checked distal and found to be biphasic and bilateral DP and PT arteries.  The groin incision was inspected for hemostasis.  Heparin was reversed with protamine.  The incision was closed with 2-0 Vicryl, 3-0 Vicryl, and 4-0 Monocryl sutures.  Instrument and sponge count were correct x2.  Sterile dressings were applied.    Patient tolerated the procedure and was  transported to the ICU in good condition.      Dr. Denton was present for the entire procedure.    Jacob Powers  Vascular Surgery Fellow, PGY-7  [phone]

## 2024-02-21 NOTE — PLAN OF CARE
Problem: Physical Therapy  Goal: Physical Therapy Goal  Description: Goals to be met by:2024       Patient will increase functional independence with mobility by performin. Supine to sit with supervision   2. Sit to stand transfer with Supervision  3. Gait  x 150 feet with Supervision using No Assistive Device.   4. Ascend/descend 3 stair Supervision using No Assistive Device.     Outcome: Ongoing, Progressing       Eval completed. Goals appropriate.

## 2024-02-21 NOTE — PLAN OF CARE
"      SICU PLAN OF CARE NOTE    Dx: Infrarenal abdominal aortic aneurysm (AAA) without rupture    Shift Events: Up from OR.  Neurovascular checks q1    Goals of Care: SBP < 160    Neuro: AAO x4, Follows Commands, and Moves All Extremities    Vital Signs: BP (!) 150/68 (BP Location: Right arm, Patient Position: Lying)   Pulse 83   Temp 98.8 °F (37.1 °C) (Oral)   Resp 17   Ht 5' 4" (1.626 m)   Wt 75.4 kg (166 lb 3.6 oz)   SpO2 96%   BMI 28.53 kg/m²     Cardiac: NSR    Respiratory: Room Air    Diet: Cardiac Diet    Gtts: Nicardipine    Urine Output: Urinary Catheter 30-90 cc/hour    Labs/Accuchecks: Daily labs.    Skin: Pt skin dry and intact.  R groin site ecchymotic, soft and non tender.  Scant drainage from left groin site.  Pt turned q2 hours per protocol.    View flowsheet for full assessment details.      "

## 2024-02-21 NOTE — PROGRESS NOTES
[facility]  Critical Care - Surgery  Progress Note    Patient Name: Deborah White  MRN: [id number]  Admission Date: 2/20/2024  Hospital Length of Stay: 1 days  Code Status: Full Code  Attending Provider: Caesar Denton MD  Primary Care Provider: Chastity Guzmán MD   Principal Problem: Infrarenal abdominal aortic aneurysm (AAA) without rupture    Subjective:     Hospital/ICU Course:  No notes on file    Interval History/Significant Events: NAEON. No new complaints. HDS. Restarting home antihypertensives per vascular surgery. Planning for step down.     Follow-up For: Procedure(s) (LRB):  REPAIR-ANEURYSM-ABDOMINAL AORTIC-ENDOVASCULAR (AAA) (N/A)    Post-Operative Day: 1 Day Post-Op    Objective:     Vital Signs (Most Recent):  Temp: 98.8 °F (37.1 °C) (02/21/24 0300)  Pulse: 83 (02/21/24 0600)  Resp: 17 (02/21/24 0600)  BP: (!) 150/68 (02/21/24 0600)  SpO2: 96 % (02/21/24 0600) Vital Signs (24h Range):  Temp:  [97.5 °F (36.4 °C)-99.1 °F (37.3 °C)] 98.8 °F (37.1 °C)  Pulse:  [53-94] 83  Resp:  [8-31] 17  SpO2:  [93 %-100 %] 96 %  BP: (108-162)/(55-70) 150/68  Arterial Line BP: (109-181)/(47-63) 153/47     Weight: 75.4 kg (166 lb 3.6 oz)  Body mass index is 28.53 kg/m².      Intake/Output Summary (Last 24 hours) at 2/21/2024 0806  Last data filed at 2/21/2024 0700  Gross per 24 hour   Intake 2372.88 ml   Output 1535 ml   Net 837.88 ml          Physical Exam  Vitals and nursing note reviewed.   Constitutional:       General: He is not in acute distress.  HENT:      Head: Normocephalic and atraumatic.   Eyes:      Extraocular Movements: Extraocular movements intact.      Pupils: Pupils are equal, round, and reactive to light.   Cardiovascular:      Rate and Rhythm: Normal rate and regular rhythm.      Pulses: Normal pulses.   Pulmonary:      Effort: Pulmonary effort is normal. No respiratory distress.      Breath sounds: Normal breath sounds.   Abdominal:      General: Abdomen is flat.       Palpations: Abdomen is soft.      Tenderness: There is no abdominal tenderness.   Musculoskeletal:      Right lower leg: No edema.      Left lower leg: No edema.   Skin:     General: Skin is warm and dry.      Comments: Groin sites non tender and soft, no significant hematoma    Neurological:      General: No focal deficit present.      Mental Status: He is alert and oriented to person, place, and time.            Vents:       Lines/Drains/Airways       Drain  Duration                  Urethral Catheter 02/20/24 0950 Straight-tip 16 Fr. <1 day              Arterial Line  Duration             Arterial Line 02/20/24 1018 Left Radial <1 day              Peripheral Intravenous Line  Duration                  Peripheral IV - Single Lumen 02/20/24 0800 18 G Posterior;Right Hand 1 day         Peripheral IV - Single Lumen 02/20/24 0951 18 G Left Hand <1 day                    Significant Labs:    CBC/Anemia Profile:  Recent Labs   Lab 02/20/24  1456 02/21/24  0312   WBC 9.31 10.31   HGB 10.8* 9.6*   HCT 33.6* 29.0*    246   MCV 92 90   RDW 14.2 14.2        Chemistries:  Recent Labs   Lab 02/20/24  1456 02/21/24  0312    141   K 3.8 3.5    110   CO2 17* 22*   BUN 17 19   CREATININE 1.7* 1.4   CALCIUM 8.1* 8.2*   ALBUMIN 3.1* 3.0*   PROT 6.1 5.8*   BILITOT 0.3 0.2   ALKPHOS 58 54*   ALT 22 18   AST 19 18   MG 2.2 2.1   PHOS 3.9 3.5       CMP:   Recent Labs   Lab 02/20/24  1456 02/21/24  0312    141   K 3.8 3.5    110   CO2 17* 22*   * 118*   BUN 17 19   CREATININE 1.7* 1.4   CALCIUM 8.1* 8.2*   PROT 6.1 5.8*   ALBUMIN 3.1* 3.0*   BILITOT 0.3 0.2   ALKPHOS 58 54*   AST 19 18   ALT 22 18   ANIONGAP 14 9       Significant Imaging:  I have reviewed all pertinent imaging results/findings within the past 24 hours.  Assessment/Plan:     Cardiac/Vascular  * Infrarenal abdominal aortic aneurysm (AAA) without rupture    Neuro/Psych:   -- Sedation: none  -- Pain: tylenol, oxy prn, gabapentin              Cards:   -- HDS  -- Sys goal 100 - 140  -- Continue home amlodipine. Clonidine   -- Cardene as needed      Pulm:   -- Goal O2 sat > 90%  -- Wean supplemental O2 as able  -- IS,OOBTC      Renal:  -- Keep lemus for strict I/O  -- Monitor BUN/Cr      FEN / GI:   -- Replace lytes as needed  -- Nutrition:  cardiac diet      ID:   -- Tm: afebrile; WBC stable  -- Abx: periop ancef      Heme/Onc:   -- Daily CBC  -- ASA daily      Endo:   -- Gluc goal 140-180  -- No history of diabetes, will monitor glucose and start insulin if needed      PPx:   Feeding: cardiac diet   Analgesia/Sedation: tylenol, oxy   Thromboembolic prevention: lovenox  HOB >30: N  Stress Ulcer ppx: not indicated  Glucose control: Critical care goal 140-180 g/dl    Lines/Drains/Airway: PIV      Dispo/Code Status/Palliative:   -- SICU / Full Code/ step down             Critical care was time spent personally by me on the following activities: development of treatment plan with patient or surrogate and bedside caregivers, discussions with consultants, evaluation of patient's response to treatment, examination of patient, ordering and performing treatments and interventions, ordering and review of laboratory studies, ordering and review of radiographic studies, pulse oximetry, re-evaluation of patient's condition.  This critical care time did not overlap with that of any other provider or involve time for any procedures.     Kole Ramírez, DO  Critical Care - Surgery  [facility]

## 2024-02-21 NOTE — ASSESSMENT & PLAN NOTE
Mr. White is a 68-year old male with PMH of AAA @ 5.9cm with significant atherosclerotic disease through his L and R common iliac with significant stenosis bilaterally.  He is now s/p EVAR, L CFA endarterectomy and PTA of bilateral common iliacs on 2/20.    -mariah Bennett aline  -SMITHOB  -PT/OT ordered, appreciate DC recs  -Aspirin  -MMPC  -Groins soft, R dp palp, L dp biphasic  -OK for stepdown from vascular perspective  -Appreciate SICU assistance

## 2024-02-21 NOTE — SUBJECTIVE & OBJECTIVE
Medications:  Continuous Infusions:   sodium chloride 0.9%      sodium chloride 0.9%      nicardipine 5 mg/hr (02/21/24 0700)     Scheduled Meds:   acetaminophen  650 mg Oral Q6H    amLODIPine  10 mg Oral Daily    aspirin  81 mg Oral Daily    ceFAZolin (Ancef) IV (PEDS and ADULTS)  2 g Intravenous Q8H    cloNIDine  0.2 mg Oral BID    enoxparin  40 mg Subcutaneous Q24H (prophylaxis, 1700)    gabapentin  300 mg Oral BID    hydroCHLOROthiazide  25 mg Oral Daily     PRN Meds:ondansetron, oxyCODONE     Objective:     Vital Signs (Most Recent):  Temp: 98.8 °F (37.1 °C) (02/21/24 0300)  Pulse: 83 (02/21/24 0600)  Resp: 17 (02/21/24 0600)  BP: (!) 150/68 (02/21/24 0600)  SpO2: 96 % (02/21/24 0600) Vital Signs (24h Range):  Temp:  [97.5 °F (36.4 °C)-99.1 °F (37.3 °C)] 98.8 °F (37.1 °C)  Pulse:  [53-94] 83  Resp:  [8-31] 17  SpO2:  [93 %-100 %] 96 %  BP: (108-168)/(55-76) 150/68  Arterial Line BP: (109-181)/(47-63) 153/47     Date 02/21/24 0700 - 02/22/24 0659   Shift 3127-2954 6748-9219 1343-5281 24 Hour Total   INTAKE   I.V.(mL/kg) 25(0.3)   25(0.3)   Shift Total(mL/kg) 25(0.3)   25(0.3)   OUTPUT   Shift Total(mL/kg)       Weight (kg) 75.4 75.4 75.4 75.4        Physical Exam  HENT:      Head: Normocephalic.      Nose: Nose normal.      Mouth/Throat:      Mouth: Mucous membranes are moist.   Eyes:      Pupils: Pupils are equal, round, and reactive to light.   Cardiovascular:      Rate and Rhythm: Normal rate.      Comments: R DP palpable  L DP biphasic  Pulmonary:      Effort: Pulmonary effort is normal.   Abdominal:      Palpations: Abdomen is soft.   Musculoskeletal:         General: Normal range of motion.      Cervical back: Normal range of motion.   Skin:     General: Skin is warm and dry.      Capillary Refill: Capillary refill takes less than 2 seconds.      Comments: Bilateral groin sites covered with surgical dressing, soft on palpation   Neurological:      Mental Status: He is alert and oriented to person, place,  and time.   Psychiatric:         Mood and Affect: Mood normal.          Significant Labs:  CBC:   Recent Labs   Lab 02/21/24 0312   WBC 10.31   RBC 3.22*   HGB 9.6*   HCT 29.0*      MCV 90   MCH 29.8   MCHC 33.1     CMP:   Recent Labs   Lab 02/21/24 0312   *   CALCIUM 8.2*   ALBUMIN 3.0*   PROT 5.8*      K 3.5   CO2 22*      BUN 19   CREATININE 1.4   ALKPHOS 54*   ALT 18   AST 18   BILITOT 0.2       Significant Diagnostics:  I have reviewed all pertinent imaging results/findings within the past 24 hours.

## 2024-02-21 NOTE — PT/OT/SLP EVAL
Occupational Therapy   Co-Evaluation/Treatment     Name: Deborah White  MRN: [id number]  Admitting Diagnosis: Infrarenal abdominal aortic aneurysm (AAA) without rupture  Recent Surgery: Procedure(s) (LRB):  REPAIR-ANEURYSM-ABDOMINAL AORTIC-ENDOVASCULAR (AAA) (N/A) 1 Day Post-Op    Recommendations:     Discharge Recommendations: No Therapy Indicated  Discharge Equipment Recommendations:  none  Barriers to discharge:  None    Assessment:     Deborah White is a 68 y.o. male with a medical diagnosis of Infrarenal abdominal aortic aneurysm (AAA) without rupture.  He presents with the following performance deficits affecting function: weakness, impaired endurance, impaired self care skills, impaired functional mobility, gait instability, impaired cardiopulmonary response to activity.      Pt agreeable to therapy and tolerated the session well this date.  used for communication throughout the session. Pt ambulated to the sink where he performed oral care in standing with CGA-SBA and was left sitting up in the chair. Pt with all VSS throughout. Pt would benefit from continued skilled acute OT services during this admission in order to maximize independence and safety with ADLs and functional mobility to ensure safe return to PLOF in the least restrictive environment. Patient does not require any post acute therapy services.       Rehab Prognosis: Good; patient would benefit from acute skilled OT services to address these deficits and reach maximum level of function.       Plan:     Patient to be seen 3 x/week to address the above listed problems via self-care/home management, therapeutic activities, therapeutic exercises, neuromuscular re-education  Plan of Care Expires: 03/21/24  Plan of Care Reviewed with: patient, spouse, daughter    Subjective     Chief Complaint: None stated   Patient/Family Comments/goals: To return to PLOF    Occupational Profile:  Living Environment: Pt lives with his wife in a [facility] with 3 MARTITA and  no HR.   Previous level of function: Independent with ADLs and mobility   Equipment Used at Home: none  Assistance upon Discharge: Family     Pain/Comfort:  Pain Rating 1: 0/10    Patients cultural, spiritual, Scientology conflicts given the current situation: no    Objective:     Co-evaluation/treatment performed due to patient's multiple deficits requiring two skilled therapists to appropriately and safely assess patient's strength and endurance while facilitating functional tasks in addition to accommodating for patient's activity tolerance.     Communicated with: RN prior to session.  Patient found up in chair with peripheral IV, pulse ox (continuous), telemetry, blood pressure cuff, arterial line upon OT entry to room.    General Precautions: Standard, fall  Orthopedic Precautions:  N/A  Braces: N/A  Respiratory Status: Room air    Occupational Performance:    Bed Mobility:    Patient completed Rolling/Turning to Right with stand by assistance  Patient completed Scooting/Bridging with stand by assistance  Patient completed Supine to Sit with stand by assistance    Functional Mobility/Transfers:  Patient completed Sit <> Stand Transfer with contact guard assistance  with  no assistive device   Functional Mobility: Pt engaging in functional mobility to simulate household/community distances with CGA and utilizing no AD in order to maximize functional activity tolerance and standing balance required for engagement in occupations of choice.    Activities of Daily Living:  Grooming: stand by assistance and contact guard assistance : to perform oral care in standing at the sink   Upper Body Dressing: maximal assistance : To don gown around the back with assistance due to lines   Lower Body Dressing: maximal assistance : To don socks     Cognitive/Visual Perceptual:  Cognitive/Psychosocial Skills:     -       Oriented to: Person, Place, Time, and Situation   -       Follows Commands/attention:Follows multistep   commands  -       Safety awareness/insight to disability: intact   -       Mood/Affect/Coping skills/emotional control: Appropriate to situation  Visual/Perceptual:      -Intact visual field     Physical Exam:  Balance:  Static Sitting   stand by assistance   Dynamic Sitting   stand by assistance   Static Standing   contact guard assistance   Dynamic Standing   contact guard assistance     Upper Extremity Function:   Dominance: Right   Left UE Right UE   UE Edema None noted None noted   UE ROM WFL WFL   UE Strength WFL WFL    Strength WFL WFL   Sensation    -       Intact    -       Intact   Fine Motor Skills:     -       Intact    -       Intact   Gross Motor Skills:   WFL   WFL       AMPAC 6 Click ADL:  AMPAC Total Score: 21    Treatment & Education:  Therapist provided facilitation and instruction of proper body mechanics and fall prevention strategies during tasks listed above.  Instructed patient to sit in bedside chair daily to increase OOB/activity tolerance.  Instructed patient to use call light to have nursing staff assist with needs/transfers.  Discussed OT POC and answered all questions within OT scope of practice.  Whiteboard updated       Patient left up in chair with all lines intact, call button in reach, and family present    GOALS:   Multidisciplinary Problems       Occupational Therapy Goals          Problem: Occupational Therapy    Goal Priority Disciplines Outcome Interventions   Occupational Therapy Goal     OT, PT/OT Ongoing, Progressing    Description: Goals to be met by: 3/21/24    Patient will increase functional independence with ADLs by performing:    UE Dressing with [place].  LE Dressing with [place].  Grooming while standing at sink with [place].  Toileting from toilet with [place] for hygiene and clothing management.   Toilet transfer to toilet with [place].                         History:     Past Medical History:   Diagnosis Date    Coronary artery  disease     Hyperlipidemia     Hypertension     Personal history of colonic polyps          Past Surgical History:   Procedure Laterality Date    ABDOMINAL AORTIC ANEURYSM REPAIR, ENDOVASCULAR N/A 2/20/2024    Procedure: REPAIR-ANEURYSM-ABDOMINAL AORTIC-ENDOVASCULAR (AAA);  Surgeon: Caesar Denton MD;  Location: [facility] OR [address] [place] [address];  Service: Vascular;  Laterality: N/A;  EVAR & L femoral artery cutdown  mGy 1195.48 Gycm2 215.60     Fluro Time 16.2min   Contrast 26ml    INJECTION OF ANESTHETIC AGENT AROUND MEDIAL BRANCH NERVES INNERVATING CERVICAL FACET JOINT Right 11/18/2020    Procedure: Block-nerve-medial branch-cervical Right C3, 4, 5with RN IV sedation;  Surgeon: Martín Barnes MD;  Location: [facility] PAIN [facility]GT;  Service: Pain Management;  Laterality: Right;    INJECTION OF ANESTHETIC AGENT AROUND MEDIAL BRANCH NERVES INNERVATING CERVICAL FACET JOINT Bilateral 04/28/2023    Procedure: Bilateral C4-6 MBB with RN IV sedation;  Surgeon: Rudolph Burgos MD;  Location: [facility];  Service: Pain Management;  Laterality: Bilateral;       Time Tracking:     OT Date of Treatment: 02/21/24  OT Start Time: 0929  OT Stop Time: 0951  OT Total Time (min): 22 min    Billable Minutes:Evaluation 10  Self Care/Home Management 12    2/21/2024

## 2024-02-21 NOTE — PT/OT/SLP EVAL
Physical Therapy Evaluation    Patient Name:  Deborah White   MRN:  [id number]  Admit Date: 2/20/2024  Admitting Diagnosis:  Infrarenal abdominal aortic aneurysm (AAA) without rupture  Length of Stay: 1 days  Recent Surgery: Procedure(s) (LRB):  REPAIR-ANEURYSM-ABDOMINAL AORTIC-ENDOVASCULAR (AAA) (N/A) 1 Day Post-Op    Recommendations:     Discharge Recommendations:  No Therapy Indicated   Discharge Equipment Recommendations: none    Assessment:     Deborah White is a 68 y.o. male admitted with a medical diagnosis of Infrarenal abdominal aortic aneurysm (AAA) without rupture. Pt was able to ambulate in the room. BP remained <160 throughout session. Will continue to work on gait distance.      Cantonese  present via iPAD    Problem List: gait instability, impaired balance  Rehab Prognosis: Good; patient would benefit from acute skilled PT services to address these deficits and reach maximum level of function.      Plan:     During this hospitalization, patient to be seen 4 x/week to address the identified rehab impairments via gait training, therapeutic activities, therapeutic exercises, neuromuscular re-education and progress towards the established goals.    Plan of Care Expires:  03/20/24    Subjective   Communicated with RN prior to session.  Patient found HOB elevated upon PT entry to room, agreeable to evaluation. Deborah White's wife and daughter present during session.    Chief Complaint: No chief complaint on file.    Patient/Family Comments/goals: to get better  Pain/Comfort:  Pain Rating 1: 0/10  Pain Rating Post-Intervention 1: 0/10    Living Environment:  Pt lives with wife in a [facility] with 3 MARTITA and no HR. Pt was ind with ambulation and ADLs. Patient uses DME as follows: none. DME owned (not currently used): none    Patient reports they will have assistance from wife upon discharge.    Objective:   Patient found with: peripheral IV, arterial line, lemus catheter, blood pressure cuff, telemetry, pulse ox  "(continuous)     General Precautions: Standard, Cardiac fall   Orthopedic Precautions:RLE weight bearing as tolerated, LLE weight bearing as tolerated   Braces: N/A   Oxygen Device: Room Air  Vitals: /70   Pulse 83   Temp 98.9 °F (37.2 °C) (Oral)   Resp 16   Ht 5' 4" (1.626 m)   Wt 75.4 kg (166 lb 3.6 oz)   SpO2 96%   BMI 28.53 kg/m²     Exams:  Cognition:   Alert and Cooperative  Ox4  Command following: Follows multistep  commands  Fluency: clear/fluent    RLE ROM: WFL  RLE Strength: WFL  LLE ROM: WFL  LLE Strength: WFL      Outcome Measures:  AM-PAC 6 CLICK MOBILITY  Turning over in bed (including adjusting bedclothes, sheets and blankets)?: 3  Sitting down on and standing up from a chair with arms (e.g., wheelchair, bedside commode, etc.): 3  Moving from lying on back to sitting on the side of the bed?: 3  Moving to and from a bed to a chair (including a wheelchair)?: 3  Need to walk in hospital room?: 3  Climbing 3-5 steps with a railing?: 3  Basic Mobility Total Score: 18     Functional Mobility:  Additional staff present: OT  Bed Mobility:  Supine to Sit: stand by assistance; HOB elevated  Scooting anteriorly to EOB to have both feet planted on floor: stand by assistance    Sitting Balance at Edge of Bed:  Assistance Level Required: Stand-by Assistance  Time: 3 minutes  Comments:   Worked on activity tolerance sitting EOB and worked on tolerance to positional change     Transfers:    Sit <> Stand Transfer: contact guard assistance with no assistive device from EOB      Gait:   Patient ambulated: 10ft + 10ft (ADLs standing at the sink between trials)   Patient required: contact guard  Patient used: no assistive device  Gait Pattern observed: reciprocal gait  Gait Deviation(s): occasional unsteady gait, decreased step length, and decreased china  Impairments due to:  post-op instability   Comments:   No LOB  No SOB  Verbal cuing for upright posture, pacing, and purposeful steps       Therapeutic " Activities, Exercises, & Education:   Educated pt on PT role/POC  Educated pt on importance of OOB activity and daily ambulation   Pt verbalized understanding     Pt performed ADLs standing at the sink with OT    T/f to chair to increase tolerance to OOB activity and to create optimal positioning for lung expansion       Patient left up in chair with all lines intact, call button in reach, and RN notified.    GOALS:   Multidisciplinary Problems       Physical Therapy Goals          Problem: Physical Therapy    Goal Priority Disciplines Outcome Goal Variances Interventions   Physical Therapy Goal     PT, PT/OT Ongoing, Progressing     Description: Goals to be met by:2024       Patient will increase functional independence with mobility by performin. Supine to sit with supervision   2. Sit to stand transfer with Supervision  3. Gait  x 150 feet with Supervision using No Assistive Device.   4. Ascend/descend 3 stair Supervision using No Assistive Device.                          History:     Past Medical History:   Diagnosis Date    Coronary artery disease     Hyperlipidemia     Hypertension     Personal history of colonic polyps        Past Surgical History:   Procedure Laterality Date    ABDOMINAL AORTIC ANEURYSM REPAIR, ENDOVASCULAR N/A 2024    Procedure: REPAIR-ANEURYSM-ABDOMINAL AORTIC-ENDOVASCULAR (AAA);  Surgeon: Caesar Denton MD;  Location: [facility] OR [address] [place] [address];  Service: Vascular;  Laterality: N/A;  EVAR & L femoral artery cutdown  mGy 1195.48 Gycm2 215.60     Fluro Time 16.2min   Contrast 26ml    INJECTION OF ANESTHETIC AGENT AROUND MEDIAL BRANCH NERVES INNERVATING CERVICAL FACET JOINT Right 2020    Procedure: Block-nerve-medial branch-cervical Right C3, 4, 5with RN IV sedation;  Surgeon: Martín Barnes MD;  Location: [facility];  Service: Pain Management;  Laterality: Right;    INJECTION OF ANESTHETIC AGENT AROUND MEDIAL BRANCH NERVES INNERVATING CERVICAL FACET JOINT Bilateral  04/28/2023    Procedure: Bilateral C4-6 MBB with RN IV sedation;  Surgeon: Rudolph Burgos MD;  Location: [facility];  Service: Pain Management;  Laterality: Bilateral;       Time Tracking:     PT Received On: 02/21/24  PT Start Time: 0930     PT Stop Time: 0953  PT Total Time (min): 23 min     Billable Minutes: Evaluation 8 and Gait Training 8

## 2024-02-21 NOTE — PLAN OF CARE
[facility]  Initial Discharge Assessment       Primary Care Provider: Chastity Guzmán MD    Admission Diagnosis: Infrarenal abdominal aortic aneurysm (AAA) without rupture [I71.43]  Abdominal aortic aneurysm [I71.40]    Admission Date: 2/20/2024  Expected Discharge Date:     Transition of Care Barriers: None    Payor: MEDICARE / Plan: MEDICARE PART A & B / Product Type: Government /     Extended Emergency Contact Information  Primary Emergency Contact: Lea White  Mobile Phone: [phone]  Relation: Daughter   needed? No    Discharge Plan A: Home with family, Home, Home Health  Discharge Plan B: Home, Home with family      [id number] - [place], [place] - [address] AT [facility]  [address]  [place] [address]  Phone: [phone] Fax: [phone]    [facility]  [place] - [address]  [address]  [place] [address]  Phone: [phone] Fax: [phone]      Initial Assessment (most recent)       Adult Discharge Assessment - 02/21/24 1130          Discharge Assessment    Assessment Type Discharge Planning Assessment     Confirmed/corrected address, phone number and insurance Yes     Confirmed Demographics Correct on Facesheet     Source of Information patient;family     When was your last doctors appointment? 11/20/23     Communicated NIRANJAN with patient/caregiver Date not available/Unable to determine     People in Home spouse     Do you expect to return to your current living situation? Yes     Do you have help at home or someone to help you manage your care at home? Yes     Prior to hospitilization cognitive status: No Deficits     Current cognitive status: No Deficits     Walking or Climbing Stairs Difficulty no     Dressing/Bathing Difficulty no     Home Layout Able to live on 1st floor     Equipment Currently Used at Home none     Readmission within 30 days? No      Patient currently being followed by outpatient case management? No     Do you currently have service(s) that help you manage your care at home? No     Do you take prescription medications? Yes     Do you have prescription coverage? No     Do you have any problems affording any of your prescribed medications? No     Is the patient taking medications as prescribed? yes     Who is going to help you get home at discharge? family     How do you get to doctors appointments? car, drives self;family or friend will provide     Are you on dialysis? No     Do you take coumadin? No     Discharge Plan A Home with family;Home;Home Health     Discharge Plan B Home;Home with family     DME Needed Upon Discharge  none     Discharge Plan discussed with: Spouse/sig other;Patient;Adult children     Transition of Care Barriers None        Physical Activity    On average, how many days per week do you engage in moderate to strenuous exercise (like a brisk walk)? 0 days     On average, how many minutes do you engage in exercise at this level? 0 min        Financial Resource Strain    How hard is it for you to pay for the very basics like food, housing, medical care, and heating? Not hard at all        Housing Stability    In the last 12 months, was there a time when you were not able to pay the mortgage or rent on time? No     In the last 12 months, was there a time when you did not have a steady place to sleep or slept in a shelter (including now)? No        Transportation Needs    In the past 12 months, has lack of transportation kept you from medical appointments or from getting medications? No     In the past 12 months, has lack of transportation kept you from meetings, work, or from getting things needed for daily living? No        Food Insecurity    Within the past 12 months, you worried that your food would run out before you got the money to buy more. Never true     Within the past 12 months, the food you bought just didn't  last and you didn't have money to get more. Never true        Stress    Do you feel stress - tense, restless, nervous, or anxious, or unable to sleep at night because your mind is troubled all the time - these days? Patient unable to answer        Social Connections    In a typical week, how many times do you talk on the phone with family, friends, or neighbors? More than three times a week     How often do you get together with friends or relatives? More than three times a week     How often do you attend Religion or Congregation services? Never     Do you belong to any clubs or organizations such as Religion groups, unions, fraDaqi or athletic groups, or school groups? No     How often do you attend meetings of the clubs or organizations you belong to? Never     Are you , , , , never , or living with a partner?         Alcohol Use    Q1: How often do you have a drink containing alcohol? Patient declined     Q2: How many drinks containing alcohol do you have on a typical day when you are drinking? Patient declined     Q3: How often do you have six or more drinks on one occasion? Patient declined                   Patient lives in a 1 story house with his spouse he is not on dialysis and does not take coumadin he has a ride home   Discharge Plan A home health  and Plan B home with family have been determined by review of patient's clinical status, future medical and therapeutic needs, and coverage/benefits for post-acute care in coordination with multidisciplinary team members.

## 2024-02-22 VITALS
TEMPERATURE: 99 F | RESPIRATION RATE: 20 BRPM | WEIGHT: 166.25 LBS | HEART RATE: 70 BPM | DIASTOLIC BLOOD PRESSURE: 63 MMHG | BODY MASS INDEX: 28.38 KG/M2 | OXYGEN SATURATION: 97 % | HEIGHT: 64 IN | SYSTOLIC BLOOD PRESSURE: 146 MMHG

## 2024-02-22 LAB
ALBUMIN SERPL BCP-MCNC: 3.1 G/DL (ref 3.5–5.2)
ALP SERPL-CCNC: 53 U/L (ref 55–135)
ALT SERPL W/O P-5'-P-CCNC: 11 U/L (ref 10–44)
ANION GAP SERPL CALC-SCNC: 8 MMOL/L (ref 8–16)
AST SERPL-CCNC: 18 U/L (ref 10–40)
BILIRUB SERPL-MCNC: 0.4 MG/DL (ref 0.1–1)
BUN SERPL-MCNC: 23 MG/DL (ref 8–23)
CALCIUM SERPL-MCNC: 8.7 MG/DL (ref 8.7–10.5)
CHLORIDE SERPL-SCNC: 107 MMOL/L (ref 95–110)
CO2 SERPL-SCNC: 23 MMOL/L (ref 23–29)
CREAT SERPL-MCNC: 1.3 MG/DL (ref 0.5–1.4)
ERYTHROCYTE [DISTWIDTH] IN BLOOD BY AUTOMATED COUNT: 14.2 % (ref 11.5–14.5)
EST. GFR  (NO RACE VARIABLE): >60 ML/MIN/1.73 M^2
GLUCOSE SERPL-MCNC: 114 MG/DL (ref 70–110)
HCT VFR BLD AUTO: 29.1 % (ref 40–54)
HGB BLD-MCNC: 9.6 G/DL (ref 14–18)
MAGNESIUM SERPL-MCNC: 2.1 MG/DL (ref 1.6–2.6)
MCH RBC QN AUTO: 30.2 PG (ref 27–31)
MCHC RBC AUTO-ENTMCNC: 33 G/DL (ref 32–36)
MCV RBC AUTO: 92 FL (ref 82–98)
PHOSPHATE SERPL-MCNC: 3.1 MG/DL (ref 2.7–4.5)
PLATELET # BLD AUTO: 192 K/UL (ref 150–450)
PMV BLD AUTO: 9.6 FL (ref 9.2–12.9)
POTASSIUM SERPL-SCNC: 3.7 MMOL/L (ref 3.5–5.1)
PROT SERPL-MCNC: 6.3 G/DL (ref 6–8.4)
RBC # BLD AUTO: 3.18 M/UL (ref 4.6–6.2)
SODIUM SERPL-SCNC: 138 MMOL/L (ref 136–145)
WBC # BLD AUTO: 9.66 K/UL (ref 3.9–12.7)

## 2024-02-22 PROCEDURE — 83735 ASSAY OF MAGNESIUM: CPT | Performed by: STUDENT IN AN ORGANIZED HEALTH CARE EDUCATION/TRAINING PROGRAM

## 2024-02-22 PROCEDURE — 85027 COMPLETE CBC AUTOMATED: CPT | Performed by: STUDENT IN AN ORGANIZED HEALTH CARE EDUCATION/TRAINING PROGRAM

## 2024-02-22 PROCEDURE — 25000003 PHARM REV CODE 250

## 2024-02-22 PROCEDURE — 25000003 PHARM REV CODE 250: Performed by: STUDENT IN AN ORGANIZED HEALTH CARE EDUCATION/TRAINING PROGRAM

## 2024-02-22 PROCEDURE — 84100 ASSAY OF PHOSPHORUS: CPT | Performed by: STUDENT IN AN ORGANIZED HEALTH CARE EDUCATION/TRAINING PROGRAM

## 2024-02-22 PROCEDURE — 99499 UNLISTED E&M SERVICE: CPT | Mod: ,,, | Performed by: ANESTHESIOLOGY

## 2024-02-22 PROCEDURE — 80053 COMPREHEN METABOLIC PANEL: CPT | Performed by: STUDENT IN AN ORGANIZED HEALTH CARE EDUCATION/TRAINING PROGRAM

## 2024-02-22 RX ORDER — OXYCODONE HYDROCHLORIDE 5 MG/1
5 TABLET ORAL EVERY 6 HOURS PRN
Qty: 12 TABLET | Refills: 0 | Status: SHIPPED | OUTPATIENT
Start: 2024-02-22 | End: 2024-06-16

## 2024-02-22 RX ORDER — AMOXICILLIN 250 MG
1 CAPSULE ORAL DAILY
Qty: 14 TABLET | Refills: 0 | Status: SHIPPED | OUTPATIENT
Start: 2024-02-22 | End: 2024-06-16

## 2024-02-22 RX ORDER — ACETAMINOPHEN 325 MG/1
650 TABLET ORAL EVERY 8 HOURS PRN
Qty: 30 TABLET | Refills: 0 | Status: SHIPPED | OUTPATIENT
Start: 2024-02-22 | End: 2024-06-16

## 2024-02-22 RX ADMIN — AMLODIPINE BESYLATE 10 MG: 10 TABLET ORAL at 08:02

## 2024-02-22 RX ADMIN — CLONIDINE HYDROCHLORIDE 0.2 MG: 0.1 TABLET ORAL at 08:02

## 2024-02-22 RX ADMIN — ACETAMINOPHEN 650 MG: 325 TABLET ORAL at 06:02

## 2024-02-22 RX ADMIN — SENNOSIDES AND DOCUSATE SODIUM 1 TABLET: 8.6; 5 TABLET ORAL at 08:02

## 2024-02-22 RX ADMIN — HYDROCHLOROTHIAZIDE 25 MG: 12.5 TABLET ORAL at 08:02

## 2024-02-22 RX ADMIN — ASPIRIN 81 MG: 81 TABLET, COATED ORAL at 08:02

## 2024-02-22 RX ADMIN — GABAPENTIN 600 MG: 300 CAPSULE ORAL at 08:02

## 2024-02-22 RX ADMIN — ATORVASTATIN CALCIUM 80 MG: 40 TABLET, FILM COATED ORAL at 08:02

## 2024-02-22 NOTE — DISCHARGE INSTRUCTIONS
VASCULAR SURGERY DISCHARGE INSTRUCTIONS    Woundcare:  GROIN WOUND CARE:  It is very important that the wound in your groin stays dry and clean because this area collects moisture and bacteria which greatly increases the risk for infection  and wound complications.    EVERY DAY:  - Starting two days after your surgery, you may shower  - Following your shower, pat the wound dry  - Paint the wound with a small amount of betadine  - Cover the wound with 4x4 gauze and tape  - Change the dressing in the same fashion every day, sooner if the gauze gets saturated with fluid    CONTACT US IMMEDIATELY  - Is getting more painful with time not less  - Becomes red  - Has pus coming out  - Falls apart   - Or your are having fevers      Activity:  - Activity is good for you. Try to walk frequently and increase walking distance every day  - No heavy lifting for 2 weeks  - No baths, swimming in pools, lakes, jacuzzi etc. for 2 weeks     Diet:  -Resume your pre-operative home diet    Follow up:  -Follow up for ultrasound and vascular surgery clinic appointment in ~2 weeks    Call Vascular Surgery Office at [phone] if you experience:  -Increased redness, warmth, tenderness, or draining pus from your incision  -Increased pain/swelling at your incision  -Worsening fevers, chills, nausea/vomiting  -Pain, weakness, coldness, or numbness in your legs  -Uncontrolled pain  -Your call will be returned within 24 hours and further instructions will be provided    Go to ER/Urgent Care if you experience:  -Worsening shortness of breath or chest pain  -Sudden severe pain and swelling at your incision site

## 2024-02-22 NOTE — SUBJECTIVE & OBJECTIVE
Interval History/Significant Events: NAEON. Patient resting in bed this morning. Pain well controlled. Right groin hematoma smaller in size.    Follow-up For: Procedure(s) (LRB):  REPAIR-ANEURYSM-ABDOMINAL AORTIC-ENDOVASCULAR (AAA) (N/A)    Objective:     Vital Signs (Most Recent):  Temp: 98.7 °F (37.1 °C) (02/22/24 0300)  Pulse: 68 (02/22/24 0600)  Resp: (!) 21 (02/22/24 0600)  BP: (!) 168/56 (02/22/24 0600)  SpO2: 97 % (02/22/24 0600) Vital Signs (24h Range):  Temp:  [97.8 °F (36.6 °C)-98.9 °F (37.2 °C)] 98.7 °F (37.1 °C)  Pulse:  [57-97] 68  Resp:  [14-30] 21  SpO2:  [94 %-98 %] 97 %  BP: (100-174)/(53-82) 168/56  Arterial Line BP: (124-159)/(45-52) 124/47     Weight: 75.4 kg (166 lb 3.6 oz)  Body mass index is 28.53 kg/m².      Intake/Output Summary (Last 24 hours) at 2/22/2024 0622  Last data filed at 2/22/2024 0600  Gross per 24 hour   Intake 1074.78 ml   Output 1865 ml   Net -790.22 ml            Physical Exam  Constitutional:       General: He is not in acute distress.  HENT:      Head: Normocephalic and atraumatic.      Mouth/Throat:      Mouth: Mucous membranes are moist.      Pharynx: No oropharyngeal exudate.   Eyes:      Extraocular Movements: Extraocular movements intact.      Conjunctiva/sclera: Conjunctivae normal.   Cardiovascular:      Rate and Rhythm: Normal rate and regular rhythm.      Comments: Palpable DP/PT bilaterally  Right groin hematoma, softer than yesterday, with significant bruising  Palpable left femoral, difficult to palpate right fem due to hematoma and patient discomfort  Pulmonary:      Effort: Pulmonary effort is normal. No respiratory distress.   Abdominal:      General: There is no distension.      Palpations: Abdomen is soft.      Tenderness: There is no abdominal tenderness.   Musculoskeletal:         General: Normal range of motion.   Skin:     General: Skin is warm and dry.   Neurological:      General: No focal deficit present.      Mental Status: He is alert. Mental  status is at baseline.            Vents:       Lines/Drains/Airways       Peripheral Intravenous Line  Duration                  Peripheral IV - Single Lumen 02/20/24 0800 18 G Posterior;Right Hand 1 day         Peripheral IV - Single Lumen 02/20/24 0951 18 G Left Hand 1 day                    Significant Labs:    CBC/Anemia Profile:  Recent Labs   Lab 02/20/24  1456 02/21/24  0312 02/22/24  0254   WBC 9.31 10.31 9.66   HGB 10.8* 9.6* 9.6*   HCT 33.6* 29.0* 29.1*    246 192   MCV 92 90 92   RDW 14.2 14.2 14.2          Chemistries:  Recent Labs   Lab 02/20/24  1456 02/21/24  0312 02/22/24  0254    141 138   K 3.8 3.5 3.7    110 107   CO2 17* 22* 23   BUN 17 19 23   CREATININE 1.7* 1.4 1.3   CALCIUM 8.1* 8.2* 8.7   ALBUMIN 3.1* 3.0* 3.1*   PROT 6.1 5.8* 6.3   BILITOT 0.3 0.2 0.4   ALKPHOS 58 54* 53*   ALT 22 18 11   AST 19 18 18   MG 2.2 2.1 2.1   PHOS 3.9 3.5 3.1         CMP:   Recent Labs   Lab 02/20/24  1456 02/21/24 0312 02/22/24  0254    141 138   K 3.8 3.5 3.7    110 107   CO2 17* 22* 23   * 118* 114*   BUN 17 19 23   CREATININE 1.7* 1.4 1.3   CALCIUM 8.1* 8.2* 8.7   PROT 6.1 5.8* 6.3   ALBUMIN 3.1* 3.0* 3.1*   BILITOT 0.3 0.2 0.4   ALKPHOS 58 54* 53*   AST 19 18 18   ALT 22 18 11   ANIONGAP 14 9 8         Significant Imaging:  I have reviewed all pertinent imaging results/findings within the past 24 hours.

## 2024-02-22 NOTE — DISCHARGE SUMMARY
[facility]  Vascular Surgery  Discharge Summary      Patient Name: Deborah White  MRN: [id number]  Admission Date: 2/20/2024  Hospital Length of Stay: 2 days  Discharge Date and Time:  02/22/2024 1:52 PM  Attending Physician: No att. providers found   Discharging Provider: Lauren Dsouza NP  Primary Care Provider: Chastity Guzmán MD    HPI:   No notes on file    Procedure(s) (LRB):  REPAIR-ANEURYSM-ABDOMINAL AORTIC-ENDOVASCULAR (AAA) (N/A)     Hospital Course: For details of hospital stay, please refer to daily progress notes. Briefly, this is a 68 y.o. male presented on 2/20 for EVAR, L CFA endarterectomy and PTA bilateral common iliacs to treat PAD.  Post-operatively patient was admitted to the ICU for close observation and had an uncomplicated hospital recovery.     On the day of discharge, the patient was ambulating without difficulty, voiding spontaneously, was tolerating a diet without nausea or vomiting, and pain was well controlled on PO pain medications. Discharge instructions were explained to the patient and appropriate follow-up was arranged.     Goals of Care Treatment Preferences:  Code Status: Full Code      Consults:     Significant Diagnostic Studies: Labs: CMP   Recent Labs   Lab 02/20/24  1456 02/21/24  0312 02/22/24  0254    141 138   K 3.8 3.5 3.7    110 107   CO2 17* 22* 23   * 118* 114*   BUN 17 19 23   CREATININE 1.7* 1.4 1.3   CALCIUM 8.1* 8.2* 8.7   PROT 6.1 5.8* 6.3   ALBUMIN 3.1* 3.0* 3.1*   BILITOT 0.3 0.2 0.4   ALKPHOS 58 54* 53*   AST 19 18 18   ALT 22 18 11   ANIONGAP 14 9 8    and CBC   Recent Labs   Lab 02/20/24  1456 02/21/24  0312 02/22/24  0254   WBC 9.31 10.31 9.66   HGB 10.8* 9.6* 9.6*   HCT 33.6* 29.0* 29.1*    246 192       Pending Diagnostic Studies:       None          Final Active Diagnoses:    Diagnosis Date Noted POA    PRINCIPAL PROBLEM:  Infrarenal abdominal aortic aneurysm (AAA) without rupture [I71.43] 12/14/2023 Yes     Coronary artery disease of native artery of native heart with stable angina pectoris [I25.118] 12/28/2020 Yes    PAD (peripheral artery disease) [I73.9] 12/28/2020 Yes    Tobacco abuse [Z72.0] 12/28/2020 Yes    Hypertension, essential [I10] 09/21/2020 Yes    Hyperlipidemia [E78.5] 09/21/2020 Yes      Problems Resolved During this Admission:      Discharged Condition: good    Disposition: Home or Self Care    Follow Up:   Follow-up Information       Caesar Denton MD Follow up in 1 month(s).    Specialty: Vascular Surgery  Contact information:  [address]  [facility] [address]  [phone]                             Patient Instructions:      Diet Cardiac     Notify your health care provider if you experience any of the following:     Notify your health care provider if you experience any of the following:  increased confusion or weakness     Notify your health care provider if you experience any of the following:  persistent dizziness, light-headedness, or visual disturbances     Notify your health care provider if you experience any of the following:  worsening rash     Notify your health care provider if you experience any of the following:  severe persistent headache     Notify your health care provider if you experience any of the following:  difficulty breathing or increased cough     Notify your health care provider if you experience any of the following:  redness, tenderness, or signs of infection (pain, swelling, redness, odor or green/yellow discharge around incision site)     Notify your health care provider if you experience any of the following:  severe uncontrolled pain     Notify your health care provider if you experience any of the following:  temperature >100.4     Notify your health care provider if you experience any of the following:  persistent nausea and vomiting or diarrhea     Change dressing (specify)   Order Comments: Left groin    GROIN WOUND CARE:  It is very important that  the wound in your groin stays dry and clean because this area collects moisture and bacteria which greatly increases the risk for infection  and wound complications.    EVERY DAY:  - Starting two days after your surgery, you may shower  - Following your shower, pat the wound dry  - Paint the wound with a small amount of betadine  - Cover the wound with 4x4 gauze and tape  - Change the dressing in the same fashion every day, sooner if the gauze gets saturated with fluid    CONTACT US IMMEDIATELY  - Is getting more painful with time not less  - Becomes red  - Has pus coming out  - Falls apart   - Or your are having fevers     Activity as tolerated     Medications:  Reconciled Home Medications:      Medication List        START taking these medications      acetaminophen 325 MG tablet  Commonly known as: TYLENOL  Take 2 tablets (650 mg total) by mouth every 8 (eight) hours as needed for Pain.     oxyCODONE 5 MG immediate release tablet  Commonly known as: ROXICODONE  Take 1 tablet (5 mg total) by mouth every 6 (six) hours as needed for Pain (severe pain).     STOOL SOFTENER-LAXATIVE 8.6-50 mg per tablet  Generic drug: senna-docusate 8.6-50 mg  Take 1 tablet by mouth once daily.            CONTINUE taking these medications      amLODIPine 10 MG tablet  Commonly known as: NORVASC  Take 1 tablet (10 mg total) by mouth once daily.     aspirin 81 MG EC tablet  Commonly known as: ECOTRIN  Take 81 mg by mouth once daily.     benazepriL 40 MG tablet  Commonly known as: LOTENSIN  Take 1 tablet (40 mg total) by mouth once daily.     cilostazoL 50 MG Tab  Commonly known as: PLETAL  Take 1 tablet (50 mg total) by mouth 2 (two) times daily.     cloNIDine 0.2 MG tablet  Commonly known as: CATAPRES  Take 1 tablet (0.2 mg total) by mouth 2 (two) times daily.     ezetimibe 10 mg tablet  Commonly known as: ZETIA  Take 1 tablet (10 mg total) by mouth once daily.     gabapentin 300 MG capsule  Commonly known as: NEURONTIN  Take 2 capsules  (600 mg total) by mouth 2 (two) times daily.     hydroCHLOROthiazide 25 MG tablet  Commonly known as: HYDRODIURIL  Take 1 tablet (25 mg total) by mouth once daily.     linaCLOtide 72 mcg Cap capsule  Commonly known as: LINZESS  Take 1 capsule (72 mcg total) by mouth before breakfast.     rosuvastatin 20 MG tablet  Commonly known as: CRESTOR  Take 1 tablet (20 mg total) by mouth once daily.     XELJANZ 5 mg Tab  Generic drug: tofacitinib  Take 1 tablet (5 mg) by mouth once daily.            ASK your doctor about these medications      omeprazole 20 MG capsule  Commonly known as: PRILOSEC  Take 1 capsule (20 mg total) by mouth once daily.              Lauren Dsouza NP  Vascular Surgery  [facility] - Surgical Intensive Care

## 2024-02-22 NOTE — PROGRESS NOTES
02/21/24 1604   Vital Signs   BP (!) 162/69   MAP (mmHg) 99     MD notified of elevated blood pressure. Order received from Dr. Lara for one-time dose of 40mg lisinopril.

## 2024-02-22 NOTE — ASSESSMENT & PLAN NOTE
Neuro/Psych:   -- Sedation: none  -- Pain: tylenol, oxy prn, gabapentin             Cards:   -- HDS  -- Sys goal 100 - 140  -- Continue home amlodipine and clonidine   -- Resume home ACE-i      Pulm:   -- Goal O2 sat > 90%, on RA this morning  -- IS,OOBTC      Renal:  -- Voiding spontaneously  -- Monitor BUN/Cr   - Cr 1.3 improved from 1.7      FEN / GI:   -- Replace lytes as needed  -- Nutrition: cardiac diet      ID:   -- Tm: afebrile; WBC stable  -- Abx: s/p periop ancef      Heme/Onc:   -- Daily CBC  -- ASA daily      Endo:   -- Gluc goal 140-180  -- No history of diabetes, will monitor glucose and start insulin if needed      PPx:   Feeding: cardiac diet   Analgesia/Sedation: tylenol, oxy   Thromboembolic prevention: lovenox  HOB >30: N  Stress Ulcer ppx: not indicated  Glucose control: Critical care goal 140-180 g/dl    Lines/Drains/Airway: PIV      Dispo/Code Status/Palliative:   -- Step down to the floor vs possible discharge/ Full Code

## 2024-02-22 NOTE — PROGRESS NOTES
[facility]  Critical Care - Surgery  Progress Note    Patient Name: Deborah White  MRN: [id number]  Admission Date: 2/20/2024  Hospital Length of Stay: 2 days  Code Status: Full Code  Attending Provider: Caesar Denton MD  Primary Care Provider: Chastity Guzmán MD   Principal Problem: Infrarenal abdominal aortic aneurysm (AAA) without rupture    Subjective:     Hospital/ICU Course:  No notes on file    Interval History/Significant Events: NAEON. Patient resting in bed this morning. Pain well controlled. Right groin hematoma smaller in size.    Follow-up For: Procedure(s) (LRB):  REPAIR-ANEURYSM-ABDOMINAL AORTIC-ENDOVASCULAR (AAA) (N/A)    Objective:     Vital Signs (Most Recent):  Temp: 98.7 °F (37.1 °C) (02/22/24 0300)  Pulse: 68 (02/22/24 0600)  Resp: (!) 21 (02/22/24 0600)  BP: (!) 168/56 (02/22/24 0600)  SpO2: 97 % (02/22/24 0600) Vital Signs (24h Range):  Temp:  [97.8 °F (36.6 °C)-98.9 °F (37.2 °C)] 98.7 °F (37.1 °C)  Pulse:  [57-97] 68  Resp:  [14-30] 21  SpO2:  [94 %-98 %] 97 %  BP: (100-174)/(53-82) 168/56  Arterial Line BP: (124-159)/(45-52) 124/47     Weight: 75.4 kg (166 lb 3.6 oz)  Body mass index is 28.53 kg/m².      Intake/Output Summary (Last 24 hours) at 2/22/2024 0622  Last data filed at 2/22/2024 0600  Gross per 24 hour   Intake 1074.78 ml   Output 1865 ml   Net -790.22 ml            Physical Exam  Constitutional:       General: He is not in acute distress.  HENT:      Head: Normocephalic and atraumatic.      Mouth/Throat:      Mouth: Mucous membranes are moist.      Pharynx: No oropharyngeal exudate.   Eyes:      Extraocular Movements: Extraocular movements intact.      Conjunctiva/sclera: Conjunctivae normal.   Cardiovascular:      Rate and Rhythm: Normal rate and regular rhythm.      Comments: Palpable DP/PT bilaterally  Right groin hematoma, softer than yesterday, with significant bruising  Palpable left femoral, difficult to palpate right fem due to hematoma and patient  discomfort  Pulmonary:      Effort: Pulmonary effort is normal. No respiratory distress.   Abdominal:      General: There is no distension.      Palpations: Abdomen is soft.      Tenderness: There is no abdominal tenderness.   Musculoskeletal:         General: Normal range of motion.   Skin:     General: Skin is warm and dry.   Neurological:      General: No focal deficit present.      Mental Status: He is alert. Mental status is at baseline.            Vents:       Lines/Drains/Airways       Peripheral Intravenous Line  Duration                  Peripheral IV - Single Lumen 02/20/24 0800 18 G Posterior;Right Hand 1 day         Peripheral IV - Single Lumen 02/20/24 0951 18 G Left Hand 1 day                    Significant Labs:    CBC/Anemia Profile:  Recent Labs   Lab 02/20/24  1456 02/21/24  0312 02/22/24  0254   WBC 9.31 10.31 9.66   HGB 10.8* 9.6* 9.6*   HCT 33.6* 29.0* 29.1*    246 192   MCV 92 90 92   RDW 14.2 14.2 14.2          Chemistries:  Recent Labs   Lab 02/20/24  1456 02/21/24  0312 02/22/24  0254    141 138   K 3.8 3.5 3.7    110 107   CO2 17* 22* 23   BUN 17 19 23   CREATININE 1.7* 1.4 1.3   CALCIUM 8.1* 8.2* 8.7   ALBUMIN 3.1* 3.0* 3.1*   PROT 6.1 5.8* 6.3   BILITOT 0.3 0.2 0.4   ALKPHOS 58 54* 53*   ALT 22 18 11   AST 19 18 18   MG 2.2 2.1 2.1   PHOS 3.9 3.5 3.1         CMP:   Recent Labs   Lab 02/20/24  1456 02/21/24  0312 02/22/24  0254    141 138   K 3.8 3.5 3.7    110 107   CO2 17* 22* 23   * 118* 114*   BUN 17 19 23   CREATININE 1.7* 1.4 1.3   CALCIUM 8.1* 8.2* 8.7   PROT 6.1 5.8* 6.3   ALBUMIN 3.1* 3.0* 3.1*   BILITOT 0.3 0.2 0.4   ALKPHOS 58 54* 53*   AST 19 18 18   ALT 22 18 11   ANIONGAP 14 9 8         Significant Imaging:  I have reviewed all pertinent imaging results/findings within the past 24 hours.  Assessment/Plan:     Cardiac/Vascular  * Infrarenal abdominal aortic aneurysm (AAA) without rupture    Neuro/Psych:   -- Sedation: none  -- Pain:  tylenol, oxy prn, gabapentin             Cards:   -- HDS  -- Sys goal 100 - 140  -- Continue home amlodipine and clonidine   -- Resume home ACE-i      Pulm:   -- Goal O2 sat > 90%, on RA this morning  -- IS,OOBTC      Renal:  -- Voiding spontaneously  -- Monitor BUN/Cr   - Cr 1.3 improved from 1.7      FEN / GI:   -- Replace lytes as needed  -- Nutrition: cardiac diet      ID:   -- Tm: afebrile; WBC stable  -- Abx: s/p periop ancef      Heme/Onc:   -- Daily CBC  -- ASA daily      Endo:   -- Gluc goal 140-180  -- No history of diabetes, will monitor glucose and start insulin if needed      PPx:   Feeding: cardiac diet   Analgesia/Sedation: tylenol, oxy   Thromboembolic prevention: lovenox  HOB >30: N  Stress Ulcer ppx: not indicated  Glucose control: Critical care goal 140-180 g/dl    Lines/Drains/Airway: PIV      Dispo/Code Status/Palliative:   -- Step down to the floor vs possible discharge/ Full Code    Roya Aponte MD  Critical Care - Surgery  [facility] - Surgical Int[facility]

## 2024-02-22 NOTE — ASSESSMENT & PLAN NOTE
Mr. White is a 68-year old male with PMH of AAA @ 5.9cm with significant atherosclerotic disease through his L and R common iliac with significant stenosis bilaterally.  He is now s/p EVAR, L CFA endarterectomy and PTA of bilateral common iliacs on 2/20.    -OOB  -PT/OT   -Aspirin  -MMPC  -Groins soft, R dp palp, L dp biphasic  -Discharge today  -Appreciate SICU assistance

## 2024-02-22 NOTE — PLAN OF CARE
[facility]  Discharge Final Note    Primary Care Provider: Chastity Guzmán MD    Expected Discharge Date: 2/22/2024    Final Discharge Note (most recent)       Final Note - 02/22/24 1416          Final Note    Assessment Type Final Discharge Note     Anticipated Discharge Disposition Home or Self Care     Hospital Resources/Appts/Education Provided Appointments scheduled and added to AVS                     Important Message from Medicare  Important Message from Medicare regarding Discharge Appeal Rights: Given to patient/caregiver, Signed/date by patient/caregiver, Explained to patient/caregiver     Date IMM was signed: 02/22/24  Time IMM was signed: 0850    Contact Info       Caesar Denton MD   Specialty: Vascular Surgery    [address]  [facility] [address]   Phone: [phone]       Next Steps: Follow up in 1 month(s)        Patient discharged home with no stated needs

## 2024-02-22 NOTE — SUBJECTIVE & OBJECTIVE
Medications:  Continuous Infusions:   sodium chloride 0.9%       Scheduled Meds:   acetaminophen  650 mg Oral Q6H    amLODIPine  10 mg Oral Daily    aspirin  81 mg Oral Daily    atorvastatin  80 mg Oral Daily    cloNIDine  0.2 mg Oral BID    enoxparin  40 mg Subcutaneous Q24H (prophylaxis, 1700)    gabapentin  600 mg Oral BID    hydroCHLOROthiazide  25 mg Oral Daily    LIDOcaine  1 patch Transdermal Q24H    polyethylene glycol  17 g Oral Daily    senna-docusate 8.6-50 mg  1 tablet Oral Daily     PRN Meds:ondansetron, oxyCODONE     Objective:     Vital Signs (Most Recent):  Temp: 98.7 °F (37.1 °C) (02/22/24 0300)  Pulse: 70 (02/22/24 0701)  Resp: (!) 21 (02/22/24 0701)  BP: 138/76 (02/22/24 0701)  SpO2: 97 % (02/22/24 0701) Vital Signs (24h Range):  Temp:  [97.8 °F (36.6 °C)-98.9 °F (37.2 °C)] 98.7 °F (37.1 °C)  Pulse:  [57-97] 70  Resp:  [14-30] 21  SpO2:  [94 %-98 %] 97 %  BP: (100-174)/(53-82) 138/76  Arterial Line BP: (124-159)/(45-52) 124/47            Physical Exam  HENT:      Head: Normocephalic.      Nose: Nose normal.      Mouth/Throat:      Mouth: Mucous membranes are moist.   Eyes:      Pupils: Pupils are equal, round, and reactive to light.   Cardiovascular:      Rate and Rhythm: Normal rate.      Comments: R DP palpable  L DP biphasic  Pulmonary:      Effort: Pulmonary effort is normal.   Abdominal:      Palpations: Abdomen is soft.   Musculoskeletal:         General: Normal range of motion.      Cervical back: Normal range of motion.   Skin:     General: Skin is warm and dry.      Capillary Refill: Capillary refill takes less than 2 seconds.      Comments: Bilateral groin sites covered with surgical dressing, soft on palpation   Neurological:      Mental Status: He is alert and oriented to person, place, and time.   Psychiatric:         Mood and Affect: Mood normal.          Significant Labs:  CBC:   Recent Labs   Lab 02/22/24  0254   WBC 9.66   RBC 3.18*   HGB 9.6*   HCT 29.1*      MCV 92    MCH 30.2   MCHC 33.0       CMP:   Recent Labs   Lab 02/22/24  0254   *   CALCIUM 8.7   ALBUMIN 3.1*   PROT 6.3      K 3.7   CO2 23      BUN 23   CREATININE 1.3   ALKPHOS 53*   ALT 11   AST 18   BILITOT 0.4         Significant Diagnostics:  I have reviewed all pertinent imaging results/findings within the past 24 hours.

## 2024-02-22 NOTE — PROGRESS NOTES
[facility]  Vascular Surgery  Progress Note    Patient Name: Deborah White  MRN: [id number]  Admission Date: 2/20/2024  Primary Care Provider: Chastity Guzmán MD    Subjective:     Interval History: No acute events overnight. Pt seen in ICU. Exam stable. Plan for discharge today.      Post-Op Info:  Procedure(s) (LRB):  REPAIR-ANEURYSM-ABDOMINAL AORTIC-ENDOVASCULAR (AAA) (N/A)   2 Days Post-Op     Medications:  Continuous Infusions:   sodium chloride 0.9%       Scheduled Meds:   acetaminophen  650 mg Oral Q6H    amLODIPine  10 mg Oral Daily    aspirin  81 mg Oral Daily    atorvastatin  80 mg Oral Daily    cloNIDine  0.2 mg Oral BID    enoxparin  40 mg Subcutaneous Q24H (prophylaxis, 1700)    gabapentin  600 mg Oral BID    hydroCHLOROthiazide  25 mg Oral Daily    LIDOcaine  1 patch Transdermal Q24H    polyethylene glycol  17 g Oral Daily    senna-docusate 8.6-50 mg  1 tablet Oral Daily     PRN Meds:ondansetron, oxyCODONE     Objective:     Vital Signs (Most Recent):  Temp: 98.7 °F (37.1 °C) (02/22/24 0300)  Pulse: 70 (02/22/24 0701)  Resp: (!) 21 (02/22/24 0701)  BP: 138/76 (02/22/24 0701)  SpO2: 97 % (02/22/24 0701) Vital Signs (24h Range):  Temp:  [97.8 °F (36.6 °C)-98.9 °F (37.2 °C)] 98.7 °F (37.1 °C)  Pulse:  [57-97] 70  Resp:  [14-30] 21  SpO2:  [94 %-98 %] 97 %  BP: (100-174)/(53-82) 138/76  Arterial Line BP: (124-159)/(45-52) 124/47           Physical Exam  HENT:      Head: Normocephalic.      Nose: Nose normal.      Mouth/Throat:      Mouth: Mucous membranes are moist.   Eyes:      Pupils: Pupils are equal, round, and reactive to light.   Cardiovascular:      Rate and Rhythm: Normal rate.      Comments: R DP palpable  L DP biphasic  Pulmonary:      Effort: Pulmonary effort is normal.   Abdominal:      Palpations: Abdomen is soft.   Musculoskeletal:         General: Normal range of motion.      Cervical back: Normal range of motion.   Skin:     General: Skin is warm and dry.       Capillary Refill: Capillary refill takes less than 2 seconds.      Comments: Bilateral groin sites covered with surgical dressing, soft on palpation   Neurological:      Mental Status: He is alert and oriented to person, place, and time.   Psychiatric:         Mood and Affect: Mood normal.          Significant Labs:  CBC:   Recent Labs   Lab 02/22/24  0254   WBC 9.66   RBC 3.18*   HGB 9.6*   HCT 29.1*      MCV 92   MCH 30.2   MCHC 33.0       CMP:   Recent Labs   Lab 02/22/24  0254   *   CALCIUM 8.7   ALBUMIN 3.1*   PROT 6.3      K 3.7   CO2 23      BUN 23   CREATININE 1.3   ALKPHOS 53*   ALT 11   AST 18   BILITOT 0.4         Significant Diagnostics:  I have reviewed all pertinent imaging results/findings within the past 24 hours.  Assessment/Plan:     * Infrarenal abdominal aortic aneurysm (AAA) without rupture  Mr. White is a 68-year old male with PMH of AAA @ 5.9cm with significant atherosclerotic disease through his L and R common iliac with significant stenosis bilaterally.  He is now s/p EVAR, L CFA endarterectomy and PTA of bilateral common iliacs on 2/20.    -OOB  -PT/OT   -Aspirin  -MMPC  -Groins soft, R dp palp, L dp biphasic  -Discharge today  -Appreciate SICU assistance         Geronimo Berrios MD  Vascular Surgery  [facility]

## 2024-02-22 NOTE — HOSPITAL COURSE
For details of hospital stay, please refer to daily progress notes. Briefly, this is a 68 y.o. male presented on 2/20 for EVAR, L CFA endarterectomy and PTA bilateral common iliacs to treat PAD.  Post-operatively patient was admitted to the ICU for close observation and had an uncomplicated hospital recovery.     On the day of discharge, the patient was ambulating without difficulty, voiding spontaneously, was tolerating a diet without nausea or vomiting, and pain was well controlled on PO pain medications. Discharge instructions were explained to the patient and appropriate follow-up was arranged.

## 2024-02-22 NOTE — PLAN OF CARE
CHW met with patient/family at bedside. Patient experience rounding completed and reviewed the following.     Do you know your discharge plan? Yes,Home    Have you discussed your needs and preferences with your SW/CM? Yes     If you are discharging home, do you have help at home? Yes    Do you think you will need help additional at home at discharge? Yes    Do you currently have difficulty keeping up with bills, affording medicine or buying food? Yes    Assigned SW/CM notified of any patient/family needs or concerns. Appropriate resources provided to address patient's needs.                            Yuriy Etienne [facility]  Case Management

## 2024-02-22 NOTE — PLAN OF CARE
SICU PLAN OF CARE    Dx: Infrarenal abdominal aortic aneurysm (AAA) without rupture    Goals of Care: -160    Vital Signs (last 12 hours):   Temp:  [97.8 °F (36.6 °C)-98.7 °F (37.1 °C)]   Pulse:  [57-86]   Resp:  [14-30]   BP: (119-174)/(59-74)   SpO2:  [94 %-98 %]      Neuro: AAOx4, Follows commands , and Moves all extremities spontaneously     Cardiac: NSR    Respiratory: Room Air    Urine Output: Voids Spontaneously  1005 mL/shift    Diet: Cardiac      Labs/Accuchecks: Daily labs/no accuchecks    Skin:  All skin remains free from injury.  Patient turned q2h, Richa mattress inflated, and bed working correctly.    Shift Events:  NAEON.  See flowsheet for further assessment/details.  Family updated on current condition/plan of care, questions answered, and emotional support provided.  MD updated on current condition, vitals, labs, and gtts.

## 2024-02-22 NOTE — NURSING
Patient discharged after I reviewed the after-visit summary with him and his daughter, Lea. Patient was able to teach back groin wound care instructions and medication instructions. Patient escorted off unit via wheelchair.

## 2024-02-22 NOTE — PLAN OF CARE
02/22/24 0818   Post-Acute Status   Post-Acute Authorization Other   Other Status No Post-Acute Service Needs      The patient is being d/c today with no social service needs identified at this time.       Yuriy Jacques LMSW  Case Management [facility]

## 2024-02-22 NOTE — PLAN OF CARE
[facility]  Discharge Final Note    Primary Care Provider: Chastity Guzmán MD    Expected Discharge Date: 2/22/2024    Final Discharge Note (most recent)       Final Note - 02/22/24 1024          Final Note    Assessment Type Final Discharge Note     Anticipated Discharge Disposition Home or Self Care     Hospital Resources/Appts/Education Provided Appointments scheduled and added to AVS        Post-Acute Status    Post-Acute Authorization Other     Other Status No Post-Acute Service Needs                     Important Message from Medicare  Important Message from Medicare regarding Discharge Appeal Rights: Given to patient/caregiver, Signed/date by patient/caregiver, Explained to patient/caregiver     Date IMM was signed: 02/22/24  Time IMM was signed: 0850    The patient was discharged with no stated needs.     Yuriy Jacques LMSW  Case Management [facility]

## 2024-02-23 ENCOUNTER — PATIENT OUTREACH (OUTPATIENT)
Dept: ADMINISTRATIVE | Facility: CLINIC | Age: 69
End: 2024-02-23
Payer: MEDICARE

## 2024-02-23 NOTE — PROGRESS NOTES
C3 nurse spoke with Deborah White's daughter Lea for a TCC post hospital discharge follow up call. [facility] @ 5700.

## 2024-02-24 LAB
BLD PROD TYP BPU: NORMAL
BLD PROD TYP BPU: NORMAL
BLOOD UNIT EXPIRATION DATE: NORMAL
BLOOD UNIT EXPIRATION DATE: NORMAL
BLOOD UNIT TYPE CODE: 6200
BLOOD UNIT TYPE CODE: 6200
BLOOD UNIT TYPE: NORMAL
BLOOD UNIT TYPE: NORMAL
CODING SYSTEM: NORMAL
CODING SYSTEM: NORMAL
CROSSMATCH INTERPRETATION: NORMAL
CROSSMATCH INTERPRETATION: NORMAL
DISPENSE STATUS: NORMAL
DISPENSE STATUS: NORMAL
TRANS ERYTHROCYTES VOL PATIENT: NORMAL ML
TRANS ERYTHROCYTES VOL PATIENT: NORMAL ML

## 2024-02-26 ENCOUNTER — NURSE TRIAGE (OUTPATIENT)
Dept: ADMINISTRATIVE | Facility: CLINIC | Age: 69
End: 2024-02-26
Payer: MEDICARE

## 2024-02-26 ENCOUNTER — HOSPITAL ENCOUNTER (EMERGENCY)
Facility: HOSPITAL | Age: 69
Discharge: HOME | End: 2024-02-27
Attending: EMERGENCY MEDICINE
Payer: MEDICARE

## 2024-02-26 DIAGNOSIS — R60.0 PEDAL EDEMA: Primary | ICD-10-CM

## 2024-02-26 DIAGNOSIS — M79.89 LEG SWELLING: ICD-10-CM

## 2024-02-26 LAB
ALBUMIN SERPL BCP-MCNC: 3 G/DL (ref 3.5–5.2)
ALP SERPL-CCNC: 59 U/L (ref 55–135)
ALT SERPL W/O P-5'-P-CCNC: 42 U/L (ref 10–44)
ANION GAP SERPL CALC-SCNC: 12 MMOL/L (ref 8–16)
ANISOCYTOSIS BLD QL SMEAR: SLIGHT
AST SERPL-CCNC: 32 U/L (ref 10–40)
BASOPHILS # BLD AUTO: 0.05 K/UL (ref 0–0.2)
BASOPHILS NFR BLD: 0.5 % (ref 0–1.9)
BILIRUB SERPL-MCNC: 0.3 MG/DL (ref 0.1–1)
BNP SERPL-MCNC: 19 PG/ML (ref 0–99)
BUN SERPL-MCNC: 23 MG/DL (ref 8–23)
CALCIUM SERPL-MCNC: 8.9 MG/DL (ref 8.7–10.5)
CHLORIDE SERPL-SCNC: 105 MMOL/L (ref 95–110)
CO2 SERPL-SCNC: 22 MMOL/L (ref 23–29)
CREAT SERPL-MCNC: 1.5 MG/DL (ref 0.5–1.4)
DIFFERENTIAL METHOD BLD: ABNORMAL
EOSINOPHIL # BLD AUTO: 0.1 K/UL (ref 0–0.5)
EOSINOPHIL NFR BLD: 1.5 % (ref 0–8)
ERYTHROCYTE [DISTWIDTH] IN BLOOD BY AUTOMATED COUNT: 14.2 % (ref 11.5–14.5)
EST. GFR  (NO RACE VARIABLE): 51 ML/MIN/1.73 M^2
GLUCOSE SERPL-MCNC: 149 MG/DL (ref 70–110)
HCT VFR BLD AUTO: 27.3 % (ref 40–54)
HGB BLD-MCNC: 9 G/DL (ref 14–18)
IMM GRANULOCYTES # BLD AUTO: 0.11 K/UL (ref 0–0.04)
IMM GRANULOCYTES NFR BLD AUTO: 1.2 % (ref 0–0.5)
LACTATE SERPL-SCNC: 1.3 MMOL/L (ref 0.5–2.2)
LYMPHOCYTES # BLD AUTO: 1.7 K/UL (ref 1–4.8)
LYMPHOCYTES NFR BLD: 18.1 % (ref 18–48)
MCH RBC QN AUTO: 29.8 PG (ref 27–31)
MCHC RBC AUTO-ENTMCNC: 33 G/DL (ref 32–36)
MCV RBC AUTO: 90 FL (ref 82–98)
MONOCYTES # BLD AUTO: 1.2 K/UL (ref 0.3–1)
MONOCYTES NFR BLD: 12.4 % (ref 4–15)
NEUTROPHILS # BLD AUTO: 6.3 K/UL (ref 1.8–7.7)
NEUTROPHILS NFR BLD: 66.3 % (ref 38–73)
NRBC BLD-RTO: 0 /100 WBC
OVALOCYTES BLD QL SMEAR: ABNORMAL
PLATELET # BLD AUTO: 315 K/UL (ref 150–450)
PLATELET BLD QL SMEAR: ABNORMAL
PMV BLD AUTO: 9.3 FL (ref 9.2–12.9)
POC ACTIVATED CLOTTING TIME K: 244 SEC (ref 74–137)
POC ACTIVATED CLOTTING TIME K: 255 SEC (ref 74–137)
POC ACTIVATED CLOTTING TIME K: 255 SEC (ref 74–137)
POC ACTIVATED CLOTTING TIME K: 261 SEC (ref 74–137)
POIKILOCYTOSIS BLD QL SMEAR: SLIGHT
POLYCHROMASIA BLD QL SMEAR: ABNORMAL
POTASSIUM SERPL-SCNC: 4.2 MMOL/L (ref 3.5–5.1)
PROT SERPL-MCNC: 7.2 G/DL (ref 6–8.4)
RBC # BLD AUTO: 3.02 M/UL (ref 4.6–6.2)
SAMPLE: ABNORMAL
SODIUM SERPL-SCNC: 139 MMOL/L (ref 136–145)
TROPONIN I SERPL DL<=0.01 NG/ML-MCNC: <0.006 NG/ML (ref 0–0.03)
WBC # BLD AUTO: 9.56 K/UL (ref 3.9–12.7)

## 2024-02-26 PROCEDURE — 99284 EMERGENCY DEPT VISIT MOD MDM: CPT | Mod: 25

## 2024-02-26 PROCEDURE — 84484 ASSAY OF TROPONIN QUANT: CPT | Performed by: EMERGENCY MEDICINE

## 2024-02-26 PROCEDURE — 87040 BLOOD CULTURE FOR BACTERIA: CPT | Performed by: NURSE PRACTITIONER

## 2024-02-26 PROCEDURE — 80053 COMPREHEN METABOLIC PANEL: CPT | Performed by: NURSE PRACTITIONER

## 2024-02-26 PROCEDURE — 85025 COMPLETE CBC W/AUTO DIFF WBC: CPT | Performed by: NURSE PRACTITIONER

## 2024-02-26 PROCEDURE — 83880 ASSAY OF NATRIURETIC PEPTIDE: CPT | Performed by: EMERGENCY MEDICINE

## 2024-02-26 PROCEDURE — 83605 ASSAY OF LACTIC ACID: CPT | Performed by: NURSE PRACTITIONER

## 2024-02-26 RX ORDER — FUROSEMIDE 40 MG/1
40 TABLET ORAL
Status: COMPLETED | OUTPATIENT
Start: 2024-02-27 | End: 2024-02-27

## 2024-02-26 RX ORDER — ONDANSETRON HYDROCHLORIDE 2 MG/ML
4 INJECTION, SOLUTION INTRAVENOUS
Status: DISCONTINUED | OUTPATIENT
Start: 2024-02-26 | End: 2024-02-27 | Stop reason: HOSPADM

## 2024-02-26 RX ORDER — MORPHINE SULFATE 4 MG/ML
2 INJECTION, SOLUTION INTRAMUSCULAR; INTRAVENOUS
Status: DISCONTINUED | OUTPATIENT
Start: 2024-02-26 | End: 2024-02-27 | Stop reason: HOSPADM

## 2024-02-26 NOTE — TELEPHONE ENCOUNTER
Pt's daughter, Lea, calling in. Pt just had surgery on 2/20 for AAA, and left leg as part of aortic aneurysm surgery. Today entire left leg is swollen. Penis is also swollen. Denies CP, SOB or fever. Leg is painful rates pain 6/10. Dispo is contact surgeon now. Spoke to Dr. Kang who advised pt go to ED to rule out DVT. Advised daughter who verbalizes understanding. Advised to call back with further concerns.      Reason for Disposition   [1] Caller has URGENT question AND [2] triager unable to answer question    Additional Information   Negative: Sounds like a life-threatening emergency to the triager   Negative: [1] Widespread rash AND [2] bright red, sunburn-like   Negative: [1] SEVERE headache AND [2] after spinal (epidural) anesthesia   Negative: [1] Vomiting AND [2] persists > 4 hours   Negative: [1] Vomiting AND [2] abdomen looks much more swollen than usual   Negative: [1] Drinking very little AND [2] dehydration suspected (e.g., no urine > 12 hours, very dry mouth, very lightheaded)   Negative: Patient sounds very sick or weak to the triager   Negative: Sounds like a serious complication to the triager   Negative: Fever > 100.4 F (38.0 C)   Negative: [1] SEVERE post-op pain (e.g., excruciating, pain scale 8-10) AND [2] not controlled with pain medications    Protocols used: Post-Op Symptoms and [id number]

## 2024-02-27 ENCOUNTER — OFFICE VISIT (OUTPATIENT)
Dept: FAMILY MEDICINE | Facility: CLINIC | Age: 69
End: 2024-02-27
Attending: FAMILY MEDICINE
Payer: MEDICARE

## 2024-02-27 VITALS
OXYGEN SATURATION: 95 % | WEIGHT: 160.25 LBS | DIASTOLIC BLOOD PRESSURE: 70 MMHG | SYSTOLIC BLOOD PRESSURE: 130 MMHG | TEMPERATURE: 98 F | BODY MASS INDEX: 27.36 KG/M2 | HEIGHT: 64 IN | HEART RATE: 88 BPM

## 2024-02-27 VITALS
TEMPERATURE: 99 F | SYSTOLIC BLOOD PRESSURE: 155 MMHG | RESPIRATION RATE: 20 BRPM | OXYGEN SATURATION: 95 % | HEART RATE: 74 BPM | DIASTOLIC BLOOD PRESSURE: 74 MMHG

## 2024-02-27 DIAGNOSIS — Z79.899 IMMUNOCOMPROMISED STATE DUE TO DRUG THERAPY: ICD-10-CM

## 2024-02-27 DIAGNOSIS — Z98.890 S/P AAA REPAIR: ICD-10-CM

## 2024-02-27 DIAGNOSIS — D84.821 IMMUNOCOMPROMISED STATE DUE TO DRUG THERAPY: ICD-10-CM

## 2024-02-27 DIAGNOSIS — I71.43 INFRARENAL ABDOMINAL AORTIC ANEURYSM (AAA) WITHOUT RUPTURE: Primary | ICD-10-CM

## 2024-02-27 DIAGNOSIS — M45.0 ANKYLOSING SPONDYLITIS OF MULTIPLE SITES IN SPINE: ICD-10-CM

## 2024-02-27 DIAGNOSIS — Z86.79 S/P AAA REPAIR: ICD-10-CM

## 2024-02-27 LAB
BACTERIA #/AREA URNS HPF: NORMAL /HPF
BILIRUB UR QL STRIP: NEGATIVE
CLARITY UR: CLEAR
COLOR UR: YELLOW
GLUCOSE UR QL STRIP: NEGATIVE
HGB UR QL STRIP: NEGATIVE
HYALINE CASTS #/AREA URNS LPF: 0 /LPF
KETONES UR QL STRIP: NEGATIVE
LEUKOCYTE ESTERASE UR QL STRIP: NEGATIVE
MICROSCOPIC COMMENT: NORMAL
NITRITE UR QL STRIP: NEGATIVE
PH UR STRIP: 6 [PH] (ref 5–8)
PROT UR QL STRIP: ABNORMAL
RBC #/AREA URNS HPF: 1 /HPF (ref 0–4)
SP GR UR STRIP: 1.02 (ref 1–1.03)
URN SPEC COLLECT METH UR: ABNORMAL
UROBILINOGEN UR STRIP-ACNC: NEGATIVE EU/DL
WBC #/AREA URNS HPF: 0 /HPF (ref 0–5)

## 2024-02-27 PROCEDURE — 25000003 PHARM REV CODE 250: Performed by: EMERGENCY MEDICINE

## 2024-02-27 PROCEDURE — 81000 URINALYSIS NONAUTO W/SCOPE: CPT | Performed by: NURSE PRACTITIONER

## 2024-02-27 PROCEDURE — 99496 TRANSJ CARE MGMT HIGH F2F 7D: CPT | Mod: S$PBB,,, | Performed by: FAMILY MEDICINE

## 2024-02-27 PROCEDURE — 99999 PR PBB SHADOW E&M-EST. PATIENT-LVL V: CPT | Mod: PBBFAC,,, | Performed by: FAMILY MEDICINE

## 2024-02-27 PROCEDURE — 99215 OFFICE O/P EST HI 40 MIN: CPT | Mod: PBBFAC,PO | Performed by: FAMILY MEDICINE

## 2024-02-27 RX ADMIN — FUROSEMIDE 40 MG: 40 TABLET ORAL at 12:02

## 2024-02-27 NOTE — ED NOTES
Pt reports took meds prior to coming to ED & pain is ok right now, would like to hold off on any pain/nausea meds

## 2024-02-27 NOTE — FIRST PROVIDER EVALUATION
Medical screening examination initiated.  I have conducted a focused provider triage encounter, findings are as follows:    Brief history of present illness:  Reports groin and bilateral leg swelling after recent heart catheterization    Vitals:    02/26/24 1824   BP: (!) 176/73   BP Location: Right arm   Patient Position: Sitting   Pulse: 97   Resp: 16   Temp: 99.4 °F (37.4 °C)   TempSrc: Oral   SpO2: 96%   Weight: (S)   Comment: unable to stand       Pertinent physical exam:  No acute distress    Brief workup plan:  Imaging, labs, further eval    Preliminary workup initiated; this workup will be continued and followed by the physician or advanced practice provider that is assigned to the patient when roomed.

## 2024-02-27 NOTE — PROGRESS NOTES
Deborah White    Chief Complaint   Patient presents with    Hospital Follow Up       History of Present Illness:   Transitional Care Note    Family and/or Caretaker present at visit?  Yes.  Diagnostic tests reviewed/disposition: No diagnosic tests pending after this hospitalization.  Disease/illness education: Discussed issues with patient.  Home health/community services discussion/referrals: Patient does not have home health established from hospital visit.  They do not need home health.  If needed, we will set up home health for the patient.   Establishment or re-establishment of referral orders for community resources: No other necessary community resources.   Discussion with other health care providers:  I will reach out to Dr. Caesar Denton, vascular surgeon, to look at photo of left inguinal procedural area.             Mr. White comes in today for hospital follow-up.  He is accompanied by his daughter Lea.  Virtual  is also present during the visit and interprets for him as his preferred language is Chinese not English.    He was hospitalized at [facility] in [place] from February 20, 2024 through February 22, 2024 for repair of infrarenal abdominal aortic aneurysm (AAA) without rupture which was  performed on February 20, 2024 by Dr. Caesar Denton,  vascular surgeon, to treat PAD. Per discharge summary, post-operatively he was admitted to the ICU for close observation and had an uncomplicated hospital recovery. On the day of discharge, he ambulated without difficulty, voided spontaneously, and tolerated a diet without nausea or vomiting. His pain was well controlled on oral pain medications. Discharge instructions were explained to him and appropriate follow-up was arranged. He was discharged to follow-up with Dr. Denton in 1 month; however, his daughter states she was told to have him follow-up with PCP in 1 week for wound check.  He was sent home without home health services.    He  states he was evaluated at [facility] on yesterday, February 26, 2024, for swelling at his genitals and both lower legs with pain at his incision site.  Workup included bilateral lower extremity venous ultrasound, chest x-ray, labs-Labs-CMP, plasma lactic acid, CBC, BNP, troponin 1, urinalysis with culture, blood culture x2 - and was given oral Lasix 40 mg for pedal edema, leg swelling.  He was advised to follow-up with PCP in 1 day.    He states he has pain at the left inguinal procedural area.  He states he takes Tylenol and gabapentin for pain with help.  He states he does not take narcotic pain medication as prescribed for him.  He states swelling in genital area and both for legs is somewhat improved.  However, he is concerned about hardening underneath the skin at the left inguinal area.    Otherwise, he denies having fever, chills, fatigue, appetite changes; shortness of breath, cough, wheezing; chest pain, palpitations; abdominal pain, nausea, vomiting, diarrhea, constipation; unusual urinary symptoms; polydipsia, polyphagia, polyuria, hot or cold intolerance; back pain; numbness, headache; anxiety, depression, homicidal or suicidal thoughts.               Current Outpatient Medications   Medication Sig    acetaminophen (TYLENOL) 325 MG tablet Take 2 tablets (650 mg total) by mouth every 8 (eight) hours as needed for Pain.    amLODIPine (NORVASC) 10 MG tablet Take 1 tablet (10 mg total) by mouth once daily.    aspirin (ECOTRIN) 81 MG EC tablet Take 81 mg by mouth once daily.    benazepriL (LOTENSIN) 40 MG tablet Take 1 tablet (40 mg total) by mouth once daily.    cilostazoL (PLETAL) 50 MG Tab Take 1 tablet (50 mg total) by mouth 2 (two) times daily.    cloNIDine (CATAPRES) 0.2 MG tablet Take 1 tablet (0.2 mg total) by mouth 2 (two) times daily.    ezetimibe (ZETIA) 10 mg tablet Take 1 tablet (10 mg total) by mouth once daily.    gabapentin (NEURONTIN) 300 MG capsule Take 2 capsules (600 mg total) by mouth 2  (two) times daily. (Patient taking differently: Take 600 mg by mouth 2 (two) times daily. 1 capsule in AM, 2 capsules in PM)    hydroCHLOROthiazide (HYDRODIURIL) 25 MG tablet Take 1 tablet (25 mg total) by mouth once daily.    linaCLOtide (LINZESS) 72 mcg Cap capsule Take 1 capsule (72 mcg total) by mouth before breakfast. (Patient taking differently: Take 72 mcg by mouth as needed.)    oxyCODONE (ROXICODONE) 5 MG immediate release tablet Take 1 tablet (5 mg total) by mouth every 6 (six) hours as needed for Pain (severe pain).    rosuvastatin (CRESTOR) 20 MG tablet Take 1 tablet (20 mg total) by mouth once daily.    senna-docusate 8.6-50 mg (PERICOLACE) 8.6-50 mg per tablet Take 1 tablet by mouth once daily.    tofacitinib (XELJANZ) 5 mg Tab Take 1 tablet (5 mg) by mouth once daily.       Review of Systems   Constitutional:  Negative for activity change, appetite change, chills, fatigue and fever.   Respiratory:  Negative for cough, shortness of breath and wheezing.    Cardiovascular:  Positive for leg swelling. Negative for chest pain and palpitations.   Gastrointestinal:  Negative for abdominal pain, constipation, diarrhea, nausea and vomiting.   Endocrine: Negative for cold intolerance, heat intolerance, polydipsia, polyphagia and polyuria.   Genitourinary:  Negative for difficulty urinating.   Musculoskeletal:  Negative for back pain.   Skin:  Positive for wound.   Neurological:  Negative for numbness and headaches.   Psychiatric/Behavioral:  Negative for dysphoric mood and suicidal ideas. The patient is not nervous/anxious.         Negative for homicidal ideas.       Objective:  Physical Exam  Vitals reviewed.   Constitutional:       General: He is not in acute distress.     Appearance: Normal appearance. He is not ill-appearing, toxic-appearing or diaphoretic.      Comments: Pleasant.   Eyes:      Comments: He wears glasses.   Cardiovascular:      Rate and Rhythm: Normal rate and regular rhythm.      Pulses:  Normal pulses.      Heart sounds: No murmur heard.  Pulmonary:      Effort: Pulmonary effort is normal. No respiratory distress.      Breath sounds: Normal breath sounds. No wheezing.   Abdominal:      General: Bowel sounds are normal. There is no distension.      Palpations: Abdomen is soft. There is no mass.      Tenderness: There is no abdominal tenderness. There is no guarding or rebound.   Musculoskeletal:         General: Swelling present. No tenderness. Normal range of motion.      Cervical back: Normal range of motion and neck supple. No tenderness.      Comments: He is ambulatory without problems. Trace edema at both lower legs.   Lymphadenopathy:      Cervical: No cervical adenopathy.   Skin:     General: Skin is warm.      Findings: Bruising present.      Comments:  Nontender bruising at right anterior hip /thigh noted.  Slight  erythema without warmth or drainage at skin over well-approximated wound at left inguinal area; however,  nontender  subcutaneous hardening over the area possibly representing hematoma.   Neurological:      General: No focal deficit present.      Mental Status: He is alert and oriented to person, place, and time.   Psychiatric:         Mood and Affect: Mood normal.         Behavior: Behavior normal.         Thought Content: Thought content normal.         Judgment: Judgment normal.         ASSESSMENT:  1. Infrarenal abdominal aortic aneurysm (AAA) without rupture    2. S/P AAA repair    3. Ankylosing spondylitis of multiple sites in spine    4. Immunocompromised state due to drug therapy        PLAN:  Deborah was seen today for hospital follow up.    Diagnoses and all orders for this visit:    Infrarenal abdominal aortic aneurysm (AAA) without rupture    S/P AAA repair    Ankylosing spondylitis of multiple sites in spine    Immunocompromised state due to drug therapy - Xeljanz for treatment of ankylosing spondylitis of multiple sites in spine      I had lengthy discussion with  patient and his daughter regarding possible hematoma at procedural site; however, I advised them I will consult with Dr. Denton via EPIC to look at wound area to see if Dr. Denton has additional recommendations at this time.   Otherwise, proceed to ER ASAP if  worsening symptoms (fever, chills, severe erythema, warmth,drainage from the area, intense pain without relief with pain medication). They verbalized understanding.  Continue current medications, follow low sodium, low cholesterol, low carb diet, daily walks.  Elevate legs prn.  Keep follow up with specialists.  Follow up if symptoms worsen or fail to improve.    40 minutes of total time spent on the encounter, which includes face to face time and non-face to face time preparing to see the patient (eg, review of tests), Obtaining and/or reviewing separately obtained history, Documenting clinical information in the electronic or other health record, Independently interpreting results (not separately reported) and communicating results to the patient/family/caregiver, or Care coordination (not separately reported).      This note is  generated with speech recognition software and is subject to transcription error and sound alike phrases that may be missed by proofreading.

## 2024-02-27 NOTE — ED NOTES
Urinal placed @bedside. Pt educated to retrieve urine sample when able to. Pt verbalized understanding

## 2024-02-27 NOTE — DISCHARGE INSTRUCTIONS
You appear to be a bit volume overloaded from her surgery.  Her ultrasounds are normal there is good flow in her legs.  You have normal pulses in her feet.  You have been given Lasix in the ED. This should help with her symptoms.  Follow up with her doctor tomorrow as scheduled return as needed

## 2024-02-27 NOTE — ED PROVIDER NOTES
SCRIBE #1 NOTE: I, Simran Briceno, am scribing for, and in the presence of, Asif Lee Jr., MD. I have scribed the entire note.       History     Chief Complaint   Patient presents with    Post-op Problem     Had AAA repair done on 2/20/24, had angioplasty to left artery. Developed swelling to both legs and groin area today. Temp remains around 99.      Review of patient's allergies indicates:  No Known Allergies      History of Present Illness     HPI    2/26/2024, 9:40 PM  History obtained from the patient  Additional history obtained from an independent historian: patient's family at bedside      History of Present Illness: Deborah White is a 69 y.o. male patient with a PMHx of CAD, HLD, and HTN who presents to the Emergency Department for evaluation of a post-op problem. The patient had an AAA repair on 2/20/2024 at [facility] with Dr. Denton (Vascular). The patient's family reports increased swelling to the BLE since the surgery. She states that the patient has also been c/o swelling to the groin area as well as bleeding and discharge from the incision sites. Symptoms are constant and moderate in severity. No mitigating or exacerbating factors reported. Patient denies any fever, SOB, cough, wheezing, CP, and all other sxs at this time. No prior Tx reported. No further complaints or concerns at this time.     Arrival mode: Personal vehicle    PCP: Chastity Guzmán MD        Past Medical History:  Past Medical History:   Diagnosis Date    Coronary artery disease     Hyperlipidemia     Hypertension     Personal history of colonic polyps        Past Surgical History:  Past Surgical History:   Procedure Laterality Date    ABDOMINAL AORTIC ANEURYSM REPAIR, ENDOVASCULAR N/A 2/20/2024    Procedure: REPAIR-ANEURYSM-ABDOMINAL AORTIC-ENDOVASCULAR (AAA);  Surgeon: Caesar Denton MD;  Location: [facility] OR [address] [place] [address];  Service: Vascular;  Laterality: N/A;  EVAR & L femoral artery cutdown  mGy 1195.48 Gycm2 215.60      Fluro Time 16.2min   Contrast 26ml    INJECTION OF ANESTHETIC AGENT AROUND MEDIAL BRANCH NERVES INNERVATING CERVICAL FACET JOINT Right 11/18/2020    Procedure: Block-nerve-medial branch-cervical Right C3, 4, 5with RN IV sedation;  Surgeon: Martín Barnes MD;  Location: [facility] PAIN [facility]GT;  Service: Pain Management;  Laterality: Right;    INJECTION OF ANESTHETIC AGENT AROUND MEDIAL BRANCH NERVES INNERVATING CERVICAL FACET JOINT Bilateral 04/28/2023    Procedure: Bilateral C4-6 MBB with RN IV sedation;  Surgeon: Rudolph Burgos MD;  Location: [facility]IN [facility];  Service: Pain Management;  Laterality: Bilateral;         Family History:  Family History   Problem Relation Age of Onset    Cancer Mother     Cancer Father     Hypertension Father     Early death Brother         flu    Heart disease Brother     Hypertension Brother        Social History:  Social History     Tobacco Use    Smoking status: Every Day     Current packs/day: 0.50     Average packs/day: 0.5 packs/day for 45.0 years (22.5 ttl pk-yrs)     Types: Cigarettes    Smokeless tobacco: Never   Substance and Sexual Activity    Alcohol use: Not Currently    Drug use: Never    Sexual activity: Not Currently     Partners: Female        Review of Systems     Review of Systems   Constitutional:  Negative for fever.   HENT:  Negative for sore throat.    Respiratory:  Negative for cough, shortness of breath and wheezing.    Cardiovascular:  Positive for leg swelling (BLE). Negative for chest pain.   Gastrointestinal:  Negative for nausea.   Genitourinary:  Negative for dysuria.        [+] groin swelling   Musculoskeletal:  Negative for back pain.   Skin:  Positive for wound (bleeding and discharge from surgical incision sites at the groin). Negative for rash.   Neurological:  Negative for weakness.   Hematological:  Does not bruise/bleed easily.   All other systems reviewed and are negative.     Physical Exam     Initial Vitals [02/26/24 1824]   BP Pulse Resp Temp SpO2   (!)  176/73 97 16 99.4 °F (37.4 °C) 96 %      MAP       --          Physical Exam  Nursing Notes and Vital Signs Reviewed.  Constitutional: Patient is in no acute distress. Well-developed and well-nourished.  Head: Atraumatic. Normocephalic.  Eyes:  EOM intact.  No scleral icterus.  ENT: Mucous membranes are moist.  Nares clear   Neck:  Full ROM. No JVD.  Cardiovascular: Regular rate. Regular rhythm No murmurs, rubs, or gallops. Distal pulses are 2+ and symmetric  Pulmonary/Chest: No respiratory distress. Clear to auscultation bilaterally. No wheezing or rales.  Equal chest wall rise bilaterally  Abdominal: Soft and non-distended.  There is no tenderness.  No rebound, guarding, or rigidity. Good bowel sounds.  Genitourinary: No CVA tenderness.  No suprapubic tenderness  Musculoskeletal: Moves all extremities. No obvious deformities.  5 x 5 strength in all extremities patient was symmetrical 2+ bilateral pedal edema.  Has normal pulses in both feet.  Feet are warm to the touch.  Has normal sensation.  Skin: Warm and dry.  Surgical wounds in bilateral groins.  These are healing well.  Some dependent edema and bruising in the groins as well as over the proximal genitalia was appears old.  There is no swelling.  No hematoma.  Was clean dry and intact.  Neurological:  Alert, awake, and appropriate.  Normal speech.  No acute focal neurological deficits are appreciated.  Two through 12 intact bilaterally.  Psychiatric: Normal affect. Good eye contact. Appropriate in content.     ED Course   Procedures  ED Vital Signs:  Vitals:    02/26/24 1824 02/26/24 2156 02/26/24 2158 02/26/24 2202   BP: (!) 176/73 (!) 151/68  (!) 144/65   Pulse: 97  72 72   Resp: 16   16   Temp: 99.4 °F (37.4 °C)      TempSrc: Oral      SpO2: 96%  98% 96%    02/26/24 2233 02/26/24 2302 02/26/24 2334 02/27/24 0002   BP: (!) 118/56 (!) 130/57 137/60 (!) 144/67   Pulse: 63 (!) 59 64 68   Resp: 17 16 14 16   Temp:       TempSrc:       SpO2: 97% 96% 98% 95%        Abnormal Lab Results:  Labs Reviewed   CBC W/ AUTO DIFFERENTIAL - Abnormal; Notable for the following components:       Result Value    RBC 3.02 (*)     Hemoglobin 9.0 (*)     Hematocrit 27.3 (*)     Immature Granulocytes 1.2 (*)     Immature Grans (Abs) 0.11 (*)     Mono # 1.2 (*)     All other components within normal limits   COMPREHENSIVE METABOLIC PANEL - Abnormal; Notable for the following components:    CO2 22 (*)     Glucose 149 (*)     Creatinine 1.5 (*)     Albumin 3.0 (*)     eGFR 51 (*)     All other components within normal limits   CULTURE, BLOOD   CULTURE, BLOOD   LACTIC ACID, PLASMA   TROPONIN I   B-TYPE NATRIURETIC PEPTIDE   URINALYSIS, REFLEX TO URINE CULTURE        All Lab Results:  Results for orders placed or performed during the hospital encounter of 02/26/24   CBC auto differential   Result Value Ref Range    WBC 9.56 3.90 - 12.70 K/uL    RBC 3.02 (L) 4.60 - 6.20 M/uL    Hemoglobin 9.0 (L) 14.0 - 18.0 g/dL    Hematocrit 27.3 (L) 40.0 - 54.0 %    MCV 90 82 - 98 fL    MCH 29.8 27.0 - 31.0 pg    MCHC 33.0 32.0 - 36.0 g/dL    RDW 14.2 11.5 - 14.5 %    Platelets 315 150 - 450 K/uL    MPV 9.3 9.2 - 12.9 fL    Immature Granulocytes 1.2 (H) 0.0 - 0.5 %    Gran # (ANC) 6.3 1.8 - 7.7 K/uL    Immature Grans (Abs) 0.11 (H) 0.00 - 0.04 K/uL    Lymph # 1.7 1.0 - 4.8 K/uL    Mono # 1.2 (H) 0.3 - 1.0 K/uL    Eos # 0.1 0.0 - 0.5 K/uL    Baso # 0.05 0.00 - 0.20 K/uL    nRBC 0 0 /100 WBC    Gran % 66.3 38.0 - 73.0 %    Lymph % 18.1 18.0 - 48.0 %    Mono % 12.4 4.0 - 15.0 %    Eosinophil % 1.5 0.0 - 8.0 %    Basophil % 0.5 0.0 - 1.9 %    Platelet Estimate Appears normal     Aniso Slight     Poik Slight     Poly Occasional     Ovalocytes Occasional     Differential Method Automated    Comprehensive metabolic panel   Result Value Ref Range    Sodium 139 136 - 145 mmol/L    Potassium 4.2 3.5 - 5.1 mmol/L    Chloride 105 95 - 110 mmol/L    CO2 22 (L) 23 - 29 mmol/L    Glucose 149 (H) 70 - 110 mg/dL    BUN 23 8  - 23 mg/dL    Creatinine 1.5 (H) 0.5 - 1.4 mg/dL    Calcium 8.9 8.7 - 10.5 mg/dL    Total Protein 7.2 6.0 - 8.4 g/dL    Albumin 3.0 (L) 3.5 - 5.2 g/dL    Total Bilirubin 0.3 0.1 - 1.0 mg/dL    Alkaline Phosphatase 59 55 - 135 U/L    AST 32 10 - 40 U/L    ALT 42 10 - 44 U/L    eGFR 51 (A) >60 mL/min/1.73 m^2    Anion Gap 12 8 - 16 mmol/L   Lactic acid, plasma #1   Result Value Ref Range    Lactate (Lactic Acid) 1.3 0.5 - 2.2 mmol/L   Troponin I   Result Value Ref Range    Troponin I <0.006 0.000 - 0.026 ng/mL   Brain natriuretic peptide   Result Value Ref Range    BNP 19 0 - 99 pg/mL         Imaging Results:  Imaging Results              US Lower Extremity Veins Bilateral (Final result)  Result time 02/26/24 20:25:08      Final result by Jacob Wolfe MD (02/26/24 20:25:08)                   Impression:      No evidence of deep venous thrombosis in either lower extremity.      Electronically signed by: Jacob Wolfe  Date:    02/26/2024  Time:    20:25               Narrative:    EXAMINATION:  US LOWER EXTREMITY VEINS BILATERAL    CLINICAL HISTORY:  Other specified soft tissue disorders    TECHNIQUE:  Duplex and color flow Doppler and dynamic compression was performed of the bilateral lower extremity veins was performed.    COMPARISON:  None    FINDINGS:  Right thigh veins: The common femoral, femoral, popliteal, upper greater saphenous, and deep femoral veins are patent and free of thrombus. The veins are normally compressible and have normal phasic flow and augmentation response.    Right calf veins: The visualized calf veins are patent.    Left thigh veins: The common femoral, femoral, popliteal, upper greater saphenous, and deep femoral veins are patent and free of thrombus. The veins are normally compressible and have normal phasic flow and augmentation response.    Left calf veins: The visualized calf veins are patent.    Miscellaneous: None                                       X-Ray Chest AP Portable (Final  result)  Result time 02/26/24 19:13:18      Final result by Jacob Wolfe MD (02/26/24 19:13:18)                   Impression:      No acute abnormality..  Prominent cardiac silhouette      Electronically signed by: Jacob Wolfe  Date:    02/26/2024  Time:    19:13               Narrative:    EXAMINATION:  XR CHEST AP PORTABLE    CLINICAL HISTORY:  Sepsis;    TECHNIQUE:  Single frontal view of the chest was performed.    COMPARISON:  None    FINDINGS:  The lungs are clear, with normal appearance of pulmonary vasculature and no pleural effusion or pneumothorax.    Prominent cardiac silhouette..  The hilar and mediastinal contours are unremarkable.    Bones are intact.                                                The Emergency Provider reviewed the vital signs and test results, which are outlined above.     ED Discussion     11:46 PM: Reassessed pt at this time. Discussed with pt and family all pertinent ED information and results. Discussed pt dx and plan of tx. Gave pt all f/u and return to the ED instructions. All questions and concerns were addressed at this time. Pt expresses understanding of information and instructions, and is comfortable with plan to discharge. Pt is stable for discharge.    I discussed with patient and/or family/caretaker that evaluation in the ED does not suggest any emergent or life threatening medical conditions requiring immediate intervention beyond what was provided in the ED, and I believe patient is safe for discharge.  Regardless, an unremarkable evaluation in the ED does not preclude the development or presence of a serious of life threatening condition. As such, patient was instructed to return immediately for any worsening or change in current symptoms.         Medical Decision Making  Differential diagnosis: CHF, bilateral pulmonary edema, pedal edema, T0 occlusion, postoperative issue, graft occlusion    Patient was evaluated history and physical examination.  Patient was  postop from a AAA stenting procedure.  Has some dependent edema bilaterally no history of CHF.  Patient with bilateral lower extremity ultrasound showing no DVT.  He would good flow on the ultrasound and no pseudoaneurysm per the tech.  Blood work was obtained showing a normal troponin normal BNP hemoglobin of 9 with a normal white count.  He would mild elevation creatinine 1.5.  Lactate was 1.3.  Good peripheral pulses.  Clinically the patient likely has volume overload secondary to his recent procedure.  I will treat with Lasix here.  He is to follow up tomorrow for re-evaluation.  Patient verbalized understanding agreement with the plan of care.  He is safe for discharge in my opinion    Amount and/or Complexity of Data Reviewed  Independent Historian: caregiver     Details: Patient's family  External Data Reviewed: labs and notes.  Labs: ordered. Decision-making details documented in ED Course.  Radiology: ordered. Decision-making details documented in ED Course.    Risk  OTC drugs.  Prescription drug management.  Drug therapy requiring intensive monitoring for toxicity.  Decision regarding hospitalization.                ED Medication(s):  Medications   ondansetron injection 4 mg (0 mg Intravenous Hold 2/26/24 2220)   morphine injection 2 mg (0 mg Intravenous Hold 2/26/24 2220)   furosemide tablet 40 mg (has no administration in time range)       New Prescriptions    No medications on file        Follow-up Information       Chastity Guzmán MD In 1 day.    Specialty: Family Medicine  Contact information:  [address]  [facility] [address] [phone]                                 Scribe Attestation:   Scribe #1: I performed the above scribed service and the documentation accurately describes the services I performed. I attest to the accuracy of the note.     Attending:   Physician Attestation Statement for Scribe #1: I, Asif Lee Jr., MD, personally performed the services described in this  documentation, as scribed by Simran Briceno, in my presence, and it is both accurate and complete.           Clinical Impression       ICD-10-CM ICD-9-CM   1. Pedal edema  R60.0 782.3   2. Leg swelling  M79.89 729.81       Disposition:   Disposition: Discharged  Condition: Stable         Asif Lee Jr., MD  02/27/24 0034

## 2024-02-28 ENCOUNTER — TELEPHONE (OUTPATIENT)
Dept: VASCULAR SURGERY | Facility: CLINIC | Age: 69
End: 2024-02-28
Payer: MEDICARE

## 2024-02-28 NOTE — TELEPHONE ENCOUNTER
Per Dr. Denton's request nurse contacted pt's daughter Lea to discuss Rx for augmentin 50 mg PO TID x 7 days for possible infected groin wound and to schedule appt next week for wound check. Confirmed pt's preferred pharmacy and Lea verbalized understanding. Lea states pt is unable to report to clinic next week due to transportation barriers and Dr. Denton not in clinic 3/6/24 - 3/8/24. Offered appt on 3/13/24 in [place], Lea accepted. Appt scheduled. Notified Lea that if groin wound worsens at all she is to contact clinic for urgent appt. Discussed s/s of infection. Lea verbalized understanding and states she will explain this to patient but that pt will not allow her to see wound, but rather will only allow her  to see wound due to privacy. Daughter asked if HH is an option. Notified daughter that nurse will discuss with Dr. Denton. Message sent, will contact daughter with response.

## 2024-02-28 NOTE — PHYSICIAN QUERY
PT Name: Deborah White  MR #: [id number]    DOCUMENTATION CLARIFICATION     CDS/: Jordana BurgessRN               Contact information: [email or web address]    This form is a permanent document in the medical record.     Query Date: February 28, 2024    Dear Provider,  By submitting this query, we are merely seeking further clarification of documentation. Please utilize your independent clinical judgment when addressing the question(s) below.    The medical record contains the following:  Supporting Clinical Findings Location in Medical Record   Comments: Bilateral groin sites dressed. Right groin appears slightly more swollen due to hematoma. Bilateral femoral and pedal pulses palpable    Groin sites non tender and soft, no significant hematoma      Right groin hematoma smaller in size.  Palpable DP/PT bilaterally  Right groin hematoma, softer than yesterday, with significant bruising  Palpable left femoral, difficult to palpate right fem due to hematoma and patient discomfort    Pre-op Diagnosis:      Infrarenal abdominal aortic aneurysm (AAA) without rupture [I71.43]  Post-op Diagnosis:      Post-Op Diagnosis Codes:     * Infrarenal abdominal aortic aneurysm (AAA) without rupture [I71.43]  Procedures:    1) Endovascular AAA repair (23 x 12 x 120cm [place] Excluder, extended bilaterally with 12mm iliac extenders)  2) Left common femoral artery endarterectomy patch angioplasty  3) PTA bilateral common iliac artery    H&P 2/20      CCS PN 2/21    CCS PN 2/22          Op Note 2/21       Please clarify if __Right groin hematoma_____________ (as it relates to _Endovascular AAA repair____) is:      [  ] Complication of the procedure     [  ] Present, but not a complication of the procedure     [  x] Incidental finding, not clinically significant     [  ] Other (please specify): __________________         Please document in your progress notes daily for the duration of treatment until resolved and include in your  discharge summary.

## 2024-03-01 ENCOUNTER — PATIENT MESSAGE (OUTPATIENT)
Dept: VASCULAR SURGERY | Facility: CLINIC | Age: 69
End: 2024-03-01
Payer: MEDICARE

## 2024-03-03 LAB
BACTERIA BLD CULT: NORMAL
BACTERIA BLD CULT: NORMAL

## 2024-03-12 ENCOUNTER — TELEPHONE (OUTPATIENT)
Dept: RHEUMATOLOGY | Facility: CLINIC | Age: 69
End: 2024-03-12
Payer: MEDICARE

## 2024-03-12 NOTE — TELEPHONE ENCOUNTER
----- Message from Rebecca Marie PA-C sent at 3/12/2024  3:25 PM CDT -----  Regarding: FW: Xeljanz  Please schedule follow up w first available provider to discuss alternative to Xeljanz.  He needs to DC xeljanz w recent AAA found in the ER>    ----- Message -----  From: Kory Lopez MD  Sent: 3/11/2024   6:22 PM CDT  To: Rebecca Marie PA-C  Subject: RE: Xeljanz                                      Yes.  Discontinue Xeljanz and try Enbrel if no contraindications.  ----- Message -----  From: Rebecca Marie PA-C  Sent: 3/11/2024   2:50 PM CDT  To: Kory Lopez MD  Subject: FW: Xeljanz                                      Do you want him to continue xeljanz?  Recent hospitalization for AAA      ----- Message -----  From: Gideon Yao, PharmD  Sent: 3/11/2024   2:32 PM CDT  To: Rebecca Marie PA-C  Subject: Xeljanz                                          Hey good afternoon,     I was in the processing of refilling the patient's Xeljanz and noticed he had a few recent hospitalizations. Recently he experienced an abdominal aortic aneurysm at the end of February. He is currently on Xeljanz for his Ankylosing Spondylitis and his next appt is on 4/10.  Would you like for him to continue Xeljanz at hold it until you are able to evaluate him further?    Thank you,     Gideon Yao, PharmD   Clinical Pharmacist   [facility]   [phone]

## 2024-03-12 NOTE — TELEPHONE ENCOUNTER
----- Message from Kory Lopez MD sent at 3/11/2024  6:21 PM CDT -----  Regarding: RE: Xeljanz  Yes.  Discontinue Xeljanz and try Enbrel if no contraindications.  ----- Message -----  From: Rebecca Marie PA-C  Sent: 3/11/2024   2:50 PM CDT  To: Kory Lopez MD  Subject: FW: Xeljanz                                      Do you want him to continue xeljanz?  Recent hospitalization for AAA      ----- Message -----  From: Gideon Yao, PharmD  Sent: 3/11/2024   2:32 PM CDT  To: Rebecca Marie PA-C  Subject: Xeljanz                                          Hey good afternoon,     I was in the processing of refilling the patient's Xeljanz and noticed he had a few recent hospitalizations. Recently he experienced an abdominal aortic aneurysm at the end of February. He is currently on Xeljanz for his Ankylosing Spondylitis and his next appt is on 4/10.  Would you like for him to continue Xeljanz at hold it until you are able to evaluate him further?    Thank you,     Gideon Yao, PharmD   Clinical Pharmacist   [facility]   [phone]

## 2024-03-13 ENCOUNTER — OFFICE VISIT (OUTPATIENT)
Dept: VASCULAR SURGERY | Facility: CLINIC | Age: 69
End: 2024-03-13
Payer: MEDICARE

## 2024-03-13 ENCOUNTER — TELEPHONE (OUTPATIENT)
Dept: VASCULAR SURGERY | Facility: CLINIC | Age: 69
End: 2024-03-13

## 2024-03-13 VITALS
HEIGHT: 64 IN | SYSTOLIC BLOOD PRESSURE: 137 MMHG | BODY MASS INDEX: 27.31 KG/M2 | HEART RATE: 72 BPM | DIASTOLIC BLOOD PRESSURE: 79 MMHG | RESPIRATION RATE: 17 BRPM | WEIGHT: 160 LBS

## 2024-03-13 DIAGNOSIS — I71.43 INFRARENAL ABDOMINAL AORTIC ANEURYSM (AAA) WITHOUT RUPTURE: Primary | ICD-10-CM

## 2024-03-13 DIAGNOSIS — Z98.890 OTHER SPECIFIED POSTPROCEDURAL STATES: Primary | ICD-10-CM

## 2024-03-13 PROCEDURE — 99999 PR PBB SHADOW E&M-EST. PATIENT-LVL III: CPT | Mod: PBBFAC,,, | Performed by: SURGERY

## 2024-03-13 PROCEDURE — 99024 POSTOP FOLLOW-UP VISIT: CPT | Mod: POP,,, | Performed by: SURGERY

## 2024-03-13 PROCEDURE — 99213 OFFICE O/P EST LOW 20 MIN: CPT | Mod: PBBFAC,PN | Performed by: SURGERY

## 2024-03-13 NOTE — TELEPHONE ENCOUNTER
Orders signed  
I personally performed the service described in the documentation recorded by the scribe in my presence, and it accurately and completely records my words and actions.

## 2024-03-13 NOTE — PROGRESS NOTES
Patient returns.  He is status post an EVAR repair and a left carotid endarterectomy proximally 3 weeks ago.  He is doing well.  Can walk much further now.  He has no numbness in his legs and he can walk a lot further he is very happy about that.  There was some question about his left groin incision.  However he lives between [facility].  They sent pictures him.  I am not sure exactly what it was but we started him on some antibiotics prophylactically.  The wound looks good now.  He does have some redness around the wound but around his whole waistline from his trousers being too tight in his underwear rubbing on it.  Overall the wound looks good there was no obvious purulence, no obvious issues with that.  He has no palpable abdominal mass I can not feel an aneurysms.    He is doing well from surgery.  We will have him return in approximately 4 weeks.  We will have him get a CTA in [place] to look at the endograft prior to this.    All their questions were answered patient is doing well.

## 2024-03-14 ENCOUNTER — PATIENT MESSAGE (OUTPATIENT)
Dept: VASCULAR SURGERY | Facility: CLINIC | Age: 69
End: 2024-03-14
Payer: MEDICARE

## 2024-03-26 NOTE — PROGRESS NOTES
Established  Patient Chronic Pain Note     Referring Physician: No ref. provider found    PCP: Chastity Guzmán MD    Chief Complaint:   Chief Complaint   Patient presents with    Shoulder Pain    Arm Pain    Neck Pain        SUBJECTIVE:  Interval history 03/27/2024  Patient presents for MRI cervical and lumbar spine review      Today patient again reports pain in the neck which radiates down bilateral upper extremities to the hands in C6-7 dermatomal distribution.  Pain is constant and today is rated a 6/10.  Pain is worse on the left than on the right.  Patient does report associated numbness in bilateral hands but denies outright weakness in the upper extremities or compromise in hand  strength or dexterity.  Pain can be exacerbated with cervical flexion/extension and overhead activities.  Pain is improved with use of gabapentin which he is taking 300 mg in the morning and 600 mg in the evening.  Patient's daughter does report he experiences slight sedation with morning dose of gabapentin.  Patient has continued physician directed physical therapy exercises over the last 8 weeks from 01/27/2024 through 03/27/2024 with marginal improvement in pain.    Pain in the lower back and leg pain has improved with abdominal aortic endovascular aneurysm repair 02/20/2024.  Today patient's primary concern neck and arm pain.    Interval Hx: 05/31/2023  [employer] services used to help translate (Cantonese).    Pt presents s/p bilateral C4-6 MBB 04/28/2023.  Patient reports 80% sustained relief in bilateral axial neck pain following cervical medial branch block.  Today he reports primarily radicular pain in the arms and in the legs.  Pain is intermittent and today is rated a 5/10.  Patient reports pain which radiates into bilateral shoulders in C6-7 dermatomal distribution and further down into the upper extremities in no particular dermatomal distribution to the hands.  Patient also reports lower back pain which  "radiates down bilateral lower extremities but predominantly on the left into the knee.  This pain is exacerbated with standing and with ambulation.  Patient does endorse noticeable weakness in the upper and lower extremities associated with use.  Patient has continued gabapentin 300 mg twice daily with noticeable improvement in his pain.  Patient is requesting a refill of this medication.  Patient has been performing physician directed physical therapy exercises over the last 8 weeks with mild improvement in his symptoms.    HPI 04/05/2023  Deborah White is a 69 y.o. male with past medical history significant for diffuse idiopathic skeletal hyperostosis (dish), coronary artery disease, peripheral arterial disease, hypertension, hyperlipidemia, latent TB, rheumatoid arthritis, ankylosing spondylitis, nicotine dependence who presents to the clinic for the evaluation of   Neck and lower back pain.  Today patient's daughter is in the room to facilitate translation as well as use of the Timothy.  She reports his pain is most severe in the neck.  Patient reports pain is constant and today is rated a 7/10, at its best is a 5/10 and at its worse is a 10/10.  Pain is described as "inside" or deep in nature.  Patient reports pain in the neck which radiates down the left upper extremity to the hand in C6 dermatomal distribution.  Pain is exacerbated with lifting exceeding 5 lb and cervical lateral flexion.  Patient denies significant weakness in the left upper extremity but does report numbness and tingling in the hand which can affect hand  strength or dexterity.  Pain is improved with rest, gabapentin which she is taking 300 mg in the morning and 300 mg in the evening as well as with prior cervical medial branch block.  Patient reports he received 1 year of relief following prior right-sided C4-6 cervical medial branch block with Dr. Barnes.   Patient has completed conventional physical therapy in November 2020 without any " improvement in his pain.    Patient also reports lower back pain which radiates down the left lower extremity to the knee.  Pain is described as numbness and tingling in nature.  Pain can be exacerbated with standing or with ambulation.  Patient's daughter also reports some associated weakness in the left lower extremity.    Patient reports significant motor weakness and loss of sensations.  Patient denies night fever/night sweats, urinary incontinence, bowel incontinence, and significant weight loss.      Pain Disability Index Review:         3/27/2024     8:46 AM   Last 3 PDI Scores   Pain Disability Index (PDI) 36       Non-Pharmacologic Treatments:  Physical Therapy/Home Exercise: yes  Ice/Heat:yes  TENS: no  Acupuncture: no  Massage: no  Chiropractic: no    Other: no      Pain Medications:  - Adjuvant Medications: Neurontin (Gabapentin), Xeljanz  - Anti-Coagulants: Aspirin,, cilostazol    Pain Procedures:   -04/28/2023: bilateral C4-6 MBB   -11/18/2020: Right-sided C4-6 medial branch blocks; Dr. Barnes    Past Medical History:   Diagnosis Date    Coronary artery disease     Hyperlipidemia     Hypertension     Personal history of colonic polyps      Past Surgical History:   Procedure Laterality Date    ABDOMINAL AORTIC ANEURYSM REPAIR, ENDOVASCULAR N/A 2/20/2024    Procedure: REPAIR-ANEURYSM-ABDOMINAL AORTIC-ENDOVASCULAR (AAA);  Surgeon: Caesar Denton MD;  Location: [facility] OR [address] [place] [address];  Service: Vascular;  Laterality: N/A;  EVAR & L femoral artery cutdown  mGy 1195.48 Gycm2 215.60     Fluro Time 16.2min   Contrast 26ml    INJECTION OF ANESTHETIC AGENT AROUND MEDIAL BRANCH NERVES INNERVATING CERVICAL FACET JOINT Right 11/18/2020    Procedure: Block-nerve-medial branch-cervical Right C3, 4, 5with RN IV sedation;  Surgeon: Martín Barnes MD;  Location: [facility] PAIN T;  Service: Pain Management;  Laterality: Right;    INJECTION OF ANESTHETIC AGENT AROUND MEDIAL BRANCH NERVES INNERVATING CERVICAL FACET JOINT Bilateral  04/28/2023    Procedure: Bilateral C4-6 MBB with RN IV sedation;  Surgeon: Rudolph Burgos MD;  Location: [facility] PAIN T;  Service: Pain Management;  Laterality: Bilateral;     Review of patient's allergies indicates:  No Known Allergies    Current Outpatient Medications   Medication Sig    acetaminophen (TYLENOL) 325 MG tablet Take 2 tablets (650 mg total) by mouth every 8 (eight) hours as needed for Pain.    amLODIPine (NORVASC) 10 MG tablet Take 1 tablet (10 mg total) by mouth once daily.    aspirin (ECOTRIN) 81 MG EC tablet Take 81 mg by mouth once daily.    benazepriL (LOTENSIN) 40 MG tablet Take 1 tablet (40 mg total) by mouth once daily.    cilostazoL (PLETAL) 50 MG Tab Take 1 tablet (50 mg total) by mouth 2 (two) times daily.    cloNIDine (CATAPRES) 0.2 MG tablet Take 1 tablet (0.2 mg total) by mouth 2 (two) times daily.    ezetimibe (ZETIA) 10 mg tablet Take 1 tablet (10 mg total) by mouth once daily.    hydroCHLOROthiazide (HYDRODIURIL) 25 MG tablet Take 1 tablet (25 mg total) by mouth once daily.    linaCLOtide (LINZESS) 72 mcg Cap capsule Take 1 capsule (72 mcg total) by mouth before breakfast. (Patient taking differently: Take 72 mcg by mouth as needed.)    oxyCODONE (ROXICODONE) 5 MG immediate release tablet Take 1 tablet (5 mg total) by mouth every 6 (six) hours as needed for Pain (severe pain).    rosuvastatin (CRESTOR) 20 MG tablet Take 1 tablet (20 mg total) by mouth once daily.    senna-docusate 8.6-50 mg (PERICOLACE) 8.6-50 mg per tablet Take 1 tablet by mouth once daily.    tofacitinib (XELJANZ) 5 mg Tab Take 1 tablet (5 mg) by mouth once daily.    gabapentin (NEURONTIN) 300 MG capsule Take 1 capsule (300 mg total) by mouth once daily AND 2 capsules (600 mg total) every evening.     No current facility-administered medications for this visit.       Review of Systems     GENERAL:  No weight loss, malaise or fevers.  HEENT:   No recent changes in vision or hearing  NECK:  Negative for lumps, no  "difficulty with swallowing.  RESPIRATORY:  Negative for cough, wheezing or shortness of breath, patient denies any recent URI.  CARDIOVASCULAR:  Negative for chest pain or palpitations.  GI:  Negative for abdominal discomfort, blood in stools or black stools or change in bowel habits.  MUSCULOSKELETAL:  See HPI.  SKIN:  Negative for lesions, rash, and itching.  PSYCH:  No mood disorder or recent psychosocial stressors.   HEMATOLOGY/LYMPHOLOGY:  Negative for prolonged bleeding, bruising easily or swollen nodes.    NEURO:   No history of syncope, paralysis, seizures or tremors.  All other reviewed and negative other than HPI.    OBJECTIVE:    /71   Pulse (!) (P) 51   Resp 17   Ht 5' 4" (1.626 m)   Wt 74 kg (163 lb 2.3 oz)   BMI 28.00 kg/m²       Physical Exam    GENERAL: Well appearing, in no acute distress, alert and oriented x3.  PSYCH:  Mood and affect appropriate.  SKIN: Skin color, texture, turgor normal, no rashes or lesions.  HEAD/FACE:  Normocephalic, atraumatic. Cranial nerves grossly intact.    NECK:  pain to palpation over the cervical paraspinous muscles. Spurling Negative. pain with neck flexion, extension, or lateral flexion.   Normaltesting biceps, triceps and brachioradialis bilaterally.    NegativeHoffmann's bilaterally.    5/5 strength testing deltoid, biceps, triceps, wrist extensor, wrist flexor and ulnar intrinsics bilaterally.    Normal  strength bilaterally    CV: RRR with palpation of the radial artery.  PULM: No evidence of respiratory difficulty, symmetric chest rise.  GI:  Soft and non-tender.      NEURO: Bilateral upper and lower extremity coordination and muscle stretch reflexes are physiologic and symmetric. No loss of sensation is noted.  GAIT: normal.    Imaging:   MRI lumbar spine 06/20/2023  FINDINGS:  Alignment: Left-sided spinal asymmetry.  Grade 1 degenerative L4-L5 anterolisthesis.     Vertebrae: Lumbar vertebral body heights are maintained.  Minor L4-L5 endplate " degenerative changes.  No significant endplate edema.  No evidence of acute fracture.  Marrow signal is otherwise within normal limits.     Discs: Disc desiccation throughout the lumbar spine.  Similar L4-L5 disc height loss.     Cord: Within normal limits.  Conus terminates at T12-L1.     Degenerative findings:     T12-L1: No significant disc bulge, spinal canal stenosis or neural foraminal stenosis.     L1-L2: Minimal broad-based disc bulge.  No significant spinal canal stenosis or neural foraminal stenosis.     L2-L3: Minor broad-based disc bulge and mild bilateral facet arthropathy.  No significant spinal canal stenosis or neural foraminal stenosis.     L3-L4: Minimal broad-based disc bulge and mild bilateral facet arthropathy.  No significant spinal canal stenosis or neural foraminal stenosis.     L4-L5: Grade 1 degenerative appearing anterolisthesis.  Mild broad-based disc bulge and moderate bilateral facet arthropathy.  Narrowing of the lateral recesses without significant spinal canal stenosis.  Moderate right and mild-to-moderate left-sided neural foraminal stenosis.     L5-S1: No significant disc bulge.  Bilateral facet arthropathy.  No significant spinal canal stenosis.  Mild right-sided neural foraminal stenosis.     Paraspinal muscles & soft tissues: 5.3 cm infrarenal abdominal aortic aneurysm.  Paraspinal soft tissues are otherwise within normal limits.     Impression:     1. Mild multilevel degenerative changes of the lumbar spine are similar to the prior MRI 04/26/2022 as discussed in the body of the report.  2. Infrarenal abdominal aortic aneurysm as previously described.    MRI cervical spine 06/20/2023  FINDINGS:  Alignment: Pain mild straightening of the normal cervical lordosis.     Vertebrae: Cervical vertebral body heights are maintained.  Minor multilevel endplate degenerative changes.  No significant endplate edema.  No evidence of an acute fracture.  Edema involving the right C3-C4 facet  joint.  There appears to be osseous fusion of the right C4-C5 facet joint.  Marrow signal is otherwise within normal limits.     Discs: Disc desiccation throughout the cervical spine.  Disc heights appear maintained.     Cord: C5-C6 prominent cord deformity with small associated area cord signal change concerning for myelomalacia.     Skull base and craniocervical junction: Within normal limits.     Degenerative findings:     C2-C3: Minimal broad-based disc osteophyte complex.  No ventral cord contact or spinal canal stenosis.  Right greater than left facet arthropathy contributing to mild-to-moderate right and mild left-sided neural foraminal stenosis.     C3-C4: Central disc osteophyte complex contacts and deforms the ventral cord.  Significant right-sided facet arthropathy with ligamentum flavum hypertrophy contributes to mild spinal canal stenosis.  There is severe right and mild-to-moderate left-sided neural foraminal stenosis.     C4-C5: Small central disc osteophyte complex contacts and slightly deforms the ventral cord without significant spinal canal stenosis.  Significant right-sided facet arthropathy with fusion of the facet joint.  Uncovertebral joint spurring contributes to severe right and mild-to-moderate left-sided neural foraminal stenosis.     C5-C6: Broad-based disc osteophyte complex contacts and flattens the ventral cord.  Right greater than left facet arthropathy and ligamentum flavum hypertrophy contributes to moderate spinal canal stenosis.  Uncovertebral joint spurring contributes to moderate to severe right and severe left-sided neural foraminal stenosis.     C6-C7: Mild broad-based disc osteophyte complex contacts and flattens the ventral cord.  Mild bilateral facet arthropathy, ligamentum flavum hypertrophy and uncovertebral joint spurring contribute to mild spinal canal stenosis and severe bilateral neural foraminal stenosis.     C7-T1: No significant disc osteophyte complex, spinal  canal stenosis or neural foraminal stenosis.     T1-T2: No significant disc osteophyte complex, spinal canal stenosis or neural foraminal stenosis.     Paraspinal muscles & soft tissues: Within normal limits.     Impression:     Multilevel degenerative changes of the cervical spine are most pronounced at C5-C6 with moderate spinal canal stenosis, severe left and moderate to severe right-sided neural foraminal stenosis.  Cord signal changes at this level concerning for myelomalacia.     Mild C3-C4 and C6-C7 spinal canal stenosis.  Severe bilateral C6-C7 and severe right-sided C3-C4 neural foraminal stenosis.     Right C3-C4 facet edema likely contributes to neck pain.     Right C4-C5 facet joint osseous fusion.      04/05/2023    X-Ray Cervical Spine AP And Lateral    FINDINGS:  No fracture, prevertebral soft tissue swelling or other acute abnormality is identified.  Cervical spine alignment is normal.  No significant abnormal motion seen on flexion or extension.  Multilevel DISH-type changes with bridging anterior syndesmophytes.  Multilevel cervical endplate spurring and bridging disc calcifications without significant disc height loss as a benign C6-7.  Multilevel hypertrophic facet arthropathy.  Bilateral C3-4 through C6-7 foraminal stenosis.      ASSESSMENT: 69 y.o. year old male with Neck, left arm, lower back and left leg pain, consistent with     1. DDD (degenerative disc disease), cervical  Ambulatory referral/consult to Neurosurgery      2. DDD (degenerative disc disease), lumbar        3. Cervical radiculopathy  Ambulatory referral/consult to Neurosurgery    Case Request-RAD/Other Procedure Area: C6/7 IL YENNY with L paramedian approach      4. Cervical cord myelomalacia  Ambulatory referral/consult to Neurosurgery              PLAN:   - Interventions:  Patient has sustained 80% relief in bilateral axial neck pain following bilateral C4-6 medial branch block.  We discussed repeating this injection in the  future should axial neck pain exacerbate.      -Schedule for C6-7 interlaminar epidural steroid injection with left paramedian approach to see if this helps with cervical radiculopathy.   Explained the risks and benefits of the procedure in detail with the patient today in clinic along with alternative treatment options, and the patient elected to pursue the intervention at this time.      - Anticoagulation use: yes aspirin  --secondary prophylaxis: Coronary artery disease, infrarenal abdominal aortic aneurysm status post repair, peripheral arterial disease PCP, Dr. Guzmán  ---Per JAMIE guidelines, patient will need to pause aspirin 3 days prior to cervical epidural steroid injection.  Will obtain clearance from PCP, Dr. Guzmán     report:  Reviewed and consistent with medication use as prescribed.    - Medications:  -Continue Gabapentin. We have reviewed potential side effects of this medication including daytime somnolence, weight gain and peripheral edema  -Gabapentin 300 mg in the morning and 600 mg QHS  -3 mo supply given/ [facility].      - Therapy:   We discussed continuing physician directed physical therapy exercises at home to help manage the patient/s painful condition. The patient was counseled that muscle strengthening will improve the long term prognosis in regards to pain and may also help increase range of motion and mobility.     - Imaging: Reviewed available imaging (MRI cervical spine, MRI lumbar spine) with patient and answered any questions they had regarding study.      - Referrals: Dr. Blanco, Neurosurgery; cervical spine stenosis, cervical radiculopathy and myelomalacia    - Follow up visit: return to clinic in 4-6 weeks s/p injection      The above plan and management options were discussed at length with patient. Patient is in agreement with the above and verbalized understanding.    - I discussed the goals of interventional chronic pain management with the patient on today's visit. We discussed a  multimodal and systematic approach to pain.  This includes diagnostic and therapeutic injections, adjuvant pharmacologic treatment, physical therapy, and at times psychiatry.  I emphasized the importance of regular exercise, core strengthening and stretching, diet and weight loss as a cornerstone of long-term pain management.    - This condition does not require this patient to take time off of work, and the primary goal of our Pain Management services is to improve the patient's functional capacity.  - Patient Questions: Answered all of the patient's questions regarding diagnoses, therapy, treatment and next steps        Rudolph Burgos MD  Interventional Pain Management  [facility]    Disclaimer:  This note was prepared using voice recognition system and is likely to have sound alike errors that may have been overlooked even after proof reading.  Please call me with any questions

## 2024-03-27 ENCOUNTER — OFFICE VISIT (OUTPATIENT)
Dept: PAIN MEDICINE | Facility: CLINIC | Age: 69
End: 2024-03-27
Payer: MEDICARE

## 2024-03-27 ENCOUNTER — TELEPHONE (OUTPATIENT)
Dept: NEUROSURGERY | Facility: CLINIC | Age: 69
End: 2024-03-27
Payer: MEDICARE

## 2024-03-27 VITALS
DIASTOLIC BLOOD PRESSURE: 71 MMHG | BODY MASS INDEX: 27.85 KG/M2 | HEIGHT: 64 IN | WEIGHT: 163.13 LBS | SYSTOLIC BLOOD PRESSURE: 133 MMHG | RESPIRATION RATE: 17 BRPM

## 2024-03-27 DIAGNOSIS — M54.12 CERVICAL RADICULOPATHY: ICD-10-CM

## 2024-03-27 DIAGNOSIS — M50.30 DDD (DEGENERATIVE DISC DISEASE), CERVICAL: Primary | ICD-10-CM

## 2024-03-27 DIAGNOSIS — M51.36 DDD (DEGENERATIVE DISC DISEASE), LUMBAR: ICD-10-CM

## 2024-03-27 DIAGNOSIS — G95.89 CERVICAL CORD MYELOMALACIA: ICD-10-CM

## 2024-03-27 PROCEDURE — 99999 PR PBB SHADOW E&M-EST. PATIENT-LVL V: CPT | Mod: PBBFAC,,, | Performed by: ANESTHESIOLOGY

## 2024-03-27 PROCEDURE — 99214 OFFICE O/P EST MOD 30 MIN: CPT | Mod: S$PBB,,, | Performed by: ANESTHESIOLOGY

## 2024-03-27 PROCEDURE — 99215 OFFICE O/P EST HI 40 MIN: CPT | Mod: PBBFAC | Performed by: ANESTHESIOLOGY

## 2024-03-27 RX ORDER — GABAPENTIN 300 MG/1
CAPSULE ORAL
Qty: 270 CAPSULE | Refills: 0 | Status: SHIPPED | OUTPATIENT
Start: 2024-03-27 | End: 2024-06-25

## 2024-03-27 NOTE — TELEPHONE ENCOUNTER
Reached out to patient for scheduling of NRSX referral, per Dr. Burgos. Referred to Dr. Blanco, but daughter of patient opted to scheduled first available which was with Dr. Blanco's PA. She v/u of all discussed.  RD

## 2024-04-03 ENCOUNTER — PATIENT MESSAGE (OUTPATIENT)
Dept: RHEUMATOLOGY | Facility: CLINIC | Age: 69
End: 2024-04-03
Payer: MEDICARE

## 2024-04-04 ENCOUNTER — TELEPHONE (OUTPATIENT)
Dept: PAIN MEDICINE | Facility: CLINIC | Age: 69
End: 2024-04-04
Payer: MEDICARE

## 2024-04-04 ENCOUNTER — PATIENT MESSAGE (OUTPATIENT)
Dept: PAIN MEDICINE | Facility: CLINIC | Age: 69
End: 2024-04-04
Payer: MEDICARE

## 2024-04-04 NOTE — TELEPHONE ENCOUNTER
----- Message from Chastity Guzmán MD sent at 4/2/2024 11:33 AM CDT -----  That's fine.  ----- Message -----  From: Kodi Dsouza MA  Sent: 4/2/2024  11:06 AM CDT  To: MD Dr. Ramirez Gann Tuyemarry Anthonyu was seen in office with complaints of severe neck pain. Dr. Burgos would like to perform cervical epidural, and Deborah White would like to proceed. Dr. Burgos is requesting for clearance to hold ASA 3 days prior to procedure. Patient Deborah White can resume medication following the procedure.     Thank You,  Maria Luisa SNYDER  [facility] Surgery Scheduler

## 2024-04-10 ENCOUNTER — PATIENT MESSAGE (OUTPATIENT)
Dept: PAIN MEDICINE | Facility: CLINIC | Age: 69
End: 2024-04-10
Payer: MEDICARE

## 2024-04-15 DIAGNOSIS — Z01.818 PRE-OPERATIVE CLEARANCE: Primary | ICD-10-CM

## 2024-04-16 ENCOUNTER — HOSPITAL ENCOUNTER (OUTPATIENT)
Dept: RADIOLOGY | Facility: HOSPITAL | Age: 69
Discharge: HOME | End: 2024-04-16
Attending: SURGERY
Payer: MEDICARE

## 2024-04-16 DIAGNOSIS — Z98.890 OTHER SPECIFIED POSTPROCEDURAL STATES: ICD-10-CM

## 2024-04-16 PROCEDURE — 25500020 PHARM REV CODE 255: Performed by: SURGERY

## 2024-04-16 PROCEDURE — 74174 CTA ABD&PLVS W/CONTRAST: CPT | Mod: 26,,, | Performed by: RADIOLOGY

## 2024-04-16 PROCEDURE — 74174 CTA ABD&PLVS W/CONTRAST: CPT | Mod: TC

## 2024-04-16 RX ADMIN — IOHEXOL 100 ML: 350 INJECTION, SOLUTION INTRAVENOUS at 01:04

## 2024-05-01 ENCOUNTER — PATIENT MESSAGE (OUTPATIENT)
Dept: PAIN MEDICINE | Facility: HOSPITAL | Age: 69
End: 2024-05-01
Payer: MEDICARE

## 2024-05-01 NOTE — PRE-PROCEDURE INSTRUCTIONS
Spoke with patient regarding procedure scheduled on 5.10     Arrival time 1030     Has patient been sick with fever or on antibiotics within the last 7 days? No     Does the patient have any open wounds, sores or rashes? No     Does the patient have any recent fractures? no     Has patient received a vaccination within the last 7 days? No     Received the COVID vaccination? yes     Has the patient stopped all medications as directed? hold asa 3 days prior to procedure. Cardia clearance obtained by dr da silva on 4.2     Does patient have a pacemaker, defibrillator, or implantable stimulator? No     Does the patient have a ride to and from procedure and someone reliable to remain with patient?       Is the patient diabetic? no     Does the patient have sleep apnea? Or use O2 at home? no     Is the patient receiving sedation? yes     Is the patient instructed to remain NPO beginning at midnight the night before their procedure? yes     Procedure location confirmed with patient? Yes     Covid- Denies signs/symptoms. Instructed to notify PAT/MD if any changes.

## 2024-05-02 DIAGNOSIS — E78.5 HYPERLIPIDEMIA, UNSPECIFIED HYPERLIPIDEMIA TYPE: ICD-10-CM

## 2024-05-03 RX ORDER — EZETIMIBE 10 MG/1
10 TABLET ORAL DAILY
Qty: 90 TABLET | Refills: 1 | Status: SHIPPED | OUTPATIENT
Start: 2024-05-03

## 2024-05-06 RX ORDER — AMLODIPINE BESYLATE 10 MG/1
10 TABLET ORAL DAILY
Qty: 90 TABLET | Refills: 3 | Status: SHIPPED | OUTPATIENT
Start: 2024-05-06

## 2024-05-09 ENCOUNTER — PATIENT MESSAGE (OUTPATIENT)
Dept: VASCULAR SURGERY | Facility: CLINIC | Age: 69
End: 2024-05-09
Payer: MEDICARE

## 2024-05-10 ENCOUNTER — HOSPITAL ENCOUNTER (OUTPATIENT)
Facility: HOSPITAL | Age: 69
Discharge: HOME | End: 2024-05-10
Attending: ANESTHESIOLOGY | Admitting: ANESTHESIOLOGY
Payer: MEDICARE

## 2024-05-10 VITALS
TEMPERATURE: 98 F | HEIGHT: 64 IN | WEIGHT: 159.5 LBS | DIASTOLIC BLOOD PRESSURE: 73 MMHG | HEART RATE: 61 BPM | BODY MASS INDEX: 27.23 KG/M2 | RESPIRATION RATE: 16 BRPM | OXYGEN SATURATION: 97 % | SYSTOLIC BLOOD PRESSURE: 142 MMHG

## 2024-05-10 DIAGNOSIS — M54.12 CERVICAL RADICULOPATHY: ICD-10-CM

## 2024-05-10 PROCEDURE — 25500020 PHARM REV CODE 255: Performed by: ANESTHESIOLOGY

## 2024-05-10 PROCEDURE — 62321 NJX INTERLAMINAR CRV/THRC: CPT | Mod: ,,, | Performed by: ANESTHESIOLOGY

## 2024-05-10 PROCEDURE — 63600175 PHARM REV CODE 636 W HCPCS: Mod: JZ,JG | Performed by: ANESTHESIOLOGY

## 2024-05-10 PROCEDURE — 62321 NJX INTERLAMINAR CRV/THRC: CPT | Performed by: ANESTHESIOLOGY

## 2024-05-10 PROCEDURE — 25000003 PHARM REV CODE 250: Performed by: ANESTHESIOLOGY

## 2024-05-10 RX ORDER — BUPIVACAINE HYDROCHLORIDE 2.5 MG/ML
INJECTION, SOLUTION EPIDURAL; INFILTRATION; INTRACAUDAL
Status: DISCONTINUED | OUTPATIENT
Start: 2024-05-10 | End: 2024-05-10 | Stop reason: HOSPADM

## 2024-05-10 RX ORDER — FENTANYL CITRATE 50 UG/ML
INJECTION, SOLUTION INTRAMUSCULAR; INTRAVENOUS
Status: DISCONTINUED | OUTPATIENT
Start: 2024-05-10 | End: 2024-05-10 | Stop reason: HOSPADM

## 2024-05-10 RX ORDER — DEXAMETHASONE SODIUM PHOSPHATE 10 MG/ML
INJECTION INTRAMUSCULAR; INTRAVENOUS
Status: DISCONTINUED | OUTPATIENT
Start: 2024-05-10 | End: 2024-05-10 | Stop reason: HOSPADM

## 2024-05-10 RX ORDER — MIDAZOLAM HYDROCHLORIDE 1 MG/ML
INJECTION, SOLUTION INTRAMUSCULAR; INTRAVENOUS
Status: DISCONTINUED | OUTPATIENT
Start: 2024-05-10 | End: 2024-05-10 | Stop reason: HOSPADM

## 2024-05-10 RX ORDER — INDOMETHACIN 25 MG/1
CAPSULE ORAL
Status: DISCONTINUED | OUTPATIENT
Start: 2024-05-10 | End: 2024-05-10 | Stop reason: HOSPADM

## 2024-05-10 NOTE — OP NOTE
"Deborah White  69 y.o. male      Vitals:    05/10/24 1040   BP: (!) 142/71   Pulse: (!) 59   Resp: 16   Temp:        Procedure Date: 05/10/2024      INFORMED CONSENT: The procedure, risks, benefits and options were discussed with patient. There are no contraindications to the procedure. The patient expressed understanding and agreed to proceed. The personnel performing the procedure was discussed. I verify that I personally obtained consent prior to the start of the procedure and the signed consent can be found on the patient's chart.         Anesthesia: Topical     Pre Procedure diagnosis: Cervical radiculopathy [M54.12]     Post-Procedure diagnosis: SAME      Sedation:Conscious sedation provided by M.D    The patient was monitored with continuous pulse oximetry, EKG, and intermittent blood pressure monitors.  The patient was hemodynamically stable throughout the entire process was responsive to voice, and breathing spontaneously.  Supplemental O2 was provided at 2L/min via nasal cannula.  Patient was comfortable for the duration of the procedure. (See nurse documentation and case log for sedation time)    There was a total of 1mg IV Midazolam and 50mcg Fentanyl titrated for the procedure       PROCEDURE:  CERVICAL EPIDURAL STEROID INJECTION         DESCRIPTION OF PROCEDURE: The patient was brought to the procedure room. After performing time out.  IV access was obtained prior to the procedure. The patient was positioned prone on the fluoroscopy table. Continuous hemodynamic monitoring was initiated including blood pressure, EKG, and pulse oximetry. The area of the cervical spine was prepped with chlorhexidine and draped in a sterile fashion. Skin anesthesia was achieved using 3 mL of Lidocaine 1% over the respective injection site. The C6/7 interspace was visualized under fluoroscopic imaging. An 18 gauge 3 1/2" Tuohy needle was slowly inserted from a L paramedian approach and advanced using loss of resistance to " saline with AP, oblique and lateral fluoroscopic imaging for needle guidance. Negative aspiration for blood or CSF was confirmed. Epidural contrast spread was confirmed using 2mL of Omnipaque 300 contrast. Spread of the contrast in the cervical epidural space was noted . 6 mL of bupivacaine 0.25% and 10 mg decadron was injected. The needle was removed and bleeding was nil. A sterile dressing was applied. No specimens collected. patient was taken back to the recovery room for further observation.      Blood Loss: Nill  Specimen: None

## 2024-05-10 NOTE — DISCHARGE INSTRUCTIONS

## 2024-05-10 NOTE — H&P
HPI  Patient presenting for Procedure(s) (LRB):  C6/7 IL YENNY with L paramedian approach (N/A)     Patient on Anti-coagulation No    No health changes since previous encounter    Past Medical History:   Diagnosis Date    Coronary artery disease     Hyperlipidemia     Hypertension     Personal history of colonic polyps      Past Surgical History:   Procedure Laterality Date    ABDOMINAL AORTIC ANEURYSM REPAIR, ENDOVASCULAR N/A 2/20/2024    Procedure: REPAIR-ANEURYSM-ABDOMINAL AORTIC-ENDOVASCULAR (AAA);  Surgeon: Caesar Denton MD;  Location: [facility] OR [address] [place] [address];  Service: Vascular;  Laterality: N/A;  EVAR & L femoral artery cutdown  mGy 1195.48 Gycm2 215.60     Fluro Time 16.2min   Contrast 26ml    INJECTION OF ANESTHETIC AGENT AROUND MEDIAL BRANCH NERVES INNERVATING CERVICAL FACET JOINT Right 11/18/2020    Procedure: Block-nerve-medial branch-cervical Right C3, 4, 5with RN IV sedation;  Surgeon: Martín Barnes MD;  Location: [facility];  Service: Pain Management;  Laterality: Right;    INJECTION OF ANESTHETIC AGENT AROUND MEDIAL BRANCH NERVES INNERVATING CERVICAL FACET JOINT Bilateral 04/28/2023    Procedure: Bilateral C4-6 MBB with RN IV sedation;  Surgeon: Rudolph Burgos MD;  Location: [facility];  Service: Pain Management;  Laterality: Bilateral;     Review of patient's allergies indicates:  No Known Allergies     No current facility-administered medications on file prior to encounter.     Current Outpatient Medications on File Prior to Encounter   Medication Sig Dispense Refill    benazepriL (LOTENSIN) 40 MG tablet Take 1 tablet (40 mg total) by mouth once daily. 90 tablet 2    cilostazoL (PLETAL) 50 MG Tab Take 1 tablet (50 mg total) by mouth 2 (two) times daily. 60 tablet 9    cloNIDine (CATAPRES) 0.2 MG tablet Take 1 tablet (0.2 mg total) by mouth 2 (two) times daily. 180 tablet 3    gabapentin (NEURONTIN) 300 MG capsule Take 1 capsule (300 mg total) by mouth once daily AND 2 capsules (600 mg total) every  "evening. 270 capsule 0    hydroCHLOROthiazide (HYDRODIURIL) 25 MG tablet Take 1 tablet (25 mg total) by mouth once daily. 90 tablet 2    linaCLOtide (LINZESS) 72 mcg Cap capsule Take 1 capsule (72 mcg total) by mouth before breakfast. (Patient taking differently: Take 72 mcg by mouth as needed.) 30 capsule 3    rosuvastatin (CRESTOR) 20 MG tablet Take 1 tablet (20 mg total) by mouth once daily. 90 tablet 2    senna-docusate 8.6-50 mg (PERICOLACE) 8.6-50 mg per tablet Take 1 tablet by mouth once daily. 14 tablet 0    tofacitinib (XELJANZ) 5 mg Tab Take 1 tablet (5 mg) by mouth once daily. 30 tablet 11    acetaminophen (TYLENOL) 325 MG tablet Take 2 tablets (650 mg total) by mouth every 8 (eight) hours as needed for Pain. 30 tablet 0    aspirin (ECOTRIN) 81 MG EC tablet Take 81 mg by mouth once daily.      oxyCODONE (ROXICODONE) 5 MG immediate release tablet Take 1 tablet (5 mg total) by mouth every 6 (six) hours as needed for Pain (severe pain). 12 tablet 0        PMHx, PSHx, Allergies, Medications reviewed in epic    ROS negative except pain complaints in HPI    OBJECTIVE:    BP (!) 154/69   Pulse 60   Temp 97.6 °F (36.4 °C) (Temporal)   Resp 16   Ht 5' 4" (1.626 m)   Wt 72.3 kg (159 lb 8 oz)   SpO2 97%   BMI 27.38 kg/m²     PHYSICAL EXAMINATION:    GENERAL: Well appearing, in no acute distress, alert and oriented x3.  PSYCH:  Mood and affect appropriate.  SKIN: Skin color, texture, turgor normal, no rashes or lesions which will impact the procedure.  CV: RRR with palpation of the radial artery.  PULM: No evidence of respiratory difficulty, symmetric chest rise. Clear to auscultation.  NEURO: Cranial nerves grossly intact.    Plan:    Proceed with procedure as planned Procedure(s) (LRB):  C6/7 IL YENNY with L paramedian approach (N/A)    Rudolph Burgos MD  05/10/2024            "

## 2024-05-10 NOTE — DISCHARGE SUMMARY
Discharge Note  Short Stay      SUMMARY     Admit Date: 5/10/2024    Attending Physician: Rudolph Burgos MD        Discharge Physician: Rudolph Burgos MD        Discharge Date: 5/10/2024 10:45 AM    Procedure(s) (LRB):  C6/7 IL YENNY with L paramedian approach (N/A)    Final Diagnosis: Cervical radiculopathy [M54.12]    Disposition: Home or self care    Patient Instructions:   Current Discharge Medication List        CONTINUE these medications which have NOT CHANGED    Details   amLODIPine (NORVASC) 10 MG tablet Take 1 tablet (10 mg total) by mouth once daily.  Qty: 90 tablet, Refills: 3    Comments: .      benazepriL (LOTENSIN) 40 MG tablet Take 1 tablet (40 mg total) by mouth once daily.  Qty: 90 tablet, Refills: 2    Comments: .  Associated Diagnoses: Hypertension, essential      cilostazoL (PLETAL) 50 MG Tab Take 1 tablet (50 mg total) by mouth 2 (two) times daily.  Qty: 60 tablet, Refills: 9    Associated Diagnoses: Hyperlipidemia, unspecified hyperlipidemia type      cloNIDine (CATAPRES) 0.2 MG tablet Take 1 tablet (0.2 mg total) by mouth 2 (two) times daily.  Qty: 180 tablet, Refills: 3    Associated Diagnoses: Hypertension, unspecified type      ezetimibe (ZETIA) 10 mg tablet Take 1 tablet (10 mg total) by mouth once daily.  Qty: 90 tablet, Refills: 1    Associated Diagnoses: Hyperlipidemia, unspecified hyperlipidemia type      gabapentin (NEURONTIN) 300 MG capsule Take 1 capsule (300 mg total) by mouth once daily AND 2 capsules (600 mg total) every evening.  Qty: 270 capsule, Refills: 0      hydroCHLOROthiazide (HYDRODIURIL) 25 MG tablet Take 1 tablet (25 mg total) by mouth once daily.  Qty: 90 tablet, Refills: 2    Comments: .  Associated Diagnoses: Hypertension, essential      linaCLOtide (LINZESS) 72 mcg Cap capsule Take 1 capsule (72 mcg total) by mouth before breakfast.  Qty: 30 capsule, Refills: 3    Associated Diagnoses: Constipation, unspecified constipation type      rosuvastatin (CRESTOR) 20 MG tablet  Take 1 tablet (20 mg total) by mouth once daily.  Qty: 90 tablet, Refills: 2    Associated Diagnoses: Hyperlipidemia, unspecified hyperlipidemia type      senna-docusate 8.6-50 mg (PERICOLACE) 8.6-50 mg per tablet Take 1 tablet by mouth once daily.  Qty: 14 tablet, Refills: 0      tofacitinib (XELJANZ) 5 mg Tab Take 1 tablet (5 mg) by mouth once daily.  Qty: 30 tablet, Refills: 11    Comments: Could not tolerate TNF alpha inhibitor- Remicade.  Associated Diagnoses: Ankylosing spondylitis of multiple sites in spine      acetaminophen (TYLENOL) 325 MG tablet Take 2 tablets (650 mg total) by mouth every 8 (eight) hours as needed for Pain.  Qty: 30 tablet, Refills: 0      aspirin (ECOTRIN) 81 MG EC tablet Take 81 mg by mouth once daily.      oxyCODONE (ROXICODONE) 5 MG immediate release tablet Take 1 tablet (5 mg total) by mouth every 6 (six) hours as needed for Pain (severe pain).  Qty: 12 tablet, Refills: 0    Comments: Quantity prescribed more than 7 day supply? No                 Discharge Diagnosis: Cervical radiculopathy [M54.12]  Condition on Discharge: Stable with no complications to procedure   Diet on Discharge: Same as before.  Activity: as per instruction sheet.  Discharge to: Home with a responsible adult.  Follow up: 2-4 weeks       Please call the office at [phone] if you experience any weakness or loss of sensation, fever > 101.5, pain uncontrolled with oral medications, persistent nausea/vomiting/or diarrhea, redness or drainage from the incisions, or any other worrisome concerns. If physician on call was not reached or could not communicate with our office for any reason please go to the nearest emergency department

## 2024-05-13 ENCOUNTER — OFFICE VISIT (OUTPATIENT)
Dept: RHEUMATOLOGY | Facility: CLINIC | Age: 69
End: 2024-05-13
Payer: MEDICARE

## 2024-05-13 ENCOUNTER — LAB VISIT (OUTPATIENT)
Dept: LAB | Facility: HOSPITAL | Age: 69
End: 2024-05-13
Attending: PHYSICIAN ASSISTANT
Payer: MEDICARE

## 2024-05-13 VITALS
DIASTOLIC BLOOD PRESSURE: 57 MMHG | HEART RATE: 65 BPM | BODY MASS INDEX: 27.67 KG/M2 | SYSTOLIC BLOOD PRESSURE: 111 MMHG | WEIGHT: 162.06 LBS | HEIGHT: 64 IN

## 2024-05-13 DIAGNOSIS — Z79.899 IMMUNOCOMPROMISED STATE DUE TO DRUG THERAPY: ICD-10-CM

## 2024-05-13 DIAGNOSIS — N18.30 STAGE 3 CHRONIC KIDNEY DISEASE, UNSPECIFIED WHETHER STAGE 3A OR 3B CKD: ICD-10-CM

## 2024-05-13 DIAGNOSIS — M45.0 ANKYLOSING SPONDYLITIS OF MULTIPLE SITES IN SPINE: Primary | ICD-10-CM

## 2024-05-13 DIAGNOSIS — Z79.899 HIGH RISK MEDICATION USE: ICD-10-CM

## 2024-05-13 DIAGNOSIS — Z51.81 MEDICATION MONITORING ENCOUNTER: ICD-10-CM

## 2024-05-13 DIAGNOSIS — M45.0 ANKYLOSING SPONDYLITIS OF MULTIPLE SITES IN SPINE: ICD-10-CM

## 2024-05-13 DIAGNOSIS — D84.821 IMMUNOCOMPROMISED STATE DUE TO DRUG THERAPY: ICD-10-CM

## 2024-05-13 DIAGNOSIS — Z86.79 S/P AAA REPAIR: ICD-10-CM

## 2024-05-13 DIAGNOSIS — Z98.890 S/P AAA REPAIR: ICD-10-CM

## 2024-05-13 DIAGNOSIS — Z51.81 ENCOUNTER FOR MEDICATION MONITORING: ICD-10-CM

## 2024-05-13 DIAGNOSIS — M48.10 DISH (DIFFUSE IDIOPATHIC SKELETAL HYPEROSTOSIS): ICD-10-CM

## 2024-05-13 LAB
ALBUMIN SERPL BCP-MCNC: 3.8 G/DL (ref 3.5–5.2)
ALP SERPL-CCNC: 68 U/L (ref 55–135)
ALT SERPL W/O P-5'-P-CCNC: 29 U/L (ref 10–44)
ANION GAP SERPL CALC-SCNC: 11 MMOL/L (ref 8–16)
AST SERPL-CCNC: 21 U/L (ref 10–40)
BASOPHILS # BLD AUTO: 0.1 K/UL (ref 0–0.2)
BASOPHILS NFR BLD: 1.1 % (ref 0–1.9)
BILIRUB SERPL-MCNC: 0.2 MG/DL (ref 0.1–1)
BUN SERPL-MCNC: 21 MG/DL (ref 8–23)
CALCIUM SERPL-MCNC: 9.7 MG/DL (ref 8.7–10.5)
CHLORIDE SERPL-SCNC: 105 MMOL/L (ref 95–110)
CO2 SERPL-SCNC: 25 MMOL/L (ref 23–29)
CREAT SERPL-MCNC: 1.7 MG/DL (ref 0.5–1.4)
CRP SERPL-MCNC: 1 MG/L (ref 0–8.2)
DIFFERENTIAL METHOD BLD: ABNORMAL
EOSINOPHIL # BLD AUTO: 0.2 K/UL (ref 0–0.5)
EOSINOPHIL NFR BLD: 2.6 % (ref 0–8)
ERYTHROCYTE [DISTWIDTH] IN BLOOD BY AUTOMATED COUNT: 14.7 % (ref 11.5–14.5)
EST. GFR  (NO RACE VARIABLE): 44 ML/MIN/1.73 M^2
GLUCOSE SERPL-MCNC: 97 MG/DL (ref 70–110)
HCT VFR BLD AUTO: 38.1 % (ref 40–54)
HGB BLD-MCNC: 12.6 G/DL (ref 14–18)
IMM GRANULOCYTES # BLD AUTO: 0.1 K/UL (ref 0–0.04)
IMM GRANULOCYTES NFR BLD AUTO: 1.1 % (ref 0–0.5)
LYMPHOCYTES # BLD AUTO: 2.6 K/UL (ref 1–4.8)
LYMPHOCYTES NFR BLD: 28.1 % (ref 18–48)
MCH RBC QN AUTO: 29.1 PG (ref 27–31)
MCHC RBC AUTO-ENTMCNC: 33.1 G/DL (ref 32–36)
MCV RBC AUTO: 88 FL (ref 82–98)
MONOCYTES # BLD AUTO: 1.3 K/UL (ref 0.3–1)
MONOCYTES NFR BLD: 14 % (ref 4–15)
NEUTROPHILS # BLD AUTO: 4.9 K/UL (ref 1.8–7.7)
NEUTROPHILS NFR BLD: 53.1 % (ref 38–73)
NRBC BLD-RTO: 0 /100 WBC
PLATELET # BLD AUTO: 340 K/UL (ref 150–450)
PMV BLD AUTO: 8.9 FL (ref 9.2–12.9)
POTASSIUM SERPL-SCNC: 3.6 MMOL/L (ref 3.5–5.1)
PROT SERPL-MCNC: 7.8 G/DL (ref 6–8.4)
RBC # BLD AUTO: 4.33 M/UL (ref 4.6–6.2)
SODIUM SERPL-SCNC: 141 MMOL/L (ref 136–145)
WBC # BLD AUTO: 9.24 K/UL (ref 3.9–12.7)

## 2024-05-13 PROCEDURE — 85652 RBC SED RATE AUTOMATED: CPT | Performed by: PHYSICIAN ASSISTANT

## 2024-05-13 PROCEDURE — 99215 OFFICE O/P EST HI 40 MIN: CPT | Mod: S$PBB,,, | Performed by: PHYSICIAN ASSISTANT

## 2024-05-13 PROCEDURE — 85025 COMPLETE CBC W/AUTO DIFF WBC: CPT | Performed by: PHYSICIAN ASSISTANT

## 2024-05-13 PROCEDURE — 80053 COMPREHEN METABOLIC PANEL: CPT | Performed by: PHYSICIAN ASSISTANT

## 2024-05-13 PROCEDURE — 36415 COLL VENOUS BLD VENIPUNCTURE: CPT | Performed by: PHYSICIAN ASSISTANT

## 2024-05-13 PROCEDURE — 99999 PR PBB SHADOW E&M-EST. PATIENT-LVL IV: CPT | Mod: PBBFAC,,, | Performed by: PHYSICIAN ASSISTANT

## 2024-05-13 PROCEDURE — 99214 OFFICE O/P EST MOD 30 MIN: CPT | Mod: PBBFAC | Performed by: PHYSICIAN ASSISTANT

## 2024-05-13 PROCEDURE — 86140 C-REACTIVE PROTEIN: CPT | Performed by: PHYSICIAN ASSISTANT

## 2024-05-13 RX ORDER — IXEKIZUMAB 80 MG/ML
80 INJECTION, SOLUTION SUBCUTANEOUS
Qty: 1 ML | Refills: 6 | Status: ACTIVE | OUTPATIENT
Start: 2024-05-13

## 2024-05-13 NOTE — Clinical Note
He is agreeable to taltz.  I submitted the order today.  I request Reg 4 today w hep serologies.  He is TB positive and has completed therapy for Latent TB.  Hepatitis labs weren't linked.  Will he need them prior?

## 2024-05-13 NOTE — PROGRESS NOTES
Subjective:      Patient ID: Deborah White is a 69 y.o. male.    Chief Complaint: ankylosing spondylitis (Multiple sites in spine)    HPI   Deborah White  is a 69 y.o. male seen today for follow-up ankylosing spondylitis.  Patient also has severe foraminal stenosis of the cervical region as well as cervical spondylosis.  At times he has difficulty differentiating pain from ankylosing spondylitis of the cervical spine with the foraminal stenosis and arthritis.  Recently DCed Xeljanz due to AAA repair.  In past he deferred changing biologic.  Pain has worsened since stopping it.   Recently underwent back injections which seemed to have helped but he still c/o worsening joint pain and stiffness overall.  He wishes to discuss his options.    Initially saw Dr. GATES for evaluation of chronic neck pain.  Also has known DISH syndrome of the neck.  Prior to initiation of immunosuppressive therapy, patient had elevated inflammatory markers.  Inflammatory markers and pain improved with a prednisone trial.    Patient denies fevers, chills, photosensitivity, eye pain, shortness of breath, chest pain, hematuria, blood in the stool, rash, sicca symptoms, raynauds, finger ulcerations.  Rheumatologic systems otherwise negative.    Serologies/Labs:  +CARMEN 1:80 speckled, neg profile  Neg RF, CCP  Current Treatment:  Xeljanz 5 mg - on hold since AAA repair  Previous Treatment:   SSZ  Remicade (elevated BP during tx)  MTX    Current Outpatient Medications:     amLODIPine (NORVASC) 10 MG tablet, Take 1 tablet (10 mg total) by mouth once daily., Disp: 90 tablet, Rfl: 3    aspirin (ECOTRIN) 81 MG EC tablet, Take 81 mg by mouth once daily., Disp: , Rfl:     benazepriL (LOTENSIN) 40 MG tablet, Take 1 tablet (40 mg total) by mouth once daily., Disp: 90 tablet, Rfl: 2    cilostazoL (PLETAL) 50 MG Tab, Take 1 tablet (50 mg total) by mouth 2 (two) times daily., Disp: 60 tablet, Rfl: 9    cloNIDine (CATAPRES) 0.2 MG tablet, Take 1 tablet (0.2 mg total) by  mouth 2 (two) times daily., Disp: 180 tablet, Rfl: 3    ezetimibe (ZETIA) 10 mg tablet, Take 1 tablet (10 mg total) by mouth once daily., Disp: 90 tablet, Rfl: 1    gabapentin (NEURONTIN) 300 MG capsule, Take 1 capsule (300 mg total) by mouth once daily AND 2 capsules (600 mg total) every evening., Disp: 270 capsule, Rfl: 0    hydroCHLOROthiazide (HYDRODIURIL) 25 MG tablet, Take 1 tablet (25 mg total) by mouth once daily., Disp: 90 tablet, Rfl: 2    linaCLOtide (LINZESS) 72 mcg Cap capsule, Take 1 capsule (72 mcg total) by mouth before breakfast. (Patient taking differently: Take 72 mcg by mouth as needed.), Disp: 30 capsule, Rfl: 3    rosuvastatin (CRESTOR) 20 MG tablet, Take 1 tablet (20 mg total) by mouth once daily., Disp: 90 tablet, Rfl: 2    tofacitinib (XELJANZ) 5 mg Tab, Take 1 tablet (5 mg) by mouth once daily., Disp: 30 tablet, Rfl: 11    acetaminophen (TYLENOL) 325 MG tablet, Take 2 tablets (650 mg total) by mouth every 8 (eight) hours as needed for Pain., Disp: 30 tablet, Rfl: 0    oxyCODONE (ROXICODONE) 5 MG immediate release tablet, Take 1 tablet (5 mg total) by mouth every 6 (six) hours as needed for Pain (severe pain)., Disp: 12 tablet, Rfl: 0    senna-docusate 8.6-50 mg (PERICOLACE) 8.6-50 mg per tablet, Take 1 tablet by mouth once daily., Disp: 14 tablet, Rfl: 0    Past Medical History:   Diagnosis Date    Coronary artery disease     Hyperlipidemia     Hypertension     Personal history of colonic polyps      Family History   Problem Relation Name Age of Onset    Cancer Mother mother     Cancer Father father     Hypertension Father father     Early death Brother brother         flu    Heart disease Brother brother     Hypertension Brother brother      Social History     Socioeconomic History    Marital status:    Tobacco Use    Smoking status: Every Day     Current packs/day: 0.50     Average packs/day: 0.5 packs/day for 45.0 years (22.5 ttl pk-yrs)     Types: Cigarettes    Smokeless tobacco:  "Never   Substance and Sexual Activity    Alcohol use: Not Currently    Drug use: Never    Sexual activity: Not Currently     Partners: Female     Social Determinants of Health     Financial Resource Strain: Low Risk  (2/21/2024)    Overall Financial Resource Strain (CARDIA)     Difficulty of Paying Living Expenses: Not hard at all   Recent Concern: Financial Resource Strain - Medium Risk (2/20/2024)    Overall Financial Resource Strain (CARDIA)     Difficulty of Paying Living Expenses: Somewhat hard   Food Insecurity: No Food Insecurity (2/21/2024)    Hunger Vital Sign     Worried About Running Out of Food in the Last Year: Never true     Ran Out of Food in the Last Year: Never true   Transportation Needs: No Transportation Needs (2/21/2024)    PRAPARE - Transportation     Lack of Transportation (Medical): No     Lack of Transportation (Non-Medical): No   Physical Activity: Inactive (2/21/2024)    Exercise Vital Sign     Days of Exercise per Week: 0 days     Minutes of Exercise per Session: 0 min   Stress: Patient Unable To Answer (2/21/2024)    [employer] - Occupational Stress Questionnaire     Feeling of Stress : Patient unable to answer   Recent Concern: Stress - Stress Concern Present (2/20/2024)    [employer] - Occupational Stress Questionnaire     Feeling of Stress : To some extent   Housing Stability: Low Risk  (2/21/2024)    Housing Stability Vital Sign     Unable to Pay for Housing in the Last Year: No     Number of Places Lived in the Last Year: 1     Unstable Housing in the Last Year: No     Review of patient's allergies indicates:  No Known Allergies    Objective:   BP (!) 111/57   Pulse 65   Ht 5' 4" (1.626 m)   Wt 73.5 kg (162 lb 0.6 oz)   BMI 27.81 kg/m²   Immunization History   Administered Date(s) Administered    COVID-19, MRNA, LN-S, PF (MODERNA FULL 0.5 ML DOSE) 02/03/2021, 03/04/2021    COVID-19, MRNA, LN-S, PF (Pfizer) (Purple Cap) " 11/23/2021    Influenza (FLUAD) - Quadrivalent - Adjuvanted - PF *Preferred* (65+) 10/05/2022, 09/11/2023    Influenza - Quadrivalent - PF *Preferred* (6 months and older) 10/09/2017, 09/03/2019, 09/15/2020    Influenza Split 10/12/2009, 10/14/2021    Pneumococcal Polysaccharide - 23 Valent 12/11/2019       Physical Exam   Constitutional: He is oriented to person, place, and time. No distress.   HENT:   Head: Normocephalic and atraumatic.   Pulmonary/Chest: Effort normal.   Musculoskeletal:         General: Normal range of motion.   Neurological: He is alert and oriented to person, place, and time.   Psychiatric: His behavior is normal. Mood normal.   Nursing note and vitals reviewed.    No synovitis, no dactylitis, no enthesitis  No effusions of large or small joints  100% fist formation  Decreased ROM neck    Recent Results (from the past 672 hour(s))   Creatinine, Serum    Collection Time: 04/16/24 11:11 AM   Result Value Ref Range    Creatinine 1.7 (H) 0.5 - 1.4 mg/dL    eGFR 44 (A) >60 mL/min/1.73 m^2         Lab Results   Component Value Date    TBGOLDPLUS Positive (A) 01/05/2022      Lab Results   Component Value Date    HEPBCAB Non-reactive 12/09/2022    HEPCAB Non-reactive 12/09/2022        Imaging  I have personally reviewed images and reports as below.  I agree with the interpretation.  MRI Cervical Spine Without Contrast  Order: [id number]  Status: Final result       Visible to patient: Yes (seen)       Next appt: 11/30/2023 at 11:00 AM in Vascular Surgery (VASCULAR, LAB)       Dx: Radiculopathy, cervical region    0 Result Notes  Details    Reading Physician Reading Date Result Priority   Terry Ha MD  [phone] 6/20/2023 Routine     Narrative & Impression  EXAMINATION:  MRI CERVICAL SPINE WITHOUT CONTRAST     CLINICAL HISTORY:  Cervical radiculopathy; Radiculopathy, cervical region     TECHNIQUE:  Multiplanar, multisequence MR images of the cervical spine were performed without the  administration of contrast.     COMPARISON:  Cervical radiograph 04/05/2023     FINDINGS:  Alignment: Pain mild straightening of the normal cervical lordosis.     Vertebrae: Cervical vertebral body heights are maintained.  Minor multilevel endplate degenerative changes.  No significant endplate edema.  No evidence of an acute fracture.  Edema involving the right C3-C4 facet joint.  There appears to be osseous fusion of the right C4-C5 facet joint.  Marrow signal is otherwise within normal limits.     Discs: Disc desiccation throughout the cervical spine.  Disc heights appear maintained.     Cord: C5-C6 prominent cord deformity with small associated area cord signal change concerning for myelomalacia.     Skull base and craniocervical junction: Within normal limits.     Degenerative findings:     C2-C3: Minimal broad-based disc osteophyte complex.  No ventral cord contact or spinal canal stenosis.  Right greater than left facet arthropathy contributing to mild-to-moderate right and mild left-sided neural foraminal stenosis.     C3-C4: Central disc osteophyte complex contacts and deforms the ventral cord.  Significant right-sided facet arthropathy with ligamentum flavum hypertrophy contributes to mild spinal canal stenosis.  There is severe right and mild-to-moderate left-sided neural foraminal stenosis.     C4-C5: Small central disc osteophyte complex contacts and slightly deforms the ventral cord without significant spinal canal stenosis.  Significant right-sided facet arthropathy with fusion of the facet joint.  Uncovertebral joint spurring contributes to severe right and mild-to-moderate left-sided neural foraminal stenosis.     C5-C6: Broad-based disc osteophyte complex contacts and flattens the ventral cord.  Right greater than left facet arthropathy and ligamentum flavum hypertrophy contributes to moderate spinal canal stenosis.  Uncovertebral joint spurring contributes to moderate to severe right and severe  left-sided neural foraminal stenosis.     C6-C7: Mild broad-based disc osteophyte complex contacts and flattens the ventral cord.  Mild bilateral facet arthropathy, ligamentum flavum hypertrophy and uncovertebral joint spurring contribute to mild spinal canal stenosis and severe bilateral neural foraminal stenosis.     C7-T1: No significant disc osteophyte complex, spinal canal stenosis or neural foraminal stenosis.     T1-T2: No significant disc osteophyte complex, spinal canal stenosis or neural foraminal stenosis.     Paraspinal muscles & soft tissues: Within normal limits.     Impression:     Multilevel degenerative changes of the cervical spine are most pronounced at C5-C6 with moderate spinal canal stenosis, severe left and moderate to severe right-sided neural foraminal stenosis.  Cord signal changes at this level concerning for myelomalacia.     Mild C3-C4 and C6-C7 spinal canal stenosis.  Severe bilateral C6-C7 and severe right-sided C3-C4 neural foraminal stenosis.     Right C3-C4 facet edema likely contributes to neck pain.     Right C4-C5 facet joint osseous fusion.        Electronically signed by: Terry Ha  Date:                                            06/20/2023  Time:                                           15:45       Assessment:     1. Ankylosing spondylitis of multiple sites in spine    2. Encounter for medication monitoring    3. Immunocompromised state due to drug therapy    4. Medication monitoring encounter    5. High risk medication use    6. Stage 3 chronic kidney disease, unspecified whether stage 3a or 3b CKD    7. DISH (diffuse idiopathic skeletal hyperostosis)    8. S/P AAA repair        Plan:     Deborah was seen today for ankylosing spondylitis.    Diagnoses and all orders for this visit:    Ankylosing spondylitis of multiple sites in spine  The following orders have not been finalized:  -     ixekizumab (TALTZ AUTOINJECTOR) 80 mg/mL AtIn    Encounter for medication  monitoring    Immunocompromised state due to drug therapy    Medication monitoring encounter    High risk medication use    Stage 3 chronic kidney disease, unspecified whether stage 3a or 3b CKD    DISH (diffuse idiopathic skeletal hyperostosis)    S/P AAA repair    Ankylosing spondylitis cervical spine w h/o DISH  Labs reviewed  TB positive  Completed therapy for Latent TB prior to initiation of immunosuppressants for AS  Reg 4 today   CBC stable  CMP stable, CrCl 44mL/min  CRP wnl  ESR pending  DC xeljanz w recent AAA repair and concern for cardiac related s/e of xeljanz  Discussed at great length alternative treatments to help improve symptoms including trial of Cosentyx or Taltz.  Pt agreeable  Taltz submitted to [facility]  Labs as above  Drug therapy requiring intensive monitoring for toxicity  High Risk Medication Monitoring encounter  No current medication related issues, no evidence of toxicity  I ordered labs for toxicity monitoring, have personally reviewed the findings, and discussed them with the patient.  Pending labs will be sent via the portal  Compromised immune system secondary to autoimmune disease and/or use of immunosuppressive drugs.  Monitor carefully for infections.  Advised patient to get immediate medical care if any infection arises.  Also advised strict adherence age-appropriate vaccinations and cancer screenings with PCP.  Patient advised to hold DMARD and/or biologic therapy for signs of infection or for surgery. If you are unsure what to do please call our office for instruction.[facility] [phone]  Return to clinic: 4 mos w reg 4 same day    40 minutes of total time spent on the encounter, which includes face to face time and non-face to face time preparing to see the patient (eg, review of tests), Obtaining and/or reviewing separately obtained history, Documenting clinical information in the electronic or other health record, Independently interpreting results (not  separately reported) and communicating results to the patient/family/caregiver, or Care coordination (not separately reported).     [place]  available throughout the duration of the visit via the Thinglink system    No follow-ups on file.    The patient understands, chooses and consents to this plan and accepts all the risks which include but are not limited to the risks mentioned above.     Disclaimer: This note was prepared using a voice recognition system and is likely to have sound alike errors within the text.

## 2024-05-14 ENCOUNTER — PATIENT MESSAGE (OUTPATIENT)
Dept: RHEUMATOLOGY | Facility: CLINIC | Age: 69
End: 2024-05-14
Payer: MEDICARE

## 2024-05-14 LAB — ERYTHROCYTE [SEDIMENTATION RATE] IN BLOOD BY PHOTOMETRIC METHOD: 73 MM/HR (ref 0–23)

## 2024-05-15 ENCOUNTER — LAB VISIT (OUTPATIENT)
Dept: LAB | Facility: HOSPITAL | Age: 69
End: 2024-05-15
Attending: INTERNAL MEDICINE
Payer: MEDICARE

## 2024-05-15 ENCOUNTER — OFFICE VISIT (OUTPATIENT)
Dept: VASCULAR SURGERY | Facility: CLINIC | Age: 69
End: 2024-05-15
Payer: MEDICARE

## 2024-05-15 VITALS
BODY MASS INDEX: 27.67 KG/M2 | SYSTOLIC BLOOD PRESSURE: 112 MMHG | WEIGHT: 162.06 LBS | DIASTOLIC BLOOD PRESSURE: 68 MMHG | HEIGHT: 64 IN | HEART RATE: 61 BPM | OXYGEN SATURATION: 98 %

## 2024-05-15 DIAGNOSIS — Z86.79 S/P AAA REPAIR: Primary | ICD-10-CM

## 2024-05-15 DIAGNOSIS — M45.0 ANKYLOSING SPONDYLITIS OF MULTIPLE SITES IN SPINE: Primary | ICD-10-CM

## 2024-05-15 DIAGNOSIS — Z98.890 S/P AAA REPAIR: Primary | ICD-10-CM

## 2024-05-15 DIAGNOSIS — M45.0 ANKYLOSING SPONDYLITIS OF MULTIPLE SITES IN SPINE: ICD-10-CM

## 2024-05-15 LAB
HBV CORE AB SERPL QL IA: NORMAL
HBV SURFACE AB SER-ACNC: <3 MIU/ML
HBV SURFACE AB SER-ACNC: NORMAL M[IU]/ML
HBV SURFACE AG SERPL QL IA: NORMAL
HCV AB SERPL QL IA: NORMAL

## 2024-05-15 PROCEDURE — 99024 POSTOP FOLLOW-UP VISIT: CPT | Mod: S$PBB,,, | Performed by: SURGERY

## 2024-05-15 PROCEDURE — 86704 HEP B CORE ANTIBODY TOTAL: CPT | Performed by: INTERNAL MEDICINE

## 2024-05-15 PROCEDURE — 99214 OFFICE O/P EST MOD 30 MIN: CPT | Mod: PBBFAC,PN | Performed by: SURGERY

## 2024-05-15 PROCEDURE — 36415 COLL VENOUS BLD VENIPUNCTURE: CPT | Performed by: INTERNAL MEDICINE

## 2024-05-15 PROCEDURE — 87340 HEPATITIS B SURFACE AG IA: CPT | Performed by: INTERNAL MEDICINE

## 2024-05-15 PROCEDURE — 86803 HEPATITIS C AB TEST: CPT | Performed by: INTERNAL MEDICINE

## 2024-05-15 PROCEDURE — 99999 PR PBB SHADOW E&M-EST. PATIENT-LVL IV: CPT | Mod: PBBFAC,,, | Performed by: SURGERY

## 2024-05-15 PROCEDURE — 86706 HEP B SURFACE ANTIBODY: CPT | Performed by: INTERNAL MEDICINE

## 2024-05-15 RX ORDER — PREDNISONE 5 MG/1
TABLET ORAL
Qty: 30 TABLET | Refills: 0 | Status: SHIPPED | OUTPATIENT
Start: 2024-05-15 | End: 2024-05-27

## 2024-05-15 NOTE — PROGRESS NOTES
Patient returns.  He is status post a EVAR and a left common femoral endarterectomy.  He is doing well from that.  He has new issues.  He can walk as far as he wants with no problem.  CTA was reviewed previously and CTA was reviewed with the patient and his daughter.  His graft is in good position no significant endoleak no increase in size and the left common femoral with the endarterectomy performed was performed is wide open.  I reviewed the CT with him showed him the CTA.  His problem is his ongoing rheumatologic problem.  They are changing him to a different medication, it should have no effect on what he has.  They will start a bolus of steroids with him also.    From a vascular point of view he is doing well I have minimal to no concerns.  We will have him return in about 6 months with a CTA.

## 2024-05-16 RX ORDER — IXEKIZUMAB 80 MG/ML
INJECTION, SOLUTION SUBCUTANEOUS
Qty: 2 ML | Refills: 0 | Status: ACTIVE | OUTPATIENT
Start: 2024-05-16 | End: 2024-06-13 | Stop reason: ALTCHOICE

## 2024-06-03 ENCOUNTER — TELEPHONE (OUTPATIENT)
Dept: NEUROSURGERY | Facility: CLINIC | Age: 69
End: 2024-06-03
Payer: MEDICARE

## 2024-06-03 ENCOUNTER — TELEPHONE (OUTPATIENT)
Dept: PAIN MEDICINE | Facility: CLINIC | Age: 69
End: 2024-06-03
Payer: MEDICARE

## 2024-06-03 ENCOUNTER — OFFICE VISIT (OUTPATIENT)
Dept: FAMILY MEDICINE | Facility: CLINIC | Age: 69
End: 2024-06-03
Payer: MEDICARE

## 2024-06-03 VITALS
OXYGEN SATURATION: 95 % | HEART RATE: 84 BPM | DIASTOLIC BLOOD PRESSURE: 62 MMHG | BODY MASS INDEX: 27.67 KG/M2 | SYSTOLIC BLOOD PRESSURE: 118 MMHG | HEIGHT: 64 IN | WEIGHT: 162.06 LBS | TEMPERATURE: 99 F | RESPIRATION RATE: 18 BRPM

## 2024-06-03 DIAGNOSIS — R79.9 ABNORMAL FINDING OF BLOOD CHEMISTRY, UNSPECIFIED: ICD-10-CM

## 2024-06-03 DIAGNOSIS — I10 HYPERTENSION, ESSENTIAL: ICD-10-CM

## 2024-06-03 DIAGNOSIS — E78.2 MIXED HYPERLIPIDEMIA: Primary | ICD-10-CM

## 2024-06-03 DIAGNOSIS — N18.31 CHRONIC KIDNEY DISEASE, STAGE 3A: ICD-10-CM

## 2024-06-03 PROCEDURE — 99213 OFFICE O/P EST LOW 20 MIN: CPT | Mod: PBBFAC,PO | Performed by: NURSE PRACTITIONER

## 2024-06-03 PROCEDURE — 99999 PR PBB SHADOW E&M-EST. PATIENT-LVL III: CPT | Mod: PBBFAC,,, | Performed by: NURSE PRACTITIONER

## 2024-06-03 PROCEDURE — 99214 OFFICE O/P EST MOD 30 MIN: CPT | Mod: S$PBB,,, | Performed by: NURSE PRACTITIONER

## 2024-06-03 NOTE — TELEPHONE ENCOUNTER
----- Message from Shanice Berrios sent at 6/3/2024  4:17 PM CDT -----  Regarding: New Patient Appointment  Good Afternoon, patient scheduled on 06/13/24 @ 8:30 am. Lea (daughter) ask that someone call her to reschedule [phone]. Patient will not be in town. Thanks/elr

## 2024-06-03 NOTE — TELEPHONE ENCOUNTER
Spoke to pt daughter Lea she verbally understood his appt has been moved to Jul 30, 2024 for 9:30 am.

## 2024-06-03 NOTE — PROGRESS NOTES
Deborah White  06/16/2024  [id number]    Chastity Guzmán MD  Patient Care Team:  Chastity Guzmán MD as PCP - General (Family Medicine)  Didier Maldonado MD (Family Medicine)  Christen Nelson-Polo Pereira MD (Cardiology)  Gideon Yao, PharmD as Pharmacist (Pharmacist)          Visit Type:a scheduled routine follow-up visit    Chief Complaint:  Chief Complaint   Patient presents with    Annual Exam       History of Present Illness:     68 yo male presents for annual exam and blood work.   Pt is not requesting medication refills.    No other complaints at this time       History:  Past Medical History:   Diagnosis Date    Coronary artery disease     Hyperlipidemia     Hypertension     Personal history of colonic polyps      Past Surgical History:   Procedure Laterality Date    ABDOMINAL AORTIC ANEURYSM REPAIR, ENDOVASCULAR N/A 2/20/2024    Procedure: REPAIR-ANEURYSM-ABDOMINAL AORTIC-ENDOVASCULAR (AAA);  Surgeon: Caesar Denton MD;  Location: [facility] OR [address] [place] [address];  Service: Vascular;  Laterality: N/A;  EVAR & L femoral artery cutdown  mGy 1195.48 Gycm2 215.60     Fluro Time 16.2min   Contrast 26ml    EPIDURAL STEROID INJECTION INTO CERVICAL SPINE N/A 5/10/2024    Procedure: C6/7 IL YENNY with L paramedian approach;  Surgeon: Rudolph Burgos MD;  Location: [facility] PAIN [facility]GT;  Service: Pain Management;  Laterality: N/A;    INJECTION OF ANESTHETIC AGENT AROUND MEDIAL BRANCH NERVES INNERVATING CERVICAL FACET JOINT Right 11/18/2020    Procedure: Block-nerve-medial branch-cervical Right C3, 4, 5with RN IV sedation;  Surgeon: Martín Barnes MD;  Location: [facility]IN [facility];  Service: Pain Management;  Laterality: Right;    INJECTION OF ANESTHETIC AGENT AROUND MEDIAL BRANCH NERVES INNERVATING CERVICAL FACET JOINT Bilateral 04/28/2023    Procedure: Bilateral C4-6 MBB with RN IV sedation;  Surgeon: Rudolph Burgos MD;  Location: [facility]IN [facility];  Service: Pain Management;  Laterality: Bilateral;     Family History   Problem Relation Name  Age of Onset    Cancer Mother mother     Cancer Father father     Hypertension Father father     Early death Brother brother         flu    Heart disease Brother brother     Hypertension Brother brother      Social History     Socioeconomic History    Marital status:    Tobacco Use    Smoking status: Every Day     Current packs/day: 0.50     Average packs/day: 0.5 packs/day for 45.0 years (22.5 ttl pk-yrs)     Types: Cigarettes    Smokeless tobacco: Never   Substance and Sexual Activity    Alcohol use: Not Currently    Drug use: Never    Sexual activity: Not Currently     Partners: Female     Social Determinants of Health     Financial Resource Strain: Low Risk  (2/21/2024)    Overall Financial Resource Strain (CARDIA)     Difficulty of Paying Living Expenses: Not hard at all   Recent Concern: Financial Resource Strain - Medium Risk (2/20/2024)    Overall Financial Resource Strain (CARDIA)     Difficulty of Paying Living Expenses: Somewhat hard   Food Insecurity: No Food Insecurity (2/21/2024)    Hunger Vital Sign     Worried About Running Out of Food in the Last Year: Never true     Ran Out of Food in the Last Year: Never true   Transportation Needs: No Transportation Needs (2/21/2024)    PRAPARE - Transportation     Lack of Transportation (Medical): No     Lack of Transportation (Non-Medical): No   Physical Activity: Inactive (2/21/2024)    Exercise Vital Sign     Days of Exercise per Week: 0 days     Minutes of Exercise per Session: 0 min   Stress: Patient Unable To Answer (2/21/2024)    [employer] [place] - Occupational Stress Questionnaire     Feeling of Stress : Patient unable to answer   Recent Concern: Stress - Stress Concern Present (2/20/2024)    [employer] [place] - Occupational Stress Questionnaire     Feeling of Stress : To some extent   Housing Stability: Low Risk  (2/21/2024)    Housing Stability Vital Sign     Unable to Pay for Housing in the Last  Year: No     Number of Places Lived in the Last Year: 1     Unstable Housing in the Last Year: No     Patient Active Problem List   Diagnosis    Ankylosing hyperostosis of cervical region    Bilateral carpal tunnel syndrome    Elevated sed rate    Hypertension, essential    Hyperlipidemia    Ankylosing spondylitis of multiple sites in spine    Cervical facet joint syndrome    Coronary artery disease of native artery of native heart with stable angina pectoris    PAD (peripheral artery disease)    Tobacco abuse    Positive QuantiFERON-TB Gold test    Immunocompromised    DISH (diffuse idiopathic skeletal hyperostosis)    TB lung, latent    Seronegative rheumatoid arthritis    Cervical spondylosis    Neural foraminal stenosis of cervical spine    Overweight (BMI 25.0-29.9)    Infrarenal abdominal aortic aneurysm (AAA) without rupture    S/P AAA repair    Immunocompromised state due to drug therapy    Cervical radiculopathy    Chronic kidney disease, stage 3a     Review of patient's allergies indicates:  No Known Allergies    The following were reviewed at this visit: active problem list, medication list, allergies, family history, social history, and health maintenance.    Medications:  Current Outpatient Medications on File Prior to Visit   Medication Sig Dispense Refill    amLODIPine (NORVASC) 10 MG tablet Take 1 tablet (10 mg total) by mouth once daily. 90 tablet 3    aspirin (ECOTRIN) 81 MG EC tablet Take 81 mg by mouth once daily.      benazepriL (LOTENSIN) 40 MG tablet Take 1 tablet (40 mg total) by mouth once daily. 90 tablet 2    cilostazoL (PLETAL) 50 MG Tab Take 1 tablet (50 mg total) by mouth 2 (two) times daily. 60 tablet 9    cloNIDine (CATAPRES) 0.2 MG tablet Take 1 tablet (0.2 mg total) by mouth 2 (two) times daily. 180 tablet 3    ezetimibe (ZETIA) 10 mg tablet Take 1 tablet (10 mg total) by mouth once daily. 90 tablet 1    gabapentin (NEURONTIN) 300 MG capsule Take 1 capsule (300 mg total) by mouth  once daily AND 2 capsules (600 mg total) every evening. 270 capsule 0    hydroCHLOROthiazide (HYDRODIURIL) 25 MG tablet Take 1 tablet (25 mg total) by mouth once daily. 90 tablet 2    ixekizumab (TALTZ AUTOINJECTOR) 80 mg/mL AtIn Inject 80 mg into the skin every 28 days. 1 mL 6    linaCLOtide (LINZESS) 72 mcg Cap capsule Take 1 capsule (72 mcg total) by mouth before breakfast. (Patient taking differently: Take 72 mcg by mouth as needed.) 30 capsule 3    rosuvastatin (CRESTOR) 20 MG tablet Take 1 tablet (20 mg total) by mouth once daily. 90 tablet 2    oxyCODONE (ROXICODONE) 5 MG immediate release tablet Take 1 tablet (5 mg total) by mouth every 6 (six) hours as needed for Pain (severe pain). 12 tablet 0    [DISCONTINUED] acetaminophen (TYLENOL) 325 MG tablet Take 2 tablets (650 mg total) by mouth every 8 (eight) hours as needed for Pain. 30 tablet 0    [DISCONTINUED] senna-docusate 8.6-50 mg (PERICOLACE) 8.6-50 mg per tablet Take 1 tablet by mouth once daily. 14 tablet 0     No current facility-administered medications on file prior to visit.       Medications have been reviewed and reconciled with patient at this visit.  Barriers to medications reviewed with patient.    Adverse reactions to current medications reviewed with patient..    Over the counter medications reviewed and reconciled with patient.    Exam:  Wt Readings from Last 3 Encounters:   06/03/24 73.5 kg (162 lb 0.6 oz)   05/15/24 73.5 kg (162 lb 0.6 oz)   05/13/24 73.5 kg (162 lb 0.6 oz)     Temp Readings from Last 3 Encounters:   06/03/24 99.4 °F (37.4 °C) (Tympanic)   05/10/24 97.6 °F (36.4 °C) (Temporal)   02/27/24 98 °F (36.7 °C) (Temporal)     BP Readings from Last 3 Encounters:   06/03/24 118/62   05/15/24 112/68   05/13/24 (!) 111/57     Pulse Readings from Last 3 Encounters:   06/03/24 84   05/15/24 61   05/13/24 65     Body mass index is 27.81 kg/m².      Review of Systems   Constitutional:  Negative for fever.   Respiratory:  Negative for  cough, shortness of breath and wheezing.    Cardiovascular:  Negative for chest pain and palpitations.   Gastrointestinal:  Negative for nausea.   Neurological:  Negative for speech change, weakness and headaches.   All other systems reviewed and are negative.    Physical Exam  Vitals and nursing note reviewed.   Constitutional:       Appearance: Normal appearance. He is obese.   HENT:      Head: Normocephalic and atraumatic.      Right Ear: Tympanic membrane, ear canal and external ear normal.      Left Ear: Tympanic membrane, ear canal and external ear normal.      Nose: Nose normal.      Mouth/Throat:      Mouth: Mucous membranes are moist.      Pharynx: Oropharynx is clear.   Eyes:      Extraocular Movements: Extraocular movements intact.      Conjunctiva/sclera: Conjunctivae normal.      Pupils: Pupils are equal, round, and reactive to light.   Cardiovascular:      Rate and Rhythm: Normal rate and regular rhythm.      Pulses: Normal pulses.      Heart sounds: Normal heart sounds.   Pulmonary:      Effort: Pulmonary effort is normal.      Breath sounds: Normal breath sounds.   Abdominal:      General: Bowel sounds are normal.      Palpations: Abdomen is soft.   Musculoskeletal:         General: Normal range of motion.      Cervical back: Normal range of motion and neck supple.   Skin:     General: Skin is warm and dry.      Capillary Refill: Capillary refill takes less than 2 seconds.   Neurological:      General: No focal deficit present.      Mental Status: He is alert and oriented to person, place, and time.   Psychiatric:         Mood and Affect: Mood normal.         Behavior: Behavior normal.         Thought Content: Thought content normal.         Judgment: Judgment normal.         Laboratory Reviewed ({Yes)  Lab Results   Component Value Date    WBC 6.86 06/05/2024    HGB 12.1 (L) 06/05/2024    HCT 38.0 (L) 06/05/2024     06/05/2024    CHOL 120 06/05/2024    TRIG 97 06/05/2024    HDL 53 06/05/2024     ALT 27 06/05/2024    AST 23 06/05/2024     06/05/2024    K 3.6 06/05/2024     06/05/2024    CREATININE 1.50 (H) 06/05/2024    BUN 23.0 06/05/2024    CO2 25 06/05/2024    TSH 0.575 06/05/2024    PSA 0.30 06/21/2023    INR 1.0 02/20/2024    HGBA1C 6.5 06/05/2024       Deborah was seen today for annual exam.    Diagnoses and all orders for this visit:    Mixed hyperlipidemia  -     Lipid Panel; Standing  -     Comprehensive Metabolic Panel; Standing  -     CBC Auto Differential; Standing    Hypertension, essential  -     T4, Free; Standing  -     TSH; Standing  -     Hemoglobin A1C; Standing    Abnormal finding of blood chemistry, unspecified  -     Hemoglobin A1C; Standing    Chronic kidney disease, stage 3a          Plan   Labs       Care Plan/Goals: Reviewed    Goals    None         Follow up: Follow up for with  for 6 month follow up.    After visit summary was printed and given to patient upon discharge today.  Patient goals and care plan are included in After Visit Summary.

## 2024-06-03 NOTE — TELEPHONE ENCOUNTER
----- Message from Shanice Berrios sent at 6/3/2024  4:15 PM CDT -----  Regarding: Established Patinent Appointment  Good Afternoon, patient scheduled on 06/12/24 @ 8:15 am. Lea (daughter) is requesting someone call her to reschedule, as patient will not be in town. Please call [phone]. Thanks/elr

## 2024-06-03 NOTE — TELEPHONE ENCOUNTER
Called pt daughter and rescheduled appt because she said he wouldn't be in town around that time. Pt verbalized understanding.    Dana PERERA

## 2024-06-05 ENCOUNTER — LAB VISIT (OUTPATIENT)
Dept: LAB | Facility: HOSPITAL | Age: 69
End: 2024-06-05
Attending: NURSE PRACTITIONER
Payer: MEDICARE

## 2024-06-05 DIAGNOSIS — I10 HYPERTENSION, ESSENTIAL: ICD-10-CM

## 2024-06-05 DIAGNOSIS — R79.9 ABNORMAL FINDING OF BLOOD CHEMISTRY, UNSPECIFIED: ICD-10-CM

## 2024-06-05 DIAGNOSIS — E78.2 MIXED HYPERLIPIDEMIA: ICD-10-CM

## 2024-06-05 LAB
ALBUMIN SERPL-MCNC: 3.5 G/DL (ref 3.4–4.8)
ALBUMIN/GLOB SERPL: 0.9 RATIO (ref 1.1–2)
ALP SERPL-CCNC: 60 UNIT/L (ref 40–150)
ALT SERPL-CCNC: 27 UNIT/L (ref 0–55)
ANION GAP SERPL CALC-SCNC: 9 MEQ/L
AST SERPL-CCNC: 23 UNIT/L (ref 5–34)
BASOPHILS # BLD AUTO: 0.11 X10(3)/MCL
BASOPHILS NFR BLD AUTO: 1.6 %
BILIRUB SERPL-MCNC: 0.3 MG/DL
BUN SERPL-MCNC: 23 MG/DL (ref 8.4–25.7)
CALCIUM SERPL-MCNC: 9.2 MG/DL (ref 8.8–10)
CHLORIDE SERPL-SCNC: 107 MMOL/L (ref 98–107)
CHOLEST SERPL-MCNC: 120 MG/DL
CHOLEST/HDLC SERPL: 2 {RATIO} (ref 0–5)
CO2 SERPL-SCNC: 25 MMOL/L (ref 23–31)
CREAT SERPL-MCNC: 1.5 MG/DL (ref 0.73–1.18)
CREAT/UREA NIT SERPL: 15
EOSINOPHIL # BLD AUTO: 0.18 X10(3)/MCL (ref 0–0.9)
EOSINOPHIL NFR BLD AUTO: 2.6 %
ERYTHROCYTE [DISTWIDTH] IN BLOOD BY AUTOMATED COUNT: 15.5 % (ref 11.5–17)
EST. AVERAGE GLUCOSE BLD GHB EST-MCNC: 139.9 MG/DL
GFR SERPLBLD CREATININE-BSD FMLA CKD-EPI: 51 ML/MIN/1.73/M2
GLOBULIN SER-MCNC: 4 GM/DL (ref 2.4–3.5)
GLUCOSE SERPL-MCNC: 124 MG/DL (ref 82–115)
HBA1C MFR BLD: 6.5 %
HCT VFR BLD AUTO: 38 % (ref 42–52)
HDLC SERPL-MCNC: 53 MG/DL (ref 35–60)
HGB BLD-MCNC: 12.1 G/DL (ref 14–18)
IMM GRANULOCYTES # BLD AUTO: 0.04 X10(3)/MCL (ref 0–0.04)
IMM GRANULOCYTES NFR BLD AUTO: 0.6 %
LDLC SERPL CALC-MCNC: 48 MG/DL (ref 50–140)
LYMPHOCYTES # BLD AUTO: 2.13 X10(3)/MCL (ref 0.6–4.6)
LYMPHOCYTES NFR BLD AUTO: 31 %
MCH RBC QN AUTO: 27.9 PG (ref 27–31)
MCHC RBC AUTO-ENTMCNC: 31.8 G/DL (ref 33–36)
MCV RBC AUTO: 87.8 FL (ref 80–94)
MONOCYTES # BLD AUTO: 0.81 X10(3)/MCL (ref 0.1–1.3)
MONOCYTES NFR BLD AUTO: 11.8 %
NEUTROPHILS # BLD AUTO: 3.59 X10(3)/MCL (ref 2.1–9.2)
NEUTROPHILS NFR BLD AUTO: 52.4 %
PLATELET # BLD AUTO: 292 X10(3)/MCL (ref 130–400)
PMV BLD AUTO: 8.8 FL (ref 7.4–10.4)
POTASSIUM SERPL-SCNC: 3.6 MMOL/L (ref 3.5–5.1)
PROT SERPL-MCNC: 7.5 GM/DL (ref 5.8–7.6)
RBC # BLD AUTO: 4.33 X10(6)/MCL (ref 4.7–6.1)
SODIUM SERPL-SCNC: 141 MMOL/L (ref 136–145)
T4 FREE SERPL-MCNC: 1.05 NG/DL (ref 0.7–1.48)
TRIGL SERPL-MCNC: 97 MG/DL (ref 34–140)
TSH SERPL-ACNC: 0.57 UIU/ML (ref 0.35–4.94)
VLDLC SERPL CALC-MCNC: 19 MG/DL
WBC # SPEC AUTO: 6.86 X10(3)/MCL (ref 4.5–11.5)

## 2024-06-05 PROCEDURE — 83036 HEMOGLOBIN GLYCOSYLATED A1C: CPT

## 2024-06-05 PROCEDURE — 36415 COLL VENOUS BLD VENIPUNCTURE: CPT

## 2024-06-05 PROCEDURE — 84443 ASSAY THYROID STIM HORMONE: CPT

## 2024-06-05 PROCEDURE — 84439 ASSAY OF FREE THYROXINE: CPT

## 2024-06-05 PROCEDURE — 80061 LIPID PANEL: CPT

## 2024-06-05 PROCEDURE — 85025 COMPLETE CBC W/AUTO DIFF WBC: CPT

## 2024-06-05 PROCEDURE — 80053 COMPREHEN METABOLIC PANEL: CPT

## 2024-06-16 PROBLEM — N18.31 CHRONIC KIDNEY DISEASE, STAGE 3A: Status: ACTIVE | Noted: 2024-06-16

## 2024-07-02 RX ORDER — GABAPENTIN 300 MG/1
CAPSULE ORAL
Qty: 270 CAPSULE | Refills: 0 | Status: SHIPPED | OUTPATIENT
Start: 2024-07-02 | End: 2024-09-30

## 2024-07-02 NOTE — TELEPHONE ENCOUNTER
Pt is requesting for a refill of: gabapentin (NEURONTIN) 300 MG capsule   Last filed:3/27/24   Last encounter: 3/27/24   Up coming apt: 9/18/24   Pharmacy:[id number] - [address]   Is this something you can do?

## 2024-07-30 ENCOUNTER — OFFICE VISIT (OUTPATIENT)
Dept: NEUROSURGERY | Facility: CLINIC | Age: 69
End: 2024-07-30
Payer: MEDICARE

## 2024-07-30 DIAGNOSIS — M50.30 DDD (DEGENERATIVE DISC DISEASE), CERVICAL: ICD-10-CM

## 2024-07-30 DIAGNOSIS — I10 HYPERTENSION, ESSENTIAL: ICD-10-CM

## 2024-07-30 DIAGNOSIS — G99.2 MYELOPATHY CONCURRENT WITH AND DUE TO SPINAL STENOSIS OF CERVICAL REGION: ICD-10-CM

## 2024-07-30 DIAGNOSIS — M50.30 DEGENERATIVE DISC DISEASE, CERVICAL: ICD-10-CM

## 2024-07-30 DIAGNOSIS — M47.892 OTHER SPONDYLOSIS, CERVICAL REGION: ICD-10-CM

## 2024-07-30 DIAGNOSIS — M48.02 MYELOPATHY CONCURRENT WITH AND DUE TO SPINAL STENOSIS OF CERVICAL REGION: ICD-10-CM

## 2024-07-30 DIAGNOSIS — G95.89 CERVICAL CORD MYELOMALACIA: Primary | ICD-10-CM

## 2024-07-30 DIAGNOSIS — M54.12 CERVICAL RADICULOPATHY: ICD-10-CM

## 2024-07-30 PROCEDURE — 99999 PR PBB SHADOW E&M-EST. PATIENT-LVL III: CPT | Mod: PBBFAC,,, | Performed by: NEUROLOGICAL SURGERY

## 2024-07-30 PROCEDURE — 99213 OFFICE O/P EST LOW 20 MIN: CPT | Mod: PBBFAC,PO | Performed by: NEUROLOGICAL SURGERY

## 2024-07-30 PROCEDURE — 99204 OFFICE O/P NEW MOD 45 MIN: CPT | Mod: S$PBB,,, | Performed by: NEUROLOGICAL SURGERY

## 2024-07-30 RX ORDER — BENAZEPRIL HYDROCHLORIDE 40 MG/1
40 TABLET ORAL DAILY
Qty: 90 TABLET | Refills: 2 | Status: SHIPPED | OUTPATIENT
Start: 2024-07-30

## 2024-07-30 NOTE — TELEPHONE ENCOUNTER
----- Message from Delaney Mcnulty sent at 7/30/2024  9:11 AM CDT -----  .Type:  Pharmacy Calling to Clarify an RX    Name of Caller:Griselda  Pharmacy Name:[facility] in [place], [place]  Prescription Name:benazepriL (LOTENSIN) 40 MG tablet  What do they need to clarify?:refill  Best Call Back Number:[phone]  Additional Information:

## 2024-07-30 NOTE — PROGRESS NOTES
Subjective:      Patient ID: Deborah White is a 69 y.o. male.    Chief Complaint: Cervical Spine Pain (C-spine) (Dr. Burgos ref - Pt reports neck pain that radiates into both UE. Pt reports RT thumb is numb )    Patient presents today for evaluation cervical spine  Referred from pain management  Long history of neck pain progressively getting worse  Radiates down the right-greater-than-left upper extremity  Associated numbness and tingling in the right C5-6 distribution  Mild weakness in the  bilaterally  Also with a history of lower back symptoms      Review of Systems   Constitutional:  Negative for activity change, appetite change and chills.   HENT:  Negative for congestion and ear pain.    Eyes:  Negative for photophobia, redness and visual disturbance.   Respiratory:  Negative for apnea and chest tightness.    Cardiovascular:  Negative for chest pain.   Gastrointestinal:  Negative for abdominal distention and abdominal pain.   Endocrine: Negative for cold intolerance.   Genitourinary:  Negative for difficulty urinating.   Musculoskeletal:  Positive for myalgias, neck pain and neck stiffness. Negative for arthralgias.   Skin:  Negative for color change.   Allergic/Immunologic: Negative for environmental allergies.   Neurological:  Positive for weakness and numbness. Negative for dizziness.   Hematological:  Negative for adenopathy. Does not bruise/bleed easily.   Psychiatric/Behavioral:  Negative for agitation, behavioral problems and confusion.          Objective:       Physical Exam:  Nursing note and vitals reviewed.    Constitutional: He appears well-nourished. No distress.     Eyes: EOM are normal.     Cardiovascular: Normal rate.     Psych/Behavior: He is alert. He is oriented to person, place, and time. He has a normal mood and affect.     Musculoskeletal:        Neck: Range of motion is limited. There is tenderness. Muscle strength is 5/5.        Right Upper Extremities: Range of motion is limited. There  is tenderness. Muscle strength is 4/5.        Left Upper Extremities: There is no tenderness. Muscle strength is 5/5.     Neurological:        Sensory: There is no sensory deficit in the trunk. There is sensory deficit in the extremities. Sensory deficit is located Right C5-6 distribution.        Cranial nerves: Cranial nerve(s) II, III, IV, V, VI, VII, VIII, IX, X, XI and XII are intact.     General    Nursing note and vitals reviewed.  Constitutional: He is oriented to person, place, and time. He appears well-nourished. No distress.   Eyes: EOM are normal.   Cardiovascular:  Normal rate.            Neurological: He is alert and oriented to person, place, and time.   Psychiatric: He has a normal mood and affect.           Ortho Exam    MRI cervical spine     Multilevel degenerative changes of the cervical spine are most pronounced at C5-C6 with moderate spinal canal stenosis, severe left and moderate to severe right-sided neural foraminal stenosis.  Cord signal changes at this level concerning for myelomalacia.     Mild C3-C4 and C6-C7 spinal canal stenosis.  Severe bilateral C6-C7 and severe right-sided C3-C4 neural foraminal stenosis.     Right C3-C4 facet edema likely contributes to neck pain.     Right C4-C5 facet joint osseous fusion.      I  reviewed all pertinent imaging regarding this case.  Assessment:     1. Cervical cord myelomalacia    2. DDD (degenerative disc disease), cervical    3. Cervical radiculopathy    4. Other spondylosis, cervical region      Plan:     Cervical cord myelomalacia  -     Ambulatory referral/consult to Neurosurgery  -     CT Cervical Spine Without Contrast; Future; Expected date: 07/30/2024    DDD (degenerative disc disease), cervical  -     Ambulatory referral/consult to Neurosurgery    Cervical radiculopathy  -     Ambulatory referral/consult to Neurosurgery    Other spondylosis, cervical region  -     CT Cervical Spine Without Contrast; Future; Expected date:  07/30/2024    Patient with neck pain and upper extremity symptoms MRI showing degenerative disc disease worse at C5-6 and C6-7 where there is stenosis.  At C5-6 there is posterior disc and evidence of T2 signal change consistent with myelomalacia    Discussed with patient the clinical symptoms along with the anatomy on radiology patient may need anterior cervical diskectomy and fusion given the compression with cord signal change.    Would like to get a CT scan to see if there is ossification of the posterior elements.    Follow up in clinic after CT completed to discuss findings discuss treatment options    Thank you for the referral   Please call with any questions    Geoff lBanco MD  Neurosurgery     Disclaimer: This note was prepared using a voice recognition system and is likely to have sound alike errors within the text.

## 2024-07-31 DIAGNOSIS — I25.118 CORONARY ARTERY DISEASE OF NATIVE ARTERY OF NATIVE HEART WITH STABLE ANGINA PECTORIS: Primary | ICD-10-CM

## 2024-07-31 DIAGNOSIS — I73.9 PAD (PERIPHERAL ARTERY DISEASE): ICD-10-CM

## 2024-07-31 DIAGNOSIS — R94.31 ABNORMAL EKG: ICD-10-CM

## 2024-08-01 ENCOUNTER — OFFICE VISIT (OUTPATIENT)
Dept: CARDIOLOGY | Facility: CLINIC | Age: 69
End: 2024-08-01
Payer: MEDICARE

## 2024-08-01 ENCOUNTER — TELEPHONE (OUTPATIENT)
Dept: NEPHROLOGY | Facility: CLINIC | Age: 69
End: 2024-08-01
Payer: MEDICARE

## 2024-08-01 ENCOUNTER — OFFICE VISIT (OUTPATIENT)
Dept: INTERNAL MEDICINE | Facility: CLINIC | Age: 69
End: 2024-08-01
Payer: MEDICARE

## 2024-08-01 ENCOUNTER — PATIENT MESSAGE (OUTPATIENT)
Dept: CARDIOLOGY | Facility: CLINIC | Age: 69
End: 2024-08-01

## 2024-08-01 ENCOUNTER — HOSPITAL ENCOUNTER (OUTPATIENT)
Dept: CARDIOLOGY | Facility: HOSPITAL | Age: 69
Discharge: HOME | End: 2024-08-01
Attending: STUDENT IN AN ORGANIZED HEALTH CARE EDUCATION/TRAINING PROGRAM
Payer: MEDICARE

## 2024-08-01 VITALS
SYSTOLIC BLOOD PRESSURE: 120 MMHG | BODY MASS INDEX: 28.44 KG/M2 | DIASTOLIC BLOOD PRESSURE: 68 MMHG | WEIGHT: 160.5 LBS | HEART RATE: 68 BPM | HEIGHT: 63 IN

## 2024-08-01 VITALS
WEIGHT: 160.25 LBS | OXYGEN SATURATION: 96 % | SYSTOLIC BLOOD PRESSURE: 120 MMHG | HEART RATE: 61 BPM | BODY MASS INDEX: 28.39 KG/M2 | HEIGHT: 63 IN | DIASTOLIC BLOOD PRESSURE: 66 MMHG

## 2024-08-01 DIAGNOSIS — R94.31 ABNORMAL EKG: ICD-10-CM

## 2024-08-01 DIAGNOSIS — Z22.7 TB LUNG, LATENT: ICD-10-CM

## 2024-08-01 DIAGNOSIS — Z72.0 TOBACCO ABUSE: ICD-10-CM

## 2024-08-01 DIAGNOSIS — F17.200 SMOKING: ICD-10-CM

## 2024-08-01 DIAGNOSIS — E78.5 HYPERLIPIDEMIA, UNSPECIFIED HYPERLIPIDEMIA TYPE: ICD-10-CM

## 2024-08-01 DIAGNOSIS — M48.10 DISH (DIFFUSE IDIOPATHIC SKELETAL HYPEROSTOSIS): ICD-10-CM

## 2024-08-01 DIAGNOSIS — Z86.79 S/P AAA REPAIR: ICD-10-CM

## 2024-08-01 DIAGNOSIS — M06.00 SERONEGATIVE RHEUMATOID ARTHRITIS: ICD-10-CM

## 2024-08-01 DIAGNOSIS — H91.90 DECREASED HEARING, UNSPECIFIED LATERALITY: ICD-10-CM

## 2024-08-01 DIAGNOSIS — M54.12 CERVICAL RADICULOPATHY: ICD-10-CM

## 2024-08-01 DIAGNOSIS — I25.118 CORONARY ARTERY DISEASE OF NATIVE ARTERY OF NATIVE HEART WITH STABLE ANGINA PECTORIS: ICD-10-CM

## 2024-08-01 DIAGNOSIS — G99.2 MYELOPATHY CONCURRENT WITH AND DUE TO SPINAL STENOSIS OF CERVICAL REGION: ICD-10-CM

## 2024-08-01 DIAGNOSIS — I10 HYPERTENSION, ESSENTIAL: ICD-10-CM

## 2024-08-01 DIAGNOSIS — N18.31 CHRONIC KIDNEY DISEASE, STAGE 3A: ICD-10-CM

## 2024-08-01 DIAGNOSIS — Z79.899 IMMUNOCOMPROMISED STATE DUE TO DRUG THERAPY: ICD-10-CM

## 2024-08-01 DIAGNOSIS — Z72.0 TOBACCO USE: ICD-10-CM

## 2024-08-01 DIAGNOSIS — D84.821 IMMUNOCOMPROMISED STATE DUE TO DRUG THERAPY: ICD-10-CM

## 2024-08-01 DIAGNOSIS — Z86.010 PERSONAL HISTORY OF COLONIC POLYPS: ICD-10-CM

## 2024-08-01 DIAGNOSIS — M48.02 MYELOPATHY CONCURRENT WITH AND DUE TO SPINAL STENOSIS OF CERVICAL REGION: ICD-10-CM

## 2024-08-01 DIAGNOSIS — Z98.890 S/P AAA REPAIR: ICD-10-CM

## 2024-08-01 DIAGNOSIS — Z00.00 ENCOUNTER FOR MEDICARE ANNUAL WELLNESS EXAM: Primary | ICD-10-CM

## 2024-08-01 DIAGNOSIS — M45.0 ANKYLOSING SPONDYLITIS OF MULTIPLE SITES IN SPINE: ICD-10-CM

## 2024-08-01 DIAGNOSIS — I73.9 PAD (PERIPHERAL ARTERY DISEASE): ICD-10-CM

## 2024-08-01 DIAGNOSIS — E78.2 MIXED HYPERLIPIDEMIA: ICD-10-CM

## 2024-08-01 DIAGNOSIS — I25.118 CORONARY ARTERY DISEASE OF NATIVE ARTERY OF NATIVE HEART WITH STABLE ANGINA PECTORIS: Primary | ICD-10-CM

## 2024-08-01 DIAGNOSIS — I10 HYPERTENSION, ESSENTIAL: Primary | ICD-10-CM

## 2024-08-01 PROCEDURE — 99213 OFFICE O/P EST LOW 20 MIN: CPT | Mod: PBBFAC,25,27 | Performed by: STUDENT IN AN ORGANIZED HEALTH CARE EDUCATION/TRAINING PROGRAM

## 2024-08-01 PROCEDURE — 93005 ELECTROCARDIOGRAM TRACING: CPT

## 2024-08-01 PROCEDURE — 99999 PR PBB SHADOW E&M-EST. PATIENT-LVL III: CPT | Mod: PBBFAC,,, | Performed by: STUDENT IN AN ORGANIZED HEALTH CARE EDUCATION/TRAINING PROGRAM

## 2024-08-01 PROCEDURE — 99999 PR PBB SHADOW E&M-EST. PATIENT-LVL V: CPT | Mod: PBBFAC,,, | Performed by: NURSE PRACTITIONER

## 2024-08-01 PROCEDURE — 99215 OFFICE O/P EST HI 40 MIN: CPT | Mod: PBBFAC,25 | Performed by: NURSE PRACTITIONER

## 2024-08-01 RX ORDER — SILDENAFIL 25 MG/1
25 TABLET, FILM COATED ORAL DAILY PRN
Qty: 30 TABLET | Refills: 3 | Status: SHIPPED | OUTPATIENT
Start: 2024-08-01 | End: 2025-08-01

## 2024-08-01 NOTE — PROGRESS NOTES
Section of Cardiology                  Cardiac Clinic Note      HPI:   Deborah White is a 69 y.o. 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 Smoking 1/2 PPD > 40 yrs, no drinking  Prior cardiologist DR FISHMAN AT [facility]  Walks at home  He denied chest pain, dyspnea on exertion, palpitation, fainting, PND, orthopnea, syncope  Exertional calf pain.    exercise KAL showed normal resting KAL. Mod to severe decreased exercise KAL to 0.5 left and 0.6 right. EKG NSR and LVH  Started Simvastatin in 1998 and developed body ache this year. Pt states that the ache resovled after d/c simvastatin.  IN . Now resumed Simvastatin AND NO RECURRENT BODY ACHE    08-202O ECHO DONE AT [facility] NORMAL EF, MILD MR AND MOD. LVH  , CR 1.1   LE arterial US no significant blockage  03/15/2021 Decrease Pletal to once a day due to headache  States that improved Claudication after added Pletal, walks longer distance    Interval history  Limited activity due to knee OA pain right arm pain  No chest pain dyspnea dizziness faint.  Smoking   EKG NSR. ABD CT pending for AAA        5/31/23  Comes in with daughter  Switching providers  Has been having claudication symptoms in his left leg, minimal on the right   Prior arterial ultrasound of the legs without stenosis in 2021  Still smoking   Not very active at home  Abn ekg today compared to prior     Denies chest pain, shortness of breath, dizziness, syncope, PND      1/11/24  Comes in with daughter  Will be having TEVAR and peripheral angiogram/angioplasty  Still not active  Having claudication  Still smoking  BP stable       2/15/24  Will be having TEVAR and peripheral angiogram/angioplasty  Procedure will be under general anesthesia  Lexiscan done yesterday- no ischemia   Still not active  Has chronic pain  Bp low today, no symptoms      Denies chest pain,  SOB, LE swelling      8/1/24  Did get TEVAR, had stents placed in left leg  Now having calf pain  Will see vascular in a while   Not exercising like he should  Not active at home   Appetite is good   Cutback on smoking- 8 cigs now   Bp stable   Lost 5 lbs sicne last visit     Denies chest pain, SOB, LE swelling    EKG 8/1/24 SB,  nonspecific intraventricular block  EKG 1/11/24 SB, 1st deg AVB, nonspecific intraventricular block  EKG 5/31/23 SB, 1st deg AVB, nonspecific intraventricular block        EKG NSR, no acute ST - T wave changes    ECHO  No results found for this or any previous visit.       STRESS TEST No results found for this or any previous visit.       C No results found for this or any previous visit.            ROS: All 10 systems reviewed. Please refer to the HPI for pertinent positives. All other systems negative.     Past Medical History  Past Medical History:   Diagnosis Date    Coronary artery disease     Hyperlipidemia     Hypertension     Personal history of colonic polyps        Surgical History  Past Surgical History:   Procedure Laterality Date    ABDOMINAL AORTIC ANEURYSM REPAIR, ENDOVASCULAR N/A 2/20/2024    Procedure: REPAIR-ANEURYSM-ABDOMINAL AORTIC-ENDOVASCULAR (AAA);  Surgeon: Caesar [place] MD;  Location: [facility] OR [address] [place] [address];  Service: Vascular;  Laterality: N/A;  EVAR & L femoral artery cutdown  mGy 1195.48 Gycm2 215.60     Fluro Time 16.2min   Contrast 26ml    EPIDURAL STEROID INJECTION INTO CERVICAL SPINE N/A 5/10/2024    Procedure: C6/7 IL YENNY with L paramedian approach;  Surgeon: Rudolph Burgos MD;  Location: [facility]IN [facility];  Service: Pain Management;  Laterality: N/A;    INJECTION OF ANESTHETIC AGENT AROUND MEDIAL BRANCH NERVES INNERVATING CERVICAL FACET JOINT Right 11/18/2020    Procedure: Block-nerve-medial branch-cervical Right C3, 4, 5with RN IV sedation;  Surgeon: Martín Barnes MD;  Location: [facility];  Service: Pain Management;  Laterality: Right;    INJECTION OF  ANESTHETIC AGENT AROUND MEDIAL BRANCH NERVES INNERVATING CERVICAL FACET JOINT Bilateral 04/28/2023    Procedure: Bilateral C4-6 MBB with RN IV sedation;  Surgeon: Rudolph Burgos MD;  Location: [facility];  Service: Pain Management;  Laterality: Bilateral;          Allergies:   Review of patient's allergies indicates:  No Known Allergies    Social History:  Social History     Socioeconomic History    Marital status:    Tobacco Use    Smoking status: Every Day     Current packs/day: 0.50     Average packs/day: 0.5 packs/day for 45.0 years (22.5 ttl pk-yrs)     Types: Cigarettes    Smokeless tobacco: Never   Substance and Sexual Activity    Alcohol use: Not Currently    Drug use: Never    Sexual activity: Not Currently     Partners: Female     Social Determinants of Health     Financial Resource Strain: Low Risk  (8/1/2024)    Overall Financial Resource Strain (CARDIA)     Difficulty of Paying Living Expenses: Not hard at all   Food Insecurity: No Food Insecurity (8/1/2024)    Hunger Vital Sign     Worried About Running Out of Food in the Last Year: Never true     Ran Out of Food in the Last Year: Never true   Transportation Needs: No Transportation Needs (8/1/2024)    PRAPARE - Transportation     Lack of Transportation (Medical): No     Lack of Transportation (Non-Medical): No   Physical Activity: Sufficiently Active (8/1/2024)    Exercise Vital Sign     Days of Exercise per Week: 7 days     Minutes of Exercise per Session: 30 min   Stress: No Stress Concern Present (8/1/2024)    [place] [employer] - Occupational Stress Questionnaire     Feeling of Stress : Not at all   Housing Stability: Low Risk  (8/1/2024)    Housing Stability Vital Sign     Unable to Pay for Housing in the Last Year: No     Homeless in the Last Year: No       Family History:  family history includes Cancer in his father and mother; Early death in his brother; Heart disease in his brother; Hypertension in his brother  "and father.    Home Medications:  Current Outpatient Medications on File Prior to Visit   Medication Sig Dispense Refill    amLODIPine (NORVASC) 10 MG tablet Take 1 tablet (10 mg total) by mouth once daily. 90 tablet 3    aspirin (ECOTRIN) 81 MG EC tablet Take 81 mg by mouth once daily.      benazepriL (LOTENSIN) 40 MG tablet Take 1 tablet (40 mg total) by mouth once daily. 90 tablet 2    cilostazoL (PLETAL) 50 MG Tab Take 1 tablet (50 mg total) by mouth 2 (two) times daily. 60 tablet 9    cloNIDine (CATAPRES) 0.2 MG tablet Take 1 tablet (0.2 mg total) by mouth 2 (two) times daily. 180 tablet 3    ezetimibe (ZETIA) 10 mg tablet Take 1 tablet (10 mg total) by mouth once daily. 90 tablet 1    gabapentin (NEURONTIN) 300 MG capsule Take 1 capsule (300 mg total) by mouth once daily AND 2 capsules (600 mg total) every evening. 270 capsule 0    hydroCHLOROthiazide (HYDRODIURIL) 25 MG tablet Take 1 tablet (25 mg total) by mouth once daily. 90 tablet 2    ixekizumab (TALTZ AUTOINJECTOR) 80 mg/mL AtIn Inject 80 mg into the skin every 28 days. 1 mL 6    linaCLOtide (LINZESS) 72 mcg Cap capsule Take 1 capsule (72 mcg total) by mouth before breakfast. (Patient taking differently: Take 72 mcg by mouth as needed.) 30 capsule 3    rosuvastatin (CRESTOR) 20 MG tablet Take 1 tablet (20 mg total) by mouth once daily. 90 tablet 2     No current facility-administered medications on file prior to visit.       Physical exam:  /68 (BP Location: Right arm, Patient Position: Sitting, BP Method: Medium (Manual))   Pulse 68   Ht 5' 3" (1.6 m)   Wt 72.8 kg (160 lb 7.9 oz)   BMI 28.43 kg/m²         General: Pt is a 69 y.o. year old male who is AAOx3, in NAD, is pleasant, well nourished, looks stated age  HEENT: PERRL, EOMI, Oral mucosa pink & moist  CVS  No abnormal cardiac pulsations noted on inspection. JVP not raised. The apical impulse is normal on palpation, and is located in the left 5th intercostal space in the mid - clavicular " line. No palpable thrills or abnormal pulsations noted. RR, S1 - S2 heard, no murmurs, rubs or gallops appreciated.   PUL : CTA B/L. No wheezes/crackles heard   ABD : BS +, soft. No tenderness elicited   LE : No C/C/E. Distal Pulses palpable B/L         LABS:    Chemistry:   Lab Results   Component Value Date     06/05/2024     05/13/2024    K 3.6 06/05/2024    K 3.6 05/13/2024     06/05/2024     05/13/2024    CO2 25 06/05/2024    CO2 25 05/13/2024    BUN 23.0 06/05/2024    BUN 21 05/13/2024    CREATININE 1.50 (H) 06/05/2024    CREATININE 1.7 (H) 05/13/2024    GLUCOSE 124 (H) 06/05/2024    CALCIUM 9.2 06/05/2024    CALCIUM 9.7 05/13/2024     Cardiac Markers:   Lab Results   Component Value Date    CKTOTAL 113 06/22/2020    CKMB 0.0 06/22/2020    TROPONINI <0.006 02/26/2024     Cardiac Markers (Last 3):   Lab Results   Component Value Date    CKTOTAL 113 06/22/2020    CKTOTAL 207 01/07/2020    CKMB 0.0 06/22/2020    CKMB 0.0 01/07/2020    TROPONINI <0.006 02/26/2024     CBC:   Lab Results   Component Value Date    WBC 6.86 06/05/2024    WBC 9.24 05/13/2024    HGB 12.1 (L) 06/05/2024    HGB 12.6 (L) 05/13/2024    HCT 38.0 (L) 06/05/2024    HCT 38.1 (L) 05/13/2024    MCV 87.8 06/05/2024    MCV 88 05/13/2024     06/05/2024     05/13/2024     Lipids:   Lab Results   Component Value Date    CHOL 120 06/05/2024    CHOL 107 (L) 06/21/2023    TRIG 97 06/05/2024    TRIG 97 06/21/2023    TRIG 133 08/05/2020    HDL 53 06/05/2024    HDL 42 06/21/2023    HDL 56 08/05/2020     Coagulation:   Lab Results   Component Value Date    INR 1.0 02/20/2024    APTT 27.1 02/20/2024           Assessment        1. Coronary artery disease of native artery of native heart with stable angina pectoris    2. PAD (peripheral artery disease)    3. Ankylosing spondylitis of multiple sites in spine    4. Abnormal EKG    5. Smoking    6. Mixed hyperlipidemia    7. Tobacco abuse    8. Hyperlipidemia, unspecified  hyperlipidemia type             Plan:  Abnormal EKG   First-degree AV block, nonspecific intraventricular conduction delay   Echo 6/23 EF 65%    CAD  Non obs CAD  Denies chest pain  Continue aspirin, Zetia, Crestor    HLD  LDL 53->46 as 6/23  Continue statin    Claudication/PVD  Left > right  Continue pletal  Sees Vascular, Rt- scattered plaques, Lt- mild disease (no significant stenosis) - had TEVAR    Hypertension  Stable   Continue benazepril, clonidine, hydrochlorothiazide, amlodipine    Ankylosing spondylitis   Stable   Continue therapy    Tobacco abuse   Smoking cessation encouraged    Low salt, low fat diet  Exercise as tolerated, at least 30 min daily     This note was prepared using voice recognition system and is likely to have sound alike errors that may have been overlooked even after proofreading.     I have reviewed all pertinent chart information.  Plans and recommendations have been formulated under my direct supervision. All questions answered and patient voiced understanding.   If symptoms persist go to the ED.    RTC in 6 months        Ludin Maldonado MD  Cardiology

## 2024-08-01 NOTE — TELEPHONE ENCOUNTER
----- Message from Sade Maurer NP sent at 8/1/2024  8:48 AM CDT -----  Pt would like a call back to discuss scheduling apt.   Thanks,   Sade

## 2024-08-01 NOTE — PATIENT INSTRUCTIONS
Counseling and Referral of Other Preventative  (Italic type indicates deductible and co-insurance are waived)    Patient Name: Deborah White  Today's Date: 8/1/2024    Health Maintenance       Date Due Completion Date    TETANUS VACCINE Never done ---    Shingles Vaccine (1 of 2) Never done ---    LDCT Lung Screen Never done ---    RSV Vaccine (Age 60+ and Pregnant patients) (1 - 1-dose 60+ series) Never done ---    Annual UACr 11/20/2018 11/20/2017    Pneumococcal Vaccines (Age 65+) (2 of 2 - PCV) 12/11/2020 12/11/2019    Colorectal Cancer Screening 01/08/2023 1/8/2018    COVID-19 Vaccine (5 - 2023-24 season) 11/06/2023 9/11/2023    PROSTATE-SPECIFIC ANTIGEN 06/21/2024 6/21/2023    Influenza Vaccine (1) 09/01/2024 9/11/2023    Hemoglobin A1c (Prediabetes) 06/05/2025 6/5/2024    High Dose Statin 08/01/2025 8/1/2024    Lipid Panel 06/05/2029 6/5/2024        Orders Placed This Encounter   Procedures    Ambulatory referral/consult to Audiology       The following information is provided to all patients.  This information is to help you find resources for any of the problems found today that may be affecting your health:                  Living healthy guide: www.[facility].[place].gov      Understanding Diabetes: www.diabetes.org      Eating healthy: www.cdc.gov/healthyweight      CDC home safety checklist: www.cdc.gov/steadi/patient.html      Agency on Aging: www.goea.[place].[facility]      Alcoholics anonymous (AA): www.aa.org      Physical Activity: www.kathie.nih.gov/lg0bhag      Tobacco use: www.quitwithusla.org

## 2024-08-01 NOTE — PROGRESS NOTES
"Deborah White presented for a  Medicare AWV and comprehensive Health Risk Assessment today. The following components were reviewed and updated:    Medical history  Family History  Social history  Allergies and Current Medications  Health Risk Assessment  Health Maintenance  Care Team         ** See Completed Assessments for Annual Wellness Visit within the encounter summary.**         The following assessments were completed:  Living Situation  CAGE  Depression Screening  Timed Get Up and Go  Whisper Test  Cognitive Function Screening  Nutrition Screening  ADL Screening  PAQ Screening      Opioid documentation:      Patient does not have a current opioid prescription.        Vitals:    08/01/24 0844   BP: 120/66   Pulse: 61   SpO2: 96%   Weight: 72.7 kg (160 lb 4.4 oz)   Height: 5' 3" (1.6 m)     Body mass index is 28.39 kg/m².  Physical Exam  Vitals and nursing note reviewed.   Constitutional:       Appearance: He is well-developed.   HENT:      Head: Normocephalic.   Cardiovascular:      Rate and Rhythm: Normal rate and regular rhythm.      Pulses:           Dorsalis pedis pulses are 1+ on the right side and 1+ on the left side.      Heart sounds: Normal heart sounds.      Comments: Bilateral feet warm to touch with normal color  Pulmonary:      Effort: Pulmonary effort is normal. No respiratory distress.      Breath sounds: Normal breath sounds.   Abdominal:      Palpations: Abdomen is soft. There is no mass.      Tenderness: There is no abdominal tenderness.   Musculoskeletal:         General: Normal range of motion.   Skin:     General: Skin is warm and dry.   Neurological:      Mental Status: He is alert and oriented to person, place, and time.      Motor: No abnormal muscle tone.   Psychiatric:         Speech: Speech normal.         Behavior: Behavior normal.               Diagnoses and health risks identified today and associated recommendations/orders:    1. Encounter for Medicare annual wellness exam  - " Ambulatory Referral/Consult to Enhanced Annual Wellness Visit (eAWV)  JANELL # [id number]    Advised to bring medications to next appointment so that chart can be updated appropriately. Patient verbalized understanding.    Message sent to optometry and nephrology staff to please contact to schedule appointment.   Declines case management at this time  Reports cardiac conditions are stable.    Reports he is at his respiratory baseline  He will discuss all vaccines with rheumatologist.   He will discuss PSA and lung cancer screening with PCP    2. Chronic kidney disease, stage 3a  Stable. Continue current treatment plan as previously prescribed with your  nephrologist.     3. DISH (diffuse idiopathic skeletal hyperostosis)  Continue current treatment plan as previously prescribed with your  rheumatologist.     4. Ankylosing spondylitis of multiple sites in spine  Ixekizumab  Continue current treatment plan as previously prescribed with your  rheumatologist.     5. PAD (peripheral artery disease)   6/23  Reports chronic leg pain with legs elevated that improve in dependent position  Red flag s/s discussed (denies any) and advised 911/ER if occur. Patient expressed understanding.    Advised to follow up with cardiologist for further evaluation and recommendations. Patient expressed understanding.      6. Immunocompromised state due to drug therapy  Ixekizumab  Continue current treatment plan as previously prescribed with your  rheumatologist.     7. Coronary artery disease of native artery of native heart with stable angina pectoris  Stable. Continue current treatment plan as previously prescribed with your  cardiologist    8. Myelopathy concurrent with and due to spinal stenosis of cervical region  Continue current treatment plan as previously prescribed with your  neurosurgeon   LOV 7/24    9. Seronegative rheumatoid arthritis  Continue current treatment plan as previously prescribed with your  rheumatologist    10.  Cervical radiculopathy  Reports n/t RUE  Continue current treatment plan as previously prescribed with your  NS    11. Mixed hyperlipidemia  Stable. Continue current treatment plan as previously prescribed with your  pcp and cardiologist.     12. Tobacco use  Discussed the importance of smoking cessation and advised to quit smoking. Patient expressed understanding. Declines smoking cessation program.     13. Hypertension, essential  Stable. Continue current treatment plan as previously prescribed with your  pcp and cardiologist.     14. S/P AAA repair  2/24  Continue current treatment plan as previously prescribed with your  cardiologist and vascular.     15. TB lung, latent  Completed treatment per daughter  Has seen ID in past    16. Decreased hearing, unspecified laterality  Abnormal whisper test.   Advised to follow up with PCP for further evaluation and recommendations. Patient expressed understanding.     - Ambulatory referral/consult to Audiology; Future    17. Personal history of colonic polyps  Declines scheduling colonoscopy at this time. He would like to wait to discuss with PCP    18. Prediabetes  Increased  Continue current treatment plan as previously prescribed with your  pcp       Provided Deborah with a 5-10 year written screening schedule and personal prevention plan. Recommendations were developed using the USPSTF age appropriate recommendations. Education, counseling, and referrals were provided as needed. After Visit Summary printed and given to patient which includes a list of additional screenings\tests needed.    Follow up in about 1 year (around 8/1/2025) for awv.    Sade Maurer NP  I offered to discuss advanced care planning, including how to pick a person who would make decisions for you if you were unable to make them for yourself, called a health care power of , and what kind of decisions you might make such as use of life sustaining treatments such as ventilators and tube  feeding when faced with a life limiting illness recorded on a living will that they will need to know. (How you want to be cared for as you near the end of your natural life)     X Patient is interested in learning more about how to make advanced directives.  I provided them paperwork and offered to discuss this with them.

## 2024-08-02 ENCOUNTER — HOSPITAL ENCOUNTER (OUTPATIENT)
Dept: RADIOLOGY | Facility: HOSPITAL | Age: 69
Discharge: HOME | End: 2024-08-02
Attending: NEUROLOGICAL SURGERY
Payer: MEDICARE

## 2024-08-02 DIAGNOSIS — M47.892 OTHER SPONDYLOSIS, CERVICAL REGION: ICD-10-CM

## 2024-08-02 DIAGNOSIS — G95.89 CERVICAL CORD MYELOMALACIA: ICD-10-CM

## 2024-08-02 LAB
OHS QRS DURATION: 122 MS
OHS QTC CALCULATION: 434 MS

## 2024-08-02 PROCEDURE — 72125 CT NECK SPINE W/O DYE: CPT | Mod: 26,,, | Performed by: RADIOLOGY

## 2024-08-02 PROCEDURE — 72125 CT NECK SPINE W/O DYE: CPT | Mod: TC

## 2024-08-04 ENCOUNTER — PATIENT MESSAGE (OUTPATIENT)
Dept: FAMILY MEDICINE | Facility: CLINIC | Age: 69
End: 2024-08-04
Payer: MEDICARE

## 2024-08-05 ENCOUNTER — PATIENT MESSAGE (OUTPATIENT)
Dept: RHEUMATOLOGY | Facility: CLINIC | Age: 69
End: 2024-08-05
Payer: MEDICARE

## 2024-08-05 ENCOUNTER — TELEPHONE (OUTPATIENT)
Dept: FAMILY MEDICINE | Facility: CLINIC | Age: 69
End: 2024-08-05
Payer: MEDICARE

## 2024-08-13 ENCOUNTER — PATIENT MESSAGE (OUTPATIENT)
Dept: ADMINISTRATIVE | Facility: OTHER | Age: 69
End: 2024-08-13
Payer: MEDICARE

## 2024-08-13 ENCOUNTER — PATIENT MESSAGE (OUTPATIENT)
Dept: OTOLARYNGOLOGY | Facility: CLINIC | Age: 69
End: 2024-08-13
Payer: MEDICARE

## 2024-09-06 ENCOUNTER — PATIENT MESSAGE (OUTPATIENT)
Dept: NEUROSURGERY | Facility: CLINIC | Age: 69
End: 2024-09-06
Payer: MEDICARE

## 2024-09-06 ENCOUNTER — TELEPHONE (OUTPATIENT)
Dept: NEUROSURGERY | Facility: CLINIC | Age: 69
End: 2024-09-06
Payer: MEDICARE

## 2024-09-17 NOTE — PROGRESS NOTES
Established Patient Interventional Pain Note     Referring Physician: No ref. provider found    PCP: Chastity Guzmán MD    Chief Complaint:   Chief Complaint   Patient presents with    Shoulder Pain     Patient received 60% relief from injection in May.  Patient has pain in bilateral shoulder that radiates in upper arms.  Pain level 6/10        SUBJECTIVE:  Interval history 09/18/2024  Patient's daughter in room to help facilitate translation    Patient presents status post C6-7 interlaminar epidural steroid injection with left paramedian approach 05/10/2024.  Patient reports 60% relief exceeding 3 months in duration with cervical epidural steroid injection.  Today he again reports pain in bilateral cervical paraspinous musculature which can radiate down the left upper extremity to mid arm in C6 dermatomal distribution.  Pain is intermittent and today is rated a 6/10.  Patient does have associated numbness in bilateral hands but denies significant upper extremity weakness or compromise in hand  strength or dexterity.  Patient has continued gabapentin which she is taking 300 mg in the morning and 600 mg in the evening.  He has continued physician directed physical therapy exercises for neck and arm pain over the last 8 weeks from 07/18/2024 through 09/18/2024 with improvement in his symptoms.  Of note patient received 80% relief with prior bilateral C4-6 cervical medial branch block for axial neck pain and received 60% sustained relief for 3 months with prior cervical epidural steroid injection for cervical radiculopathy.    Of note patient saw neurosurgery 07/30/2024 with the following evaluation:      Interval history 03/27/2024  Patient presents for MRI cervical and lumbar spine review      Today patient again reports pain in the neck which radiates down bilateral upper extremities to the hands in C6-7 dermatomal distribution.  Pain is constant and today is rated a 6/10.  Pain is worse on the left than on  the right.  Patient does report associated numbness in bilateral hands but denies outright weakness in the upper extremities or compromise in hand  strength or dexterity.  Pain can be exacerbated with cervical flexion/extension and overhead activities.  Pain is improved with use of gabapentin which he is taking 300 mg in the morning and 600 mg in the evening.  Patient's daughter does report he experiences slight sedation with morning dose of gabapentin.  Patient has continued physician directed physical therapy exercises over the last 8 weeks from 01/27/2024 through 03/27/2024 with marginal improvement in pain.    Pain in the lower back and leg pain has improved with abdominal aortic endovascular aneurysm repair 02/20/2024.  Today patient's primary concern neck and arm pain.    Interval Hx: 05/31/2023  [employer] services used to help translate (Cantonese).    Pt presents s/p bilateral C4-6 MBB 04/28/2023.  Patient reports 80% sustained relief in bilateral axial neck pain following cervical medial branch block.  Today he reports primarily radicular pain in the arms and in the legs.  Pain is intermittent and today is rated a 5/10.  Patient reports pain which radiates into bilateral shoulders in C6-7 dermatomal distribution and further down into the upper extremities in no particular dermatomal distribution to the hands.  Patient also reports lower back pain which radiates down bilateral lower extremities but predominantly on the left into the knee.  This pain is exacerbated with standing and with ambulation.  Patient does endorse noticeable weakness in the upper and lower extremities associated with use.  Patient has continued gabapentin 300 mg twice daily with noticeable improvement in his pain.  Patient is requesting a refill of this medication.  Patient has been performing physician directed physical therapy exercises over the last 8 weeks with mild improvement in his symptoms.    HPI 04/05/2023  Deborah  "Christopher is a 69 y.o. male with past medical history significant for diffuse idiopathic skeletal hyperostosis (dish), coronary artery disease, peripheral arterial disease, hypertension, hyperlipidemia, latent TB, rheumatoid arthritis, ankylosing spondylitis, nicotine dependence who presents to the clinic for the evaluation of   Neck and lower back pain.  Today patient's daughter is in the room to facilitate translation as well as use of the Timothy.  She reports his pain is most severe in the neck.  Patient reports pain is constant and today is rated a 7/10, at its best is a 5/10 and at its worse is a 10/10.  Pain is described as "inside" or deep in nature.  Patient reports pain in the neck which radiates down the left upper extremity to the hand in C6 dermatomal distribution.  Pain is exacerbated with lifting exceeding 5 lb and cervical lateral flexion.  Patient denies significant weakness in the left upper extremity but does report numbness and tingling in the hand which can affect hand  strength or dexterity.  Pain is improved with rest, gabapentin which she is taking 300 mg in the morning and 300 mg in the evening as well as with prior cervical medial branch block.  Patient reports he received 1 year of relief following prior right-sided C4-6 cervical medial branch block with Dr. Barnes.   Patient has completed conventional physical therapy in November 2020 without any improvement in his pain.    Patient also reports lower back pain which radiates down the left lower extremity to the knee.  Pain is described as numbness and tingling in nature.  Pain can be exacerbated with standing or with ambulation.  Patient's daughter also reports some associated weakness in the left lower extremity.    Patient reports significant motor weakness and loss of sensations.  Patient denies night fever/night sweats, urinary incontinence, bowel incontinence, and significant weight loss.      Pain Disability Index Review:         " 9/18/2024     9:57 AM 3/27/2024     8:46 AM   Last 3 PDI Scores   Pain Disability Index (PDI) 49 36       Non-Pharmacologic Treatments:  Physical Therapy/Home Exercise: yes  Ice/Heat:yes  TENS: no  Acupuncture: no  Massage: no  Chiropractic: no    Other: no      Pain Medications:  - Adjuvant Medications: Neurontin (Gabapentin), Xeljanz  - Anti-Coagulants: Aspirin,, cilostazol    Pain Procedures:   -05/10/2024: C6-7 interlaminar epidural steroid injection with left paramedian approach  -04/28/2023: bilateral C4-6 MBB   -11/18/2020: Right-sided C4-6 medial branch blocks; Dr. Barnes    Past Medical History:   Diagnosis Date    Coronary artery disease     Hyperlipidemia     Hypertension     Personal history of colonic polyps      Past Surgical History:   Procedure Laterality Date    ABDOMINAL AORTIC ANEURYSM REPAIR, ENDOVASCULAR N/A 2/20/2024    Procedure: REPAIR-ANEURYSM-ABDOMINAL AORTIC-ENDOVASCULAR (AAA);  Surgeon: Caesar Denton MD;  Location: [facility] OR [address] [place] [address];  Service: Vascular;  Laterality: N/A;  EVAR & L femoral artery cutdown  mGy 1195.48 Gycm2 215.60     Fluro Time 16.2min   Contrast 26ml    EPIDURAL STEROID INJECTION INTO CERVICAL SPINE N/A 5/10/2024    Procedure: C6/7 IL YENNY with L paramedian approach;  Surgeon: Rudolph Burgos MD;  Location: [facility] PAIN MGT;  Service: Pain Management;  Laterality: N/A;    INJECTION OF ANESTHETIC AGENT AROUND MEDIAL BRANCH NERVES INNERVATING CERVICAL FACET JOINT Right 11/18/2020    Procedure: Block-nerve-medial branch-cervical Right C3, 4, 5with RN IV sedation;  Surgeon: Martín Barnes MD;  Location: [facility] PAIN [facility]GT;  Service: Pain Management;  Laterality: Right;    INJECTION OF ANESTHETIC AGENT AROUND MEDIAL BRANCH NERVES INNERVATING CERVICAL FACET JOINT Bilateral 04/28/2023    Procedure: Bilateral C4-6 MBB with RN IV sedation;  Surgeon: Rudolph Burgos MD;  Location: [facility] PAIN [facility]GT;  Service: Pain Management;  Laterality: Bilateral;     Review of patient's allergies indicates:  No  Known Allergies    Current Outpatient Medications   Medication Sig    amLODIPine (NORVASC) 10 MG tablet Take 1 tablet (10 mg total) by mouth once daily.    aspirin (ECOTRIN) 81 MG EC tablet Take 81 mg by mouth once daily.    benazepriL (LOTENSIN) 40 MG tablet Take 1 tablet (40 mg total) by mouth once daily.    cilostazoL (PLETAL) 50 MG Tab Take 1 tablet (50 mg total) by mouth 2 (two) times daily.    cloNIDine (CATAPRES) 0.2 MG tablet Take 1 tablet (0.2 mg total) by mouth 2 (two) times daily.    ezetimibe (ZETIA) 10 mg tablet Take 1 tablet (10 mg total) by mouth once daily.    gabapentin (NEURONTIN) 300 MG capsule Take 1 capsule (300 mg total) by mouth once daily AND 2 capsules (600 mg total) every evening.    hydroCHLOROthiazide (HYDRODIURIL) 25 MG tablet Take 1 tablet (25 mg total) by mouth once daily.    ixekizumab (TALTZ AUTOINJECTOR) 80 mg/mL AtIn Inject 80 mg into the skin every 28 days.    linaCLOtide (LINZESS) 72 mcg Cap capsule Take 1 capsule (72 mcg total) by mouth before breakfast. (Patient taking differently: Take 72 mcg by mouth as needed.)    rosuvastatin (CRESTOR) 20 MG tablet Take 1 tablet (20 mg total) by mouth once daily.    sildenafiL (VIAGRA) 25 MG tablet Take 1 tablet (25 mg total) by mouth daily as needed for Erectile Dysfunction.     No current facility-administered medications for this visit.       Review of Systems     GENERAL:  No weight loss, malaise or fevers.  HEENT:   No recent changes in vision or hearing  NECK:  Negative for lumps, no difficulty with swallowing.  RESPIRATORY:  Negative for cough, wheezing or shortness of breath, patient denies any recent URI.  CARDIOVASCULAR:  Negative for chest pain or palpitations.  GI:  Negative for abdominal discomfort, blood in stools or black stools or change in bowel habits.  MUSCULOSKELETAL:  See HPI.  SKIN:  Negative for lesions, rash, and itching.  PSYCH:  No mood disorder or recent psychosocial stressors.   HEMATOLOGY/LYMPHOLOGY:  Negative  "for prolonged bleeding, bruising easily or swollen nodes.    NEURO:   No history of syncope, paralysis, seizures or tremors.  All other reviewed and negative other than HPI.    OBJECTIVE:    BP (!) 147/72   Pulse (!) 56   Resp 16   Ht 5' 3" (1.6 m)   Wt 73 kg (160 lb 15 oz)   BMI 28.51 kg/m²       Physical Exam    GENERAL: Well appearing, in no acute distress, alert and oriented x3.  PSYCH:  Mood and affect appropriate.  SKIN: Skin color, texture, turgor normal, no rashes or lesions.  HEAD/FACE:  Normocephalic, atraumatic. Cranial nerves grossly intact.    NECK:  pain to palpation over the cervical paraspinous muscles. Spurling Negative. pain with neck flexion, extension, or lateral flexion.   Normaltesting biceps, triceps and brachioradialis bilaterally.    NegativeHoffmann's bilaterally.    5/5 strength testing deltoid, biceps, triceps, wrist extensor, wrist flexor and ulnar intrinsics bilaterally.    Normal  strength bilaterally    CV: RRR with palpation of the radial artery.  PULM: No evidence of respiratory difficulty, symmetric chest rise.  GI:  Soft and non-tender.      NEURO: Bilateral upper and lower extremity coordination and muscle stretch reflexes are physiologic and symmetric. No loss of sensation is noted.  GAIT: normal.    Imaging:   MRI lumbar spine 06/20/2023  FINDINGS:  Alignment: Left-sided spinal asymmetry.  Grade 1 degenerative L4-L5 anterolisthesis.     Vertebrae: Lumbar vertebral body heights are maintained.  Minor L4-L5 endplate degenerative changes.  No significant endplate edema.  No evidence of acute fracture.  Marrow signal is otherwise within normal limits.     Discs: Disc desiccation throughout the lumbar spine.  Similar L4-L5 disc height loss.     Cord: Within normal limits.  Conus terminates at T12-L1.     Degenerative findings:     T12-L1: No significant disc bulge, spinal canal stenosis or neural foraminal stenosis.     L1-L2: Minimal broad-based disc bulge.  No significant " spinal canal stenosis or neural foraminal stenosis.     L2-L3: Minor broad-based disc bulge and mild bilateral facet arthropathy.  No significant spinal canal stenosis or neural foraminal stenosis.     L3-L4: Minimal broad-based disc bulge and mild bilateral facet arthropathy.  No significant spinal canal stenosis or neural foraminal stenosis.     L4-L5: Grade 1 degenerative appearing anterolisthesis.  Mild broad-based disc bulge and moderate bilateral facet arthropathy.  Narrowing of the lateral recesses without significant spinal canal stenosis.  Moderate right and mild-to-moderate left-sided neural foraminal stenosis.     L5-S1: No significant disc bulge.  Bilateral facet arthropathy.  No significant spinal canal stenosis.  Mild right-sided neural foraminal stenosis.     Paraspinal muscles & soft tissues: 5.3 cm infrarenal abdominal aortic aneurysm.  Paraspinal soft tissues are otherwise within normal limits.     Impression:     1. Mild multilevel degenerative changes of the lumbar spine are similar to the prior MRI 04/26/2022 as discussed in the body of the report.  2. Infrarenal abdominal aortic aneurysm as previously described.    MRI cervical spine 06/20/2023  FINDINGS:  Alignment: Pain mild straightening of the normal cervical lordosis.     Vertebrae: Cervical vertebral body heights are maintained.  Minor multilevel endplate degenerative changes.  No significant endplate edema.  No evidence of an acute fracture.  Edema involving the right C3-C4 facet joint.  There appears to be osseous fusion of the right C4-C5 facet joint.  Marrow signal is otherwise within normal limits.     Discs: Disc desiccation throughout the cervical spine.  Disc heights appear maintained.     Cord: C5-C6 prominent cord deformity with small associated area cord signal change concerning for myelomalacia.     Skull base and craniocervical junction: Within normal limits.     Degenerative findings:     C2-C3: Minimal broad-based disc  osteophyte complex.  No ventral cord contact or spinal canal stenosis.  Right greater than left facet arthropathy contributing to mild-to-moderate right and mild left-sided neural foraminal stenosis.     C3-C4: Central disc osteophyte complex contacts and deforms the ventral cord.  Significant right-sided facet arthropathy with ligamentum flavum hypertrophy contributes to mild spinal canal stenosis.  There is severe right and mild-to-moderate left-sided neural foraminal stenosis.     C4-C5: Small central disc osteophyte complex contacts and slightly deforms the ventral cord without significant spinal canal stenosis.  Significant right-sided facet arthropathy with fusion of the facet joint.  Uncovertebral joint spurring contributes to severe right and mild-to-moderate left-sided neural foraminal stenosis.     C5-C6: Broad-based disc osteophyte complex contacts and flattens the ventral cord.  Right greater than left facet arthropathy and ligamentum flavum hypertrophy contributes to moderate spinal canal stenosis.  Uncovertebral joint spurring contributes to moderate to severe right and severe left-sided neural foraminal stenosis.     C6-C7: Mild broad-based disc osteophyte complex contacts and flattens the ventral cord.  Mild bilateral facet arthropathy, ligamentum flavum hypertrophy and uncovertebral joint spurring contribute to mild spinal canal stenosis and severe bilateral neural foraminal stenosis.     C7-T1: No significant disc osteophyte complex, spinal canal stenosis or neural foraminal stenosis.     T1-T2: No significant disc osteophyte complex, spinal canal stenosis or neural foraminal stenosis.     Paraspinal muscles & soft tissues: Within normal limits.     Impression:     Multilevel degenerative changes of the cervical spine are most pronounced at C5-C6 with moderate spinal canal stenosis, severe left and moderate to severe right-sided neural foraminal stenosis.  Cord signal changes at this level  concerning for myelomalacia.     Mild C3-C4 and C6-C7 spinal canal stenosis.  Severe bilateral C6-C7 and severe right-sided C3-C4 neural foraminal stenosis.     Right C3-C4 facet edema likely contributes to neck pain.     Right C4-C5 facet joint osseous fusion.      04/05/2023    X-Ray Cervical Spine AP And Lateral    FINDINGS:  No fracture, prevertebral soft tissue swelling or other acute abnormality is identified.  Cervical spine alignment is normal.  No significant abnormal motion seen on flexion or extension.  Multilevel DISH-type changes with bridging anterior syndesmophytes.  Multilevel cervical endplate spurring and bridging disc calcifications without significant disc height loss as a benign C6-7.  Multilevel hypertrophic facet arthropathy.  Bilateral C3-4 through C6-7 foraminal stenosis.    CT cervical spine 08/02/2024  FINDINGS: Alignment is normal.  Disc spaces and vertebral body heights are maintained although there is degenerative spondylosis visible throughout the cervical spine, large bridging anterior osteophytes from C2 through C4 and C6-T1 and a prominent posterior osteophytes visible at the midline at C6-7.     Axial images demonstrate moderate to severe unilateral right foraminal stenosis at C2-3 and C3-4, severe unilateral right foraminal stenosis at C4-5 and severe bilateral foraminal stenoses at C5-6 and C6-7.  Broad posterior disc bulge is also visible at C5-6 contacting the spinal cord anteriorly and narrowing the right lateral recess.  This finding corresponds to the spinal canal stenosis and suspected myelomalacia in the spinal cord seen at this level on the prior MRI study.  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        Impression:     1.  No evidence of acute osseous injury.  2.  Chronic disc degeneration at multiple levels described above including foraminal stenoses.  3.  Spinal canal stenosis at C5-6.  4.  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    ASSESSMENT: 69 y.o. year old male with Neck, left arm, lower back and left leg pain, consistent with     1. Cervical radiculopathy  Case Request-RAD/Other Procedure Area: C6/7 IL YENNY      2. DDD (degenerative disc disease), cervical        3. Cervical cord myelomalacia        4. Cervical spondylosis  Case Request-RAD/Other Procedure Area: Bilateral C4-6 MBB with RN IV sedation                PLAN:   - Interventions:    Schedule for bilateral C4-6 cervical medial branch block (#2) for axial neck pain.  Of note patient received 80% relief with 1st cervical medial branch block.  We have discussed with repeat significant (80%) relief, he may be a candidate for high heat radiofrequency ablation for more sustained relief.  Schedule for C6-7 interlaminar epidural steroid injection with left paramedian approach for cervical radiculopathy.  Of note patient received 60% relief exceeding 3 months in duration with prior cervical epidural steroid injection.     Explained the risks and benefits of these procedures in detail with the patient today in clinic along with alternative treatment options, and the patient elected to pursue these interventions at this time.      - Anticoagulation use: yes aspirin and Pletal  --secondary prophylaxis: Coronary artery disease, infrarenal abdominal aortic aneurysm status post repair, peripheral arterial disease   ---Per JAMIE Coag guidelines, patient can continue cilostazol for cervical epidural steroid injection and cervical medial branch block  ---Per JAMIE guidelines, patient will need to pause aspirin 3 days prior to cervical epidural steroid injection.  Will obtain clearance from PCP, Dr. Guzmán     report:  Reviewed and consistent with medication use as prescribed.    - Medications:  -Continue Gabapentin. We have reviewed potential side effects of this medication including daytime somnolence, weight gain and peripheral edema  -Gabapentin 300 mg  in the morning and 600 mg QHS  -3 mo supply given prior @ [facility].    - Therapy:   We discussed continuing physician directed physical therapy exercises at home to help manage the patient/s painful condition. The patient was counseled that muscle strengthening will improve the long term prognosis in regards to pain and may also help increase range of motion and mobility.     - Imaging: Reviewed available imaging (MRI cervical spine, MRI lumbar spine) with patient and answered any questions they had regarding study.      - Referrals: Dr. Blanco, Neurosurgery; cervical spine stenosis, cervical radiculopathy and myelomalacia    - Follow up visit: return to clinic in 4-6 weeks s/p injections.       The above plan and management options were discussed at length with patient. Patient is in agreement with the above and verbalized understanding.    - I discussed the goals of interventional chronic pain management with the patient on today's visit. We discussed a multimodal and systematic approach to pain.  This includes diagnostic and therapeutic injections, adjuvant pharmacologic treatment, physical therapy, and at times psychiatry.  I emphasized the importance of regular exercise, core strengthening and stretching, diet and weight loss as a cornerstone of long-term pain management.    - This condition does not require this patient to take time off of work, and the primary goal of our Pain Management services is to improve the patient's functional capacity.  - Patient Questions: Answered all of the patient's questions regarding diagnoses, therapy, treatment and next steps        Rudolph uBrgos MD  Interventional Pain Management  [facility]    Disclaimer:  This note was prepared using voice recognition system and is likely to have sound alike errors that may have been overlooked even after proof reading.  Please call me with any questions

## 2024-09-18 ENCOUNTER — OFFICE VISIT (OUTPATIENT)
Dept: PAIN MEDICINE | Facility: CLINIC | Age: 69
End: 2024-09-18
Payer: MEDICARE

## 2024-09-18 ENCOUNTER — LAB VISIT (OUTPATIENT)
Dept: LAB | Facility: HOSPITAL | Age: 69
End: 2024-09-18
Attending: STUDENT IN AN ORGANIZED HEALTH CARE EDUCATION/TRAINING PROGRAM
Payer: MEDICARE

## 2024-09-18 VITALS
WEIGHT: 160.94 LBS | SYSTOLIC BLOOD PRESSURE: 147 MMHG | DIASTOLIC BLOOD PRESSURE: 72 MMHG | BODY MASS INDEX: 28.52 KG/M2 | HEIGHT: 63 IN | HEART RATE: 56 BPM | RESPIRATION RATE: 16 BRPM

## 2024-09-18 DIAGNOSIS — M50.30 DDD (DEGENERATIVE DISC DISEASE), CERVICAL: ICD-10-CM

## 2024-09-18 DIAGNOSIS — M45.0 ANKYLOSING SPONDYLITIS OF MULTIPLE SITES IN SPINE: ICD-10-CM

## 2024-09-18 DIAGNOSIS — G95.89 CERVICAL CORD MYELOMALACIA: ICD-10-CM

## 2024-09-18 DIAGNOSIS — M54.12 CERVICAL RADICULOPATHY: Primary | ICD-10-CM

## 2024-09-18 DIAGNOSIS — M47.812 CERVICAL SPONDYLOSIS: ICD-10-CM

## 2024-09-18 LAB
ALBUMIN SERPL BCP-MCNC: 4.1 G/DL (ref 3.5–5.2)
ALP SERPL-CCNC: 68 U/L (ref 55–135)
ALT SERPL W/O P-5'-P-CCNC: 25 U/L (ref 10–44)
ANION GAP SERPL CALC-SCNC: 12 MMOL/L (ref 8–16)
AST SERPL-CCNC: 23 U/L (ref 10–40)
BASOPHILS # BLD AUTO: 0.1 K/UL (ref 0–0.2)
BASOPHILS NFR BLD: 1.3 % (ref 0–1.9)
BILIRUB SERPL-MCNC: 0.3 MG/DL (ref 0.1–1)
BUN SERPL-MCNC: 16 MG/DL (ref 8–23)
CALCIUM SERPL-MCNC: 10.1 MG/DL (ref 8.7–10.5)
CHLORIDE SERPL-SCNC: 102 MMOL/L (ref 95–110)
CO2 SERPL-SCNC: 25 MMOL/L (ref 23–29)
CREAT SERPL-MCNC: 1.5 MG/DL (ref 0.5–1.4)
CRP SERPL-MCNC: 2.1 MG/L (ref 0–8.2)
DIFFERENTIAL METHOD BLD: ABNORMAL
EOSINOPHIL # BLD AUTO: 0.2 K/UL (ref 0–0.5)
EOSINOPHIL NFR BLD: 2.4 % (ref 0–8)
ERYTHROCYTE [DISTWIDTH] IN BLOOD BY AUTOMATED COUNT: 14.3 % (ref 11.5–14.5)
ERYTHROCYTE [SEDIMENTATION RATE] IN BLOOD BY PHOTOMETRIC METHOD: 27 MM/HR (ref 0–23)
EST. GFR  (NO RACE VARIABLE): 50.7 ML/MIN/1.73 M^2
GLUCOSE SERPL-MCNC: 95 MG/DL (ref 70–110)
HCT VFR BLD AUTO: 42 % (ref 40–54)
HGB BLD-MCNC: 13.3 G/DL (ref 14–18)
IMM GRANULOCYTES # BLD AUTO: 0.03 K/UL (ref 0–0.04)
IMM GRANULOCYTES NFR BLD AUTO: 0.4 % (ref 0–0.5)
LYMPHOCYTES # BLD AUTO: 2.2 K/UL (ref 1–4.8)
LYMPHOCYTES NFR BLD: 28.2 % (ref 18–48)
MCH RBC QN AUTO: 28.6 PG (ref 27–31)
MCHC RBC AUTO-ENTMCNC: 31.7 G/DL (ref 32–36)
MCV RBC AUTO: 90 FL (ref 82–98)
MONOCYTES # BLD AUTO: 0.8 K/UL (ref 0.3–1)
MONOCYTES NFR BLD: 10.4 % (ref 4–15)
NEUTROPHILS # BLD AUTO: 4.5 K/UL (ref 1.8–7.7)
NEUTROPHILS NFR BLD: 57.3 % (ref 38–73)
NRBC BLD-RTO: 0 /100 WBC
PLATELET # BLD AUTO: 320 K/UL (ref 150–450)
PMV BLD AUTO: 11 FL (ref 9.2–12.9)
POTASSIUM SERPL-SCNC: 4.2 MMOL/L (ref 3.5–5.1)
PROT SERPL-MCNC: 7.5 G/DL (ref 6–8.4)
RBC # BLD AUTO: 4.65 M/UL (ref 4.6–6.2)
SODIUM SERPL-SCNC: 139 MMOL/L (ref 136–145)
WBC # BLD AUTO: 7.81 K/UL (ref 3.9–12.7)

## 2024-09-18 PROCEDURE — 36415 COLL VENOUS BLD VENIPUNCTURE: CPT | Performed by: STUDENT IN AN ORGANIZED HEALTH CARE EDUCATION/TRAINING PROGRAM

## 2024-09-18 PROCEDURE — 99999 PR PBB SHADOW E&M-EST. PATIENT-LVL III: CPT | Mod: PBBFAC,,, | Performed by: ANESTHESIOLOGY

## 2024-09-18 PROCEDURE — 99214 OFFICE O/P EST MOD 30 MIN: CPT | Mod: S$PBB,,, | Performed by: ANESTHESIOLOGY

## 2024-09-18 PROCEDURE — 99213 OFFICE O/P EST LOW 20 MIN: CPT | Mod: PBBFAC | Performed by: ANESTHESIOLOGY

## 2024-09-18 PROCEDURE — 85652 RBC SED RATE AUTOMATED: CPT | Performed by: STUDENT IN AN ORGANIZED HEALTH CARE EDUCATION/TRAINING PROGRAM

## 2024-09-18 PROCEDURE — 85025 COMPLETE CBC W/AUTO DIFF WBC: CPT | Performed by: STUDENT IN AN ORGANIZED HEALTH CARE EDUCATION/TRAINING PROGRAM

## 2024-09-18 PROCEDURE — 86140 C-REACTIVE PROTEIN: CPT | Performed by: STUDENT IN AN ORGANIZED HEALTH CARE EDUCATION/TRAINING PROGRAM

## 2024-09-18 PROCEDURE — 80053 COMPREHEN METABOLIC PANEL: CPT | Performed by: STUDENT IN AN ORGANIZED HEALTH CARE EDUCATION/TRAINING PROGRAM

## 2024-09-19 ENCOUNTER — OFFICE VISIT (OUTPATIENT)
Dept: RHEUMATOLOGY | Facility: CLINIC | Age: 69
End: 2024-09-19
Payer: MEDICARE

## 2024-09-19 VITALS
SYSTOLIC BLOOD PRESSURE: 148 MMHG | HEIGHT: 63 IN | WEIGHT: 157.19 LBS | BODY MASS INDEX: 27.85 KG/M2 | HEART RATE: 56 BPM | DIASTOLIC BLOOD PRESSURE: 83 MMHG

## 2024-09-19 DIAGNOSIS — D84.821 ENCOUNTER FOR MONITORING IMMUNOSUPPRESSIVE MEDICATION THERAPY CAUSING IMMUNODEFICIENCY: ICD-10-CM

## 2024-09-19 DIAGNOSIS — M45.0 ANKYLOSING SPONDYLITIS OF MULTIPLE SITES IN SPINE: Primary | ICD-10-CM

## 2024-09-19 DIAGNOSIS — Z79.899 HIGH RISK MEDICATION USE: ICD-10-CM

## 2024-09-19 DIAGNOSIS — Z51.81 ENCOUNTER FOR MONITORING IMMUNOSUPPRESSIVE MEDICATION THERAPY CAUSING IMMUNODEFICIENCY: ICD-10-CM

## 2024-09-19 DIAGNOSIS — Z79.899 IMMUNODEFICIENCY DUE TO DRUG THERAPY: ICD-10-CM

## 2024-09-19 DIAGNOSIS — Z98.890 S/P AAA REPAIR: ICD-10-CM

## 2024-09-19 DIAGNOSIS — N18.30 STAGE 3 CHRONIC KIDNEY DISEASE, UNSPECIFIED WHETHER STAGE 3A OR 3B CKD: ICD-10-CM

## 2024-09-19 DIAGNOSIS — Z79.60 ENCOUNTER FOR MONITORING IMMUNOSUPPRESSIVE MEDICATION THERAPY CAUSING IMMUNODEFICIENCY: ICD-10-CM

## 2024-09-19 DIAGNOSIS — Z86.79 S/P AAA REPAIR: ICD-10-CM

## 2024-09-19 DIAGNOSIS — M48.10 DISH (DIFFUSE IDIOPATHIC SKELETAL HYPEROSTOSIS): ICD-10-CM

## 2024-09-19 DIAGNOSIS — R70.0 ELEVATED SED RATE: ICD-10-CM

## 2024-09-19 DIAGNOSIS — D84.821 IMMUNODEFICIENCY DUE TO DRUG THERAPY: ICD-10-CM

## 2024-09-19 DIAGNOSIS — Z22.7 TB LUNG, LATENT: ICD-10-CM

## 2024-09-19 PROCEDURE — 99999 PR PBB SHADOW E&M-EST. PATIENT-LVL III: CPT | Mod: PBBFAC,,, | Performed by: STUDENT IN AN ORGANIZED HEALTH CARE EDUCATION/TRAINING PROGRAM

## 2024-09-19 PROCEDURE — 99213 OFFICE O/P EST LOW 20 MIN: CPT | Mod: PBBFAC | Performed by: STUDENT IN AN ORGANIZED HEALTH CARE EDUCATION/TRAINING PROGRAM

## 2024-09-19 PROCEDURE — 99215 OFFICE O/P EST HI 40 MIN: CPT | Mod: S$PBB,,, | Performed by: STUDENT IN AN ORGANIZED HEALTH CARE EDUCATION/TRAINING PROGRAM

## 2024-09-19 RX ORDER — PREDNISONE 5 MG/1
5 TABLET ORAL DAILY PRN
Qty: 10 TABLET | Refills: 0 | Status: SHIPPED | OUTPATIENT
Start: 2024-09-19

## 2024-09-19 NOTE — PROGRESS NOTES
"Subjective:      Patient ID: Deborah White is a 69 y.o. male.    Chief Complaint: ankylosing spondylitis    HPI:   Patient presents for Rheumatology follow up for AS and DISH of cervical spine. PMH CAD h/o LHC in 1998, nonobstructive, PAD + claudication, HTN HLD. Smoking 1/2 PPD > 40 yrs, no drinking. Has previously seen Dr. GATES as well as Rheumatology PAs. For inflammatory arthritis (AS) and DISH, he is on Taltz which he started around 05/2024. He previously took Xeljanz which helped but had to come off it when he was diagnosed with AAA. He presents with his wife and daughter today who help with history. Patient complains of pain mainly in the neck radiating down the arms. Also feels bony pain in the right forearm. He was having severe left leg pain in the past but this has improved since his left lower extremity arterial stents were placed. He follows with Pain Management Dr. Burgos who is setting him up for nerve block and YENNY in the cervical spine. Denies joint swelling, significant stiffness, skin rashes, oral ulcers, patchy alopecia, sicca symptoms, cardiopulmonary symptoms, eye inflammation, IBD, fevers, weight loss, Raynaud's. Rheumatology review of systems is otherwise negative.    Objective:   BP (!) 148/83 (BP Location: Left forearm, Patient Position: Sitting, BP Method: Small (Automatic))   Pulse (!) 56   Ht 5' 3" (1.6 m)   Wt 71.3 kg (157 lb 3 oz)   BMI 27.84 kg/m²   Physical Exam  Constitutional:       General: He is not in acute distress.     Appearance: Normal appearance.   HENT:      Head: Normocephalic and atraumatic.      Mouth/Throat:      Mouth: Mucous membranes are moist.      Pharynx: Oropharynx is clear.   Cardiovascular:      Rate and Rhythm: Normal rate and regular rhythm.   Pulmonary:      Effort: Pulmonary effort is normal.      Breath sounds: Normal breath sounds.   Abdominal:      Palpations: Abdomen is soft.      Tenderness: There is no abdominal tenderness.   Musculoskeletal:         " General: No swelling.      Cervical back: Normal range of motion. No tenderness.      Comments: +tenderness cervical spine, shoulders, right elbow, right forearm, right CMC joint, right thumb MCP joint, both knees, mild MTP squeeze bilaterally. No synovitis, dactylitis, enthesitis.   Skin:     General: Skin is warm and dry.   Neurological:      Mental Status: He is alert and oriented to person, place, and time. Mental status is at baseline.             Assessment and Plan:     Problem List Items Addressed This Visit          Unprioritized    S/P AAA repair    DISH (diffuse idiopathic skeletal hyperostosis)    TB lung, latent    Ankylosing spondylitis of multiple sites in spine - Primary    Relevant Orders    CBC Auto Differential    Comprehensive Metabolic Panel    C-Reactive Protein    Sedimentation rate    Elevated sed rate     Other Visit Diagnoses       Encounter for monitoring immunosuppressive medication therapy causing immunodeficiency        Immunodeficiency due to drug therapy        High risk medication use        Stage 3 chronic kidney disease, unspecified whether stage 3a or 3b CKD                Patient presents for Rheumatology follow up for AS and DISH of cervical spine.    Low positive CARMEN 1:80 speckled with negative profile.  Negative RF, CCP, HMGCR Ab.    Hx of latent TB - completed treatment prior to initiation of immunosuppressants.    ESR historically elevated teens to 40s. ESR improved from 73 to 27 with addition of Taltz.  Reviewed CBC, CMP. Stable.    Plan:  Continue Taltz.  Prednisone 5 mg - 10 tablets provided for severe flares. Cautioned to use very sparingly in the setting of cardiovascular disease.  Agree with plan from Dr. Burgos - planning nerve block and YENNY for cervical spine.  Safety and monitoring labs in 4 months.      High Risk Medication Monitoring encounter  Drug therapy requiring intensive monitoring for toxicity  No current medication related issues, no evidence of  toxicity  Appropriate labs ordered for toxicity monitoring    Compromised immune system secondary to autoimmune disease and/or use of immunosuppressive drugs.  Monitor carefully for infections.  Advised patient to get immediate medical care if any infection arises.  Also advised strict adherence age-appropriate vaccinations and cancer screenings with PCP.    Patient advised to hold DMARD and/or biologic therapy for signs of infection or for surgery. If you are unsure what to do please call our office for instruction.[facility] [phone]          Follow up in about 4 months (around 1/19/2025).         I spent a total of 40 minutes on the day of the visit.  This includes face to face time and non-face to face time preparing to see the patient (eg, review of tests), obtaining and/or reviewing separately obtained history, documenting clinical information in the electronic or other health record, independently interpreting results and communicating results to the patient/family/caregiver, or care coordinator.

## 2024-09-23 ENCOUNTER — TELEPHONE (OUTPATIENT)
Dept: PAIN MEDICINE | Facility: CLINIC | Age: 69
End: 2024-09-23
Payer: MEDICARE

## 2024-09-23 NOTE — TELEPHONE ENCOUNTER
Cardiac Clearance    ----- Message from Chastity Guzmán MD sent at 9/20/2024  1:30 PM CDT -----  Regarding: RE: Med hold for Pain Procedure  That is okay. Thanks.  ----- Message -----  From: Kodi Dsouza MA  Sent: 9/19/2024   3:47 PM CDT  To: Chastity Guzmán MD  Subject: Med hold for Pain Procedure                       Deborah Ferrer was seen in office with complaints of severe neck pain. Dr. Burgos would like to perform cervical epidural, and Deborah White would like to proceed. Dr. Burgos is requesting for clearance to hold ASA 3 days prior to procedure. Patient Deborah White can resume medication following the procedure.     Thank You,  Maria Luisa SNYDER  [facility] Surgery Scheduler

## 2024-09-25 ENCOUNTER — PATIENT MESSAGE (OUTPATIENT)
Dept: PAIN MEDICINE | Facility: CLINIC | Age: 69
End: 2024-09-25
Payer: MEDICARE

## 2024-09-27 ENCOUNTER — TELEPHONE (OUTPATIENT)
Dept: PAIN MEDICINE | Facility: CLINIC | Age: 69
End: 2024-09-27
Payer: MEDICARE

## 2024-09-27 NOTE — TELEPHONE ENCOUNTER
----- Message from Delaney Mcnulty sent at 9/27/2024 11:10 AM CDT -----  .Type:  Pharmacy Calling to Clarify an RX    Name of Caller:Griselda  Pharmacy Name:[facility] in [place], [place]  Prescription Name:gabapentin (NEURONTIN) 300 MG capsule  What do they need to clarify?:refill  Best Call Back Number:[phone]  Additional Information:

## 2024-09-27 NOTE — TELEPHONE ENCOUNTER
Reach out to :[facility] in [place], [place] to clarify pt  gabapentin (NEURONTIN) 300 MG capsule so he can get a refill. All questions answered.

## 2024-09-30 ENCOUNTER — PATIENT MESSAGE (OUTPATIENT)
Dept: PAIN MEDICINE | Facility: CLINIC | Age: 69
End: 2024-09-30
Payer: MEDICARE

## 2024-10-08 ENCOUNTER — PATIENT MESSAGE (OUTPATIENT)
Dept: PAIN MEDICINE | Facility: HOSPITAL | Age: 69
End: 2024-10-08
Payer: MEDICARE

## 2024-10-08 NOTE — PRE-PROCEDURE INSTRUCTIONS
Spoke with patient regarding procedure scheduled on 10.18     Arrival time 0645     Has patient been sick with fever or on antibiotics within the last 7 days? No     Does the patient have any open wounds, sores or rashes? No     Does the patient have any recent fractures? no     Has patient received a vaccination within the last 7 days? No     Received the COVID vaccination? yes     Has the patient stopped all medications as directed? hold asa 3 days prior to procedure. Cardiac clearance obtained from dr da silva on 9.20. continue pletal per md      Does patient have a pacemaker, defibrillator, or implantable stimulator? No     Does the patient have a ride to and from procedure and someone reliable to remain with patient?       Is the patient diabetic? no     Does the patient have sleep apnea? Or use O2 at home? no     Is the patient receiving sedation? yes     Is the patient instructed to remain NPO beginning at midnight the night before their procedure? yes     Procedure location confirmed with patient? Yes     Covid- Denies signs/symptoms. Instructed to notify PAT/MD if any changes.

## 2024-10-16 ENCOUNTER — PATIENT MESSAGE (OUTPATIENT)
Dept: NEPHROLOGY | Facility: CLINIC | Age: 69
End: 2024-10-16
Payer: MEDICARE

## 2024-10-17 ENCOUNTER — OFFICE VISIT (OUTPATIENT)
Dept: NEUROSURGERY | Facility: CLINIC | Age: 69
End: 2024-10-17
Payer: MEDICARE

## 2024-10-17 VITALS — HEIGHT: 63 IN | BODY MASS INDEX: 27.73 KG/M2 | WEIGHT: 156.5 LBS

## 2024-10-17 DIAGNOSIS — M50.30 DEGENERATIVE DISC DISEASE, CERVICAL: Primary | ICD-10-CM

## 2024-10-17 DIAGNOSIS — G99.2 MYELOPATHY CONCURRENT WITH AND DUE TO SPINAL STENOSIS OF CERVICAL REGION: ICD-10-CM

## 2024-10-17 DIAGNOSIS — M54.12 CERVICAL RADICULOPATHY: ICD-10-CM

## 2024-10-17 DIAGNOSIS — M48.02 MYELOPATHY CONCURRENT WITH AND DUE TO SPINAL STENOSIS OF CERVICAL REGION: ICD-10-CM

## 2024-10-17 PROCEDURE — 99213 OFFICE O/P EST LOW 20 MIN: CPT | Mod: PBBFAC | Performed by: NEUROLOGICAL SURGERY

## 2024-10-17 PROCEDURE — 99999 PR PBB SHADOW E&M-EST. PATIENT-LVL III: CPT | Mod: PBBFAC,,, | Performed by: NEUROLOGICAL SURGERY

## 2024-10-17 PROCEDURE — 99213 OFFICE O/P EST LOW 20 MIN: CPT | Mod: S$PBB,,, | Performed by: NEUROLOGICAL SURGERY

## 2024-10-17 NOTE — PROGRESS NOTES
Subjective:      Patient ID: Deborah White is a 69 y.o. male.    Chief Complaint: Cervical cord myelomalacia (Pt reports with cervical spine symptoms unchanged since LOV 07/30/24; Pt reports neck pain that radiates to Tacho UE; Pt reports thumb is numb; Pt has CT cervical performed 08/02/24 for review)    Patient is here today to follow up his CT of the cervical spine  HPI is obtained through the interpretation service  Patient is accompanied by his wife and daughter at the time of this examination  Neck pain remained similar  Bilateral upper extremity symptoms with numbness and tingling bilaterally   Ambulates unassisted  Denies any bowel or bladder since    PREVIOUS NOTES     Patient presents today for evaluation cervical spine  Referred from pain management  Long history of neck pain progressively getting worse  Radiates down the right-greater-than-left upper extremity  Associated numbness and tingling in the right C5-6 distribution  Mild weakness in the  bilaterally  Also with a history of lower back symptoms        Review of Systems   Constitutional:  Negative for activity change, appetite change and chills.   HENT:  Negative for congestion and ear pain.    Eyes:  Negative for photophobia, redness and visual disturbance.   Respiratory:  Negative for apnea and chest tightness.    Cardiovascular:  Negative for chest pain.   Gastrointestinal:  Negative for abdominal distention and abdominal pain.   Endocrine: Negative for cold intolerance.   Genitourinary:  Negative for difficulty urinating.   Musculoskeletal:  Positive for myalgias, neck pain and neck stiffness. Negative for arthralgias.   Skin:  Negative for color change.   Allergic/Immunologic: Negative for environmental allergies.   Neurological:  Positive for weakness and numbness. Negative for dizziness.   Hematological:  Negative for adenopathy. Does not bruise/bleed easily.   Psychiatric/Behavioral:  Negative for agitation, behavioral problems and  confusion.          Objective:       Physical Exam:  Nursing note and vitals reviewed.    Constitutional: He appears well-nourished. No distress.     Eyes: EOM are normal.     Cardiovascular: Normal rate.     Psych/Behavior: He is alert. He is oriented to person, place, and time. He has a normal mood and affect.     Musculoskeletal:        Neck: Range of motion is limited. There is tenderness. Muscle strength is 5/5.        Right Upper Extremities: Range of motion is limited. There is tenderness. Muscle strength is 4/5.        Left Upper Extremities: There is no tenderness. Muscle strength is 5/5.     Neurological:        Sensory: There is no sensory deficit in the trunk. There is sensory deficit in the extremities. Sensory deficit is located Right C5-6 distribution.        Cranial nerves: Cranial nerve(s) II, III, IV, V, VI, VII, VIII, IX, X, XI and XII are intact.     General    Nursing note and vitals reviewed.  Constitutional: He is oriented to person, place, and time. He appears well-nourished. No distress.   Eyes: EOM are normal.   Cardiovascular:  Normal rate.            Neurological: He is alert and oriented to person, place, and time.   Psychiatric: He has a normal mood and affect.             Ortho Exam    MRI cervical spine     Multilevel degenerative changes of the cervical spine are most pronounced at C5-C6 with moderate spinal canal stenosis, severe left and moderate to severe right-sided neural foraminal stenosis.  Cord signal changes at this level concerning for myelomalacia.     Mild C3-C4 and C6-C7 spinal canal stenosis.  Severe bilateral C6-C7 and severe right-sided C3-C4 neural foraminal stenosis.     Right C3-C4 facet edema likely contributes to neck pain.     Right C4-C5 facet joint osseous fusion.    CT cervical spine  \Axial images demonstrate moderate to severe unilateral right foraminal stenosis at C2-3 and C3-4, severe unilateral right foraminal stenosis at C4-5 and severe bilateral  foraminal stenoses at C5-6 and C6-7. Broad posterior disc bulge is also visible at C5-6 contacting the spinal cord anteriorly and narrowing the right lateral recess. This finding corresponds to the spinal canal stenosis and suspected myelomalacia in the spinal cord seen at this level on the prior MRI study. 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   Assessment:     1. Degenerative disc disease, cervical    2. Myelopathy concurrent with and due to spinal stenosis of cervical region    3. Cervical radiculopathy        Plan:     Degenerative disc disease, cervical    Myelopathy concurrent with and due to spinal stenosis of cervical region    Cervical radiculopathy      Discuss with patient the imaging as well as symptoms likely consistent with cervical myelopathy  Multilevel degenerative disc disease throughout the cervical spine  Due to the symptoms I have recommended a posterior cervical decompression and fusion    At this time patient does not wish to consider surgical intervention  He would like to think about surgical options and will make follow-up appointment in 6 months to discuss further    He develops any new or worsening symptoms in the upper extremities he is instructed to call or make return appointment sooner      Thank you for the referral   Please call with any questions    Geoff Blanco MD  Neurosurgery     Disclaimer: This note was prepared using a voice recognition system and is likely to have sound alike errors within the text.

## 2024-10-18 ENCOUNTER — HOSPITAL ENCOUNTER (OUTPATIENT)
Facility: HOSPITAL | Age: 69
Discharge: HOME | End: 2024-10-18
Attending: ANESTHESIOLOGY | Admitting: ANESTHESIOLOGY
Payer: MEDICARE

## 2024-10-18 VITALS
RESPIRATION RATE: 16 BRPM | HEART RATE: 53 BPM | SYSTOLIC BLOOD PRESSURE: 133 MMHG | OXYGEN SATURATION: 96 % | BODY MASS INDEX: 28.36 KG/M2 | WEIGHT: 160.06 LBS | DIASTOLIC BLOOD PRESSURE: 68 MMHG | HEIGHT: 63 IN

## 2024-10-18 DIAGNOSIS — M47.812 CERVICAL SPONDYLOSIS: ICD-10-CM

## 2024-10-18 PROCEDURE — 63600175 PHARM REV CODE 636 W HCPCS: Performed by: ANESTHESIOLOGY

## 2024-10-18 PROCEDURE — 64491 INJ PARAVERT F JNT C/T 2 LEV: CPT | Mod: 50,,, | Performed by: ANESTHESIOLOGY

## 2024-10-18 PROCEDURE — 99152 MOD SED SAME PHYS/QHP 5/>YRS: CPT | Performed by: ANESTHESIOLOGY

## 2024-10-18 PROCEDURE — 64491 INJ PARAVERT F JNT C/T 2 LEV: CPT | Mod: 50 | Performed by: ANESTHESIOLOGY

## 2024-10-18 PROCEDURE — 25000003 PHARM REV CODE 250: Performed by: ANESTHESIOLOGY

## 2024-10-18 PROCEDURE — 64490 INJ PARAVERT F JNT C/T 1 LEV: CPT | Mod: 50 | Performed by: ANESTHESIOLOGY

## 2024-10-18 PROCEDURE — 64490 INJ PARAVERT F JNT C/T 1 LEV: CPT | Mod: 50,,, | Performed by: ANESTHESIOLOGY

## 2024-10-18 RX ORDER — BUPIVACAINE HYDROCHLORIDE 5 MG/ML
INJECTION, SOLUTION EPIDURAL; INTRACAUDAL
Status: DISCONTINUED | OUTPATIENT
Start: 2024-10-18 | End: 2024-10-18 | Stop reason: HOSPADM

## 2024-10-18 RX ORDER — DEXAMETHASONE SODIUM PHOSPHATE 10 MG/ML
INJECTION INTRAMUSCULAR; INTRAVENOUS
Status: DISCONTINUED | OUTPATIENT
Start: 2024-10-18 | End: 2024-10-18 | Stop reason: HOSPADM

## 2024-10-18 RX ORDER — FENTANYL CITRATE 50 UG/ML
INJECTION, SOLUTION INTRAMUSCULAR; INTRAVENOUS
Status: DISCONTINUED | OUTPATIENT
Start: 2024-10-18 | End: 2024-10-18 | Stop reason: HOSPADM

## 2024-10-18 RX ORDER — INDOMETHACIN 25 MG/1
CAPSULE ORAL
Status: DISCONTINUED | OUTPATIENT
Start: 2024-10-18 | End: 2024-10-18 | Stop reason: HOSPADM

## 2024-10-18 RX ORDER — MIDAZOLAM HYDROCHLORIDE 1 MG/ML
INJECTION, SOLUTION INTRAMUSCULAR; INTRAVENOUS
Status: DISCONTINUED | OUTPATIENT
Start: 2024-10-18 | End: 2024-10-18 | Stop reason: HOSPADM

## 2024-10-18 NOTE — DISCHARGE SUMMARY
Discharge Note  Short Stay      SUMMARY     Admit Date: 10/18/2024    Attending Physician: Rudolph Burgos MD        Discharge Physician: Rudolph Burgos MD        Discharge Date: 10/18/2024 7:57 AM    Procedure(s) (LRB):  Bilateral C4-6 MBB with RN IV sedation (Bilateral)    Final Diagnosis: Cervical spondylosis [M47.812]    Disposition: Home or self care    Patient Instructions:   Current Discharge Medication List        CONTINUE these medications which have NOT CHANGED    Details   amLODIPine (NORVASC) 10 MG tablet Take 1 tablet (10 mg total) by mouth once daily.  Qty: 90 tablet, Refills: 3    Comments: .      benazepriL (LOTENSIN) 40 MG tablet Take 1 tablet (40 mg total) by mouth once daily.  Qty: 90 tablet, Refills: 2    Comments: .  Associated Diagnoses: Hypertension, essential      cilostazoL (PLETAL) 50 MG Tab Take 1 tablet (50 mg total) by mouth 2 (two) times daily.  Qty: 60 tablet, Refills: 9    Associated Diagnoses: Hyperlipidemia, unspecified hyperlipidemia type      cloNIDine (CATAPRES) 0.2 MG tablet Take 1 tablet (0.2 mg total) by mouth 2 (two) times daily.  Qty: 180 tablet, Refills: 3    Associated Diagnoses: Hypertension, unspecified type      ezetimibe (ZETIA) 10 mg tablet Take 1 tablet (10 mg total) by mouth once daily.  Qty: 90 tablet, Refills: 1    Associated Diagnoses: Hyperlipidemia, unspecified hyperlipidemia type      gabapentin (NEURONTIN) 300 MG capsule Take 1 capsule (300 mg total) by mouth once daily AND 2 capsules (600 mg total) every evening.  Qty: 270 capsule, Refills: 0      hydroCHLOROthiazide (HYDRODIURIL) 25 MG tablet Take 1 tablet (25 mg total) by mouth once daily.  Qty: 90 tablet, Refills: 2    Comments: .  Associated Diagnoses: Hypertension, essential      rosuvastatin (CRESTOR) 20 MG tablet Take 1 tablet (20 mg total) by mouth once daily.  Qty: 90 tablet, Refills: 2    Associated Diagnoses: Hyperlipidemia, unspecified hyperlipidemia type      aspirin (ECOTRIN) 81 MG EC tablet Take  81 mg by mouth once daily.      ixekizumab (TALTZ AUTOINJECTOR) 80 mg/mL AtIn Inject 80 mg into the skin every 28 days.  Qty: 1 mL, Refills: 6    Associated Diagnoses: Ankylosing spondylitis of multiple sites in spine      linaCLOtide (LINZESS) 72 mcg Cap capsule Take 1 capsule (72 mcg total) by mouth before breakfast.  Qty: 30 capsule, Refills: 3    Associated Diagnoses: Constipation, unspecified constipation type      predniSONE (DELTASONE) 5 MG tablet Take 1 tablet (5 mg total) by mouth daily as needed (arthritis flares).  Qty: 10 tablet, Refills: 0      sildenafiL (VIAGRA) 25 MG tablet Take 1 tablet (25 mg total) by mouth daily as needed for Erectile Dysfunction.  Qty: 30 tablet, Refills: 3                 Discharge Diagnosis: Cervical spondylosis [M47.812]  Condition on Discharge: Stable with no complications to procedure   Diet on Discharge: Same as before.  Activity: as per instruction sheet.  Discharge to: Home with a responsible adult.  Follow up: 2-4 weeks       Please call the office at [phone] if you experience any weakness or loss of sensation, fever > 101.5, pain uncontrolled with oral medications, persistent nausea/vomiting/or diarrhea, redness or drainage from the incisions, or any other worrisome concerns. If physician on call was not reached or could not communicate with our office for any reason please go to the nearest emergency department

## 2024-10-18 NOTE — OP NOTE
CERVICAL Medial Branch Block Under Fluoroscopy  Time-out taken to identify patient and procedure side prior to starting the procedure.        Deborah White  [id number]      Date of Procedure: 10/18/2024                                                               PROCEDURE:  bilateral medial branch block at the transverse processes of C4, C5, C6    Pre Procedure Diagnosis:  Cervical spondylosis [M47.812]  Post Procedure Diagnosis: Same    PHYSICIAN: Rudolph Burgos MD    ASSISTANTS: None    Anesthesia:   Conscious sedation provided by M.D    The patient was monitored with continuous pulse oximetry, EKG, and intermittent blood pressure monitors.  The patient was hemodynamically stable throughout the entire process was responsive to voice, and breathing spontaneously.  Supplemental O2 was provided at 2L/min via nasal cannula.  Patient was comfortable for the duration of the procedure. (See nurse documentation and case log for sedation time)    There was a total of 1mg IV Midazolam and 50mcg Fentanyl titrated for the procedure      MEDICATIONS INJECTED:  0.25% Bupivicaine total 1.5mL  LOCAL ANESTHETIC USED:   Xylocaine 1% 1mL / site    SEDATION MEDICATIONS: None    ESTIMATED BLOOD LOSS:  None.    COMPLICATIONS:  None.    TECHNIQUE:   Laying in a bilateral lateral decubitus  position, the patient was prepped and draped in the usual sterile fashion using ChloraPrep and fenestrated drape.  The level was determined under fluoroscopic guidance.  Local anesthetic was given by going down to the hub of the 27-gauge 1.25in needle and raising a wheel.  A 22-gauge 3.5inch needle was introduced to the anatomic local of the medial branch at each of the stated above levels using fluoroscopy. Then after negative aspiration, a total of 1.5 cc of .25% bupivacaine and 10 mg dexamethasone was injected in divided doses at the three levels. The patient tolerated the procedure well.     The patient was monitored after the procedure.  Patient was  given post procedure and discharge instructions to follow at home.  We will see the patient back in two weeks or the patient may call to inform of status. The patient was discharged in a stable condition.    Rudolph Burgos

## 2024-10-18 NOTE — PLAN OF CARE
Patient met criteria for discharge. No complaints of pain. Discharge instructions reviewed, and patient verbalizes understanding. Meds delivered by pharmacy. Pt dressed and wheeled to car by RN.

## 2024-10-18 NOTE — DISCHARGE INSTRUCTIONS

## 2024-10-21 ENCOUNTER — TELEPHONE (OUTPATIENT)
Dept: PAIN MEDICINE | Facility: CLINIC | Age: 69
End: 2024-10-21
Payer: MEDICARE

## 2024-10-21 ENCOUNTER — PATIENT MESSAGE (OUTPATIENT)
Dept: ADMINISTRATIVE | Facility: OTHER | Age: 69
End: 2024-10-21
Payer: MEDICARE

## 2024-10-21 NOTE — TELEPHONE ENCOUNTER
Reached out to pt to get their percent relief from the injection that they had and to answer the following questions.pt daughter is not around her dad, she will give us a call no later then tomorrow with the results.       Thank you,  Avery Abdi   Medical Assistant

## 2024-10-22 ENCOUNTER — PATIENT MESSAGE (OUTPATIENT)
Dept: PAIN MEDICINE | Facility: CLINIC | Age: 69
End: 2024-10-22
Payer: MEDICARE

## 2024-10-28 DIAGNOSIS — E78.5 HYPERLIPIDEMIA, UNSPECIFIED HYPERLIPIDEMIA TYPE: ICD-10-CM

## 2024-10-28 RX ORDER — CILOSTAZOL 50 MG/1
50 TABLET ORAL 2 TIMES DAILY
Qty: 60 TABLET | Refills: 3 | Status: SHIPPED | OUTPATIENT
Start: 2024-10-28

## 2024-10-30 ENCOUNTER — PATIENT MESSAGE (OUTPATIENT)
Dept: FAMILY MEDICINE | Facility: CLINIC | Age: 69
End: 2024-10-30
Payer: MEDICARE

## 2024-10-30 DIAGNOSIS — I10 HYPERTENSION, ESSENTIAL: ICD-10-CM

## 2024-10-30 RX ORDER — HYDROCHLOROTHIAZIDE 25 MG/1
25 TABLET ORAL DAILY
Qty: 90 TABLET | Refills: 1 | Status: SHIPPED | OUTPATIENT
Start: 2024-10-30 | End: 2025-10-30

## 2024-10-30 RX ORDER — ROSUVASTATIN CALCIUM 20 MG/1
20 TABLET, COATED ORAL DAILY
Qty: 90 TABLET | Refills: 2 | Status: SHIPPED | OUTPATIENT
Start: 2024-10-30

## 2024-10-30 RX ORDER — EZETIMIBE 10 MG/1
10 TABLET ORAL DAILY
Qty: 90 TABLET | Refills: 1 | Status: SHIPPED | OUTPATIENT
Start: 2024-10-30

## 2024-11-05 ENCOUNTER — TELEPHONE (OUTPATIENT)
Dept: PAIN MEDICINE | Facility: CLINIC | Age: 69
End: 2024-11-05
Payer: MEDICARE

## 2024-11-05 NOTE — TELEPHONE ENCOUNTER
Attempt to call patient to confirm appointment. Patent did not answer, Left message on patients voice mail to call back at earliest convenience to confirm or reschedule p.t apt.   ( is out of office on 11/25/24)

## 2024-11-13 NOTE — PROGRESS NOTES
Established Patient Interventional Pain Note     The patient location is: home  The chief complaint leading to consultation is: neck, arm and shoulder pain  Visit type: Virtual visit with synchronous audio and video  Total time spent with patient: 11-15m  Each patient to whom he or she provides medical services by telemedicine is: (1) informed of the relationship between the physician and patient and the respective role of any other health care provider with respect to management of the patient; and (2) notified that he or she may decline to receive medical services by telemedicine and may withdraw from such care at any time.    Referring Physician: No ref. provider found    PCP: Chastity Guzmán MD    Chief Complaint:   Neck, shoulder and arm pain       SUBJECTIVE:  Interval history 11/14/2024  Patient's daughter in room to help facilitate translation    Patient presents status post bilateral C4-6 cervical medial branch block 10/18/2024.  Patient reports 80% sustained relief in axial neck pain following cervical medial branch block.  Today his primary concern is pain in the neck which can radiate down the left upper extremity to mid arm in C6 dermatomal distribution.  Of note he received greater than 80% relief exceeding 3 months in duration with prior cervical epidural steroid injection.  He would like to repeat this procedure.  He has continued physician directed physical therapy exercises for neck arm and shoulder pain over the last 8 weeks from 09/14/2024 through 11 14th 2024.  He has continued gabapentin with noticeable improvement in his pain.  He denies any side effects from this medication.      Interval history 09/18/2024  Patient's daughter in room to help facilitate translation    Patient presents status post C6-7 interlaminar epidural steroid injection with left paramedian approach 05/10/2024.  Patient reports 60% relief exceeding 3 months in duration with cervical epidural steroid injection.  Today  he again reports pain in bilateral cervical paraspinous musculature which can radiate down the left upper extremity to mid arm in C6 dermatomal distribution.  Pain is intermittent and today is rated a 6/10.  Patient does have associated numbness in bilateral hands but denies significant upper extremity weakness or compromise in hand  strength or dexterity.  Patient has continued gabapentin which she is taking 300 mg in the morning and 600 mg in the evening.  He has continued physician directed physical therapy exercises for neck and arm pain over the last 8 weeks from 07/18/2024 through 09/18/2024 with improvement in his symptoms.  Of note patient received 80% relief with prior bilateral C4-6 cervical medial branch block for axial neck pain and received 80% sustained relief for 3 months with prior cervical epidural steroid injection for cervical radiculopathy.    Of note patient saw neurosurgery 07/30/2024 with the following evaluation:      Interval history 03/27/2024  Patient presents for MRI cervical and lumbar spine review      Today patient again reports pain in the neck which radiates down bilateral upper extremities to the hands in C6-7 dermatomal distribution.  Pain is constant and today is rated a 6/10.  Pain is worse on the left than on the right.  Patient does report associated numbness in bilateral hands but denies outright weakness in the upper extremities or compromise in hand  strength or dexterity.  Pain can be exacerbated with cervical flexion/extension and overhead activities.  Pain is improved with use of gabapentin which he is taking 300 mg in the morning and 600 mg in the evening.  Patient's daughter does report he experiences slight sedation with morning dose of gabapentin.  Patient has continued physician directed physical therapy exercises over the last 8 weeks from 01/27/2024 through 03/27/2024 with marginal improvement in pain.    Pain in the lower back and leg pain has improved  "with abdominal aortic endovascular aneurysm repair 02/20/2024.  Today patient's primary concern neck and arm pain.    Interval Hx: 05/31/2023  [employer] services used to help translate (Cantonese).    Pt presents s/p bilateral C4-6 MBB 04/28/2023.  Patient reports 80% sustained relief in bilateral axial neck pain following cervical medial branch block.  Today he reports primarily radicular pain in the arms and in the legs.  Pain is intermittent and today is rated a 5/10.  Patient reports pain which radiates into bilateral shoulders in C6-7 dermatomal distribution and further down into the upper extremities in no particular dermatomal distribution to the hands.  Patient also reports lower back pain which radiates down bilateral lower extremities but predominantly on the left into the knee.  This pain is exacerbated with standing and with ambulation.  Patient does endorse noticeable weakness in the upper and lower extremities associated with use.  Patient has continued gabapentin 300 mg twice daily with noticeable improvement in his pain.  Patient is requesting a refill of this medication.  Patient has been performing physician directed physical therapy exercises over the last 8 weeks with mild improvement in his symptoms.    HPI 04/05/2023  Deborah White is a 69 y.o. male with past medical history significant for diffuse idiopathic skeletal hyperostosis (dish), coronary artery disease, peripheral arterial disease, hypertension, hyperlipidemia, latent TB, rheumatoid arthritis, ankylosing spondylitis, nicotine dependence who presents to the clinic for the evaluation of   Neck and lower back pain.  Today patient's daughter is in the room to facilitate translation as well as use of the Timothy.  She reports his pain is most severe in the neck.  Patient reports pain is constant and today is rated a 7/10, at its best is a 5/10 and at its worse is a 10/10.  Pain is described as "inside" or deep in nature.  Patient " reports pain in the neck which radiates down the left upper extremity to the hand in C6 dermatomal distribution.  Pain is exacerbated with lifting exceeding 5 lb and cervical lateral flexion.  Patient denies significant weakness in the left upper extremity but does report numbness and tingling in the hand which can affect hand  strength or dexterity.  Pain is improved with rest, gabapentin which she is taking 300 mg in the morning and 300 mg in the evening as well as with prior cervical medial branch block.  Patient reports he received 1 year of relief following prior right-sided C4-6 cervical medial branch block with Dr. Barnes.   Patient has completed conventional physical therapy in November 2020 without any improvement in his pain.    Patient also reports lower back pain which radiates down the left lower extremity to the knee.  Pain is described as numbness and tingling in nature.  Pain can be exacerbated with standing or with ambulation.  Patient's daughter also reports some associated weakness in the left lower extremity.    Patient reports significant motor weakness and loss of sensations.  Patient denies night fever/night sweats, urinary incontinence, bowel incontinence, and significant weight loss.      Pain Disability Index Review:         9/18/2024     9:57 AM 3/27/2024     8:46 AM   Last 3 PDI Scores   Pain Disability Index (PDI) 49 36       Non-Pharmacologic Treatments:  Physical Therapy/Home Exercise: yes  Ice/Heat:yes  TENS: no  Acupuncture: no  Massage: no  Chiropractic: no    Other: no      Pain Medications:  - Adjuvant Medications: Neurontin (Gabapentin), Xeljanz  - Anti-Coagulants: Aspirin,, cilostazol    Pain Procedures:   -10/18/2024: Bilateral C4-6 cervical medial branch block  -05/10/2024: C6-7 interlaminar epidural steroid injection with left paramedian approach  -04/28/2023: bilateral C4-6 MBB     -11/18/2020: Right-sided C4-6 medial branch blocks; Dr. Barnes    Past Medical History:    Diagnosis Date    Coronary artery disease     Hyperlipidemia     Hypertension     Personal history of colonic polyps      Past Surgical History:   Procedure Laterality Date    ABDOMINAL AORTIC ANEURYSM REPAIR, ENDOVASCULAR N/A 2/20/2024    Procedure: REPAIR-ANEURYSM-ABDOMINAL AORTIC-ENDOVASCULAR (AAA);  Surgeon: Caesar Denton MD;  Location: [facility] OR [address] [place] [address];  Service: Vascular;  Laterality: N/A;  EVAR & L femoral artery cutdown  mGy 1195.48 Gycm2 215.60     Fluro Time 16.2min   Contrast 26ml    EPIDURAL STEROID INJECTION INTO CERVICAL SPINE N/A 5/10/2024    Procedure: C6/7 IL YENNY with L paramedian approach;  Surgeon: Rudolph Burgos MD;  Location: [facility] PAIN [facility]GT;  Service: Pain Management;  Laterality: N/A;    INJECTION OF ANESTHETIC AGENT AROUND MEDIAL BRANCH NERVES INNERVATING CERVICAL FACET JOINT Right 11/18/2020    Procedure: Block-nerve-medial branch-cervical Right C3, 4, 5with RN IV sedation;  Surgeon: Martín Barnes MD;  Location: [facility] PAIN [facility]GT;  Service: Pain Management;  Laterality: Right;    INJECTION OF ANESTHETIC AGENT AROUND MEDIAL BRANCH NERVES INNERVATING CERVICAL FACET JOINT Bilateral 04/28/2023    Procedure: Bilateral C4-6 MBB with RN IV sedation;  Surgeon: Rudolph Burgos MD;  Location: [facility] PAIN [facility]GT;  Service: Pain Management;  Laterality: Bilateral;    INJECTION OF ANESTHETIC AGENT AROUND MEDIAL BRANCH NERVES INNERVATING CERVICAL FACET JOINT Bilateral 10/18/2024    Procedure: Bilateral C4-6 MBB with RN IV sedation;  Surgeon: Rudolph Burgos MD;  Location: [facility] PAIN [facility];  Service: Pain Management;  Laterality: Bilateral;     Review of patient's allergies indicates:  No Known Allergies    Current Outpatient Medications   Medication Sig    amLODIPine (NORVASC) 10 MG tablet Take 1 tablet (10 mg total) by mouth once daily.    aspirin (ECOTRIN) 81 MG EC tablet Take 81 mg by mouth once daily.    benazepriL (LOTENSIN) 40 MG tablet Take 1 tablet (40 mg total) by mouth once daily.    cilostazoL (PLETAL) 50  MG Tab Take 1 tablet by mouth twice daily    cloNIDine (CATAPRES) 0.2 MG tablet Take 1 tablet (0.2 mg total) by mouth 2 (two) times daily.    ezetimibe (ZETIA) 10 mg tablet Take 1 tablet (10 mg total) by mouth once daily.    gabapentin (NEURONTIN) 300 MG capsule Take 1 capsule (300 mg total) by mouth once daily AND 2 capsules (600 mg total) every evening.    hydroCHLOROthiazide (HYDRODIURIL) 25 MG tablet Take 1 tablet (25 mg total) by mouth once daily.    ixekizumab (TALTZ AUTOINJECTOR) 80 mg/mL AtIn Inject 80 mg into the skin every 28 days.    linaCLOtide (LINZESS) 72 mcg Cap capsule Take 1 capsule (72 mcg total) by mouth before breakfast. (Patient taking differently: Take 72 mcg by mouth as needed.)    predniSONE (DELTASONE) 5 MG tablet Take 1 tablet (5 mg total) by mouth daily as needed (arthritis flares).    rosuvastatin (CRESTOR) 20 MG tablet Take 1 tablet (20 mg total) by mouth once daily.    sildenafiL (VIAGRA) 25 MG tablet Take 1 tablet (25 mg total) by mouth daily as needed for Erectile Dysfunction.     No current facility-administered medications for this visit.       Review of Systems     GENERAL:  No weight loss, malaise or fevers.  HEENT:   No recent changes in vision or hearing  NECK:  Negative for lumps, no difficulty with swallowing.  RESPIRATORY:  Negative for cough, wheezing or shortness of breath, patient denies any recent URI.  CARDIOVASCULAR:  Negative for chest pain or palpitations.  GI:  Negative for abdominal discomfort, blood in stools or black stools or change in bowel habits.  MUSCULOSKELETAL:  See HPI.  SKIN:  Negative for lesions, rash, and itching.  PSYCH:  No mood disorder or recent psychosocial stressors.   HEMATOLOGY/LYMPHOLOGY:  Negative for prolonged bleeding, bruising easily or swollen nodes.    NEURO:   No history of syncope, paralysis, seizures or tremors.  All other reviewed and negative other than HPI.    OBJECTIVE:    There were no vitals taken for this visit.      Physical  Exam    GENERAL: Well appearing, in no acute distress, alert and oriented x3.  PSYCH:  Mood and affect appropriate.  SKIN: Skin color, texture, turgor normal, no rashes or lesions.  HEAD/FACE:  Normocephalic, atraumatic. Cranial nerves grossly intact.    NECK:  pain to palpation over the cervical paraspinous muscles. Spurling Negative. pain with neck flexion, extension, or lateral flexion.   Normaltesting biceps, triceps and brachioradialis bilaterally.    NegativeHoffmann's bilaterally.    5/5 strength testing deltoid, biceps, triceps, wrist extensor, wrist flexor and ulnar intrinsics bilaterally.    Normal  strength bilaterally    CV: RRR with palpation of the radial artery.  PULM: No evidence of respiratory difficulty, symmetric chest rise.  GI:  Soft and non-tender.      NEURO: Bilateral upper and lower extremity coordination and muscle stretch reflexes are physiologic and symmetric. No loss of sensation is noted.  GAIT: normal.    Imaging:   MRI lumbar spine 06/20/2023  FINDINGS:  Alignment: Left-sided spinal asymmetry.  Grade 1 degenerative L4-L5 anterolisthesis.     Vertebrae: Lumbar vertebral body heights are maintained.  Minor L4-L5 endplate degenerative changes.  No significant endplate edema.  No evidence of acute fracture.  Marrow signal is otherwise within normal limits.     Discs: Disc desiccation throughout the lumbar spine.  Similar L4-L5 disc height loss.     Cord: Within normal limits.  Conus terminates at T12-L1.     Degenerative findings:     T12-L1: No significant disc bulge, spinal canal stenosis or neural foraminal stenosis.     L1-L2: Minimal broad-based disc bulge.  No significant spinal canal stenosis or neural foraminal stenosis.     L2-L3: Minor broad-based disc bulge and mild bilateral facet arthropathy.  No significant spinal canal stenosis or neural foraminal stenosis.     L3-L4: Minimal broad-based disc bulge and mild bilateral facet arthropathy.  No significant spinal canal  stenosis or neural foraminal stenosis.     L4-L5: Grade 1 degenerative appearing anterolisthesis.  Mild broad-based disc bulge and moderate bilateral facet arthropathy.  Narrowing of the lateral recesses without significant spinal canal stenosis.  Moderate right and mild-to-moderate left-sided neural foraminal stenosis.     L5-S1: No significant disc bulge.  Bilateral facet arthropathy.  No significant spinal canal stenosis.  Mild right-sided neural foraminal stenosis.     Paraspinal muscles & soft tissues: 5.3 cm infrarenal abdominal aortic aneurysm.  Paraspinal soft tissues are otherwise within normal limits.     Impression:     1. Mild multilevel degenerative changes of the lumbar spine are similar to the prior MRI 04/26/2022 as discussed in the body of the report.  2. Infrarenal abdominal aortic aneurysm as previously described.    MRI cervical spine 06/20/2023  FINDINGS:  Alignment: Pain mild straightening of the normal cervical lordosis.     Vertebrae: Cervical vertebral body heights are maintained.  Minor multilevel endplate degenerative changes.  No significant endplate edema.  No evidence of an acute fracture.  Edema involving the right C3-C4 facet joint.  There appears to be osseous fusion of the right C4-C5 facet joint.  Marrow signal is otherwise within normal limits.     Discs: Disc desiccation throughout the cervical spine.  Disc heights appear maintained.     Cord: C5-C6 prominent cord deformity with small associated area cord signal change concerning for myelomalacia.     Skull base and craniocervical junction: Within normal limits.     Degenerative findings:     C2-C3: Minimal broad-based disc osteophyte complex.  No ventral cord contact or spinal canal stenosis.  Right greater than left facet arthropathy contributing to mild-to-moderate right and mild left-sided neural foraminal stenosis.     C3-C4: Central disc osteophyte complex contacts and deforms the ventral cord.  Significant right-sided  facet arthropathy with ligamentum flavum hypertrophy contributes to mild spinal canal stenosis.  There is severe right and mild-to-moderate left-sided neural foraminal stenosis.     C4-C5: Small central disc osteophyte complex contacts and slightly deforms the ventral cord without significant spinal canal stenosis.  Significant right-sided facet arthropathy with fusion of the facet joint.  Uncovertebral joint spurring contributes to severe right and mild-to-moderate left-sided neural foraminal stenosis.     C5-C6: Broad-based disc osteophyte complex contacts and flattens the ventral cord.  Right greater than left facet arthropathy and ligamentum flavum hypertrophy contributes to moderate spinal canal stenosis.  Uncovertebral joint spurring contributes to moderate to severe right and severe left-sided neural foraminal stenosis.     C6-C7: Mild broad-based disc osteophyte complex contacts and flattens the ventral cord.  Mild bilateral facet arthropathy, ligamentum flavum hypertrophy and uncovertebral joint spurring contribute to mild spinal canal stenosis and severe bilateral neural foraminal stenosis.     C7-T1: No significant disc osteophyte complex, spinal canal stenosis or neural foraminal stenosis.     T1-T2: No significant disc osteophyte complex, spinal canal stenosis or neural foraminal stenosis.     Paraspinal muscles & soft tissues: Within normal limits.     Impression:     Multilevel degenerative changes of the cervical spine are most pronounced at C5-C6 with moderate spinal canal stenosis, severe left and moderate to severe right-sided neural foraminal stenosis.  Cord signal changes at this level concerning for myelomalacia.     Mild C3-C4 and C6-C7 spinal canal stenosis.  Severe bilateral C6-C7 and severe right-sided C3-C4 neural foraminal stenosis.     Right C3-C4 facet edema likely contributes to neck pain.     Right C4-C5 facet joint osseous fusion.      04/05/2023    X-Ray Cervical Spine AP And  Lateral    FINDINGS:  No fracture, prevertebral soft tissue swelling or other acute abnormality is identified.  Cervical spine alignment is normal.  No significant abnormal motion seen on flexion or extension.  Multilevel DISH-type changes with bridging anterior syndesmophytes.  Multilevel cervical endplate spurring and bridging disc calcifications without significant disc height loss as a benign C6-7.  Multilevel hypertrophic facet arthropathy.  Bilateral C3-4 through C6-7 foraminal stenosis.    CT cervical spine 08/02/2024  FINDINGS: Alignment is normal.  Disc spaces and vertebral body heights are maintained although there is degenerative spondylosis visible throughout the cervical spine, large bridging anterior osteophytes from C2 through C4 and C6-T1 and a prominent posterior osteophytes visible at the midline at C6-7.     Axial images demonstrate moderate to severe unilateral right foraminal stenosis at C2-3 and C3-4, severe unilateral right foraminal stenosis at C4-5 and severe bilateral foraminal stenoses at C5-6 and C6-7.  Broad posterior disc bulge is also visible at C5-6 contacting the spinal cord anteriorly and narrowing the right lateral recess.  This finding corresponds to the spinal canal stenosis and suspected myelomalacia in the spinal cord seen at this level on the prior MRI study.  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        Impression:     1.  No evidence of acute osseous injury.  2.  Chronic disc degeneration at multiple levels described above including foraminal stenoses.  3.  Spinal canal stenosis at C5-6.  4.  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    ASSESSMENT: 69 y.o. year old male with Neck, left arm, lower back and left leg pain, consistent with     1. Cervical spondylosis        2. Cervical radiculopathy        3. DDD (degenerative disc disease), cervical          PLAN:   - Interventions:    Schedule for  C6-7 interlaminar epidural steroid injection with left paramedian approach for cervical radiculopathy.  Of note patient received 60% relief exceeding 3 months in duration with prior cervical epidural steroid injection.     Explained the risks and benefits of these procedures in detail with the patient today in clinic along with alternative treatment options, and the patient elected to pursue these interventions at this time.      - Anticoagulation use: yes aspirin and Pletal  --secondary prophylaxis: Coronary artery disease, infrarenal abdominal aortic aneurysm status post repair, peripheral arterial disease   ---Per JAMIE Coag guidelines, patient can continue cilostazol for cervical epidural steroid injection   ---Per JAMIE guidelines, patient will need to pause aspirin 3 days prior to cervical epidural steroid injection.    Will obtain clearance from PCP, Dr. Guzmán     report:  Reviewed and consistent with medication use as prescribed.    - Medications:  -Continue Gabapentin. We have reviewed potential side effects of this medication including daytime somnolence, weight gain and peripheral edema  -Gabapentin 300 mg in the morning and 600 mg QHS  -3 mo supply given prior @ [facility]'s.    - Therapy:   We discussed continuing physician directed physical therapy exercises at home to help manage the patient/s painful condition. The patient was counseled that muscle strengthening will improve the long term prognosis in regards to pain and may also help increase range of motion and mobility.     - Imaging: Reviewed available imaging (MRI cervical spine, MRI lumbar spine) with patient and answered any questions they had regarding study.      - Referrals: (PRN) Dr. Blanco, Neurosurgery; cervical spine stenosis, cervical radiculopathy and myelomalacia    - Follow up visit: return to clinic in 4-6 weeks s/p injection.  Virtual visit.      The above plan and management options were discussed at length with patient. Patient is in  agreement with the above and verbalized understanding.    - I discussed the goals of interventional chronic pain management with the patient on today's visit. We discussed a multimodal and systematic approach to pain.  This includes diagnostic and therapeutic injections, adjuvant pharmacologic treatment, physical therapy, and at times psychiatry.  I emphasized the importance of regular exercise, core strengthening and stretching, diet and weight loss as a cornerstone of long-term pain management.    - This condition does not require this patient to take time off of work, and the primary goal of our Pain Management services is to improve the patient's functional capacity.  - Patient Questions: Answered all of the patient's questions regarding diagnoses, therapy, treatment and next steps        Rudolph Burgos MD  Interventional Pain Management  [facility]    Disclaimer:  This note was prepared using voice recognition system and is likely to have sound alike errors that may have been overlooked even after proof reading.  Please call me with any questions

## 2024-11-13 NOTE — H&P (VIEW-ONLY)
Established Patient Interventional Pain Note     The patient location is: home  The chief complaint leading to consultation is: neck, arm and shoulder pain  Visit type: Virtual visit with synchronous audio and video  Total time spent with patient: 11-15m  Each patient to whom he or she provides medical services by telemedicine is: (1) informed of the relationship between the physician and patient and the respective role of any other health care provider with respect to management of the patient; and (2) notified that he or she may decline to receive medical services by telemedicine and may withdraw from such care at any time.    Referring Physician: No ref. provider found    PCP: Chastity Guzmán MD    Chief Complaint:   Neck, shoulder and arm pain       SUBJECTIVE:  Interval history 11/14/2024  Patient's daughter in room to help facilitate translation    Patient presents status post bilateral C4-6 cervical medial branch block 10/18/2024.  Patient reports 80% sustained relief in axial neck pain following cervical medial branch block.  Today his primary concern is pain in the neck which can radiate down the left upper extremity to mid arm in C6 dermatomal distribution.  Of note he received greater than 80% relief exceeding 3 months in duration with prior cervical epidural steroid injection.  He would like to repeat this procedure.  He has continued physician directed physical therapy exercises for neck arm and shoulder pain over the last 8 weeks from 09/14/2024 through 11 14th 2024.  He has continued gabapentin with noticeable improvement in his pain.  He denies any side effects from this medication.      Interval history 09/18/2024  Patient's daughter in room to help facilitate translation    Patient presents status post C6-7 interlaminar epidural steroid injection with left paramedian approach 05/10/2024.  Patient reports 60% relief exceeding 3 months in duration with cervical epidural steroid injection.  Today  he again reports pain in bilateral cervical paraspinous musculature which can radiate down the left upper extremity to mid arm in C6 dermatomal distribution.  Pain is intermittent and today is rated a 6/10.  Patient does have associated numbness in bilateral hands but denies significant upper extremity weakness or compromise in hand  strength or dexterity.  Patient has continued gabapentin which she is taking 300 mg in the morning and 600 mg in the evening.  He has continued physician directed physical therapy exercises for neck and arm pain over the last 8 weeks from 07/18/2024 through 09/18/2024 with improvement in his symptoms.  Of note patient received 80% relief with prior bilateral C4-6 cervical medial branch block for axial neck pain and received 80% sustained relief for 3 months with prior cervical epidural steroid injection for cervical radiculopathy.    Of note patient saw neurosurgery 07/30/2024 with the following evaluation:      Interval history 03/27/2024  Patient presents for MRI cervical and lumbar spine review      Today patient again reports pain in the neck which radiates down bilateral upper extremities to the hands in C6-7 dermatomal distribution.  Pain is constant and today is rated a 6/10.  Pain is worse on the left than on the right.  Patient does report associated numbness in bilateral hands but denies outright weakness in the upper extremities or compromise in hand  strength or dexterity.  Pain can be exacerbated with cervical flexion/extension and overhead activities.  Pain is improved with use of gabapentin which he is taking 300 mg in the morning and 600 mg in the evening.  Patient's daughter does report he experiences slight sedation with morning dose of gabapentin.  Patient has continued physician directed physical therapy exercises over the last 8 weeks from 01/27/2024 through 03/27/2024 with marginal improvement in pain.    Pain in the lower back and leg pain has improved  "with abdominal aortic endovascular aneurysm repair 02/20/2024.  Today patient's primary concern neck and arm pain.    Interval Hx: 05/31/2023  [employer] services used to help translate (Cantonese).    Pt presents s/p bilateral C4-6 MBB 04/28/2023.  Patient reports 80% sustained relief in bilateral axial neck pain following cervical medial branch block.  Today he reports primarily radicular pain in the arms and in the legs.  Pain is intermittent and today is rated a 5/10.  Patient reports pain which radiates into bilateral shoulders in C6-7 dermatomal distribution and further down into the upper extremities in no particular dermatomal distribution to the hands.  Patient also reports lower back pain which radiates down bilateral lower extremities but predominantly on the left into the knee.  This pain is exacerbated with standing and with ambulation.  Patient does endorse noticeable weakness in the upper and lower extremities associated with use.  Patient has continued gabapentin 300 mg twice daily with noticeable improvement in his pain.  Patient is requesting a refill of this medication.  Patient has been performing physician directed physical therapy exercises over the last 8 weeks with mild improvement in his symptoms.    HPI 04/05/2023  Deborah White is a 69 y.o. male with past medical history significant for diffuse idiopathic skeletal hyperostosis (dish), coronary artery disease, peripheral arterial disease, hypertension, hyperlipidemia, latent TB, rheumatoid arthritis, ankylosing spondylitis, nicotine dependence who presents to the clinic for the evaluation of   Neck and lower back pain.  Today patient's daughter is in the room to facilitate translation as well as use of the Timothy.  She reports his pain is most severe in the neck.  Patient reports pain is constant and today is rated a 7/10, at its best is a 5/10 and at its worse is a 10/10.  Pain is described as "inside" or deep in nature.  Patient " reports pain in the neck which radiates down the left upper extremity to the hand in C6 dermatomal distribution.  Pain is exacerbated with lifting exceeding 5 lb and cervical lateral flexion.  Patient denies significant weakness in the left upper extremity but does report numbness and tingling in the hand which can affect hand  strength or dexterity.  Pain is improved with rest, gabapentin which she is taking 300 mg in the morning and 300 mg in the evening as well as with prior cervical medial branch block.  Patient reports he received 1 year of relief following prior right-sided C4-6 cervical medial branch block with Dr. Barnes.   Patient has completed conventional physical therapy in November 2020 without any improvement in his pain.    Patient also reports lower back pain which radiates down the left lower extremity to the knee.  Pain is described as numbness and tingling in nature.  Pain can be exacerbated with standing or with ambulation.  Patient's daughter also reports some associated weakness in the left lower extremity.    Patient reports significant motor weakness and loss of sensations.  Patient denies night fever/night sweats, urinary incontinence, bowel incontinence, and significant weight loss.      Pain Disability Index Review:         9/18/2024     9:57 AM 3/27/2024     8:46 AM   Last 3 PDI Scores   Pain Disability Index (PDI) 49 36       Non-Pharmacologic Treatments:  Physical Therapy/Home Exercise: yes  Ice/Heat:yes  TENS: no  Acupuncture: no  Massage: no  Chiropractic: no    Other: no      Pain Medications:  - Adjuvant Medications: Neurontin (Gabapentin), Xeljanz  - Anti-Coagulants: Aspirin,, cilostazol    Pain Procedures:   -10/18/2024: Bilateral C4-6 cervical medial branch block  -05/10/2024: C6-7 interlaminar epidural steroid injection with left paramedian approach  -04/28/2023: bilateral C4-6 MBB     -11/18/2020: Right-sided C4-6 medial branch blocks; Dr. Barnes    Past Medical History:    Diagnosis Date    Coronary artery disease     Hyperlipidemia     Hypertension     Personal history of colonic polyps      Past Surgical History:   Procedure Laterality Date    ABDOMINAL AORTIC ANEURYSM REPAIR, ENDOVASCULAR N/A 2/20/2024    Procedure: REPAIR-ANEURYSM-ABDOMINAL AORTIC-ENDOVASCULAR (AAA);  Surgeon: Caesar Denton MD;  Location: [facility] OR [address] [place] [address];  Service: Vascular;  Laterality: N/A;  EVAR & L femoral artery cutdown  mGy 1195.48 Gycm2 215.60     Fluro Time 16.2min   Contrast 26ml    EPIDURAL STEROID INJECTION INTO CERVICAL SPINE N/A 5/10/2024    Procedure: C6/7 IL YENNY with L paramedian approach;  Surgeon: Rudolph Burgos MD;  Location: [facility] PAIN MGT;  Service: Pain Management;  Laterality: N/A;    INJECTION OF ANESTHETIC AGENT AROUND MEDIAL BRANCH NERVES INNERVATING CERVICAL FACET JOINT Right 11/18/2020    Procedure: Block-nerve-medial branch-cervical Right C3, 4, 5with RN IV sedation;  Surgeon: Martín Barnes MD;  Location: [facility] PAIN [facility]GT;  Service: Pain Management;  Laterality: Right;    INJECTION OF ANESTHETIC AGENT AROUND MEDIAL BRANCH NERVES INNERVATING CERVICAL FACET JOINT Bilateral 04/28/2023    Procedure: Bilateral C4-6 MBB with RN IV sedation;  Surgeon: Rudolph Burgos MD;  Location: [facility] PAIN [facility]GT;  Service: Pain Management;  Laterality: Bilateral;    INJECTION OF ANESTHETIC AGENT AROUND MEDIAL BRANCH NERVES INNERVATING CERVICAL FACET JOINT Bilateral 10/18/2024    Procedure: Bilateral C4-6 MBB with RN IV sedation;  Surgeon: Rudolph Burgos MD;  Location: [facility] PAIN [facility]GT;  Service: Pain Management;  Laterality: Bilateral;     Review of patient's allergies indicates:  No Known Allergies    Current Outpatient Medications   Medication Sig    amLODIPine (NORVASC) 10 MG tablet Take 1 tablet (10 mg total) by mouth once daily.    aspirin (ECOTRIN) 81 MG EC tablet Take 81 mg by mouth once daily.    benazepriL (LOTENSIN) 40 MG tablet Take 1 tablet (40 mg total) by mouth once daily.    cilostazoL (PLETAL) 50  MG Tab Take 1 tablet by mouth twice daily    cloNIDine (CATAPRES) 0.2 MG tablet Take 1 tablet (0.2 mg total) by mouth 2 (two) times daily.    ezetimibe (ZETIA) 10 mg tablet Take 1 tablet (10 mg total) by mouth once daily.    gabapentin (NEURONTIN) 300 MG capsule Take 1 capsule (300 mg total) by mouth once daily AND 2 capsules (600 mg total) every evening.    hydroCHLOROthiazide (HYDRODIURIL) 25 MG tablet Take 1 tablet (25 mg total) by mouth once daily.    ixekizumab (TALTZ AUTOINJECTOR) 80 mg/mL AtIn Inject 80 mg into the skin every 28 days.    linaCLOtide (LINZESS) 72 mcg Cap capsule Take 1 capsule (72 mcg total) by mouth before breakfast. (Patient taking differently: Take 72 mcg by mouth as needed.)    predniSONE (DELTASONE) 5 MG tablet Take 1 tablet (5 mg total) by mouth daily as needed (arthritis flares).    rosuvastatin (CRESTOR) 20 MG tablet Take 1 tablet (20 mg total) by mouth once daily.    sildenafiL (VIAGRA) 25 MG tablet Take 1 tablet (25 mg total) by mouth daily as needed for Erectile Dysfunction.     No current facility-administered medications for this visit.       Review of Systems     GENERAL:  No weight loss, malaise or fevers.  HEENT:   No recent changes in vision or hearing  NECK:  Negative for lumps, no difficulty with swallowing.  RESPIRATORY:  Negative for cough, wheezing or shortness of breath, patient denies any recent URI.  CARDIOVASCULAR:  Negative for chest pain or palpitations.  GI:  Negative for abdominal discomfort, blood in stools or black stools or change in bowel habits.  MUSCULOSKELETAL:  See HPI.  SKIN:  Negative for lesions, rash, and itching.  PSYCH:  No mood disorder or recent psychosocial stressors.   HEMATOLOGY/LYMPHOLOGY:  Negative for prolonged bleeding, bruising easily or swollen nodes.    NEURO:   No history of syncope, paralysis, seizures or tremors.  All other reviewed and negative other than HPI.    OBJECTIVE:    There were no vitals taken for this visit.      Physical  Exam    GENERAL: Well appearing, in no acute distress, alert and oriented x3.  PSYCH:  Mood and affect appropriate.  SKIN: Skin color, texture, turgor normal, no rashes or lesions.  HEAD/FACE:  Normocephalic, atraumatic. Cranial nerves grossly intact.    NECK:  pain to palpation over the cervical paraspinous muscles. Spurling Negative. pain with neck flexion, extension, or lateral flexion.   Normaltesting biceps, triceps and brachioradialis bilaterally.    NegativeHoffmann's bilaterally.    5/5 strength testing deltoid, biceps, triceps, wrist extensor, wrist flexor and ulnar intrinsics bilaterally.    Normal  strength bilaterally    CV: RRR with palpation of the radial artery.  PULM: No evidence of respiratory difficulty, symmetric chest rise.  GI:  Soft and non-tender.      NEURO: Bilateral upper and lower extremity coordination and muscle stretch reflexes are physiologic and symmetric. No loss of sensation is noted.  GAIT: normal.    Imaging:   MRI lumbar spine 06/20/2023  FINDINGS:  Alignment: Left-sided spinal asymmetry.  Grade 1 degenerative L4-L5 anterolisthesis.     Vertebrae: Lumbar vertebral body heights are maintained.  Minor L4-L5 endplate degenerative changes.  No significant endplate edema.  No evidence of acute fracture.  Marrow signal is otherwise within normal limits.     Discs: Disc desiccation throughout the lumbar spine.  Similar L4-L5 disc height loss.     Cord: Within normal limits.  Conus terminates at T12-L1.     Degenerative findings:     T12-L1: No significant disc bulge, spinal canal stenosis or neural foraminal stenosis.     L1-L2: Minimal broad-based disc bulge.  No significant spinal canal stenosis or neural foraminal stenosis.     L2-L3: Minor broad-based disc bulge and mild bilateral facet arthropathy.  No significant spinal canal stenosis or neural foraminal stenosis.     L3-L4: Minimal broad-based disc bulge and mild bilateral facet arthropathy.  No significant spinal canal  stenosis or neural foraminal stenosis.     L4-L5: Grade 1 degenerative appearing anterolisthesis.  Mild broad-based disc bulge and moderate bilateral facet arthropathy.  Narrowing of the lateral recesses without significant spinal canal stenosis.  Moderate right and mild-to-moderate left-sided neural foraminal stenosis.     L5-S1: No significant disc bulge.  Bilateral facet arthropathy.  No significant spinal canal stenosis.  Mild right-sided neural foraminal stenosis.     Paraspinal muscles & soft tissues: 5.3 cm infrarenal abdominal aortic aneurysm.  Paraspinal soft tissues are otherwise within normal limits.     Impression:     1. Mild multilevel degenerative changes of the lumbar spine are similar to the prior MRI 04/26/2022 as discussed in the body of the report.  2. Infrarenal abdominal aortic aneurysm as previously described.    MRI cervical spine 06/20/2023  FINDINGS:  Alignment: Pain mild straightening of the normal cervical lordosis.     Vertebrae: Cervical vertebral body heights are maintained.  Minor multilevel endplate degenerative changes.  No significant endplate edema.  No evidence of an acute fracture.  Edema involving the right C3-C4 facet joint.  There appears to be osseous fusion of the right C4-C5 facet joint.  Marrow signal is otherwise within normal limits.     Discs: Disc desiccation throughout the cervical spine.  Disc heights appear maintained.     Cord: C5-C6 prominent cord deformity with small associated area cord signal change concerning for myelomalacia.     Skull base and craniocervical junction: Within normal limits.     Degenerative findings:     C2-C3: Minimal broad-based disc osteophyte complex.  No ventral cord contact or spinal canal stenosis.  Right greater than left facet arthropathy contributing to mild-to-moderate right and mild left-sided neural foraminal stenosis.     C3-C4: Central disc osteophyte complex contacts and deforms the ventral cord.  Significant right-sided  facet arthropathy with ligamentum flavum hypertrophy contributes to mild spinal canal stenosis.  There is severe right and mild-to-moderate left-sided neural foraminal stenosis.     C4-C5: Small central disc osteophyte complex contacts and slightly deforms the ventral cord without significant spinal canal stenosis.  Significant right-sided facet arthropathy with fusion of the facet joint.  Uncovertebral joint spurring contributes to severe right and mild-to-moderate left-sided neural foraminal stenosis.     C5-C6: Broad-based disc osteophyte complex contacts and flattens the ventral cord.  Right greater than left facet arthropathy and ligamentum flavum hypertrophy contributes to moderate spinal canal stenosis.  Uncovertebral joint spurring contributes to moderate to severe right and severe left-sided neural foraminal stenosis.     C6-C7: Mild broad-based disc osteophyte complex contacts and flattens the ventral cord.  Mild bilateral facet arthropathy, ligamentum flavum hypertrophy and uncovertebral joint spurring contribute to mild spinal canal stenosis and severe bilateral neural foraminal stenosis.     C7-T1: No significant disc osteophyte complex, spinal canal stenosis or neural foraminal stenosis.     T1-T2: No significant disc osteophyte complex, spinal canal stenosis or neural foraminal stenosis.     Paraspinal muscles & soft tissues: Within normal limits.     Impression:     Multilevel degenerative changes of the cervical spine are most pronounced at C5-C6 with moderate spinal canal stenosis, severe left and moderate to severe right-sided neural foraminal stenosis.  Cord signal changes at this level concerning for myelomalacia.     Mild C3-C4 and C6-C7 spinal canal stenosis.  Severe bilateral C6-C7 and severe right-sided C3-C4 neural foraminal stenosis.     Right C3-C4 facet edema likely contributes to neck pain.     Right C4-C5 facet joint osseous fusion.      04/05/2023    X-Ray Cervical Spine AP And  Lateral    FINDINGS:  No fracture, prevertebral soft tissue swelling or other acute abnormality is identified.  Cervical spine alignment is normal.  No significant abnormal motion seen on flexion or extension.  Multilevel DISH-type changes with bridging anterior syndesmophytes.  Multilevel cervical endplate spurring and bridging disc calcifications without significant disc height loss as a benign C6-7.  Multilevel hypertrophic facet arthropathy.  Bilateral C3-4 through C6-7 foraminal stenosis.    CT cervical spine 08/02/2024  FINDINGS: Alignment is normal.  Disc spaces and vertebral body heights are maintained although there is degenerative spondylosis visible throughout the cervical spine, large bridging anterior osteophytes from C2 through C4 and C6-T1 and a prominent posterior osteophytes visible at the midline at C6-7.     Axial images demonstrate moderate to severe unilateral right foraminal stenosis at C2-3 and C3-4, severe unilateral right foraminal stenosis at C4-5 and severe bilateral foraminal stenoses at C5-6 and C6-7.  Broad posterior disc bulge is also visible at C5-6 contacting the spinal cord anteriorly and narrowing the right lateral recess.  This finding corresponds to the spinal canal stenosis and suspected myelomalacia in the spinal cord seen at this level on the prior MRI study.  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        Impression:     1.  No evidence of acute osseous injury.  2.  Chronic disc degeneration at multiple levels described above including foraminal stenoses.  3.  Spinal canal stenosis at C5-6.  4.  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    ASSESSMENT: 69 y.o. year old male with Neck, left arm, lower back and left leg pain, consistent with     1. Cervical spondylosis        2. Cervical radiculopathy        3. DDD (degenerative disc disease), cervical          PLAN:   - Interventions:    Schedule for  C6-7 interlaminar epidural steroid injection with left paramedian approach for cervical radiculopathy.  Of note patient received 60% relief exceeding 3 months in duration with prior cervical epidural steroid injection.     Explained the risks and benefits of these procedures in detail with the patient today in clinic along with alternative treatment options, and the patient elected to pursue these interventions at this time.      - Anticoagulation use: yes aspirin and Pletal  --secondary prophylaxis: Coronary artery disease, infrarenal abdominal aortic aneurysm status post repair, peripheral arterial disease   ---Per JAMIE Coag guidelines, patient can continue cilostazol for cervical epidural steroid injection   ---Per JAMIE guidelines, patient will need to pause aspirin 3 days prior to cervical epidural steroid injection.    Will obtain clearance from PCP, Dr. Guzmán     report:  Reviewed and consistent with medication use as prescribed.    - Medications:  -Continue Gabapentin. We have reviewed potential side effects of this medication including daytime somnolence, weight gain and peripheral edema  -Gabapentin 300 mg in the morning and 600 mg QHS  -3 mo supply given prior @ [facility].    - Therapy:   We discussed continuing physician directed physical therapy exercises at home to help manage the patient/s painful condition. The patient was counseled that muscle strengthening will improve the long term prognosis in regards to pain and may also help increase range of motion and mobility.     - Imaging: Reviewed available imaging (MRI cervical spine, MRI lumbar spine) with patient and answered any questions they had regarding study.      - Referrals: (PRN) Dr. Blanco, Neurosurgery; cervical spine stenosis, cervical radiculopathy and myelomalacia    - Follow up visit: return to clinic in 4-6 weeks s/p injection.  Virtual visit.      The above plan and management options were discussed at length with patient. Patient is in  agreement with the above and verbalized understanding.    - I discussed the goals of interventional chronic pain management with the patient on today's visit. We discussed a multimodal and systematic approach to pain.  This includes diagnostic and therapeutic injections, adjuvant pharmacologic treatment, physical therapy, and at times psychiatry.  I emphasized the importance of regular exercise, core strengthening and stretching, diet and weight loss as a cornerstone of long-term pain management.    - This condition does not require this patient to take time off of work, and the primary goal of our Pain Management services is to improve the patient's functional capacity.  - Patient Questions: Answered all of the patient's questions regarding diagnoses, therapy, treatment and next steps        Rudolph Burgos MD  Interventional Pain Management  [facility]    Disclaimer:  This note was prepared using voice recognition system and is likely to have sound alike errors that may have been overlooked even after proof reading.  Please call me with any questions

## 2024-11-14 ENCOUNTER — OFFICE VISIT (OUTPATIENT)
Dept: PAIN MEDICINE | Facility: CLINIC | Age: 69
End: 2024-11-14
Payer: MEDICARE

## 2024-11-14 ENCOUNTER — PATIENT MESSAGE (OUTPATIENT)
Dept: PAIN MEDICINE | Facility: CLINIC | Age: 69
End: 2024-11-14
Payer: MEDICARE

## 2024-11-14 DIAGNOSIS — M54.12 CERVICAL RADICULOPATHY: ICD-10-CM

## 2024-11-14 DIAGNOSIS — M50.30 DDD (DEGENERATIVE DISC DISEASE), CERVICAL: ICD-10-CM

## 2024-11-14 DIAGNOSIS — M47.812 CERVICAL SPONDYLOSIS: Primary | ICD-10-CM

## 2024-11-14 PROCEDURE — 99214 OFFICE O/P EST MOD 30 MIN: CPT | Mod: 95,,, | Performed by: ANESTHESIOLOGY

## 2024-12-03 ENCOUNTER — TELEPHONE (OUTPATIENT)
Dept: PAIN MEDICINE | Facility: CLINIC | Age: 69
End: 2024-12-03
Payer: MEDICARE

## 2024-12-03 NOTE — TELEPHONE ENCOUNTER
Cardiac Clearance    ----- Message from Chastity Guzmán MD sent at 12/2/2024  6:57 PM CST -----  That's fine. Thanks.  ----- Message -----  From: Kodi Dsouza MA  Sent: 12/2/2024  10:03 AM CST  To: MD Dr. Ramirez Gann Tukanika Anthonyu was seen in office with complaints of severe neck pain. Dr. Burgos would like to perform cervical epidural, and Deborah White would like to proceed. Dr. Burgos is requesting for clearance to hold ASA 3 days prior to procedure. Patient Deborah White can resume medication following the procedure.     Thank You,  Maria Luisa SNYDER  [facility] Scheduler

## 2024-12-03 NOTE — PRE-PROCEDURE INSTRUCTIONS
Spoke with patient daughter regarding procedure scheduled on 12/6     Arrival time 1200, wants to come at 0700     Has patient been sick with fever or on antibiotics within the last 7 days? No     Does the patient have any open wounds, sores or rashes? No     Does the patient have any recent fractures? no     Has patient received a vaccination within the last 7 days? No     Received the COVID vaccination?      Has the patient stopped all medications as directed? asa 3 days     Does patient have a pacemaker, defibrillator, or implantable stimulator? No     Does the patient have a ride to and from procedure and someone reliable to remain with patient?  daughter     Is the patient diabetic? no     Does the patient have sleep apnea? Or use O2 at home? no     Is the patient receiving sedation?      Is the patient instructed to remain NPO beginning at midnight the night before their procedure? yes     Procedure location confirmed with patient? Yes     Covid- Denies signs/symptoms. Instructed to notify PAT/MD if any changes

## 2024-12-04 ENCOUNTER — PATIENT MESSAGE (OUTPATIENT)
Dept: VASCULAR SURGERY | Facility: CLINIC | Age: 69
End: 2024-12-04
Payer: MEDICARE

## 2024-12-04 DIAGNOSIS — N18.31 CHRONIC KIDNEY DISEASE, STAGE 3A: ICD-10-CM

## 2024-12-05 ENCOUNTER — OFFICE VISIT (OUTPATIENT)
Dept: FAMILY MEDICINE | Facility: CLINIC | Age: 69
End: 2024-12-05
Attending: FAMILY MEDICINE
Payer: MEDICARE

## 2024-12-05 ENCOUNTER — LAB VISIT (OUTPATIENT)
Dept: LAB | Facility: HOSPITAL | Age: 69
End: 2024-12-05
Attending: FAMILY MEDICINE
Payer: MEDICARE

## 2024-12-05 VITALS
HEART RATE: 72 BPM | BODY MASS INDEX: 29.02 KG/M2 | RESPIRATION RATE: 18 BRPM | HEIGHT: 63 IN | WEIGHT: 163.81 LBS | SYSTOLIC BLOOD PRESSURE: 130 MMHG | DIASTOLIC BLOOD PRESSURE: 74 MMHG | TEMPERATURE: 97 F | OXYGEN SATURATION: 99 %

## 2024-12-05 DIAGNOSIS — M48.10 DISH (DIFFUSE IDIOPATHIC SKELETAL HYPEROSTOSIS): ICD-10-CM

## 2024-12-05 DIAGNOSIS — R73.03 PREDIABETES: ICD-10-CM

## 2024-12-05 DIAGNOSIS — M47.812 CERVICAL SPONDYLOSIS: ICD-10-CM

## 2024-12-05 DIAGNOSIS — N18.31 CHRONIC KIDNEY DISEASE, STAGE 3A: ICD-10-CM

## 2024-12-05 DIAGNOSIS — M48.12: ICD-10-CM

## 2024-12-05 DIAGNOSIS — D84.9 IMMUNOCOMPROMISED: ICD-10-CM

## 2024-12-05 DIAGNOSIS — I10 HYPERTENSION, ESSENTIAL: ICD-10-CM

## 2024-12-05 DIAGNOSIS — Z72.0 TOBACCO ABUSE: ICD-10-CM

## 2024-12-05 DIAGNOSIS — E78.2 MIXED HYPERLIPIDEMIA: ICD-10-CM

## 2024-12-05 DIAGNOSIS — M45.0 ANKYLOSING SPONDYLITIS OF MULTIPLE SITES IN SPINE: ICD-10-CM

## 2024-12-05 DIAGNOSIS — M06.00 SERONEGATIVE RHEUMATOID ARTHRITIS: ICD-10-CM

## 2024-12-05 DIAGNOSIS — E66.3 OVERWEIGHT (BMI 25.0-29.9): ICD-10-CM

## 2024-12-05 DIAGNOSIS — Z12.5 PROSTATE CANCER SCREENING: ICD-10-CM

## 2024-12-05 DIAGNOSIS — I25.118 CORONARY ARTERY DISEASE OF NATIVE ARTERY OF NATIVE HEART WITH STABLE ANGINA PECTORIS: ICD-10-CM

## 2024-12-05 DIAGNOSIS — I73.9 PAD (PERIPHERAL ARTERY DISEASE): ICD-10-CM

## 2024-12-05 DIAGNOSIS — K59.00 CONSTIPATION, UNSPECIFIED CONSTIPATION TYPE: ICD-10-CM

## 2024-12-05 LAB
ALBUMIN/CREAT UR: 238.6 UG/MG (ref 0–30)
COMPLEXED PSA SERPL-MCNC: 0.32 NG/ML (ref 0–4)
CREAT UR-MCNC: 88 MG/DL (ref 23–375)
ESTIMATED AVG GLUCOSE: 137 MG/DL (ref 68–131)
HBA1C MFR BLD: 6.4 % (ref 4–5.6)
MICROALBUMIN UR DL<=1MG/L-MCNC: 210 UG/ML

## 2024-12-05 PROCEDURE — 99215 OFFICE O/P EST HI 40 MIN: CPT | Mod: PBBFAC,PO | Performed by: FAMILY MEDICINE

## 2024-12-05 PROCEDURE — 99214 OFFICE O/P EST MOD 30 MIN: CPT | Mod: S$PBB,,, | Performed by: FAMILY MEDICINE

## 2024-12-05 PROCEDURE — G2211 COMPLEX E/M VISIT ADD ON: HCPCS | Mod: S$PBB,,, | Performed by: FAMILY MEDICINE

## 2024-12-05 PROCEDURE — 84153 ASSAY OF PSA TOTAL: CPT | Performed by: FAMILY MEDICINE

## 2024-12-05 PROCEDURE — 99999 PR PBB SHADOW E&M-EST. PATIENT-LVL V: CPT | Mod: PBBFAC,,, | Performed by: FAMILY MEDICINE

## 2024-12-05 PROCEDURE — 82043 UR ALBUMIN QUANTITATIVE: CPT | Performed by: FAMILY MEDICINE

## 2024-12-05 PROCEDURE — 83036 HEMOGLOBIN GLYCOSYLATED A1C: CPT | Performed by: FAMILY MEDICINE

## 2024-12-05 NOTE — PROGRESS NOTES
Deborah White    Chief Complaint   Patient presents with    Follow-up    Hypertension       History of Present Illness:   Mr. White comes in today for hypertension follow-up.  He speaks little English and speaks Cantonese and requires  during visit today. Interpreters information includes the following: [facility] #[id number].    He states he is not fasting but has taken medication today.  He states he continues to take Lotensin 40 mg daily, hydrochlorothiazide 25 mg daily, amlodipine 10 mg daily, clonidine 0.2 mg twice daily for blood pressure control; Zetia 10 mg daily, Crestor 20 mg nightly for hyperlipidemia; Pletal 50 mg twice daily, aspirin EC 81 mg daily for PAD.  He states he monitors his diet.  He states he walks 7 times per week, 15-20 minutes each time for exercise.  He states he performs home blood pressure checks with levels ranging 120's/70's. States he continues to smoke cigarettes-10 cigarettes per day - and states he is not trying to quit smoking as he has teeth pain when not smoking.    He states he is scheduled to receive shot at his back, neck and right hand on tomorrow. He saw Dr. Burgos, pain management specialist, on November 19, 2024 for cervical spondylosis, cervical radiculopathy, DDD (degenerative disc disease), cervical. He saw Dr. Brooks, rheumatologist, on September 19, 2024 for ankylosing spondylitis of multiple sites in spine, encounter for monitoring immunosuppressive medication therapy causing immunodeficiency, immunodeficiency due to drug therapy, high risk medication use, stage 3 chronic kidney disease, unspecified whether stage 3a or 3b CKD, DISH (diffuse idiopathic skeletal hyperostosis), S/P AAA repair, elevated sed rate, TB lung, latent. He saw Dr. Maldonado, cardiologist, on August 1, 2024 for coronary artery disease of native artery of native heart with stable angina pectoris, PAD (peripheral artery disease), ankylosing spondylitis of multiple sites in spine, abnormal EKG, smoking,  mixed hyperlipidemia, tobacco abuse, hyperlipidemia, unspecified hyperlipidemia type.    Otherwise, he reports no acute problems today. He denies having fever, chills, fatigue, appetite changes; shortness of breath, cough, wheezing; chest pain, palpitations, leg swelling; abdominal pain, nausea, vomiting, diarrhea, constipation; unusual urinary symptoms; polydipsia, polyphagia, polyuria, hot or cold intolerance; acute visual changes, numbness,  headache; anxiety, depression, homicidal or suicidal thoughts.      Labs:                     WBC                      7.81                09/18/2024                 HGB                      13.3 (L)            09/18/2024                 HCT                      42.0                09/18/2024                 PLT                      320                 09/18/2024                 CHOL                     120                 06/05/2024                 TRIG                     97                  06/05/2024                 HDL                      53                  06/05/2024                 ALT                      25                  09/18/2024                 AST                      23                  09/18/2024                 NA                       139                 09/18/2024                 K                        4.2                 09/18/2024                 CL                       102                 09/18/2024                 CREATININE               1.5 (H)             09/18/2024                 BUN                      16                  09/18/2024                 CO2                      25                  09/18/2024                 TSH                      0.575               06/05/2024                 PSA                      0.30                06/21/2023                 INR                      1.0                 02/20/2024                 HGBA1C                   6.5                 06/05/2024                LDLCALC                  45.6 (L)             06/21/2023                  Current Outpatient Medications   Medication Sig    amLODIPine (NORVASC) 10 MG tablet Take 1 tablet (10 mg total) by mouth once daily.    aspirin (ECOTRIN) 81 MG EC tablet Take 81 mg by mouth once daily.    benazepriL (LOTENSIN) 40 MG tablet Take 1 tablet (40 mg total) by mouth once daily.    cloNIDine (CATAPRES) 0.2 MG tablet Take 1 tablet (0.2 mg total) by mouth 2 (two) times daily.    ezetimibe (ZETIA) 10 mg tablet Take 1 tablet (10 mg total) by mouth once daily.    gabapentin (NEURONTIN) 300 MG capsule Take 1 capsule (300 mg total) by mouth once daily AND 2 capsules (600 mg total) every evening.    hydroCHLOROthiazide (HYDRODIURIL) 25 MG tablet Take 1 tablet (25 mg total) by mouth once daily.    ixekizumab (TALTZ AUTOINJECTOR) 80 mg/mL AtIn Inject 80 mg into the skin every 28 days.    linaCLOtide (LINZESS) 72 mcg Cap capsule Take 1 capsule (72 mcg total) by mouth before breakfast.    predniSONE (DELTASONE) 5 MG tablet Take 1 tablet (5 mg total) by mouth daily as needed (arthritis flares).    rosuvastatin (CRESTOR) 20 MG tablet Take 1 tablet (20 mg total) by mouth once daily.    sildenafiL (VIAGRA) 25 MG tablet Take 1 tablet (25 mg total) by mouth daily as needed for Erectile Dysfunction.    cilostazoL (PLETAL) 50 MG Tab Take 1 tablet by mouth twice daily       Review of Systems   Constitutional:  Negative for activity change, appetite change, chills, fatigue and fever.   Eyes:  Negative for visual disturbance.   Respiratory:  Negative for cough, shortness of breath and wheezing.    Cardiovascular:  Negative for chest pain, palpitations and leg swelling.   Gastrointestinal:  Negative for abdominal pain, constipation, diarrhea, nausea and vomiting.   Endocrine: Negative for cold intolerance, heat intolerance, polydipsia, polyphagia and polyuria.   Genitourinary:  Negative for difficulty urinating.   Musculoskeletal:  Positive for arthralgias, back pain and neck pain.   Neurological:   Negative for numbness and headaches.   Psychiatric/Behavioral:  Negative for dysphoric mood and suicidal ideas. The patient is not nervous/anxious.         Negative for homicidal ideas.       Objective:  Physical Exam  Vitals reviewed.   Constitutional:       General: He is not in acute distress.     Appearance: Normal appearance. He is not ill-appearing, toxic-appearing or diaphoretic.      Comments: Pleasant.   Eyes:      Comments: He wears glasses.   Cardiovascular:      Rate and Rhythm: Normal rate and regular rhythm.      Pulses:           Dorsalis pedis pulses are 3+ on the right side and 3+ on the left side.      Heart sounds: No murmur heard.  Pulmonary:      Effort: Pulmonary effort is normal. No respiratory distress.      Breath sounds: Normal breath sounds. No wheezing.   Abdominal:      General: Bowel sounds are normal. There is no distension.      Palpations: Abdomen is soft. There is no mass.      Tenderness: There is no abdominal tenderness. There is no guarding or rebound.   Musculoskeletal:         General: No swelling or tenderness. Normal range of motion.      Cervical back: Normal range of motion and neck supple. No tenderness.      Comments: He is ambulatory without problems.   Feet:      Right foot:      Protective Sensation: 5 sites tested.  5 sites sensed.      Skin integrity: No ulcer or skin breakdown.      Left foot:      Protective Sensation: 5 sites tested.  5 sites sensed.      Skin integrity: No ulcer or skin breakdown.   Lymphadenopathy:      Cervical: No cervical adenopathy.   Skin:     General: Skin is warm.   Neurological:      General: No focal deficit present.      Mental Status: He is alert and oriented to person, place, and time.   Psychiatric:         Mood and Affect: Mood normal.         Behavior: Behavior normal.         Thought Content: Thought content normal.         Judgment: Judgment normal.         ASSESSMENT:  1. Hypertension, essential    2. Prediabetes    3.  Coronary artery disease of native artery of native heart with stable angina pectoris    4. Mixed hyperlipidemia    5. Constipation, unspecified constipation type    6. PAD (peripheral artery disease)    7. Chronic kidney disease, stage 3a    8. DISH (diffuse idiopathic skeletal hyperostosis)    9. Ankylosing hyperostosis of cervical region    10. Ankylosing spondylitis of multiple sites in spine    11. Seronegative rheumatoid arthritis    12. Cervical spondylosis    13. Immunocompromised    14. Tobacco abuse    15. Overweight (BMI 25.0-29.9)    16. Prostate cancer screening        PLAN:  Deborah was seen today for follow-up and hypertension.    Diagnoses and all orders for this visit:    Hypertension, essential    Prediabetes  -     Hemoglobin A1C; Future    Coronary artery disease of native artery of native heart with stable angina pectoris    Mixed hyperlipidemia    Constipation, unspecified constipation type  -     linaCLOtide (LINZESS) 72 mcg Cap capsule; Take 1 capsule (72 mcg total) by mouth before breakfast.    PAD (peripheral artery disease)    Chronic kidney disease, stage 3a  -     Microalbumin/Creatinine Ratio, Urine    DISH (diffuse idiopathic skeletal hyperostosis)    Ankylosing hyperostosis of cervical region    Ankylosing spondylitis of multiple sites in spine    Seronegative rheumatoid arthritis    Cervical spondylosis    Immunocompromised    Tobacco abuse    Overweight (BMI 25.0-29.9)    Prostate cancer screening  -     PSA, Screening; Future    Patient advised to call for results.  Continue current medications, follow low sodium, low cholesterol, low carb diet, daily walks.  Prescription refill as noted above.  Keep follow up with specialists.  Smoking cessation advised.  Follow up today (on 12/5/2024) for physical.

## 2024-12-06 ENCOUNTER — HOSPITAL ENCOUNTER (OUTPATIENT)
Facility: HOSPITAL | Age: 69
Discharge: HOME | End: 2024-12-06
Attending: ANESTHESIOLOGY | Admitting: ANESTHESIOLOGY
Payer: MEDICARE

## 2024-12-06 VITALS
RESPIRATION RATE: 16 BRPM | HEIGHT: 63 IN | HEART RATE: 67 BPM | TEMPERATURE: 97 F | DIASTOLIC BLOOD PRESSURE: 72 MMHG | OXYGEN SATURATION: 96 % | BODY MASS INDEX: 28.52 KG/M2 | SYSTOLIC BLOOD PRESSURE: 152 MMHG | WEIGHT: 160.94 LBS

## 2024-12-06 DIAGNOSIS — M54.12 CERVICAL RADICULOPATHY: ICD-10-CM

## 2024-12-06 PROCEDURE — 25500020 PHARM REV CODE 255: Performed by: ANESTHESIOLOGY

## 2024-12-06 PROCEDURE — 63600175 PHARM REV CODE 636 W HCPCS: Performed by: ANESTHESIOLOGY

## 2024-12-06 PROCEDURE — 25000003 PHARM REV CODE 250: Performed by: ANESTHESIOLOGY

## 2024-12-06 PROCEDURE — 62321 NJX INTERLAMINAR CRV/THRC: CPT | Mod: ,,, | Performed by: ANESTHESIOLOGY

## 2024-12-06 PROCEDURE — 62321 NJX INTERLAMINAR CRV/THRC: CPT | Performed by: ANESTHESIOLOGY

## 2024-12-06 RX ORDER — FENTANYL CITRATE 50 UG/ML
INJECTION, SOLUTION INTRAMUSCULAR; INTRAVENOUS
Status: DISCONTINUED | OUTPATIENT
Start: 2024-12-06 | End: 2024-12-06 | Stop reason: HOSPADM

## 2024-12-06 RX ORDER — DEXAMETHASONE SODIUM PHOSPHATE 10 MG/ML
INJECTION INTRAMUSCULAR; INTRAVENOUS
Status: DISCONTINUED | OUTPATIENT
Start: 2024-12-06 | End: 2024-12-06 | Stop reason: HOSPADM

## 2024-12-06 RX ORDER — INDOMETHACIN 25 MG/1
CAPSULE ORAL
Status: DISCONTINUED | OUTPATIENT
Start: 2024-12-06 | End: 2024-12-06 | Stop reason: HOSPADM

## 2024-12-06 RX ORDER — BUPIVACAINE HYDROCHLORIDE 2.5 MG/ML
INJECTION, SOLUTION EPIDURAL; INFILTRATION; INTRACAUDAL
Status: DISCONTINUED | OUTPATIENT
Start: 2024-12-06 | End: 2024-12-06 | Stop reason: HOSPADM

## 2024-12-06 RX ORDER — MIDAZOLAM HYDROCHLORIDE 1 MG/ML
INJECTION, SOLUTION INTRAMUSCULAR; INTRAVENOUS
Status: DISCONTINUED | OUTPATIENT
Start: 2024-12-06 | End: 2024-12-06 | Stop reason: HOSPADM

## 2024-12-06 NOTE — DISCHARGE SUMMARY
Discharge Note  Short Stay      SUMMARY     Admit Date: 12/6/2024    Attending Physician: Rudolph Burgos MD        Discharge Physician: Rudolph Burgos MD        Discharge Date: 12/6/2024 8:25 AM    Procedure(s) (LRB):  C6/7 IL YENNY (N/A)    Final Diagnosis: Cervical radiculopathy [M54.12]    Disposition: Home or self care    Patient Instructions:   Current Discharge Medication List        CONTINUE these medications which have NOT CHANGED    Details   amLODIPine (NORVASC) 10 MG tablet Take 1 tablet (10 mg total) by mouth once daily.  Qty: 90 tablet, Refills: 3    Comments: .      benazepriL (LOTENSIN) 40 MG tablet Take 1 tablet (40 mg total) by mouth once daily.  Qty: 90 tablet, Refills: 2    Comments: .  Associated Diagnoses: Hypertension, essential      gabapentin (NEURONTIN) 300 MG capsule Take 1 capsule (300 mg total) by mouth once daily AND 2 capsules (600 mg total) every evening.  Qty: 270 capsule, Refills: 0      hydroCHLOROthiazide (HYDRODIURIL) 25 MG tablet Take 1 tablet (25 mg total) by mouth once daily.  Qty: 90 tablet, Refills: 1    Comments: .  Associated Diagnoses: Hypertension, essential      rosuvastatin (CRESTOR) 20 MG tablet Take 1 tablet (20 mg total) by mouth once daily.  Qty: 90 tablet, Refills: 2    Associated Diagnoses: Hyperlipidemia, unspecified hyperlipidemia type      aspirin (ECOTRIN) 81 MG EC tablet Take 81 mg by mouth once daily.      cilostazoL (PLETAL) 50 MG Tab Take 1 tablet by mouth twice daily  Qty: 60 tablet, Refills: 3    Associated Diagnoses: Hyperlipidemia, unspecified hyperlipidemia type      cloNIDine (CATAPRES) 0.2 MG tablet Take 1 tablet (0.2 mg total) by mouth 2 (two) times daily.  Qty: 180 tablet, Refills: 3    Associated Diagnoses: Hypertension, unspecified type      ezetimibe (ZETIA) 10 mg tablet Take 1 tablet (10 mg total) by mouth once daily.  Qty: 90 tablet, Refills: 1    Associated Diagnoses: Hyperlipidemia, unspecified hyperlipidemia type      ixekizumab (TALTZ  AUTOINJECTOR) 80 mg/mL AtIn Inject 80 mg into the skin every 28 days.  Qty: 1 mL, Refills: 6    Associated Diagnoses: Ankylosing spondylitis of multiple sites in spine      linaCLOtide (LINZESS) 72 mcg Cap capsule Take 1 capsule (72 mcg total) by mouth before breakfast.  Qty: 90 capsule, Refills: 3    Associated Diagnoses: Constipation, unspecified constipation type      predniSONE (DELTASONE) 5 MG tablet Take 1 tablet (5 mg total) by mouth daily as needed (arthritis flares).  Qty: 10 tablet, Refills: 0      sildenafiL (VIAGRA) 25 MG tablet Take 1 tablet (25 mg total) by mouth daily as needed for Erectile Dysfunction.  Qty: 30 tablet, Refills: 3                 Discharge Diagnosis: Cervical radiculopathy [M54.12]  Condition on Discharge: Stable with no complications to procedure   Diet on Discharge: Same as before.  Activity: as per instruction sheet.  Discharge to: Home with a responsible adult.  Follow up: 2-4 weeks       Please call the office at [phone] if you experience any weakness or loss of sensation, fever > 101.5, pain uncontrolled with oral medications, persistent nausea/vomiting/or diarrhea, redness or drainage from the incisions, or any other worrisome concerns. If physician on call was not reached or could not communicate with our office for any reason please go to the nearest emergency department

## 2024-12-06 NOTE — OP NOTE
"Deborah White  69 y.o. male      Vitals:    12/06/24 0812   BP:    Pulse: 60   Resp: 16   Temp:        Procedure Date: 12/06/2024     INFORMED CONSENT: The procedure, risks, benefits and options were discussed with patient. There are no contraindications to the procedure. The patient expressed understanding and agreed to proceed. The personnel performing the procedure was discussed. I verify that I personally obtained consent prior to the start of the procedure and the signed consent can be found on the patient's chart.         Anesthesia: Topical     Pre Procedure diagnosis: Cervical radiculopathy [M54.12]     Post-Procedure diagnosis: SAME      Sedation:Conscious sedation provided by M.D    The patient was monitored with continuous pulse oximetry, EKG, and intermittent blood pressure monitors.  The patient was hemodynamically stable throughout the entire process was responsive to voice, and breathing spontaneously.  Supplemental O2 was provided at 2L/min via nasal cannula.  Patient was comfortable for the duration of the procedure. (See nurse documentation and case log for sedation time)    There was a total of 1mg IV Midazolam and 50mcg Fentanyl titrated for the procedure       PROCEDURE:  CERVICAL EPIDURAL STEROID INJECTION         DESCRIPTION OF PROCEDURE: The patient was brought to the procedure room. After performing time out.  IV access was obtained prior to the procedure. The patient was positioned prone on the fluoroscopy table. Continuous hemodynamic monitoring was initiated including blood pressure, EKG, and pulse oximetry. The area of the cervical spine was prepped with chlorhexidine and draped in a sterile fashion. Skin anesthesia was achieved using 3 mL of Lidocaine 1% over the respective injection site. The C6/7 interspace was visualized under fluoroscopic imaging. An 18 gauge 3 1/2" Tuohy needle was slowly inserted and advanced using loss of resistance to saline with AP, oblique and lateral " fluoroscopic imaging for needle guidance. Negative aspiration for blood or CSF was confirmed. Epidural contrast spread was confirmed using 2mL of Omnipaque 300 contrast. Spread of the contrast in the cervical epidural space was noted . 6 mL of bupivacaine 0.25% and 10 mg decadron was injected. The needle was removed and bleeding was nil. A sterile dressing was applied. No specimens collected. patient was taken back to the recovery room for further observation.      Blood Loss: Nill  Specimen: None

## 2024-12-06 NOTE — DISCHARGE INSTRUCTIONS

## 2024-12-08 PROBLEM — R73.03 PREDIABETES: Status: ACTIVE | Noted: 2024-12-08

## 2025-01-06 ENCOUNTER — PATIENT MESSAGE (OUTPATIENT)
Dept: VASCULAR SURGERY | Facility: CLINIC | Age: 70
End: 2025-01-06
Payer: MEDICARE

## 2025-01-07 ENCOUNTER — LAB VISIT (OUTPATIENT)
Dept: LAB | Facility: HOSPITAL | Age: 70
End: 2025-01-07
Attending: SURGERY
Payer: MEDICARE

## 2025-01-07 DIAGNOSIS — Z01.818 ENCOUNTER FOR OTHER PREPROCEDURAL EXAMINATION: ICD-10-CM

## 2025-01-07 LAB
CREAT SERPL-MCNC: 1.5 MG/DL (ref 0.5–1.4)
EST. GFR  (NO RACE VARIABLE): 50.7 ML/MIN/1.73 M^2

## 2025-01-07 PROCEDURE — 36415 COLL VENOUS BLD VENIPUNCTURE: CPT | Performed by: SURGERY

## 2025-01-07 PROCEDURE — 82565 ASSAY OF CREATININE: CPT | Performed by: SURGERY

## 2025-01-08 ENCOUNTER — PATIENT MESSAGE (OUTPATIENT)
Dept: FAMILY MEDICINE | Facility: CLINIC | Age: 70
End: 2025-01-08
Payer: MEDICARE

## 2025-01-08 ENCOUNTER — HOSPITAL ENCOUNTER (OUTPATIENT)
Dept: RADIOLOGY | Facility: HOSPITAL | Age: 70
Discharge: HOME | End: 2025-01-08
Attending: SURGERY
Payer: MEDICARE

## 2025-01-08 DIAGNOSIS — Z98.890 S/P AAA REPAIR: ICD-10-CM

## 2025-01-08 DIAGNOSIS — Z86.79 S/P AAA REPAIR: ICD-10-CM

## 2025-01-08 PROCEDURE — 25500020 PHARM REV CODE 255: Performed by: SURGERY

## 2025-01-08 PROCEDURE — 74174 CTA ABD&PLVS W/CONTRAST: CPT | Mod: TC

## 2025-01-08 RX ADMIN — IOHEXOL 100 ML: 350 INJECTION, SOLUTION INTRAVENOUS at 11:01

## 2025-01-08 NOTE — PROGRESS NOTES
"Established Patient Interventional Pain Clinic Note     The patient location is: home  The chief complaint leading to consultation is: neck, arm and shoulder pain  Visit type: Virtual visit with synchronous audio and video  Total time spent with patient: 11-15m  Each patient to whom he or she provides medical services by telemedicine is: (1) informed of the relationship between the physician and patient and the respective role of any other health care provider with respect to management of the patient; and (2) notified that he or she may decline to receive medical services by telemedicine and may withdraw from such care at any time.    Referring Physician: No ref. provider found    PCP: Chastity Guzmán MD    Chief Complaint:   Neck, shoulder and arm pain       SUBJECTIVE:  Interval history 01/09/2025  Patient presents status post C6-7 interlaminar epidural steroid injection 12/06/2024.  Patient is happy today during our virtual zoom visit and reports he is doing very very good!" He reports pain levels are currently between 2-3/10.  Patient reports approximately 80% sustained relief in cervical radiculopathy following his epidural steroid injection.  Continues to have sustained relief following prior bilateral C4-6 cervical medial branch block 10/18/2024 and axial neck pain.  Today he denies significant pain, radiculopathy, weakness in the upper extremities or myelopathic signs such as compromise in hand  strength or dexterity.      Interval history 11/14/2024  Patient's daughter in room to help facilitate translation    Patient presents status post bilateral C4-6 cervical medial branch block 10/18/2024.  Patient reports 80% sustained relief in axial neck pain following cervical medial branch block.  Today his primary concern is pain in the neck which can radiate down the left upper extremity to mid arm in C6 dermatomal distribution.  Of note he received greater than 80% relief exceeding 3 months in duration " with prior cervical epidural steroid injection.  He would like to repeat this procedure.  He has continued physician directed physical therapy exercises for neck arm and shoulder pain over the last 8 weeks from 09/14/2024 through 11 14th 2024.  He has continued gabapentin with noticeable improvement in his pain.  He denies any side effects from this medication.      Interval history 09/18/2024  Patient's daughter in room to help facilitate translation    Patient presents status post C6-7 interlaminar epidural steroid injection with left paramedian approach 05/10/2024.  Patient reports 60% relief exceeding 3 months in duration with cervical epidural steroid injection.  Today he again reports pain in bilateral cervical paraspinous musculature which can radiate down the left upper extremity to mid arm in C6 dermatomal distribution.  Pain is intermittent and today is rated a 6/10.  Patient does have associated numbness in bilateral hands but denies significant upper extremity weakness or compromise in hand  strength or dexterity.  Patient has continued gabapentin which she is taking 300 mg in the morning and 600 mg in the evening.  He has continued physician directed physical therapy exercises for neck and arm pain over the last 8 weeks from 07/18/2024 through 09/18/2024 with improvement in his symptoms.  Of note patient received 80% relief with prior bilateral C4-6 cervical medial branch block for axial neck pain and received 80% sustained relief for 3 months with prior cervical epidural steroid injection for cervical radiculopathy.    Of note patient saw neurosurgery 07/30/2024 with the following evaluation:      Interval history 03/27/2024  Patient presents for MRI cervical and lumbar spine review      Today patient again reports pain in the neck which radiates down bilateral upper extremities to the hands in C6-7 dermatomal distribution.  Pain is constant and today is rated a 6/10.  Pain is worse on the left  than on the right.  Patient does report associated numbness in bilateral hands but denies outright weakness in the upper extremities or compromise in hand  strength or dexterity.  Pain can be exacerbated with cervical flexion/extension and overhead activities.  Pain is improved with use of gabapentin which he is taking 300 mg in the morning and 600 mg in the evening.  Patient's daughter does report he experiences slight sedation with morning dose of gabapentin.  Patient has continued physician directed physical therapy exercises over the last 8 weeks from 01/27/2024 through 03/27/2024 with marginal improvement in pain.    Pain in the lower back and leg pain has improved with abdominal aortic endovascular aneurysm repair 02/20/2024.  Today patient's primary concern neck and arm pain.    Interval Hx: 05/31/2023  [employer] [facility] used to help translate (Cantonese).    Pt presents s/p bilateral C4-6 MBB 04/28/2023.  Patient reports 80% sustained relief in bilateral axial neck pain following cervical medial branch block.  Today he reports primarily radicular pain in the arms and in the legs.  Pain is intermittent and today is rated a 5/10.  Patient reports pain which radiates into bilateral shoulders in C6-7 dermatomal distribution and further down into the upper extremities in no particular dermatomal distribution to the hands.  Patient also reports lower back pain which radiates down bilateral lower extremities but predominantly on the left into the knee.  This pain is exacerbated with standing and with ambulation.  Patient does endorse noticeable weakness in the upper and lower extremities associated with use.  Patient has continued gabapentin 300 mg twice daily with noticeable improvement in his pain.  Patient is requesting a refill of this medication.  Patient has been performing physician directed physical therapy exercises over the last 8 weeks with mild improvement in his symptoms.    HPI  "04/05/2023  Deborah White is a 69 y.o. male with past medical history significant for diffuse idiopathic skeletal hyperostosis (dish), coronary artery disease, peripheral arterial disease, hypertension, hyperlipidemia, latent TB, rheumatoid arthritis, ankylosing spondylitis, nicotine dependence who presents to the clinic for the evaluation of   Neck and lower back pain.  Today patient's daughter is in the room to facilitate translation as well as use of the Timothy.  She reports his pain is most severe in the neck.  Patient reports pain is constant and today is rated a 7/10, at its best is a 5/10 and at its worse is a 10/10.  Pain is described as "inside" or deep in nature.  Patient reports pain in the neck which radiates down the left upper extremity to the hand in C6 dermatomal distribution.  Pain is exacerbated with lifting exceeding 5 lb and cervical lateral flexion.  Patient denies significant weakness in the left upper extremity but does report numbness and tingling in the hand which can affect hand  strength or dexterity.  Pain is improved with rest, gabapentin which she is taking 300 mg in the morning and 300 mg in the evening as well as with prior cervical medial branch block.  Patient reports he received 1 year of relief following prior right-sided C4-6 cervical medial branch block with Dr. Barnes.   Patient has completed conventional physical therapy in November 2020 without any improvement in his pain.    Patient also reports lower back pain which radiates down the left lower extremity to the knee.  Pain is described as numbness and tingling in nature.  Pain can be exacerbated with standing or with ambulation.  Patient's daughter also reports some associated weakness in the left lower extremity.    Patient reports significant motor weakness and loss of sensations.  Patient denies night fever/night sweats, urinary incontinence, bowel incontinence, and significant weight loss.      Pain Disability Index " Review:         9/18/2024     9:57 AM 3/27/2024     8:46 AM   Last 3 PDI Scores   Pain Disability Index (PDI) 49 36       Non-Pharmacologic Treatments:  Physical Therapy/Home Exercise: yes  Ice/Heat:yes  TENS: no  Acupuncture: no  Massage: no  Chiropractic: no    Other: no      Pain Medications:  - Adjuvant Medications: Neurontin (Gabapentin), Xeljanz  - Anti-Coagulants: Aspirin,, cilostazol    Pain Procedures:   -12/06/2024: C6-7 interlaminar epidural steroid injection  -10/18/2024: Bilateral C4-6 cervical medial branch block  -05/10/2024: C6-7 interlaminar epidural steroid injection with left paramedian approach  -04/28/2023: bilateral C4-6 MBB     -11/18/2020: Right-sided C4-6 medial branch blocks; Dr. Barnes    Past Medical History:   Diagnosis Date    Coronary artery disease     Hyperlipidemia     Hypertension     Personal history of colonic polyps      Past Surgical History:   Procedure Laterality Date    ABDOMINAL AORTIC ANEURYSM REPAIR, ENDOVASCULAR N/A 2/20/2024    Procedure: REPAIR-ANEURYSM-ABDOMINAL AORTIC-ENDOVASCULAR (AAA);  Surgeon: Caesar Denton MD;  Location: [facility] OR [address] [place] [address];  Service: Vascular;  Laterality: N/A;  EVAR & L femoral artery cutdown  mGy 1195.48 Gycm2 215.60     Fluro Time 16.2min   Contrast 26ml    EPIDURAL STEROID INJECTION INTO CERVICAL SPINE N/A 5/10/2024    Procedure: C6/7 IL YENNY with L paramedian approach;  Surgeon: Rudolph Burgos MD;  Location: [facility] PAIN [facility]GT;  Service: Pain Management;  Laterality: N/A;    EPIDURAL STEROID INJECTION INTO CERVICAL SPINE N/A 12/6/2024    Procedure: C6/7 IL YENNY;  Surgeon: Rudolph Burgos MD;  Location: [facility] PAIN [facility]GT;  Service: Pain Management;  Laterality: N/A;    INJECTION OF ANESTHETIC AGENT AROUND MEDIAL BRANCH NERVES INNERVATING CERVICAL FACET JOINT Right 11/18/2020    Procedure: Block-nerve-medial branch-cervical Right C3, 4, 5with RN IV sedation;  Surgeon: Martín Barnes MD;  Location: [facility] PAIN [facility]GT;  Service: Pain Management;  Laterality:  Right;    INJECTION OF ANESTHETIC AGENT AROUND MEDIAL BRANCH NERVES INNERVATING CERVICAL FACET JOINT Bilateral 04/28/2023    Procedure: Bilateral C4-6 MBB with RN IV sedation;  Surgeon: Rudolph Burgos MD;  [facility];  Service: Pain Management;  Laterality: Bilateral;    INJECTION OF ANESTHETIC AGENT AROUND MEDIAL BRANCH NERVES INNERVATING CERVICAL FACET JOINT Bilateral 10/18/2024    Procedure: Bilateral C4-6 MBB with RN IV sedation;  Surgeon: Rudolph Burgos MD;  Location: [facility]IN [facility];  Service: Pain Management;  Laterality: Bilateral;     Review of patient's allergies indicates:  No Known Allergies    Current Outpatient Medications   Medication Sig    amLODIPine (NORVASC) 10 MG tablet Take 1 tablet (10 mg total) by mouth once daily.    aspirin (ECOTRIN) 81 MG EC tablet Take 81 mg by mouth once daily.    benazepriL (LOTENSIN) 40 MG tablet Take 1 tablet (40 mg total) by mouth once daily.    cilostazoL (PLETAL) 50 MG Tab Take 1 tablet by mouth twice daily    cloNIDine (CATAPRES) 0.2 MG tablet Take 1 tablet (0.2 mg total) by mouth 2 (two) times daily.    ezetimibe (ZETIA) 10 mg tablet Take 1 tablet (10 mg total) by mouth once daily.    gabapentin (NEURONTIN) 300 MG capsule Take 1 capsule (300 mg total) by mouth once daily AND 2 capsules (600 mg total) every evening.    hydroCHLOROthiazide (HYDRODIURIL) 25 MG tablet Take 1 tablet (25 mg total) by mouth once daily.    ixekizumab (TALTZ AUTOINJECTOR) 80 mg/mL AtIn Inject 80 mg into the skin every 28 days.    linaCLOtide (LINZESS) 72 mcg Cap capsule Take 1 capsule (72 mcg total) by mouth before breakfast.    predniSONE (DELTASONE) 5 MG tablet Take 1 tablet (5 mg total) by mouth daily as needed (arthritis flares).    rosuvastatin (CRESTOR) 20 MG tablet Take 1 tablet (20 mg total) by mouth once daily.    sildenafiL (VIAGRA) 25 MG tablet Take 1 tablet (25 mg total) by mouth daily as needed for Erectile Dysfunction.     No current facility-administered  medications for this visit.       Review of Systems     GENERAL:  No weight loss, malaise or fevers.  HEENT:   No recent changes in vision or hearing  NECK:  Negative for lumps, no difficulty with swallowing.  RESPIRATORY:  Negative for cough, wheezing or shortness of breath, patient denies any recent URI.  CARDIOVASCULAR:  Negative for chest pain or palpitations.  GI:  Negative for abdominal discomfort, blood in stools or black stools or change in bowel habits.  MUSCULOSKELETAL:  See HPI.  SKIN:  Negative for lesions, rash, and itching.  PSYCH:  No mood disorder or recent psychosocial stressors.   HEMATOLOGY/LYMPHOLOGY:  Negative for prolonged bleeding, bruising easily or swollen nodes.    NEURO:   No history of syncope, paralysis, seizures or tremors.  All other reviewed and negative other than HPI.    OBJECTIVE:    There were no vitals taken for this visit.      Physical Exam    GENERAL: Well appearing, in no acute distress, alert and oriented x3.  PSYCH:  Mood and affect appropriate.  SKIN: Skin color, texture, turgor normal, no rashes or lesions.  HEAD/FACE:  Normocephalic, atraumatic. Cranial nerves grossly intact.    NECK:  pain to palpation over the cervical paraspinous muscles. Spurling Negative. pain with neck flexion, extension, or lateral flexion.   Normaltesting biceps, triceps and brachioradialis bilaterally.    NegativeHoffmann's bilaterally.    5/5 strength testing deltoid, biceps, triceps, wrist extensor, wrist flexor and ulnar intrinsics bilaterally.    Normal  strength bilaterally    CV: RRR with palpation of the radial artery.  PULM: No evidence of respiratory difficulty, symmetric chest rise.  GI:  Soft and non-tender.      NEURO: Bilateral upper and lower extremity coordination and muscle stretch reflexes are physiologic and symmetric. No loss of sensation is noted.  GAIT: normal.    Imaging:   MRI lumbar spine 06/20/2023  FINDINGS:  Alignment: Left-sided spinal asymmetry.  Grade 1  degenerative L4-L5 anterolisthesis.     Vertebrae: Lumbar vertebral body heights are maintained.  Minor L4-L5 endplate degenerative changes.  No significant endplate edema.  No evidence of acute fracture.  Marrow signal is otherwise within normal limits.     Discs: Disc desiccation throughout the lumbar spine.  Similar L4-L5 disc height loss.     Cord: Within normal limits.  Conus terminates at T12-L1.     Degenerative findings:     T12-L1: No significant disc bulge, spinal canal stenosis or neural foraminal stenosis.     L1-L2: Minimal broad-based disc bulge.  No significant spinal canal stenosis or neural foraminal stenosis.     L2-L3: Minor broad-based disc bulge and mild bilateral facet arthropathy.  No significant spinal canal stenosis or neural foraminal stenosis.     L3-L4: Minimal broad-based disc bulge and mild bilateral facet arthropathy.  No significant spinal canal stenosis or neural foraminal stenosis.     L4-L5: Grade 1 degenerative appearing anterolisthesis.  Mild broad-based disc bulge and moderate bilateral facet arthropathy.  Narrowing of the lateral recesses without significant spinal canal stenosis.  Moderate right and mild-to-moderate left-sided neural foraminal stenosis.     L5-S1: No significant disc bulge.  Bilateral facet arthropathy.  No significant spinal canal stenosis.  Mild right-sided neural foraminal stenosis.     Paraspinal muscles & soft tissues: 5.3 cm infrarenal abdominal aortic aneurysm.  Paraspinal soft tissues are otherwise within normal limits.     Impression:     1. Mild multilevel degenerative changes of the lumbar spine are similar to the prior MRI 04/26/2022 as discussed in the body of the report.  2. Infrarenal abdominal aortic aneurysm as previously described.    MRI cervical spine 06/20/2023  FINDINGS:  Alignment: Pain mild straightening of the normal cervical lordosis.     Vertebrae: Cervical vertebral body heights are maintained.  Minor multilevel endplate  degenerative changes.  No significant endplate edema.  No evidence of an acute fracture.  Edema involving the right C3-C4 facet joint.  There appears to be osseous fusion of the right C4-C5 facet joint.  Marrow signal is otherwise within normal limits.     Discs: Disc desiccation throughout the cervical spine.  Disc heights appear maintained.     Cord: C5-C6 prominent cord deformity with small associated area cord signal change concerning for myelomalacia.     Skull base and craniocervical junction: Within normal limits.     Degenerative findings:     C2-C3: Minimal broad-based disc osteophyte complex.  No ventral cord contact or spinal canal stenosis.  Right greater than left facet arthropathy contributing to mild-to-moderate right and mild left-sided neural foraminal stenosis.     C3-C4: Central disc osteophyte complex contacts and deforms the ventral cord.  Significant right-sided facet arthropathy with ligamentum flavum hypertrophy contributes to mild spinal canal stenosis.  There is severe right and mild-to-moderate left-sided neural foraminal stenosis.     C4-C5: Small central disc osteophyte complex contacts and slightly deforms the ventral cord without significant spinal canal stenosis.  Significant right-sided facet arthropathy with fusion of the facet joint.  Uncovertebral joint spurring contributes to severe right and mild-to-moderate left-sided neural foraminal stenosis.     C5-C6: Broad-based disc osteophyte complex contacts and flattens the ventral cord.  Right greater than left facet arthropathy and ligamentum flavum hypertrophy contributes to moderate spinal canal stenosis.  Uncovertebral joint spurring contributes to moderate to severe right and severe left-sided neural foraminal stenosis.     C6-C7: Mild broad-based disc osteophyte complex contacts and flattens the ventral cord.  Mild bilateral facet arthropathy, ligamentum flavum hypertrophy and uncovertebral joint spurring contribute to mild  spinal canal stenosis and severe bilateral neural foraminal stenosis.     C7-T1: No significant disc osteophyte complex, spinal canal stenosis or neural foraminal stenosis.     T1-T2: No significant disc osteophyte complex, spinal canal stenosis or neural foraminal stenosis.     Paraspinal muscles & soft tissues: Within normal limits.     Impression:     Multilevel degenerative changes of the cervical spine are most pronounced at C5-C6 with moderate spinal canal stenosis, severe left and moderate to severe right-sided neural foraminal stenosis.  Cord signal changes at this level concerning for myelomalacia.     Mild C3-C4 and C6-C7 spinal canal stenosis.  Severe bilateral C6-C7 and severe right-sided C3-C4 neural foraminal stenosis.     Right C3-C4 facet edema likely contributes to neck pain.     Right C4-C5 facet joint osseous fusion.      04/05/2023    X-Ray Cervical Spine AP And Lateral    FINDINGS:  No fracture, prevertebral soft tissue swelling or other acute abnormality is identified.  Cervical spine alignment is normal.  No significant abnormal motion seen on flexion or extension.  Multilevel DISH-type changes with bridging anterior syndesmophytes.  Multilevel cervical endplate spurring and bridging disc calcifications without significant disc height loss as a benign C6-7.  Multilevel hypertrophic facet arthropathy.  Bilateral C3-4 through C6-7 foraminal stenosis.    CT cervical spine 08/02/2024  FINDINGS: Alignment is normal.  Disc spaces and vertebral body heights are maintained although there is degenerative spondylosis visible throughout the cervical spine, large bridging anterior osteophytes from C2 through C4 and C6-T1 and a prominent posterior osteophytes visible at the midline at C6-7.     Axial images demonstrate moderate to severe unilateral right foraminal stenosis at C2-3 and C3-4, severe unilateral right foraminal stenosis at C4-5 and severe bilateral foraminal stenoses at C5-6 and C6-7.  Broad  posterior disc bulge is also visible at C5-6 contacting the spinal cord anteriorly and narrowing the right lateral recess.  This finding corresponds to the spinal canal stenosis and suspected myelomalacia in the spinal cord seen at this level on the prior MRI study.  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        Impression:     1.  No evidence of acute osseous injury.  2.  Chronic disc degeneration at multiple levels described above including foraminal stenoses.  3.  Spinal canal stenosis at C5-6.  4.  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    ASSESSMENT: 69 y.o. year old male with Neck, left arm, lower back and left leg pain, consistent with     1. Cervical spondylosis        2. Cervical radiculopathy        3. DDD (degenerative disc disease), cervical        4. Cervical cord myelomalacia            PLAN:   - Interventions:  We have discussed performing the following procedure should symptoms exacerbate:  -patient has 80% sustained relief in cervical radiculopathy following C6-7 interlaminar epidural steroid injection.  -patient has sustained relief in cervical axial neck pain following bilateral C4-6 cervical medial branch block.      - Anticoagulation use: yes aspirin and Pletal  --secondary prophylaxis: Coronary artery disease, infrarenal abdominal aortic aneurysm status post repair, peripheral arterial disease   --- PCP, Dr. Guzmán     report:  Reviewed and consistent with medication use as prescribed.    - Medications:  -Continue Gabapentin. We have reviewed potential side effects of this medication including daytime somnolence, weight gain and peripheral edema  -Gabapentin 300 mg in the morning and 600 mg QHS      - Therapy:   We discussed continuing physician directed physical therapy exercises at home to help manage the patient/s painful condition. The patient was counseled that muscle strengthening will improve the long term  prognosis in regards to pain and may also help increase range of motion and mobility.     - Imaging: Reviewed available imaging (MRI cervical spine, MRI lumbar spine) with patient and answered any questions they had regarding study.      - Referrals: (PRN) Dr. Blanco, Neurosurgery; cervical spine stenosis, cervical radiculopathy and myelomalacia    - Follow up visit: return to clinic in three months.  Virtual visit.  Scheduling ticket sent      The above plan and management options were discussed at length with patient. Patient is in agreement with the above and verbalized understanding.    - I discussed the goals of interventional chronic pain management with the patient on today's visit. We discussed a multimodal and systematic approach to pain.  This includes diagnostic and therapeutic injections, adjuvant pharmacologic treatment, physical therapy, and at times psychiatry.  I emphasized the importance of regular exercise, core strengthening and stretching, diet and weight loss as a cornerstone of long-term pain management.    - This condition does not require this patient to take time off of work, and the primary goal of our Pain Management services is to improve the patient's functional capacity.  - Patient Questions: Answered all of the patient's questions regarding diagnoses, therapy, treatment and next steps        Rudolph Burgos MD  Interventional Pain Management  [facility]    Disclaimer:  This note was prepared using voice recognition system and is likely to have sound alike errors that may have been overlooked even after proof reading.  Please call me with any questions

## 2025-01-09 ENCOUNTER — PATIENT MESSAGE (OUTPATIENT)
Dept: RHEUMATOLOGY | Facility: CLINIC | Age: 70
End: 2025-01-09
Payer: MEDICARE

## 2025-01-09 ENCOUNTER — OFFICE VISIT (OUTPATIENT)
Dept: PAIN MEDICINE | Facility: CLINIC | Age: 70
End: 2025-01-09
Payer: MEDICARE

## 2025-01-09 ENCOUNTER — PATIENT MESSAGE (OUTPATIENT)
Dept: PAIN MEDICINE | Facility: CLINIC | Age: 70
End: 2025-01-09

## 2025-01-09 ENCOUNTER — TELEPHONE (OUTPATIENT)
Dept: FAMILY MEDICINE | Facility: CLINIC | Age: 70
End: 2025-01-09
Payer: MEDICARE

## 2025-01-09 DIAGNOSIS — M50.30 DDD (DEGENERATIVE DISC DISEASE), CERVICAL: ICD-10-CM

## 2025-01-09 DIAGNOSIS — G95.89 CERVICAL CORD MYELOMALACIA: ICD-10-CM

## 2025-01-09 DIAGNOSIS — M54.12 CERVICAL RADICULOPATHY: ICD-10-CM

## 2025-01-09 DIAGNOSIS — M47.812 CERVICAL SPONDYLOSIS: Primary | ICD-10-CM

## 2025-01-09 NOTE — TELEPHONE ENCOUNTER
Patient's daughter sent the following portal message. Please advise.     Good afternoon,     My dad had an abdominal CT today for a post operative follow -up  with his surgeon, Dr. Denton. The scan was for his AAA.      In the results, there is also a note regarding a mass found on his liver.  Does he need to see gastroenterology/hepatology?       Thanks,  Lea

## 2025-01-14 ENCOUNTER — PATIENT MESSAGE (OUTPATIENT)
Dept: VASCULAR SURGERY | Facility: CLINIC | Age: 70
End: 2025-01-14
Payer: MEDICARE

## 2025-01-15 ENCOUNTER — TELEPHONE (OUTPATIENT)
Dept: VASCULAR SURGERY | Facility: CLINIC | Age: 70
End: 2025-01-15
Payer: MEDICARE

## 2025-01-15 ENCOUNTER — PATIENT MESSAGE (OUTPATIENT)
Dept: CARDIOLOGY | Facility: CLINIC | Age: 70
End: 2025-01-15
Payer: MEDICARE

## 2025-01-15 NOTE — TELEPHONE ENCOUNTER
Contacted pt's daughter Lea in reference to upcoming appt with Dr. Denton. Explained that [place] [facility] [place] need to interpretor services and lack of availability in [place]. Lea verbalized understanding. Appt rescheduled, Lea verified.

## 2025-01-16 ENCOUNTER — PATIENT MESSAGE (OUTPATIENT)
Dept: FAMILY MEDICINE | Facility: CLINIC | Age: 70
End: 2025-01-16
Payer: MEDICARE

## 2025-01-16 ENCOUNTER — TELEPHONE (OUTPATIENT)
Dept: FAMILY MEDICINE | Facility: CLINIC | Age: 70
End: 2025-01-16
Payer: MEDICARE

## 2025-01-16 NOTE — TELEPHONE ENCOUNTER
Pt daughter sent follow up regarding msg sent on 1/8 to discuss pt's abdominal CT results. Please advise. Msg below.     Good afternoon,     My dad had an abdominal CT today for a post operative follow -up  with his surgeon, Dr. Denton. The scan was for his AAA.      In the results, there is also a note regarding a mass found on his liver.  Does he need to see gastroenterology/hepatology?       Thanks,  Lea

## 2025-01-17 NOTE — NURSING
ACUTE OCCUPATIONAL THERAPY GOALS:   (Developed with and agreed upon by patient and/or caregiver.)  1. Patient will maintain NWB status in R LE for 100% of treatment session and no verbal cues from therapist.   2. Patient will complete functional transfers with mod I while maintaining NWB status in R LE and with adaptive equipment as needed.   3. Patient will complete full body bathing and dressing with mod I and adaptive equipment as needed.   4. Patient will complete toileting and toilet transfer with mod I.   5. Patient will tolerate 15 minutes of OT treatment with as needed rest breaks to increase activity tolerance for ADLs.   6. Patient will participate in at least 15 minutes of BUE therapeutic exercises to strengthen BUE for functional transfers.   7. Patient will complete grooming tasks with mod I in standing at sink level maintaining NWB R LE.     Timeframe: 7 visits      OCCUPATIONAL THERAPY: Daily Note AM   OT Visit Days: 2   Time In/Out  OT Charge Capture  Rehab Caseload Tracker  OT Orders    NWB R LE    Koffi Henderson is a 58 y.o. male   PRIMARY DIAGNOSIS: Bimalleolar fracture of right ankle  Bimalleolar fracture of right ankle [S82.841A]  S/P foot joint surgery [Z98.890]  Closed right ankle fracture, sequela [S82.891S]  Procedure(s) (LRB):  RIGHT ANKLE OPEN REDUCTION INTERNAL FIXATION (Right)  2 Days Post-Op  Inpatient: Payor: [facility] MEDICARE / Plan: [employer] DUAL COMPLETE / Product Type: *No Product type* /     ASSESSMENT:     REHAB RECOMMENDATIONS:   Recommendation to date pending progress:  Setting:  Home Health Therapy    Equipment:    To Be Determined     ASSESSMENT:  Mr. Henderson supine in bed, SBA to crutches for toileting in bathroom, cues for slowing down for safety. Completed full ADL with warm wipes and donned his clothes with some help to get pants over R LE, reports he is going home today and will shower at home. Reminded pt to cover R LE with bag to water proof it. Standing at sink for  Dr. GATES made aware of increased BP. Pt denies any cp, sob, blurred vision or dizziness. States he took BP meds this am. Pt advised to monitor bp at home and notify PCP if it remains elevated. Pt and daughter advised to seek emergency care if he experiences any chest pain, sob, blurred vision, dizziness, etc. Both verbalized understanding.    Intervention: Patient satisfied  Side Effects: No side effects reported or observed        Post Treatment: unchanged Vitals stable         Oxygen  room air        MOBILITY: I Mod I S SBA CGA Min Mod Max Total  NT x2 Comments:   Bed Mobility    Rolling [] [] [] [x] [] [] [] [] [] [] []    Supine to Sit [] [] [] [x] [] [] [] [] [] [] []    Scooting [] [] [] [x] [] [] [] [] [] [] []    Sit to Supine [] [] [] [] [] [] [] [] [] [x] [] Up to recliner   Transfers    Sit to Stand [] [] [] [x] [] [] [] [] [] [] [] W 2 crutches   Bed to Chair [] [] [] [x] [] [] [] [] [] [] []    Stand to Sit [] [] [] [x] [] [] [] [] [] [] []    Tub/Shower [] [] [] [] [] [] [] [] [] [x] []  Declined offer   Toilet [] [] [] [x] [] [] [] [] [] [] []      [] [] [] [] [] [] [] [] [] [] []    I=Independent, Mod I=Modified Independent, S=Supervision/Setup, SBA=Standby Assistance, CGA=Contact Guard Assistance, Min=Minimal Assistance, Mod=Moderate Assistance, Max=Maximal Assistance, Total=Total Assistance, NT=Not Tested    ACTIVITIES OF DAILY LIVING: I Mod I S SBA CGA Min Mod Max Total NT Comments   BASIC ADLs:              Upper Body   Bathing [] [] [] [x] [] [] [] [] [] []  Warm wipes at sink level   Lower Body Bathing [] [] [] [x] [] [] [] [] [] []     Toileting [] [] [] [x] [] [] [] [] [] []    Upper Body Dressing [] [] [] [x] [] [] [] [] [] []    Lower Body Dressing [] [] [] [x] [] [] [] [] [] []    Feeding [x] [] [] [] [] [] [] [] [] []    Grooming [] [] [] [x] [] [] [] [] [] [] In standing at sink   Personal Device Care [] [] [] [] [] [] [] [] [] [x]    Functional Mobility [] [] [] [x] [x] [] [] [] [] [] With crutches   I=Independent, Mod I=Modified Independent, S=Supervision/Setup, SBA=Standby Assistance, CGA=Contact Guard Assistance, Min=Minimal Assistance, Mod=Moderate Assistance, Max=Maximal Assistance, Total=Total Assistance, NT=Not Tested    BALANCE: Good Fair+ Fair Fair- Poor NT Comments   Sitting Static [x] [] [] [] [] []    Sitting

## 2025-01-22 NOTE — TELEPHONE ENCOUNTER
Advise pt (and daughter) similar finding was seen on 6/29/2023 abdominal scan and thought to be cyst; so, lesion is not new, likely cyst, and likely benign without need for further work up at this time.  However, keep appt w/Dr. Denton for 1/30/25 for discussion of recent scan results. Thanks.

## 2025-01-24 RX ORDER — GABAPENTIN 300 MG/1
CAPSULE ORAL
Qty: 270 CAPSULE | Refills: 0 | Status: SHIPPED | OUTPATIENT
Start: 2025-01-24 | End: 2025-04-24

## 2025-01-24 NOTE — TELEPHONE ENCOUNTER
Pt is requesting for a refill of: gabapentin (NEURONTIN) 300 MG capsule   Last filled:7/02/24  Last encounter: 1//09/25  Up coming apt: Follow up visit: return to clinic in three months.  Virtual visit.  Scheduling ticket sent   Pharmacy:  [id number] - [place] - [id number]

## 2025-01-24 NOTE — TELEPHONE ENCOUNTER
----- Message from Venkat sent at 1/23/2025  3:01 PM CST -----  .Type:  Pharmacy Calling to Clarify an RX    Name of Caller:HAJA   Pharmacy Name:.  [id number] - [place] - [address]  [address] [place] [id number]  Phone: [phone] Fax: [phone]      Prescription Name:gabapentin (NEURONTIN) 300 MG capsule  What do they need to clarify?:PATIENT NEED A PRESCRIPTION SENT IN   Best Call Back Number:[phone]   Additional Information:

## 2025-01-28 ENCOUNTER — LAB VISIT (OUTPATIENT)
Dept: LAB | Facility: HOSPITAL | Age: 70
End: 2025-01-28
Attending: STUDENT IN AN ORGANIZED HEALTH CARE EDUCATION/TRAINING PROGRAM
Payer: MEDICARE

## 2025-01-28 ENCOUNTER — OFFICE VISIT (OUTPATIENT)
Dept: RHEUMATOLOGY | Facility: CLINIC | Age: 70
End: 2025-01-28
Payer: MEDICARE

## 2025-01-28 VITALS
DIASTOLIC BLOOD PRESSURE: 66 MMHG | BODY MASS INDEX: 29.38 KG/M2 | HEIGHT: 63 IN | HEART RATE: 71 BPM | WEIGHT: 165.81 LBS | SYSTOLIC BLOOD PRESSURE: 131 MMHG

## 2025-01-28 DIAGNOSIS — M06.00 SERONEGATIVE RHEUMATOID ARTHRITIS: ICD-10-CM

## 2025-01-28 DIAGNOSIS — Z79.899 IMMUNODEFICIENCY DUE TO DRUG THERAPY: ICD-10-CM

## 2025-01-28 DIAGNOSIS — M48.10 DISH (DIFFUSE IDIOPATHIC SKELETAL HYPEROSTOSIS): ICD-10-CM

## 2025-01-28 DIAGNOSIS — Z51.81 ENCOUNTER FOR MONITORING IMMUNOSUPPRESSIVE MEDICATION THERAPY CAUSING IMMUNODEFICIENCY: ICD-10-CM

## 2025-01-28 DIAGNOSIS — Z79.899 HIGH RISK MEDICATION USE: ICD-10-CM

## 2025-01-28 DIAGNOSIS — D84.821 IMMUNODEFICIENCY DUE TO DRUG THERAPY: ICD-10-CM

## 2025-01-28 DIAGNOSIS — R80.9 PROTEINURIA, UNSPECIFIED TYPE: ICD-10-CM

## 2025-01-28 DIAGNOSIS — M45.0 ANKYLOSING SPONDYLITIS OF MULTIPLE SITES IN SPINE: Primary | ICD-10-CM

## 2025-01-28 DIAGNOSIS — D84.821 ENCOUNTER FOR MONITORING IMMUNOSUPPRESSIVE MEDICATION THERAPY CAUSING IMMUNODEFICIENCY: ICD-10-CM

## 2025-01-28 DIAGNOSIS — N18.30 STAGE 3 CHRONIC KIDNEY DISEASE, UNSPECIFIED WHETHER STAGE 3A OR 3B CKD: ICD-10-CM

## 2025-01-28 DIAGNOSIS — M45.0 ANKYLOSING SPONDYLITIS OF MULTIPLE SITES IN SPINE: ICD-10-CM

## 2025-01-28 DIAGNOSIS — Z79.60 ENCOUNTER FOR MONITORING IMMUNOSUPPRESSIVE MEDICATION THERAPY CAUSING IMMUNODEFICIENCY: ICD-10-CM

## 2025-01-28 LAB — ERYTHROCYTE [SEDIMENTATION RATE] IN BLOOD BY WESTERGREN METHOD: 27 MM/HR (ref 0–10)

## 2025-01-28 PROCEDURE — 86140 C-REACTIVE PROTEIN: CPT | Performed by: STUDENT IN AN ORGANIZED HEALTH CARE EDUCATION/TRAINING PROGRAM

## 2025-01-28 PROCEDURE — 36415 COLL VENOUS BLD VENIPUNCTURE: CPT | Performed by: STUDENT IN AN ORGANIZED HEALTH CARE EDUCATION/TRAINING PROGRAM

## 2025-01-28 PROCEDURE — 99215 OFFICE O/P EST HI 40 MIN: CPT | Mod: S$PBB,,, | Performed by: STUDENT IN AN ORGANIZED HEALTH CARE EDUCATION/TRAINING PROGRAM

## 2025-01-28 PROCEDURE — 99999 PR PBB SHADOW E&M-EST. PATIENT-LVL IV: CPT | Mod: PBBFAC,,, | Performed by: STUDENT IN AN ORGANIZED HEALTH CARE EDUCATION/TRAINING PROGRAM

## 2025-01-28 PROCEDURE — G2211 COMPLEX E/M VISIT ADD ON: HCPCS | Mod: S$PBB,,, | Performed by: STUDENT IN AN ORGANIZED HEALTH CARE EDUCATION/TRAINING PROGRAM

## 2025-01-28 PROCEDURE — 99214 OFFICE O/P EST MOD 30 MIN: CPT | Mod: PBBFAC | Performed by: STUDENT IN AN ORGANIZED HEALTH CARE EDUCATION/TRAINING PROGRAM

## 2025-01-28 PROCEDURE — 85025 COMPLETE CBC W/AUTO DIFF WBC: CPT | Performed by: STUDENT IN AN ORGANIZED HEALTH CARE EDUCATION/TRAINING PROGRAM

## 2025-01-28 PROCEDURE — 85651 RBC SED RATE NONAUTOMATED: CPT | Performed by: STUDENT IN AN ORGANIZED HEALTH CARE EDUCATION/TRAINING PROGRAM

## 2025-01-28 PROCEDURE — 80053 COMPREHEN METABOLIC PANEL: CPT | Performed by: STUDENT IN AN ORGANIZED HEALTH CARE EDUCATION/TRAINING PROGRAM

## 2025-01-28 RX ORDER — SECUKINUMAB 150 MG/ML
150 INJECTION SUBCUTANEOUS
Qty: 1 EACH | Refills: 11 | Status: ACTIVE | OUTPATIENT
Start: 2025-01-28

## 2025-01-28 RX ORDER — PREDNISONE 5 MG/1
5 TABLET ORAL DAILY PRN
Qty: 30 TABLET | Refills: 0 | Status: SHIPPED | OUTPATIENT
Start: 2025-01-28

## 2025-01-28 NOTE — PROGRESS NOTES
"Subjective:      Patient ID: Deborah White is a 69 y.o. male.    Chief Complaint: ankylosing spondylitis    HPI:   Patient presents for Rheumatology follow up for AS and DISH of cervical spine. PMH CAD h/o LHC in 1998, nonobstructive, PAD + claudication, HTN HLD. Smoking 1/2 PPD > 40 yrs, no drinking. Has previously seen Dr. GATES as well as Rheumatology PAs. For inflammatory arthritis (AS) and DISH, he is on Taltz which he started around 05/2024. He previously took Xeljanz which helped but had to come off it when he was diagnosed with AAA. He presents with his wife and daughter today who help with history. Patient reports ongoing pain in the neck and low back which is unrelieved with Taltz. Also notes that he is gaining 5 lbs and having swelling in the hands in the days following Taltz injection. Also have peripheral joint pain in the MCPs. Also having numbness and tingling in entirety of both arms and both legs. Palpation of limbs and back actually helps the pain and feels good. Has seen Neurosurgery who did CT cervical spine which shows spinal canal stenosis and degenerative disc disease among other degenerative findings. They recommended surgery which he prefers to defer. He follows with Pain Management Dr. Burgos who has done nerve block and YENNY in the cervical spine. Denies joint swelling, significant stiffness, skin rashes, oral ulcers, patchy alopecia, sicca symptoms, cardiopulmonary symptoms, eye inflammation, IBD, fevers, weight loss, Raynaud's. Rheumatology review of systems is otherwise negative.    Objective:   /66 (BP Location: Left arm, Patient Position: Sitting)   Pulse 71   Ht 5' 3" (1.6 m)   Wt 75.2 kg (165 lb 12.6 oz)   BMI 29.37 kg/m²   Physical Exam  Constitutional:       General: He is not in acute distress.     Appearance: Normal appearance.   HENT:      Head: Normocephalic and atraumatic.      Mouth/Throat:      Mouth: Mucous membranes are moist.      Pharynx: Oropharynx is clear. "   Cardiovascular:      Rate and Rhythm: Normal rate and regular rhythm.   Pulmonary:      Effort: Pulmonary effort is normal.      Breath sounds: Normal breath sounds.   Abdominal:      Palpations: Abdomen is soft.      Tenderness: There is no abdominal tenderness.   Musculoskeletal:         General: No swelling.      Cervical back: Normal range of motion. No tenderness.      Comments: No synovitis. No TTP of spine or joints. Forward flexion of spine is 90% intact. Lateral flexion of spine is mildly reduced.   Skin:     General: Skin is warm and dry.   Neurological:      Mental Status: He is alert and oriented to person, place, and time. Mental status is at baseline.             Assessment and Plan:     Problem List Items Addressed This Visit          Unprioritized    Seronegative rheumatoid arthritis    DISH (diffuse idiopathic skeletal hyperostosis)    Immunocompromised    Ankylosing spondylitis of multiple sites in spine - Primary    Relevant Medications    secukinumab (COSENTYX PEN) 150 mg/mL PnIj    Other Relevant Orders    Urinalysis    Protein/Creatinine Ratio, Urine    CBC Auto Differential    Comprehensive Metabolic Panel    C-Reactive Protein    Sedimentation rate     Other Visit Diagnoses       High risk medication use        Stage 3 chronic kidney disease, unspecified whether stage 3a or 3b CKD        Encounter for monitoring immunosuppressive medication therapy causing immunodeficiency        Proteinuria, unspecified type                Patient presents for Rheumatology follow up for AS and DISH of cervical spine.    Low positive CARMEN 1:80 speckled with negative profile.  Negative RF, CCP, HMGCR Ab.    Hx of latent TB - completed treatment prior to initiation of immunosuppressants.    ESR historically elevated teens to 40s, fluctuates regardless of symptoms.  CRP has been stable, more normal range.  Reviewed CBC, CMP. Stable with stable CKD.  Microalbuminuria/Proteinuria. Unknown etiology. Saw Nephrology  in 2023 and they weren't certain of the etiology either. Has pre-diabetes; HbA1c is 6.4.    Plan:  Stop Taltz - ineffective and might be causing fluid retention.  Start Cosentyx (without loading dose since he has to travel here for daughter to give the injection).  Episodic fluid retention. Check UA, UPCR today. Refer back to Nephrology if concerning.  Prednisone 5 mg. Use 1 tablet twice weekly or less for flares of arthritis.  Safety and monitoring labs in 4 months.      High Risk Medication Monitoring encounter  Drug therapy requiring intensive monitoring for toxicity  No current medication related issues, no evidence of toxicity  Appropriate labs ordered for toxicity monitoring    Compromised immune system secondary to autoimmune disease and/or use of immunosuppressive drugs.  Monitor carefully for infections.  Advised patient to get immediate medical care if any infection arises.  Also advised strict adherence age-appropriate vaccinations and cancer screenings with PCP.    Patient advised to hold DMARD and/or biologic therapy for signs of infection or for surgery. If you are unsure what to do please call our office for instruction.[facility] [phone]          Follow up in about 4 months (around 5/28/2025).         I spent a total of 43 minutes on the day of the visit.  This includes face to face time and non-face to face time preparing to see the patient (eg, review of tests), obtaining and/or reviewing separately obtained history, documenting clinical information in the electronic or other health record, independently interpreting results and communicating results to the patient/family/caregiver, or care coordinator.      Today's visit is associated with current or anticipated ongoing medical care related to this patient's diagnosis of ankylosing spondylitis, which is a chronic disease which will require regular follow up to manage symptoms and progression. The patient is to return to the  office within a minimum of 3-6 months to review symptoms and function at that time. CPT code

## 2025-01-29 LAB
ALBUMIN SERPL BCP-MCNC: 3.9 G/DL (ref 3.5–5.2)
ALP SERPL-CCNC: 65 U/L (ref 40–150)
ALT SERPL W/O P-5'-P-CCNC: 23 U/L (ref 10–44)
ANION GAP SERPL CALC-SCNC: 7 MMOL/L (ref 8–16)
AST SERPL-CCNC: 18 U/L (ref 10–40)
BASOPHILS # BLD AUTO: 0.08 K/UL (ref 0–0.2)
BASOPHILS NFR BLD: 1 % (ref 0–1.9)
BILIRUB SERPL-MCNC: 0.2 MG/DL (ref 0.1–1)
BUN SERPL-MCNC: 22 MG/DL (ref 8–23)
CALCIUM SERPL-MCNC: 9.3 MG/DL (ref 8.7–10.5)
CHLORIDE SERPL-SCNC: 102 MMOL/L (ref 95–110)
CO2 SERPL-SCNC: 29 MMOL/L (ref 23–29)
CREAT SERPL-MCNC: 1.7 MG/DL (ref 0.5–1.4)
CRP SERPL-MCNC: 1.7 MG/L (ref 0–8.2)
DIFFERENTIAL METHOD BLD: ABNORMAL
EOSINOPHIL # BLD AUTO: 0.1 K/UL (ref 0–0.5)
EOSINOPHIL NFR BLD: 1.4 % (ref 0–8)
ERYTHROCYTE [DISTWIDTH] IN BLOOD BY AUTOMATED COUNT: 13.6 % (ref 11.5–14.5)
EST. GFR  (NO RACE VARIABLE): 43.6 ML/MIN/1.73 M^2
GLUCOSE SERPL-MCNC: 217 MG/DL (ref 70–110)
HCT VFR BLD AUTO: 38.7 % (ref 40–54)
HGB BLD-MCNC: 12.3 G/DL (ref 14–18)
IMM GRANULOCYTES # BLD AUTO: 0.02 K/UL (ref 0–0.04)
IMM GRANULOCYTES NFR BLD AUTO: 0.3 % (ref 0–0.5)
LYMPHOCYTES # BLD AUTO: 2.5 K/UL (ref 1–4.8)
LYMPHOCYTES NFR BLD: 32.2 % (ref 18–48)
MCH RBC QN AUTO: 29.5 PG (ref 27–31)
MCHC RBC AUTO-ENTMCNC: 31.8 G/DL (ref 32–36)
MCV RBC AUTO: 93 FL (ref 82–98)
MONOCYTES # BLD AUTO: 0.8 K/UL (ref 0.3–1)
MONOCYTES NFR BLD: 9.8 % (ref 4–15)
NEUTROPHILS # BLD AUTO: 4.2 K/UL (ref 1.8–7.7)
NEUTROPHILS NFR BLD: 55.3 % (ref 38–73)
NRBC BLD-RTO: 0 /100 WBC
PLATELET # BLD AUTO: 300 K/UL (ref 150–450)
PMV BLD AUTO: 11.4 FL (ref 9.2–12.9)
POTASSIUM SERPL-SCNC: 4 MMOL/L (ref 3.5–5.1)
PROT SERPL-MCNC: 7.7 G/DL (ref 6–8.4)
RBC # BLD AUTO: 4.17 M/UL (ref 4.6–6.2)
SODIUM SERPL-SCNC: 138 MMOL/L (ref 136–145)
WBC # BLD AUTO: 7.65 K/UL (ref 3.9–12.7)

## 2025-01-30 ENCOUNTER — OFFICE VISIT (OUTPATIENT)
Dept: VASCULAR SURGERY | Facility: CLINIC | Age: 70
End: 2025-01-30
Payer: MEDICARE

## 2025-01-30 VITALS
HEIGHT: 64 IN | SYSTOLIC BLOOD PRESSURE: 135 MMHG | WEIGHT: 163.13 LBS | DIASTOLIC BLOOD PRESSURE: 71 MMHG | HEART RATE: 69 BPM | BODY MASS INDEX: 27.85 KG/M2 | TEMPERATURE: 98 F

## 2025-01-30 DIAGNOSIS — Z98.890 S/P AAA REPAIR: Primary | ICD-10-CM

## 2025-01-30 DIAGNOSIS — Z86.79 S/P AAA REPAIR: Primary | ICD-10-CM

## 2025-01-30 PROCEDURE — 99213 OFFICE O/P EST LOW 20 MIN: CPT | Mod: PBBFAC | Performed by: SURGERY

## 2025-01-30 PROCEDURE — 99999 PR PBB SHADOW E&M-EST. PATIENT-LVL III: CPT | Mod: PBBFAC,,, | Performed by: SURGERY

## 2025-01-30 NOTE — PROGRESS NOTES
Patient returns to clinic status post an endovascular aortic aneurysms repair and patch angioplasty of his femoral artery.  At this point he is doing well.  He has no complaints.  He can walk as far as he wants, some days he will go a half a mi. He was smoking 2 packs per day he is now down to 7 or 8 cigarettes.  We spent significant time talking about him trying to quit smoking completely, this was done through the .  However he said that he can not do that because it makes him shaky and he gets nervous and needs to smoke.    CT scan was reviewed.  The aneurysms smaller.  It has significantly shrunk.  Overall he is doing great.  We can see him back in 1 year.  All of his questions were answered it was a pleasure to see him.

## 2025-02-04 ENCOUNTER — TELEPHONE (OUTPATIENT)
Dept: RHEUMATOLOGY | Facility: CLINIC | Age: 70
End: 2025-02-04
Payer: MEDICARE

## 2025-02-04 DIAGNOSIS — D84.821 IMMUNOCOMPROMISED STATE DUE TO DRUG THERAPY: Primary | ICD-10-CM

## 2025-02-04 DIAGNOSIS — Z79.899 HIGH RISK MEDICATION USE: ICD-10-CM

## 2025-02-04 DIAGNOSIS — Z79.899 IMMUNOCOMPROMISED STATE DUE TO DRUG THERAPY: Primary | ICD-10-CM

## 2025-02-04 NOTE — TELEPHONE ENCOUNTER
----- Message from Pharmacist Sherri sent at 2/3/2025  3:42 PM CST -----  Regarding: safety labs  Elvira Holden    Can you add safety labs to this pt at next visit? Thanks!

## 2025-02-17 ENCOUNTER — RESULTS FOLLOW-UP (OUTPATIENT)
Dept: RHEUMATOLOGY | Facility: CLINIC | Age: 70
End: 2025-02-17

## 2025-02-25 ENCOUNTER — PATIENT MESSAGE (OUTPATIENT)
Dept: NEPHROLOGY | Facility: CLINIC | Age: 70
End: 2025-02-25
Payer: MEDICARE

## 2025-02-25 ENCOUNTER — TELEPHONE (OUTPATIENT)
Dept: NEPHROLOGY | Facility: CLINIC | Age: 70
End: 2025-02-25
Payer: MEDICARE

## 2025-02-25 NOTE — TELEPHONE ENCOUNTER
Used language line for cantonese  name Missy Id # [id number]. To confirm appts and had to l/m to do lab or reschedule. He may call us or sent my chart message. 2/25/25/sf

## 2025-03-03 ENCOUNTER — PATIENT MESSAGE (OUTPATIENT)
Dept: CARDIOLOGY | Facility: CLINIC | Age: 70
End: 2025-03-03
Payer: MEDICARE

## 2025-03-03 DIAGNOSIS — E78.5 HYPERLIPIDEMIA, UNSPECIFIED HYPERLIPIDEMIA TYPE: ICD-10-CM

## 2025-03-04 RX ORDER — CILOSTAZOL 50 MG/1
50 TABLET ORAL 2 TIMES DAILY
Qty: 60 TABLET | Refills: 3 | Status: SHIPPED | OUTPATIENT
Start: 2025-03-04

## 2025-03-13 ENCOUNTER — PATIENT MESSAGE (OUTPATIENT)
Dept: RHEUMATOLOGY | Facility: CLINIC | Age: 70
End: 2025-03-13
Payer: MEDICARE

## 2025-03-18 ENCOUNTER — PATIENT MESSAGE (OUTPATIENT)
Dept: RHEUMATOLOGY | Facility: CLINIC | Age: 70
End: 2025-03-18
Payer: MEDICARE

## 2025-03-18 DIAGNOSIS — M45.0 ANKYLOSING SPONDYLITIS OF MULTIPLE SITES IN SPINE: Primary | ICD-10-CM

## 2025-03-25 ENCOUNTER — PATIENT MESSAGE (OUTPATIENT)
Dept: NEPHROLOGY | Facility: CLINIC | Age: 70
End: 2025-03-25
Payer: MEDICARE

## 2025-03-25 DIAGNOSIS — Z79.899 HIGH RISK MEDICATION USE: ICD-10-CM

## 2025-03-25 DIAGNOSIS — M45.0 ANKYLOSING SPONDYLITIS OF MULTIPLE SITES IN SPINE: Primary | ICD-10-CM

## 2025-03-25 DIAGNOSIS — I73.9 PAD (PERIPHERAL ARTERY DISEASE): Primary | ICD-10-CM

## 2025-03-26 ENCOUNTER — HOSPITAL ENCOUNTER (OUTPATIENT)
Dept: CARDIOLOGY | Facility: HOSPITAL | Age: 70
Discharge: HOME | End: 2025-03-26
Attending: INTERNAL MEDICINE
Payer: MEDICARE

## 2025-03-26 ENCOUNTER — OFFICE VISIT (OUTPATIENT)
Dept: CARDIOLOGY | Facility: CLINIC | Age: 70
End: 2025-03-26
Payer: MEDICARE

## 2025-03-26 VITALS
SYSTOLIC BLOOD PRESSURE: 142 MMHG | DIASTOLIC BLOOD PRESSURE: 72 MMHG | WEIGHT: 166.25 LBS | OXYGEN SATURATION: 98 % | BODY MASS INDEX: 28.53 KG/M2 | HEART RATE: 75 BPM

## 2025-03-26 DIAGNOSIS — R94.31 ABNORMAL EKG: ICD-10-CM

## 2025-03-26 DIAGNOSIS — I73.9 PAD (PERIPHERAL ARTERY DISEASE): Primary | ICD-10-CM

## 2025-03-26 DIAGNOSIS — I10 HYPERTENSION, ESSENTIAL: ICD-10-CM

## 2025-03-26 DIAGNOSIS — E78.5 HYPERLIPIDEMIA, UNSPECIFIED HYPERLIPIDEMIA TYPE: ICD-10-CM

## 2025-03-26 DIAGNOSIS — G47.30 SLEEP APNEA, UNSPECIFIED TYPE: ICD-10-CM

## 2025-03-26 DIAGNOSIS — Z72.0 TOBACCO ABUSE: ICD-10-CM

## 2025-03-26 DIAGNOSIS — M48.12: ICD-10-CM

## 2025-03-26 DIAGNOSIS — R09.89 CAROTID BRUIT, UNSPECIFIED LATERALITY: ICD-10-CM

## 2025-03-26 DIAGNOSIS — E78.2 MIXED HYPERLIPIDEMIA: ICD-10-CM

## 2025-03-26 DIAGNOSIS — Z98.890 S/P AAA REPAIR: ICD-10-CM

## 2025-03-26 DIAGNOSIS — I25.118 CORONARY ARTERY DISEASE OF NATIVE ARTERY OF NATIVE HEART WITH STABLE ANGINA PECTORIS: ICD-10-CM

## 2025-03-26 DIAGNOSIS — I73.9 PAD (PERIPHERAL ARTERY DISEASE): ICD-10-CM

## 2025-03-26 DIAGNOSIS — F17.200 SMOKING: ICD-10-CM

## 2025-03-26 DIAGNOSIS — Z86.79 S/P AAA REPAIR: ICD-10-CM

## 2025-03-26 PROCEDURE — 93005 ELECTROCARDIOGRAM TRACING: CPT

## 2025-03-26 PROCEDURE — 93010 ELECTROCARDIOGRAM REPORT: CPT | Mod: ,,, | Performed by: INTERNAL MEDICINE

## 2025-03-26 PROCEDURE — 99213 OFFICE O/P EST LOW 20 MIN: CPT | Mod: PBBFAC | Performed by: INTERNAL MEDICINE

## 2025-03-26 PROCEDURE — 99214 OFFICE O/P EST MOD 30 MIN: CPT | Mod: S$PBB,25,, | Performed by: INTERNAL MEDICINE

## 2025-03-26 PROCEDURE — 99999 PR PBB SHADOW E&M-EST. PATIENT-LVL III: CPT | Mod: PBBFAC,,, | Performed by: INTERNAL MEDICINE

## 2025-03-26 RX ORDER — AMLODIPINE BESYLATE 10 MG/1
10 TABLET ORAL DAILY
Qty: 90 TABLET | Refills: 3 | Status: SHIPPED | OUTPATIENT
Start: 2025-03-26

## 2025-03-26 RX ORDER — CILOSTAZOL 50 MG/1
50 TABLET ORAL 2 TIMES DAILY
Qty: 180 TABLET | Refills: 1 | Status: SHIPPED | OUTPATIENT
Start: 2025-03-26

## 2025-03-26 RX ORDER — BENAZEPRIL HYDROCHLORIDE 40 MG/1
40 TABLET ORAL DAILY
Qty: 90 TABLET | Refills: 2 | Status: SHIPPED | OUTPATIENT
Start: 2025-03-26

## 2025-03-26 RX ORDER — EZETIMIBE 10 MG/1
10 TABLET ORAL DAILY
Qty: 90 TABLET | Refills: 1 | Status: SHIPPED | OUTPATIENT
Start: 2025-03-26

## 2025-03-26 RX ORDER — ROSUVASTATIN CALCIUM 20 MG/1
20 TABLET, COATED ORAL DAILY
Qty: 90 TABLET | Refills: 2 | Status: SHIPPED | OUTPATIENT
Start: 2025-03-26

## 2025-03-26 RX ORDER — ASPIRIN 81 MG/1
81 TABLET ORAL DAILY
Qty: 90 TABLET | Refills: 1 | Status: SHIPPED | OUTPATIENT
Start: 2025-03-26

## 2025-03-26 RX ORDER — HYDROCHLOROTHIAZIDE 25 MG/1
25 TABLET ORAL DAILY
Qty: 90 TABLET | Refills: 1 | Status: SHIPPED | OUTPATIENT
Start: 2025-03-26 | End: 2026-03-26

## 2025-03-26 NOTE — PROGRESS NOTES
Subjective:   Patient ID:  Deborah White is a 70 y.o. male who presents for cardiac consult of No chief complaint on file.      Referral by: Ludin Maldonado [place]  [id number] [facility]  [place] [id number]     Reason for consult:       HPI  The patient came in today for cardiac consult of No chief complaint on file.      Deborah White is a 70 y.o. male pt with CAD, PAD, HTN, HLD, AAA s/p repair, CKD3, RA, tobacco abuse, latent TB lung presents for CV follow up visit.     HPI:   Deborah White is a 69 y.o. male with h/o CAD, PAT, HTN, HLD     Retired from [employer]  [facility] CAD h/o [facility] in 1998, nonobstructive, PAD + claudication, HTN HLD. Smoking 1/2 PPD > 40 yrs, no drinking  Prior cardiologist DR FISHMAN AT [facility]  Walks at home  He denied chest pain, dyspnea on exertion, palpitation, fainting, PND, orthopnea, syncope  Exertional calf pain.    exercise KAL showed normal resting KAL. Mod to severe decreased exercise KAL to 0.5 left and 0.6 right. EKG NSR and LVH  Started Simvastatin in 1998 and developed body ache this year. Pt states that the ache resovled after d/c simvastatin.  IN . Now resumed Simvastatin AND NO RECURRENT BODY ACHE     08-202O ECHO DONE AT [facility] NORMAL EF, MILD MR AND MOD. LVH  , CR 1.1   LE arterial US no significant blockage  03/15/2021 Decrease Pletal to once a day due to headache  States that improved Claudication after added Pletal, walks longer distance         8/1/24  Did get TEVAR, had stents placed in left leg  Now having calf pain  Will see vascular in a while   Not exercising like he should  Not active at home   Appetite is good   Cutback on smoking- 8 cigs now   Bp stable   Lost 5 lbs sicne last visit     3/26/25  PT seen by Dr. Maldonado here for CV follow up.   BP mildly elevated today. BMI 28 - 166 lbs     PT lives in [place], comes here with his family.   BP overall stable at home.     HE is active, does work out.     ECG - NSR, non sp ST  [facility]      Results for orders placed during the hospital encounter of 06/21/23    Echo    Interpretation Summary  · The left ventricle is normal in size with mild concentric hypertrophy and normal systolic function.  · The estimated ejection fraction is 65%.  · Normal left ventricular diastolic function.  · Normal right ventricular size with normal right ventricular systolic function.  · Normal central venous pressure (3 mmHg).  · The estimated PA systolic pressure is 23 mmHg.      Results for orders placed during the hospital encounter of 02/14/24    Nuclear Stress - Cardiology Interpreted    Interpretation Summary    Normal myocardial perfusion scan. There is no evidence of myocardial ischemia or infarction.    The gated perfusion images showed an ejection fraction of 71% at rest. The gated perfusion images showed an ejection fraction of 72% post stress.    The ECG portion of the study is negative for ischemia.    The patient reported no chest pain during the stress test.    There were no arrhythmias during stress.      No results found for this or any previous visit.      No cardiac monitor results found for the past 12 months         Past Medical History:   Diagnosis Date    Coronary artery disease     Hyperlipidemia     Hypertension     Personal history of colonic polyps        Past Surgical History:   Procedure Laterality Date    ABDOMINAL AORTIC ANEURYSM REPAIR, ENDOVASCULAR N/A 2/20/2024    Procedure: REPAIR-ANEURYSM-ABDOMINAL AORTIC-ENDOVASCULAR (AAA);  Surgeon: Caesar Denton MD;  Location: [facility] OR [address] [place] [address];  Service: Vascular;  Laterality: N/A;  EVAR & L femoral artery cutdown  mGy 1195.48 Gycm2 215.60     Fluro Time 16.2min   Contrast 26ml    EPIDURAL STEROID INJECTION INTO CERVICAL SPINE N/A 5/10/2024    Procedure: C6/7 IL YENNY with L paramedian approach;  Surgeon: Rudolph Burgos MD;  Location: [facility];  Service: Pain Management;  Laterality: N/A;    EPIDURAL STEROID INJECTION INTO CERVICAL  SPINE N/A 12/6/2024    Procedure: C6/7 IL YENNY;  Surgeon: Rudolph Burgos MD;  [facility];  Service: Pain Management;  Laterality: N/A;    INJECTION OF ANESTHETIC AGENT AROUND MEDIAL BRANCH NERVES INNERVATING CERVICAL FACET JOINT Right 11/18/2020    Procedure: Block-nerve-medial branch-cervical Right C3, 4, 5with RN IV sedation;  Surgeon: Martín Barnes MD;  Location: [facility];  Service: Pain Management;  Laterality: Right;    INJECTION OF ANESTHETIC AGENT AROUND MEDIAL BRANCH NERVES INNERVATING CERVICAL FACET JOINT Bilateral 04/28/2023    Procedure: Bilateral C4-6 MBB with RN IV sedation;  Surgeon: Rudolph Burgos MD;  Location: [facility];  Service: Pain Management;  Laterality: Bilateral;    INJECTION OF ANESTHETIC AGENT AROUND MEDIAL BRANCH NERVES INNERVATING CERVICAL FACET JOINT Bilateral 10/18/2024    Procedure: Bilateral C4-6 MBB with RN IV sedation;  Surgeon: Rudolph Burgos MD;  Location: [facility];  Service: Pain Management;  Laterality: Bilateral;       Social History[1]    Family History   Problem Relation Name Age of Onset    Cancer Mother mother     Cancer Father father     Hypertension Father father     Early death Brother brother         flu    Heart disease Brother brother     Hypertension Brother brother        Patient's Medications   New Prescriptions    No medications on file   Previous Medications    CLONIDINE (CATAPRES) 0.2 MG TABLET    Take 1 tablet (0.2 mg total) by mouth 2 (two) times daily.    GABAPENTIN (NEURONTIN) 300 MG CAPSULE    Take 1 capsule (300 mg total) by mouth once daily AND 2 capsules (600 mg total) every evening.    LINACLOTIDE (LINZESS) 72 MCG CAP CAPSULE    Take 1 capsule (72 mcg total) by mouth before breakfast.    PREDNISONE (DELTASONE) 5 MG TABLET    Take 1 tablet (5 mg total) by mouth daily as needed (arthritis flares).    SECUKINUMAB (COSENTYX PEN) 150 MG/ML PNIJ    Inject 150 mg into the skin every 28 days.    SILDENAFIL (VIAGRA) 25 MG TABLET     Take 1 tablet (25 mg total) by mouth daily as needed for Erectile Dysfunction.   Modified Medications    Modified Medication Previous Medication    AMLODIPINE (NORVASC) 10 MG TABLET amLODIPine (NORVASC) 10 MG tablet       Take 1 tablet (10 mg total) by mouth once daily.    Take 1 tablet (10 mg total) by mouth once daily.    ASPIRIN (ECOTRIN) 81 MG EC TABLET aspirin (ECOTRIN) 81 MG EC tablet       Take 1 tablet (81 mg total) by mouth once daily.    Take 81 mg by mouth once daily.    BENAZEPRIL (LOTENSIN) 40 MG TABLET benazepriL (LOTENSIN) 40 MG tablet       Take 1 tablet (40 mg total) by mouth once daily.    Take 1 tablet (40 mg total) by mouth once daily.    CILOSTAZOL (PLETAL) 50 MG TAB cilostazoL (PLETAL) 50 MG Tab       Take 1 tablet (50 mg total) by mouth 2 (two) times daily.    Take 1 tablet (50 mg total) by mouth 2 (two) times daily.    EZETIMIBE (ZETIA) 10 MG TABLET ezetimibe (ZETIA) 10 mg tablet       Take 1 tablet (10 mg total) by mouth once daily.    Take 1 tablet (10 mg total) by mouth once daily.    HYDROCHLOROTHIAZIDE (HYDRODIURIL) 25 MG TABLET hydroCHLOROthiazide (HYDRODIURIL) 25 MG tablet       Take 1 tablet (25 mg total) by mouth once daily.    Take 1 tablet (25 mg total) by mouth once daily.    ROSUVASTATIN (CRESTOR) 20 MG TABLET rosuvastatin (CRESTOR) 20 MG tablet       Take 1 tablet (20 mg total) by mouth once daily.    Take 1 tablet (20 mg total) by mouth once daily.   Discontinued Medications    No medications on file       Review of Systems   Constitutional: Negative.    HENT: Negative.     Eyes: Negative.    Respiratory: Negative.     Cardiovascular:  Positive for claudication.   Gastrointestinal: Negative.    Genitourinary: Negative.    Musculoskeletal: Negative.    Skin: Negative.    Neurological: Negative.    Endo/Heme/Allergies: Negative.    Psychiatric/Behavioral: Negative.     All 12 systems otherwise negative.      Wt Readings from Last 3 Encounters:   03/26/25 75.4 kg (166 lb 3.6 oz)    01/30/25 74 kg (163 lb 2.3 oz)   01/28/25 75.2 kg (165 lb 12.6 oz)     Temp Readings from Last 3 Encounters:   01/30/25 98.1 °F (36.7 °C) (Oral)   12/06/24 97.3 °F (36.3 °C)   12/05/24 97.2 °F (36.2 °C) (Tympanic)     BP Readings from Last 3 Encounters:   03/26/25 (!) 142/72   01/30/25 135/71   01/28/25 131/66     Pulse Readings from Last 3 Encounters:   03/26/25 75   01/30/25 69   01/28/25 71       BP (!) 142/72 (BP Location: Left arm, Patient Position: Sitting)   Pulse 75   Wt 75.4 kg (166 lb 3.6 oz)   SpO2 98%   BMI 28.53 kg/m²     Objective:   Physical Exam  Vitals and nursing note reviewed.   Constitutional:       General: He is not in acute distress.     Appearance: He is well-developed. He is not diaphoretic.   HENT:      Head: Normocephalic and atraumatic.      Nose: Nose normal.   Eyes:      General: No scleral icterus.     Conjunctiva/sclera: Conjunctivae normal.   Neck:      Thyroid: No thyromegaly.      Vascular: No JVD.   Cardiovascular:      Rate and Rhythm: Normal rate and regular rhythm.      Heart sounds: S1 normal and S2 normal. Murmur heard.      No friction rub. No gallop. No S3 or S4 sounds.   Pulmonary:      Effort: Pulmonary effort is normal. No respiratory distress.      Breath sounds: Normal breath sounds. No stridor. No wheezing or rales.   Chest:      Chest wall: No tenderness.   Abdominal:      General: Bowel sounds are normal. There is no distension.      Palpations: Abdomen is soft. There is no mass.      Tenderness: There is no abdominal tenderness. There is no rebound.   Genitourinary:     Comments: Deferred  Musculoskeletal:         General: No tenderness or deformity. Normal range of motion.      Cervical back: Normal range of motion and neck supple.   Lymphadenopathy:      Cervical: No cervical adenopathy.   Skin:     General: Skin is warm and dry.      Coloration: Skin is not pale.      Findings: No erythema or rash.   Neurological:      Mental Status: He is alert and  oriented to person, place, and time.      Motor: No abnormal muscle tone.      Coordination: Coordination normal.   Psychiatric:         Behavior: Behavior normal.         Thought Content: Thought content normal.         Judgment: Judgment normal.         Lab Results   Component Value Date     01/28/2025    K 4.0 01/28/2025     01/28/2025    CO2 29 01/28/2025    BUN 22 01/28/2025    CREATININE 1.7 (H) 01/28/2025     (H) 01/28/2025    HGBA1C 6.4 (H) 12/05/2024    MG 2.1 02/22/2024    AST 18 01/28/2025    ALT 23 01/28/2025    ALBUMIN 3.9 01/28/2025    PROT 7.7 01/28/2025    BILITOT 0.2 01/28/2025    WBC 7.65 01/28/2025    HGB 12.3 (L) 01/28/2025    HCT 38.7 (L) 01/28/2025    MCV 93 01/28/2025     01/28/2025    INR 1.0 02/20/2024    TSH 0.575 06/05/2024    TSH 0.863 06/21/2023    CHOL 120 06/05/2024    CHOL 107 (L) 06/21/2023    HDL 53 06/05/2024    HDL 42 06/21/2023    HDL 56 08/05/2020    LDLCALC 45.6 (L) 06/21/2023    TRIG 97 06/05/2024    TRIG 97 06/21/2023    TRIG 133 08/05/2020    BNP 19 02/26/2024         BNP (pg/mL)   Date Value   02/26/2024 19     INR (no units)   Date Value   02/20/2024 1.0          Assessment:      1. PAD (peripheral artery disease)    2. Coronary artery disease of native artery of native heart with stable angina pectoris    3. Hyperlipidemia, unspecified hyperlipidemia type    4. Smoking    5. Tobacco abuse    6. Abnormal EKG    7. Mixed hyperlipidemia    8. Hypertension, essential    9. Ankylosing hyperostosis of cervical region    10. S/P AAA repair    11. Carotid bruit, unspecified laterality    12. Sleep apnea, unspecified type        Plan:       Abnormal EKG   First-degree AV block, nonspecific intraventricular conduction delay   Echo 6/23 EF 65%     CAD  Non obs CAD  Continue aspirin, Zetia, Crestor     HLD  Continue statin    Claudication/PVD, AAA s/p TEVAR  Left > right  Continue pletal  Sees Vascular, Rt- scattered plaques, Lt- mild disease (no significant  stenosis) - AAA s/pTEVAR   Infrarenal abdominal aortic aneurysm measuring 4.3 x 4.0 cm previously measuring 5.1 x 5.0 cm.  1/2202   Order LE u/s and KAL and carotid u/s    Hypertension  Stable   Continue benazepril, clonidine, hydrochlorothiazide, amlodipine     Ankylosing spondylitis   Stable   Continue therapy     Tobacco abuse   Smoking cessation encouraged    - discussed again  - r/o JESUS      Visit today included increased complexity associated with the care of the episodic problem claudication addressed and managing the longitudinal care of the patient due to the serious and/or complex managed problem(s) .      Thank you for allowing me to participate in this patient's care. Please do not hesitate to contact me with any questions or concerns. Consult note has been forwarded to the referral physician.          [1]   Social History  Tobacco Use    Smoking status: Every Day     Current packs/day: 0.50     Average packs/day: 0.5 packs/day for 45.0 years (22.5 ttl pk-yrs)     Types: Cigarettes    Smokeless tobacco: Never   Substance Use Topics    Alcohol use: Not Currently    Drug use: Never

## 2025-03-27 LAB
OHS QRS DURATION: 110 MS
OHS QTC CALCULATION: 435 MS

## 2025-03-28 ENCOUNTER — PATIENT MESSAGE (OUTPATIENT)
Dept: PULMONOLOGY | Facility: CLINIC | Age: 70
End: 2025-03-28
Payer: MEDICARE

## 2025-03-29 ENCOUNTER — OFFICE VISIT (OUTPATIENT)
Dept: NEPHROLOGY | Facility: CLINIC | Age: 70
End: 2025-03-29
Payer: MEDICARE

## 2025-03-29 VITALS
BODY MASS INDEX: 28.12 KG/M2 | HEART RATE: 67 BPM | WEIGHT: 164.69 LBS | RESPIRATION RATE: 18 BRPM | SYSTOLIC BLOOD PRESSURE: 138 MMHG | DIASTOLIC BLOOD PRESSURE: 70 MMHG | HEIGHT: 64 IN

## 2025-03-29 DIAGNOSIS — N18.31 CHRONIC KIDNEY DISEASE, STAGE 3A: ICD-10-CM

## 2025-03-29 DIAGNOSIS — N18.32 STAGE 3B CHRONIC KIDNEY DISEASE: Primary | ICD-10-CM

## 2025-03-29 PROCEDURE — 99213 OFFICE O/P EST LOW 20 MIN: CPT | Mod: PBBFAC | Performed by: INTERNAL MEDICINE

## 2025-03-29 PROCEDURE — 99999 PR PBB SHADOW E&M-EST. PATIENT-LVL III: CPT | Mod: PBBFAC,,, | Performed by: INTERNAL MEDICINE

## 2025-03-29 PROCEDURE — 99215 OFFICE O/P EST HI 40 MIN: CPT | Mod: S$PBB,,, | Performed by: INTERNAL MEDICINE

## 2025-03-29 NOTE — PROGRESS NOTES
Renal clinic consult note:  Date of consult: 3/29/25  Reason for f/u and chief c/o: CKD  PCP: Dr. Guzmán     HPI: Pt is a 71 y/o  male with h/o of CKD, prior fluctuations in Cr,  HTN, vascular disease, s/p AAA repair, active heavy smoking (2 ppd since the age of 22), who presents for follow-up.  Patient was last seen by me about 2 years ago when Cr was 1.2. Pt presented with his daughter.  Chart was reviewed, including Cardiology and vascular notes.     On follow-up today, pt has no new or active c/o's, no discomfort. Labs were reviewed with pt.  Patient says he smokes less now about 6-7 cigarettes per day.      PAST MEDICAL HISTORY:  CKD stage 3, HTN, Coronary artery disease, Hyperlipidemia, AAA s/p repair in Feb 2024     PAST SURGICAL HISTORY:  He  has a past surgical history that includes Injection of anesthetic agent around medial branch nerves innervating cervical facet joint (Right, 11/18/2020).     SOCIAL HISTORY:  He  reports that he has been smoking cigarettes. He has never used smokeless tobacco. He reports that he does not drink alcohol.     FAMILY MEDICAL HISTORY:  His family history includes Cancer in his mother.     Review of patient's allergies indicates:  No Known Allergies     Meds reviewed  Current Outpatient Medications   Medication Instructions    amLODIPine (NORVASC) 10 mg, Oral, Daily    aspirin (ECOTRIN) 81 mg, Oral, Daily    benazepriL (LOTENSIN) 40 mg, Oral, Daily    cilostazoL (PLETAL) 50 mg, Oral, 2 times daily    cloNIDine (CATAPRES) 0.2 mg, Oral, 2 times daily    COSENTYX  mg, Subcutaneous, Every 28 days    ezetimibe (ZETIA) 10 mg, Oral, Daily    gabapentin (NEURONTIN) 300 MG capsule Take 1 capsule (300 mg total) by mouth once daily AND 2 capsules (600 mg total) every evening.    hydroCHLOROthiazide (HYDRODIURIL) 25 mg, Oral, Daily    linaCLOtide (LINZESS) 72 mcg, Oral, Before breakfast    predniSONE (DELTASONE) 5 mg, Oral, Daily PRN    rosuvastatin (CRESTOR) 20 mg, Oral, Daily  "   sildenafiL (VIAGRA) 25 mg, Oral, Daily PRN     He        REVIEW OF SYSTEMS:  Patient has no fever, fatigue, visual changes, chest pain, edema, cough, dyspnea, nausea, vomiting, constipation, diarrhea, arthralgias, pruritis, dizziness, weakness, depression, confusion.     PHYSICAL EXAM:  Blood pressure 138/70, pulse 67, resp. rate 18, height 5' 4" (1.626 m), weight 74.7 kg (164 lb 10.9 oz).  Gen:WDWN male in no apparent distress  Psych:Normal mood and affect  Skin:No rashes or ulcers  Neck:No JVD  Chest:Clear with no rales, rhonchi, wheezing with normal effort  CV:Regular with no murmurs, gallops or rubs  Abd:Soft, nontender, no distension  Ext:No edema     Labs reviewed  BMP  Lab Results   Component Value Date     03/26/2025    K 3.9 03/26/2025     03/26/2025    CO2 23 03/26/2025    BUN 22 03/26/2025    CREATININE 1.7 (H) 03/26/2025    CALCIUM 9.3 03/26/2025    ANIONGAP 12 03/26/2025    EGFRNORACEVR 43 (L) 03/26/2025     Lab Results   Component Value Date    WBC 8.82 03/26/2025    HGB 12.6 (L) 03/26/2025    HCT 39.6 (L) 03/26/2025    MCV 91 03/26/2025     03/26/2025       Lab Results   Component Value Date    CALCIUM 9.3 03/26/2025    PHOS 3.1 02/22/2024        U/a: trace protein, no blood, no RBC's, no casts  Previously had Urine p/Cr 600 mg        IMPRESSION AND RECOMMENDATIONS: 67 y/o male with h/o of HTN and active smoking presented with evaluation of abnormal Cr:     Renal: Cr has slowly worsened since last seen 2 years ago  Has CKD stage 3   Previously had Cr fluctuations, likely related to hemodynamic factors, fluids, taking diuretics (HCTZ)  CKD likely due to h/o of HTN and smoking     Proteinuria, appears improved.  No hematuria  No casts reported  [place] urine  The cause of proteinuria is not clear  May be raled to long standing smoking h/o (pt said he used to smoke 2 ppd).  Smoking can cause nodular glomerulonephrosclerosis  Elective kidney biopsy may be done     2. HTN: BP " controlled  Meds reviewed     3. Life style. Reviewed with pt, advised healthy life style  Advised to quit smoking  Eat healthy, avoid salty foods     4. Medical chart reviewed.  Noted h/o of latent TB  H/o of ankylosing spondylitis    5. AAA: s/p repair in February 2024  No active issues.  Hemodynamically stable       Plans and recommendations:  As discussed above  Total time spent 40 minutes including time needed to review the records, the   patient evaluation, documentation, face-to-face discussion with the patient,   more than 50% of the time was spent on coordination of care and counseling.    Level V visit.  RTC 1 year for f/u and repeat labs.     Shola Cordoba MD

## 2025-04-01 ENCOUNTER — TELEPHONE (OUTPATIENT)
Dept: RHEUMATOLOGY | Facility: CLINIC | Age: 70
End: 2025-04-01
Payer: MEDICARE

## 2025-04-01 DIAGNOSIS — M45.0 ANKYLOSING SPONDYLITIS OF MULTIPLE SITES IN SPINE: Primary | ICD-10-CM

## 2025-04-01 DIAGNOSIS — N18.30 STAGE 3 CHRONIC KIDNEY DISEASE, UNSPECIFIED WHETHER STAGE 3A OR 3B CKD: ICD-10-CM

## 2025-04-01 NOTE — TELEPHONE ENCOUNTER
----- Message from Delaney sent at 4/1/2025  2:20 PM CDT -----  Name of Caller: [facility] Back Number:[phone] and fax is [phone] Information: She called regarding a referral Dr. Brooks sent over. She need 2 office notes and recent labs. Thx.EL   No

## 2025-04-03 ENCOUNTER — HOSPITAL ENCOUNTER (OUTPATIENT)
Dept: RADIOLOGY | Facility: HOSPITAL | Age: 70
Discharge: HOME | End: 2025-04-03
Payer: MEDICARE

## 2025-04-03 ENCOUNTER — OFFICE VISIT (OUTPATIENT)
Dept: FAMILY MEDICINE | Facility: CLINIC | Age: 70
End: 2025-04-03
Payer: MEDICARE

## 2025-04-03 VITALS
HEART RATE: 70 BPM | DIASTOLIC BLOOD PRESSURE: 66 MMHG | TEMPERATURE: 99 F | SYSTOLIC BLOOD PRESSURE: 128 MMHG | WEIGHT: 168.44 LBS | BODY MASS INDEX: 28.76 KG/M2 | HEIGHT: 64 IN | OXYGEN SATURATION: 96 %

## 2025-04-03 DIAGNOSIS — E78.2 MIXED HYPERLIPIDEMIA: ICD-10-CM

## 2025-04-03 DIAGNOSIS — J06.9 UPPER RESPIRATORY TRACT INFECTION, UNSPECIFIED TYPE: Primary | ICD-10-CM

## 2025-04-03 DIAGNOSIS — I10 HYPERTENSION, ESSENTIAL: ICD-10-CM

## 2025-04-03 DIAGNOSIS — E66.3 OVERWEIGHT (BMI 25.0-29.9): ICD-10-CM

## 2025-04-03 DIAGNOSIS — R06.02 SHORTNESS OF BREATH: ICD-10-CM

## 2025-04-03 DIAGNOSIS — J06.9 UPPER RESPIRATORY TRACT INFECTION, UNSPECIFIED TYPE: ICD-10-CM

## 2025-04-03 DIAGNOSIS — R05.1 ACUTE COUGH: ICD-10-CM

## 2025-04-03 LAB
CTP QC/QA: YES
POC MOLECULAR INFLUENZA A AGN: NEGATIVE
POC MOLECULAR INFLUENZA B AGN: NEGATIVE

## 2025-04-03 PROCEDURE — 99214 OFFICE O/P EST MOD 30 MIN: CPT | Mod: PBBFAC,25,PO

## 2025-04-03 PROCEDURE — 71045 X-RAY EXAM CHEST 1 VIEW: CPT | Mod: TC,FY,PO

## 2025-04-03 PROCEDURE — 99999PBSHW POCT INFLUENZA A/B MOLECULAR: Mod: PBBFAC,,,

## 2025-04-03 PROCEDURE — 99999 PR PBB SHADOW E&M-EST. PATIENT-LVL IV: CPT | Mod: PBBFAC,,,

## 2025-04-03 PROCEDURE — 71045 X-RAY EXAM CHEST 1 VIEW: CPT | Mod: 26,,, | Performed by: RADIOLOGY

## 2025-04-03 PROCEDURE — 99213 OFFICE O/P EST LOW 20 MIN: CPT | Mod: S$PBB,,,

## 2025-04-03 PROCEDURE — 87502 INFLUENZA DNA AMP PROBE: CPT | Mod: PBBFAC,PO

## 2025-04-03 RX ORDER — AZITHROMYCIN 250 MG/1
TABLET, FILM COATED ORAL
Qty: 6 TABLET | Refills: 0 | Status: SHIPPED | OUTPATIENT
Start: 2025-04-03 | End: 2025-04-08

## 2025-04-03 RX ORDER — ALBUTEROL SULFATE 90 UG/1
2 INHALANT RESPIRATORY (INHALATION) EVERY 6 HOURS PRN
Qty: 18 G | Refills: 0 | Status: SHIPPED | OUTPATIENT
Start: 2025-04-03

## 2025-04-03 RX ORDER — PROMETHAZINE HYDROCHLORIDE AND DEXTROMETHORPHAN HYDROBROMIDE 6.25; 15 MG/5ML; MG/5ML
5 SYRUP ORAL
Qty: 220 ML | Refills: 0 | Status: SHIPPED | OUTPATIENT
Start: 2025-04-03

## 2025-04-03 NOTE — PROGRESS NOTES
Deborah White  04/03/2025  [id number]    Chastity Guzmán MD  Patient Care Team:  Chastity Guzmán MD as PCP - General (Family Medicine)  Geoff Blanco MD as Consulting Physician (Neurosurgery)  Kory Lopez MD as Consulting Physician (Rheumatology)  Ludin Maldonado MD as Consulting Physician (Cardiology)  Caesar Denton MD as Consulting Physician (Vascular Surgery)  Keshawn Peterson OD as Consulting Physician (Optometry)  Shola Cordoba MD as Consulting Physician (Nephrology)  Sherri Rodas PharmD as Pharmacist (Pharmacist)     Subjective      Chief Complaint:  Chief Complaint   Patient presents with    Cough    Sinus Problem    Nasal Congestion       History of Present Illness:    Mr. White presents today with respiratory symptoms. He is accompanied today by his daughter. He reports symptoms began on Sunday with heavy congestion, SOB, productive cough with mucus, fever, and sore throat. He has been taking OTC cough medicine since Monday. He came in contact with his grandchildren who are also ill. Wife shares similar symptoms. 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     Review of Systems   Constitutional:  Positive for fever.   HENT:  Positive for nasal congestion and sore throat.    Respiratory:  Positive for cough and shortness of breath.    Cardiovascular:  Positive for chest pain.   Gastrointestinal:  Negative for nausea and vomiting.   Neurological:  Negative for headaches.        History:  Past Medical History:   Diagnosis Date    Coronary artery disease     Hyperlipidemia     Hypertension     Personal history of colonic polyps      Past Surgical History:   Procedure Laterality Date    ABDOMINAL AORTIC ANEURYSM REPAIR, ENDOVASCULAR N/A 2/20/2024    Procedure: REPAIR-ANEURYSM-ABDOMINAL AORTIC-ENDOVASCULAR (AAA);  Surgeon: Caesar Denton MD;  Location: [facility] OR [address] [place] [address];  Service: Vascular;  Laterality: N/A;  EVAR &  L femoral artery cutdown  mGy 1195.48 Gycm2 215.60     Fluro Time 16.2min   Contrast 26ml    EPIDURAL STEROID INJECTION INTO CERVICAL SPINE N/A 5/10/2024    Procedure: C6/7 IL YENNY with L paramedian approach;  Surgeon: Rudolph Burgos MD;  Location: [facility]IN [facility];  Service: Pain Management;  Laterality: N/A;    EPIDURAL STEROID INJECTION INTO CERVICAL SPINE N/A 12/6/2024    Procedure: C6/7 IL YENNY;  Surgeon: Rudolph Burgos MD;  Location: [facility];  Service: Pain Management;  Laterality: N/A;    INJECTION OF ANESTHETIC AGENT AROUND MEDIAL BRANCH NERVES INNERVATING CERVICAL FACET JOINT Right 11/18/2020    Procedure: Block-nerve-medial branch-cervical Right C3, 4, 5with RN IV sedation;  Surgeon: Martín Barnes MD;  Location: [facility];  Service: Pain Management;  Laterality: Right;    INJECTION OF ANESTHETIC AGENT AROUND MEDIAL BRANCH NERVES INNERVATING CERVICAL FACET JOINT Bilateral 04/28/2023    Procedure: Bilateral C4-6 MBB with RN IV sedation;  Surgeon: Rudolph Burgos MD;  Location: [facility];  Service: Pain Management;  Laterality: Bilateral;    INJECTION OF ANESTHETIC AGENT AROUND MEDIAL BRANCH NERVES INNERVATING CERVICAL FACET JOINT Bilateral 10/18/2024    Procedure: Bilateral C4-6 MBB with RN IV sedation;  Surgeon: Rudolph Burgos MD;  Location: [facility];  Service: Pain Management;  Laterality: Bilateral;     Family History   Problem Relation Name Age of Onset    Cancer Mother mother     Cancer Father father     Hypertension Father father     Early death Brother brother         flu    Heart disease Brother brother     Hypertension Brother brother      Social History[1]     Review of patient's allergies indicates:  No Known Allergies    **The following were reviewed at this visit: active problem list, medication list, allergies, family history, social history, and health maintenance.    Medications:  Medications Ordered Prior to Encounter[2]    **Medications have been reviewed and reconciled with  patient at this visit.            Objective      Vitals:    04/03/25 1412   BP: 128/66   Pulse: 70   Temp: 99.4 °F (37.4 °C)      Body mass index is 28.91 kg/m².    Wt Readings from Last 3 Encounters:   04/03/25 76.4 kg (168 lb 6.9 oz)   03/29/25 74.7 kg (164 lb 10.9 oz)   03/26/25 75.4 kg (166 lb 3.6 oz)     Temp Readings from Last 3 Encounters:   04/03/25 99.4 °F (37.4 °C) (Tympanic)   01/30/25 98.1 °F (36.7 °C) (Oral)   12/06/24 97.3 °F (36.3 °C)     BP Readings from Last 3 Encounters:   04/03/25 128/66   03/29/25 138/70   03/26/25 (!) 142/72     Pulse Readings from Last 3 Encounters:   04/03/25 70   03/29/25 67   03/26/25 75         Laboratory Reviewed ({Yes)  Lab Results   Component Value Date    WBC 8.82 03/26/2025    HGB 12.6 (L) 03/26/2025    HCT 39.6 (L) 03/26/2025     03/26/2025    CHOL 120 06/05/2024    TRIG 97 06/05/2024    HDL 53 06/05/2024    ALT 23 01/28/2025    AST 18 01/28/2025     03/26/2025    K 3.9 03/26/2025     03/26/2025    CREATININE 1.7 (H) 03/26/2025    BUN 22 03/26/2025    CO2 23 03/26/2025    TSH 0.575 06/05/2024    PSA 0.32 12/05/2024    INR 1.0 02/20/2024    HGBA1C 6.4 (H) 12/05/2024       Physical Exam  Vitals reviewed.   Constitutional:       Appearance: Normal appearance.   HENT:      Head: Normocephalic and atraumatic.      Right Ear: Tympanic membrane, ear canal and external ear normal.      Left Ear: Tympanic membrane, ear canal and external ear normal.      Nose: Congestion and rhinorrhea present.      Right Turbinates: Swollen.      Left Turbinates: Swollen.      Right Sinus: No maxillary sinus tenderness or frontal sinus tenderness.      Left Sinus: No maxillary sinus tenderness or frontal sinus tenderness.      Mouth/Throat:      Mouth: Mucous membranes are moist.      Pharynx: Oropharynx is clear. Uvula midline. Posterior oropharyngeal erythema and postnasal drip present. No oropharyngeal exudate.   Eyes:      Extraocular Movements: Extraocular movements  intact.      Conjunctiva/sclera: Conjunctivae normal.      Pupils: Pupils are equal, round, and reactive to light.   Cardiovascular:      Rate and Rhythm: Normal rate and regular rhythm.      Pulses: Normal pulses.      Heart sounds: Normal heart sounds.   Pulmonary:      Effort: Pulmonary effort is normal.      Breath sounds: Normal breath sounds.   Abdominal:      General: Bowel sounds are normal.      Palpations: Abdomen is soft.   Musculoskeletal:         General: Normal range of motion.   Skin:     General: Skin is warm.   Neurological:      General: No focal deficit present.      Mental Status: He is alert and oriented to person, place, and time.   Psychiatric:         Mood and Affect: Mood normal.         Behavior: Behavior normal.         Thought Content: Thought content normal.         Judgment: Judgment normal.               Assessment      Deborah was seen today for cough, sinus problem and nasal congestion.    Diagnoses and all orders for this visit:    Upper respiratory tract infection, unspecified type  -     POCT COVID-19 Rapid Screening  -     POCT Influenza A/B Molecular  -     X-Ray Chest 1 View; Future  -     azithromycin (Z-ARTIE) 250 MG tablet; Take 2 tablets by mouth on day 1; Take 1 tablet by mouth on days 2-5  -     promethazine-dextromethorphan (PROMETHAZINE-DM) 6.25-15 mg/5 mL Syrp; Take 5 mLs by mouth every 4 to 6 hours as needed (cough and congestion).  -     albuterol (VENTOLIN HFA) 90 mcg/actuation inhaler; Inhale 2 puffs into the lungs every 6 (six) hours as needed for Wheezing. Rescue    Acute cough  -     promethazine-dextromethorphan (PROMETHAZINE-DM) 6.25-15 mg/5 mL Syrp; Take 5 mLs by mouth every 4 to 6 hours as needed (cough and congestion).    Shortness of breath  -     X-Ray Chest 1 View; Future  -     albuterol (VENTOLIN HFA) 90 mcg/actuation inhaler; Inhale 2 puffs into the lungs every 6 (six) hours as needed for Wheezing. Rescue    Mixed hyperlipidemia    Hypertension,  essential    Overweight (BMI 25.0-29.9)          Plan    1) rapid covid/flu-negative  2) z-santino  3) promethazine, albuterol inhaler PRN  4) cxr today r/o pneumonia per patient request  5) ER for severe symptoms  6) continue all other present management       Follow up: Follow up if symptoms worsen or fail to improve.    **After visit summary was printed and given to patient upon discharge today.  Patient goals and care plan are included in After Visit Summary.    I have spent a total of 25 minutes on this visit. This includes face to face time and non-face to face time preparing to see the patient (eg, review of tests), obtaining and/or reviewing separately obtained history, documenting clinical information in the electronic or other health record, independently interpreting results and communicating results to the patient/family/caregiver, or care coordinator.            Arik Sanabria, MSN, APRN, FNP-C           [1]   Social History  Socioeconomic History    Marital status:    Tobacco Use    Smoking status: Every Day     Current packs/day: 0.50     Average packs/day: 0.5 packs/day for 45.0 years (22.5 ttl pk-yrs)     Types: Cigarettes    Smokeless tobacco: Never   Substance and Sexual Activity    Alcohol use: Not Currently    Drug use: Never    Sexual activity: Not Currently     Partners: Female     Social Drivers of Health     Financial Resource Strain: Medium Risk (3/25/2025)    Overall Financial Resource Strain (CARDIA)     Difficulty of Paying Living Expenses: Somewhat hard   Food Insecurity: No Food Insecurity (3/25/2025)    Hunger Vital Sign     Worried About Running Out of Food in the Last Year: Never true     Ran Out of Food in the Last Year: Never true   Transportation Needs: No Transportation Needs (3/25/2025)    PRAPARE - Transportation     Lack of Transportation (Medical): No     Lack of Transportation (Non-Medical): No   Physical Activity: Insufficiently Active (3/25/2025)    Exercise Vital Sign      Days of Exercise per Week: 3 days     Minutes of Exercise per Session: 30 min   Stress: Stress Concern Present (3/25/2025)    [employer] [place] - Occupational Stress Questionnaire     Feeling of Stress : To some extent   Housing Stability: Low Risk  (3/25/2025)    Housing Stability Vital Sign     Unable to Pay for Housing in the Last Year: No     Number of Times Moved in the Last Year: 0     Homeless in the Last Year: No   [2]   Current Outpatient Medications on File Prior to Visit   Medication Sig Dispense Refill    amLODIPine (NORVASC) 10 MG tablet Take 1 tablet (10 mg total) by mouth once daily. 90 tablet 3    aspirin (ECOTRIN) 81 MG EC tablet Take 1 tablet (81 mg total) by mouth once daily. 90 tablet 1    benazepriL (LOTENSIN) 40 MG tablet Take 1 tablet (40 mg total) by mouth once daily. 90 tablet 2    cilostazoL (PLETAL) 50 MG Tab Take 1 tablet (50 mg total) by mouth 2 (two) times daily. 180 tablet 1    cloNIDine (CATAPRES) 0.2 MG tablet Take 1 tablet (0.2 mg total) by mouth 2 (two) times daily. 180 tablet 3    ezetimibe (ZETIA) 10 mg tablet Take 1 tablet (10 mg total) by mouth once daily. 90 tablet 1    gabapentin (NEURONTIN) 300 MG capsule Take 1 capsule (300 mg total) by mouth once daily AND 2 capsules (600 mg total) every evening. 270 capsule 0    hydroCHLOROthiazide (HYDRODIURIL) 25 MG tablet Take 1 tablet (25 mg total) by mouth once daily. 90 tablet 1    linaCLOtide (LINZESS) 72 mcg Cap capsule Take 1 capsule (72 mcg total) by mouth before breakfast. 90 capsule 3    predniSONE (DELTASONE) 5 MG tablet Take 1 tablet (5 mg total) by mouth daily as needed (arthritis flares). 30 tablet 0    rosuvastatin (CRESTOR) 20 MG tablet Take 1 tablet (20 mg total) by mouth once daily. 90 tablet 2    secukinumab (COSENTYX PEN) 150 mg/mL PnIj Inject 150 mg into the skin every 28 days. 1 mL 11    sildenafiL (VIAGRA) 25 MG tablet Take 1 tablet (25 mg total) by mouth daily as needed for Erectile  Dysfunction. 30 tablet 3     No current facility-administered medications on file prior to visit.

## 2025-04-11 ENCOUNTER — PATIENT MESSAGE (OUTPATIENT)
Dept: FAMILY MEDICINE | Facility: CLINIC | Age: 70
End: 2025-04-11
Payer: MEDICARE

## 2025-04-15 ENCOUNTER — TELEPHONE (OUTPATIENT)
Dept: FAMILY MEDICINE | Facility: CLINIC | Age: 70
End: 2025-04-15
Payer: MEDICARE

## 2025-04-15 DIAGNOSIS — R05.1 ACUTE COUGH: ICD-10-CM

## 2025-04-15 DIAGNOSIS — J06.9 UPPER RESPIRATORY TRACT INFECTION, UNSPECIFIED TYPE: ICD-10-CM

## 2025-04-15 DIAGNOSIS — R09.82 POSTNASAL DRIP: Primary | ICD-10-CM

## 2025-04-15 RX ORDER — PROMETHAZINE HYDROCHLORIDE AND DEXTROMETHORPHAN HYDROBROMIDE 6.25; 15 MG/5ML; MG/5ML
5 SYRUP ORAL
Qty: 220 ML | Refills: 0 | Status: SHIPPED | OUTPATIENT
Start: 2025-04-15

## 2025-04-15 RX ORDER — BENZONATATE 200 MG/1
200 CAPSULE ORAL 3 TIMES DAILY PRN
Qty: 30 CAPSULE | Refills: 0 | Status: SHIPPED | OUTPATIENT
Start: 2025-04-15 | End: 2025-04-25

## 2025-04-15 RX ORDER — LEVOCETIRIZINE DIHYDROCHLORIDE 5 MG/1
5 TABLET, FILM COATED ORAL NIGHTLY
Qty: 30 TABLET | Refills: 1 | Status: SHIPPED | OUTPATIENT
Start: 2025-04-15 | End: 2025-06-14

## 2025-04-15 NOTE — TELEPHONE ENCOUNTER
Copied from CRM #[id number]. Topic: General Inquiry - Status Check  >> Apr 15, 2025 12:47 PM Bronwyn wrote:  Patient would like to get medical advice.  Symptoms (please be specific):   Pt daughter is following up on the [employer] sent on 04/11/2025. Pt daughter states the pt is still having symptoms since he was seen on 04/03/2025. Pt daughter states she still has not heard back since she sent the [employer] message on 04/11/2025  How long have you had these symptoms: on going   Would you like a call back, or a response through your [facility] portal?:Lea/daughter  and [facility] message; whichever is quicker     Pharmacy name and phone # (copy from chart):  [id number] - [place] - [phone]  [address] [id number]  Phone: [phone] Fax: [phone]  Hours: Not open 24 hours       Comments:

## 2025-04-15 NOTE — TELEPHONE ENCOUNTER
Good Afternoon,      My dad was seen 4/3 for upper respiratory infection/cough.  He has finished  his antibiotics and cough medicine but he's still coughing.  This is the first time he's been sick since starting his Cosyntx/injections, so perhaps it will take him a while to recover.  Does he need another round of antibiotics or more cough medicine?       Thanks,   Lea

## 2025-04-29 ENCOUNTER — HOSPITAL ENCOUNTER (OUTPATIENT)
Dept: CARDIOLOGY | Facility: HOSPITAL | Age: 70
Discharge: HOME | End: 2025-04-29
Attending: INTERNAL MEDICINE
Payer: MEDICARE

## 2025-04-29 ENCOUNTER — OFFICE VISIT (OUTPATIENT)
Dept: PULMONOLOGY | Facility: CLINIC | Age: 70
End: 2025-04-29
Payer: MEDICARE

## 2025-04-29 VITALS
BODY MASS INDEX: 29.77 KG/M2 | WEIGHT: 168 LBS | HEIGHT: 63 IN | SYSTOLIC BLOOD PRESSURE: 131 MMHG | DIASTOLIC BLOOD PRESSURE: 74 MMHG | SYSTOLIC BLOOD PRESSURE: 131 MMHG | BODY MASS INDEX: 29.77 KG/M2 | BODY MASS INDEX: 29.77 KG/M2 | HEIGHT: 63 IN | HEIGHT: 63 IN | WEIGHT: 168 LBS | SYSTOLIC BLOOD PRESSURE: 131 MMHG | DIASTOLIC BLOOD PRESSURE: 74 MMHG | DIASTOLIC BLOOD PRESSURE: 74 MMHG | WEIGHT: 168 LBS

## 2025-04-29 VITALS
DIASTOLIC BLOOD PRESSURE: 70 MMHG | HEART RATE: 68 BPM | HEIGHT: 63 IN | SYSTOLIC BLOOD PRESSURE: 130 MMHG | OXYGEN SATURATION: 96 % | RESPIRATION RATE: 14 BRPM | BODY MASS INDEX: 29.57 KG/M2 | WEIGHT: 166.88 LBS

## 2025-04-29 DIAGNOSIS — R06.81 WITNESSED APNEIC SPELLS: ICD-10-CM

## 2025-04-29 DIAGNOSIS — R09.89 CAROTID BRUIT, UNSPECIFIED LATERALITY: ICD-10-CM

## 2025-04-29 DIAGNOSIS — G47.30 SLEEP APNEA, UNSPECIFIED TYPE: ICD-10-CM

## 2025-04-29 DIAGNOSIS — F17.210 SMOKING GREATER THAN 30 PACK YEARS: ICD-10-CM

## 2025-04-29 DIAGNOSIS — I71.43 INFRARENAL ABDOMINAL AORTIC ANEURYSM (AAA) WITHOUT RUPTURE: ICD-10-CM

## 2025-04-29 DIAGNOSIS — I73.9 PAD (PERIPHERAL ARTERY DISEASE): ICD-10-CM

## 2025-04-29 DIAGNOSIS — R06.02 SOBOE (SHORTNESS OF BREATH ON EXERTION): ICD-10-CM

## 2025-04-29 DIAGNOSIS — R06.83 SNORING: ICD-10-CM

## 2025-04-29 DIAGNOSIS — G47.19 EXCESSIVE DAYTIME SLEEPINESS: Primary | ICD-10-CM

## 2025-04-29 DIAGNOSIS — I10 HYPERTENSION, ESSENTIAL: ICD-10-CM

## 2025-04-29 LAB
LEFT ABI: 1.24
LEFT ANT TIBIAL SYS PSV: 62 CM/S
LEFT ARM BP: 135 MMHG
LEFT ARM DIASTOLIC BLOOD PRESSURE: 80 MMHG
LEFT ARM SYSTOLIC BLOOD PRESSURE: 135 MMHG
LEFT CBA DIAS: 11 CM/S
LEFT CBA SYS: 60 CM/S
LEFT CCA DIST DIAS: 13 CM/S
LEFT CCA DIST SYS: 53 CM/S
LEFT CCA MID DIAS: 9 CM/S
LEFT CCA MID SYS: 73 CM/S
LEFT CCA PROX DIAS: 10 CM/S
LEFT CCA PROX SYS: 70 CM/S
LEFT CFA PSV: 176 CM/S
LEFT DORSALIS PEDIS: 161 MMHG
LEFT ECA DIAS: 6 CM/S
LEFT ECA SYS: 70 CM/S
LEFT ICA DIST DIAS: 16 CM/S
LEFT ICA DIST SYS: 81 CM/S
LEFT ICA MID DIAS: 18 CM/S
LEFT ICA MID SYS: 68 CM/S
LEFT ICA PROX DIAS: 13 CM/S
LEFT ICA PROX SYS: 72 CM/S
LEFT PERONEAL SYS PSV: 49 CM/S
LEFT POPLITEAL PSV: 108 CM/S
LEFT POST TIBIAL SYS PSV: 86 CM/S
LEFT POSTERIOR TIBIAL: 167 MMHG
LEFT PROFUNDA SYS PSV: 86 CM/S
LEFT SUPER FEMORAL DIST SYS PSV: 98 CM/S
LEFT SUPER FEMORAL MID SYS PSV: 87 CM/S
LEFT SUPER FEMORAL OSTIAL SYS PSV: 131 CM/S
LEFT SUPER FEMORAL PROX SYS PSV: 92 CM/S
LEFT TBI: 1.07
LEFT TIB/PER TRUNK SYS PSV: 93 CM/S
LEFT TOE PRESSURE: 144 MMHG
LEFT VERTEBRAL DIAS: 18 CM/S
LEFT VERTEBRAL SYS: 77 CM/S
OHS CV CAROTID RIGHT ICA EDV HIGHEST: 20
OHS CV CAROTID ULTRASOUND LEFT ICA/CCA RATIO: 1.53
OHS CV CAROTID ULTRASOUND RIGHT ICA/CCA RATIO: 1.23
OHS CV LEFT LOWER EXTREMITY ABI (NO CALC): 1.24
OHS CV PV CAROTID LEFT HIGHEST CCA: 73
OHS CV PV CAROTID LEFT HIGHEST ICA: 81
OHS CV PV CAROTID RIGHT HIGHEST CCA: 65
OHS CV PV CAROTID RIGHT HIGHEST ICA: 59
OHS CV RIGHT ABI LOWER EXTREMITY (NO CALC): 1.32
OHS CV US CAROTID LEFT HIGHEST EDV: 18
RIGHT ABI: 1.32
RIGHT ANT TIBIAL SYS PSV: 92 CM/S
RIGHT ARM BP: 131 MMHG
RIGHT ARM DIASTOLIC BLOOD PRESSURE: 74 MMHG
RIGHT ARM SYSTOLIC BLOOD PRESSURE: 131 MMHG
RIGHT CBA DIAS: 8 CM/S
RIGHT CBA SYS: 49 CM/S
RIGHT CCA DIST DIAS: 10 CM/S
RIGHT CCA DIST SYS: 48 CM/S
RIGHT CCA MID DIAS: 8 CM/S
RIGHT CCA MID SYS: 47 CM/S
RIGHT CCA PROX DIAS: 8 CM/S
RIGHT CCA PROX SYS: 65 CM/S
RIGHT CFA PSV: 130 CM/S
RIGHT DORSALIS PEDIS: 161 MMHG
RIGHT ECA DIAS: 6 CM/S
RIGHT ECA SYS: 72 CM/S
RIGHT ICA DIST DIAS: 20 CM/S
RIGHT ICA DIST SYS: 59 CM/S
RIGHT ICA MID DIAS: 14 CM/S
RIGHT ICA MID SYS: 46 CM/S
RIGHT ICA PROX DIAS: 10 CM/S
RIGHT ICA PROX SYS: 35 CM/S
RIGHT POPLITEAL PSV: 98 CM/S
RIGHT POST TIBIAL SYS PSV: 89 CM/S
RIGHT POSTERIOR TIBIAL: 178 MMHG
RIGHT PROFUNDA SYS PSV: 121 CM/S
RIGHT SUPER FEMORAL DIST SYS PSV: 151 CM/S
RIGHT SUPER FEMORAL MID SYS PSV: 107 CM/S
RIGHT SUPER FEMORAL OSTIAL SYS PSV: 166 CM/S
RIGHT SUPER FEMORAL PROX SYS PSV: 99 CM/S
RIGHT TBI: 1.13
RIGHT TIB/PER TRUNK SYS PSV: 121 CM/S
RIGHT TOE PRESSURE: 152 MMHG
RIGHT VERTEBRAL DIAS: 9 CM/S
RIGHT VERTEBRAL SYS: 37 CM/S

## 2025-04-29 PROCEDURE — 93922 UPR/L XTREMITY ART 2 LEVELS: CPT | Mod: 26,,, | Performed by: INTERNAL MEDICINE

## 2025-04-29 PROCEDURE — 99215 OFFICE O/P EST HI 40 MIN: CPT | Mod: PBBFAC,25 | Performed by: INTERNAL MEDICINE

## 2025-04-29 PROCEDURE — 93925 LOWER EXTREMITY STUDY: CPT | Mod: 26,,, | Performed by: INTERNAL MEDICINE

## 2025-04-29 PROCEDURE — 93880 EXTRACRANIAL BILAT STUDY: CPT | Mod: 26,,, | Performed by: INTERNAL MEDICINE

## 2025-04-29 PROCEDURE — 93922 UPR/L XTREMITY ART 2 LEVELS: CPT

## 2025-04-29 PROCEDURE — 99999 PR PBB SHADOW E&M-EST. PATIENT-LVL V: CPT | Mod: PBBFAC,,, | Performed by: INTERNAL MEDICINE

## 2025-04-29 PROCEDURE — 93925 LOWER EXTREMITY STUDY: CPT

## 2025-04-29 PROCEDURE — 93880 EXTRACRANIAL BILAT STUDY: CPT

## 2025-04-29 NOTE — PATIENT INSTRUCTIONS
No eating / drinking for 3 hours before going to bed.  Elevate head of bed 30 - 45 %     CPAP HABITUATION PROCEDURE       [facility], [facility]     Some people have difficulty adjusting to CPAP/BiPAP/AutoCPAP.  This is not unusual or hard to understand: Breathing with CPAP is different from ordinary breathing, and this difference is aversive to some. The problem can be overcome, however, and the benefits CPAP confers are certainly worth the effort.  Below, you will find a simple and gradual way to get used to CPAP before you try to use it all night, every night.  The essence of this procedure is to relax and let breathing with CPAP become a habit.  It may take about 2 weeks, and involves the following:      CPAP while awake and comfortably seated, during the late evening.     CPAP in bed while attempting sleep at night.     If your discomfort is too great at any time, discontinue and attempt again later the same night, for the same amount of time.   You and your physician may alter the times and pressures if necessary.     If you find that it is very easy to get used to CPAP, you may start using it every night when you are comfortable enough to do so.  IMPORTANT REMINDER: If you have a cold or sinus congestion it is okay to miss a night or two of CPAP. Consider using antihistamines or decongestants to clear up your sinus congestion prior to sleeping.     DAYS  1-3   Start CPAP while awake and comfortably seated during the late evening, after having prepared for bed.  You may do this while watching television, listening to music or reading. Use for 1 hour, then take off CPAP and go directly to bed to sleep     DAYS  4-6     Start CPAP when you go to bed and use for 1 hour, or until you fall asleep.  If your discomfort is too great at any time, discontinue and attempt again later the same night, for the same designated amount of time (1 hour).      DAYS  7-9     Increase time  with CPAP to 2 hours a night.  If your discomfort is too great at any time, discontinue and attempt again later the same night, for the same designated amount of time (2 hours).      DAYS 10-12    Increase time with CPAP to 3 hours a night. If your discomfort is too great at any time, discontinue and attempt again later the same night, for the same designated amount of time (3 hours).      DAYS 13-15     Sleep the entire night with CPAP.      OPTIONAL: You may use Progressive Muscle Relaxation (PMR) to help put you at ease when using CPAP; do PMR twice each day, once in the morning or afternoon, and once in the evening just before using CPAP. You may do PMR prior to any attempt until you are comfortable with CPAP.        Continuous Positive Air Pressure (CPAP)  Continuous positive air pressure (CPAP) uses gentle air pressure to hold the airway open. CPAP is often the most effective treatment for sleep apnea and severe snoring. It works very well for many people. But keep in mind that it can take several adjustments before the setup is right for you.       How CPAP Works  CPAP is a small portable pump beside the bed. The pump sends air through a hose, which is held over your nose and/or mouth by a mask. Mild air pressure is gently pushed through your airway. The air pressure nudges sagging tissues aside. This widens the airway so you can breathe better. CPAP may be combined with other kinds of therapy for sleep apnea.       Types of Air Pressure Treatments  There are different types of CPAP. Your doctor or CPAP technician will help you decide which type is best for you:  Basic CPAP keeps the pressure constant all night long.  A bilevel device (BiPAP) provides more pressure when you breathe in and less when you breathe out. A BiPAP machine also may be set to provide automatic breaths to maintain breathing if you stop breathing while sleeping.  An autoCPAP device automatically adjusts pressure throughout the night and  in response to changes such as body position, sleep stage, and snoring.  © 7577-8895 [employer]. [address] [place] [address]. All rights reserved. This information is not intended as a substitute for professional medical care. Always follow your healthcare professional's instructions.        Snoring and Sleep Apnea: Notes for a Partner  Snoring and sleep apnea affect your life, as well as your partners. You can help in the treatment of the problem. Be supportive. Encourage your partner both to get treatment and to make the adjustments needed to follow through.       Adjusting to Changes  Your partners treatment may involve making changes to certain life habits. You can help your partner make and stick with these changes. For example:  Support and even join your partners exercise program.  Be supportive if your partner gets CPAP (continuous positive airway pressure). He or she may feel self-conscious at first. Remind your partner to expect adjustments to CPAP before it feels just right.  Consider joining a snoring and sleep apnea support group.  Go Along to See the Health Care Provider  You can give the health care provider the best account of your partners nighttime breathing and snoring patterns. Try to go along to health care providers appointments. If you cant go, write notes for your partner to give to the health care provider. Describe your partners snoring and sleep breathing patterns in detail.  Tips for Sleeping with a Snorer  Until treatment takes care of your partners snoring:  Try to go to bed first. It may help if youre already asleep when your partner starts to snore.  Wear earplugs to bed. A fan or other source of background noise may also help drown out snoring.   © 6625-6525 [employer]. [address] [place] [address]. All rights reserved. This information is not intended as a substitute for professional medical care. Always follow your  healthcare professional's instructions.        Continuous Positive Airway Pressure (CPAP)  Your health care provider has prescribed continuous positive airway pressure (CPAP) therapy for you. A CPAP device helps you breathe better at night. The device delivers air through your nose or mouth when you breathe in to keep your air passages open. CPAP is:  Used most often to treat sleep apnea and some other problems (Sleep apnea is a chronic condition with periods of sleep in which you briefly stop breathing.)  Safe and very effective, but it takes time to get used to the mask.   Your health care provider, nurse, or medical supplier will give you tips for wearing and caring for your CPAP device.  General guidelines  It's very important not to give up! It takes time to get used to wearing the mask at night.  Practice using your CPAP device during the day, especially whenever you take a nap.  Remember, there are several different types of masks. If you cant get used to your mask, ask your provider or medical supply company about trying another style.  If you have nasal stuffiness or dryness when using your CPAP device, talk with your provider or medical supply company. There are ways to lessen these problems. For example, your provider may recommend moistening nasal spray or the medical supply company may recommend a device with a humidifier.  The goal is to use your CPAP all night, every night, during all naps, and even when you travel.  Keep your mask clean. Wash it with soap and water. Be sure to rinse the mask and tubing well with water to remove any soap. Let them air-dry thoroughly before using.  Make yourself comfortable when sleeping with CPAP. Try using extra pillows.  Work with your medical supply company so that you know how to correctly use your CPAP. Their representative will be able to help you:  Use the CPAP correctly  Troubleshoot any problems that come up  Learn to clean and maintain the device  Adjust to  regular use of the CPAP  © 1842-3818 [employer]. [address] [place] [address], [place] [address]. All rights reserved. This information is not intended as a substitute for professional medical care. Always follow your healthcare professional's instructions.

## 2025-04-29 NOTE — PROGRESS NOTES
Initial Outpatient Pulmonary Evaluation       SUBJECTIVE:     Chief Complaint   Patient presents with    NEW PATIENT [place]       History of Present Illness:    Patient is a 70 y.o. male     History of Present Illness    CHIEF COMPLAINT:  Patient presents today for initial sleep consultation referred by cardiology    SLEEP CONCERNS:  He reports excessive daytime sleepiness with frequent dozing off episodes and loud snoring.    RESPIRATORY:  He reports experiencing shortness of breath during exertion.    MEDICAL HISTORY:  He has history of hypertension, hyperlipidemia, and abdominal aortic aneurysm status post surgical repair at [facility] by Dr. Machuca in [place] last year. He also has ankylosing spondylitis and rheumatoid arthritis.    MEDICATIONS:  He is currently taking Gabapentin and Cosentyx for ankylosing spondylitis and rheumatoid arthritis. He has been prescribed albuterol inhaler for a previous upper respiratory infection but reports never using it.    SOCIAL HISTORY:  He is a current smoker with a 40-year history, currently smoking 6 cigarettes per day, reduced from 1-2 packs per day. He has attempted to quit smoking multiple times using nicotine gum and patches. He is a caregiver for someone with dementia, which contributes to increased stress.           Review of Systems   Respiratory:  Positive for apnea, snoring and somnolence.    Psychiatric/Behavioral:  Positive for sleep disturbance.        Review of patient's allergies indicates:  No Known Allergies    Current Medications[1]    Past Medical History:   Diagnosis Date    Coronary artery disease     Hyperlipidemia     Hypertension     Personal history of colonic polyps      Past Surgical History:   Procedure Laterality Date    ABDOMINAL AORTIC ANEURYSM REPAIR, ENDOVASCULAR N/A 2/20/2024    Procedure: REPAIR-ANEURYSM-ABDOMINAL AORTIC-ENDOVASCULAR (AAA);  Surgeon: Caesar Denton MD;  Location: [facility] OR [facility]  "[facility]R;  Service: Vascular;  Laterality: N/A;  EVAR & L femoral artery cutdown  mGy 1195.48 Gycm2 215.60     Fluro Time 16.2min   Contrast 26ml    EPIDURAL STEROID INJECTION INTO CERVICAL SPINE N/A 5/10/2024    Procedure: C6/7 IL YENNY with L paramedian approach;  Surgeon: Rudolph Burgos MD;  Location: [facility];  Service: Pain Management;  Laterality: N/A;    EPIDURAL STEROID INJECTION INTO CERVICAL SPINE N/A 12/6/2024    Procedure: C6/7 IL YENNY;  Surgeon: Rudolph Burgos MD;  Location: [facility];  Service: Pain Management;  Laterality: N/A;    INJECTION OF ANESTHETIC AGENT AROUND MEDIAL BRANCH NERVES INNERVATING CERVICAL FACET JOINT Right 11/18/2020    Procedure: Block-nerve-medial branch-cervical Right C3, 4, 5with RN IV sedation;  Surgeon: Martín Barnes MD;  Location: [facility];  Service: Pain Management;  Laterality: Right;    INJECTION OF ANESTHETIC AGENT AROUND MEDIAL BRANCH NERVES INNERVATING CERVICAL FACET JOINT Bilateral 04/28/2023    Procedure: Bilateral C4-6 MBB with RN IV sedation;  Surgeon: Rudolph Burgos MD;  Location: [facility];  Service: Pain Management;  Laterality: Bilateral;    INJECTION OF ANESTHETIC AGENT AROUND MEDIAL BRANCH NERVES INNERVATING CERVICAL FACET JOINT Bilateral 10/18/2024    Procedure: Bilateral C4-6 MBB with RN IV sedation;  Surgeon: Rudolph Burgos MD;  [facility]: [facility];  Service: Pain Management;  Laterality: Bilateral;     Family History   Problem Relation Name Age of Onset    Cancer Mother mother     Cancer Father father     Hypertension Father father     Early death Brother brother         flu    Heart disease Brother brother     Hypertension Brother brother      Social History[2]       OBJECTIVE:     Vital Signs (Most Recent)  Vital Signs  Pulse: 68  Resp: 14  SpO2: 96 %  BP: 130/70  Pain Score:   4  Pain Loc: Neck  Height and Weight  Height: 5' 3" (160 cm)  Weight: 75.7 kg (166 lb 14.2 oz)  BSA (Calculated - sq m): 1.83 sq meters  BMI (Calculated): " 29.6  Weight in (lb) to have BMI = 25: 140.8]  Wt Readings from Last 2 Encounters:   04/29/25 75.7 kg (166 lb 14.2 oz)   04/29/25 76.2 kg (168 lb)         Physical Exam:  Physical Exam   Constitutional: He is oriented to person, place, and time. He appears well-developed. No distress.   HENT:   Head: Normocephalic.   Pulmonary/Chest: Normal expansion and effort normal. No stridor. No respiratory distress. He has no wheezes. He has no rhonchi.   Neurological: He is alert and oriented to person, place, and time.   Psychiatric: He has a normal mood and affect. His behavior is normal. Judgment and thought content normal.   Nursing note and vitals reviewed.      Laboratory  Lab Results   Component Value Date    WBC 8.82 03/26/2025    RBC 4.34 (L) 03/26/2025    HGB 12.6 (L) 03/26/2025    HCT 39.6 (L) 03/26/2025    MCV 91 03/26/2025    MCH 29.0 03/26/2025    MCHC 31.8 (L) 03/26/2025    RDW 13.6 03/26/2025     03/26/2025    MPV 12.4 03/26/2025    GRAN 4.2 01/28/2025    GRAN 55.3 01/28/2025    LYMPH 28.3 03/26/2025    LYMPH 2.50 03/26/2025    MONO 11.2 03/26/2025    MONO 0.99 03/26/2025    EOS 2.4 03/26/2025    EOS 0.21 03/26/2025    BASO 0.08 01/28/2025    EOSINOPHIL 1.4 01/28/2025    BASOPHIL 1.1 03/26/2025    BASOPHIL 0.10 03/26/2025       BMP  Lab Results   Component Value Date     03/26/2025    K 3.9 03/26/2025     03/26/2025    CO2 23 03/26/2025    BUN 22 03/26/2025    CREATININE 1.7 (H) 03/26/2025    CALCIUM 9.3 03/26/2025    ANIONGAP 12 03/26/2025    ESTGFRAFRICA 51 (A) 07/06/2022    ESTGFRAFRICA 56 (A) 07/06/2022    EGFRNONAA 45 (A) 07/06/2022    EGFRNONAA 48 (A) 07/06/2022    AST 18 01/28/2025    ALT 23 01/28/2025    PROT 7.7 01/28/2025       Lab Results   Component Value Date    BNP 19 02/26/2024       Lab Results   Component Value Date    TSH 0.575 06/05/2024       Lab Results   Component Value Date    SEDRATE 27 (H) 01/28/2025       Lab Results   Component Value Date    CRP 1.7 01/28/2025  "      No results found for: "IGE"    No results found for: "ASPERGILLUS"  No results found for: "AFUMIGATUSCL"     No results found for: "ACE"    Diagnostic Results:    I have personally reviewed today the following studies :    AS ABOVE    ASSESSMENT/PLAN:   Assessment & Plan    G47.30 Sleep apnea, unspecified type  I71.43 Infrarenal abdominal aortic aneurysm (AAA) without rupture  F17.210 Smoking greater than 30 pack years  I10 Hypertension, essential  G47.19 Excessive daytime sleepiness  R06.83 Snoring  R06.81 Witnessed apneic spells  R06.02 SOBOE (shortness of breath on exertion)    IMPRESSION:  - Presents with multiple risk factors for obstructive sleep apnea (JESUS), including snoring, daytime sleepiness, male gender, age >50, HTN, and anatomical features (scalloped tongue, Mallampati score of 4).  - Ordered home sleep study due to scheduling constraints at sleep center.  - Ordered low-dose CT Lungs for cancer screening due to smoking history and age.  - Ordered pulmonary function test to assess lung capacity and function, evaluating for potential COPD or other smoking-related lung conditions.  - Ordered 6-minute walk test to evaluate exercise capacity and oxygen levels.  - Started nicotine patches for smoking cessation.    SLEEP APNEA, UNSPECIFIED TYPE:  - Explained the concept of sleep apnea, including risk factors and potential health impacts.  - Discussed the difference between home sleep study and in-lab polysomnography.  - Ordered home sleep study due to scheduling constraints at sleep center.    SMOKING GREATER THAN 30 PACK YEARS:  - Patient to discuss smoking cessation options with family and consider quitting.  - Started nicotine patches for smoking cessation.  - Provided information on low-dose CT for lung cancer screening, emphasizing its lower radiation exposure compared to standard CT.  - Ordered low-dose CT Lungs for cancer screening due to smoking history and age.  - Ordered pulmonary function " test to assess lung capacity and function, evaluating for potential COPD or other smoking-related lung conditions.    HYPERTENSION, ESSENTIAL:  - Educated on the relationship between sleep apnea and HTN management.    SOBOE (SHORTNESS OF BREATH ON EXERTION):  - Ordered 6-minute walk test to evaluate exercise capacity and oxygen levels.         Excessive daytime sleepiness  -     Home Sleep Study; Future    Snoring  -     Home Sleep Study; Future    Sleep apnea, unspecified type  -     Ambulatory referral/consult to Sleep Disorders    Infrarenal abdominal aortic aneurysm (AAA) without rupture    Smoking greater than 30 pack years  -     CT Chest Lung Screening Low Dose; Future; Expected date: 04/21/2026    Hypertension, essential  -     Home Sleep Study; Future    Witnessed apneic spells  -     Home Sleep Study; Future    SOBOE (shortness of breath on exertion)  -     Complete PFT with bronchodilator; Future; Expected date: 05/13/2025  -     Stress test, pulmonary; Future        PFT 6 minute walk low-dose CAT scan of the chest.      Smoking cessation discussion.  Chantix nicotine patch suggested but further discussion with family before starting.    Home sleep study.      Additional recommendation to follow.  Follow up in about 2 months (around 6/29/2025).    This note was prepared using voice recognition system and is likely to have sound alike errors that may have been overlooked even after proof reading.  Please call me with any questions    Discussed diagnosis, its evaluation, treatment and usual course. All questions answered.    Thank you for the courtesy of participating in the care of this patient    Verna Jackson MD             [1]   Current Outpatient Medications   Medication Sig Dispense Refill    albuterol (VENTOLIN HFA) 90 mcg/actuation inhaler Inhale 2 puffs into the lungs every 6 (six) hours as needed for Wheezing. Rescue 18 g 0    amLODIPine (NORVASC) 10 MG tablet Take 1 tablet (10 mg total) by  mouth once daily. 90 tablet 3    aspirin (ECOTRIN) 81 MG EC tablet Take 1 tablet (81 mg total) by mouth once daily. 90 tablet 1    benazepriL (LOTENSIN) 40 MG tablet Take 1 tablet (40 mg total) by mouth once daily. 90 tablet 2    cilostazoL (PLETAL) 50 MG Tab Take 1 tablet (50 mg total) by mouth 2 (two) times daily. 180 tablet 1    cloNIDine (CATAPRES) 0.2 MG tablet Take 1 tablet (0.2 mg total) by mouth 2 (two) times daily. 180 tablet 3    ezetimibe (ZETIA) 10 mg tablet Take 1 tablet (10 mg total) by mouth once daily. 90 tablet 1    hydroCHLOROthiazide (HYDRODIURIL) 25 MG tablet Take 1 tablet (25 mg total) by mouth once daily. 90 tablet 1    levocetirizine (XYZAL) 5 MG tablet Take 1 tablet (5 mg total) by mouth every evening. 30 tablet 1    linaCLOtide (LINZESS) 72 mcg Cap capsule Take 1 capsule (72 mcg total) by mouth before breakfast. 90 capsule 3    promethazine-dextromethorphan (PROMETHAZINE-DM) 6.25-15 mg/5 mL Syrp Take 5 mLs by mouth every 4 to 6 hours as needed (cough and congestion). 220 mL 0    rosuvastatin (CRESTOR) 20 MG tablet Take 1 tablet (20 mg total) by mouth once daily. 90 tablet 2    secukinumab (COSENTYX PEN) 150 mg/mL PnIj Inject 150 mg into the skin every 28 days. 1 mL 11    sildenafiL (VIAGRA) 25 MG tablet Take 1 tablet (25 mg total) by mouth daily as needed for Erectile Dysfunction. 30 tablet 3    gabapentin (NEURONTIN) 300 MG capsule Take 1 capsule (300 mg total) by mouth once daily AND 2 capsules (600 mg total) every evening. 270 capsule 0    predniSONE (DELTASONE) 5 MG tablet Take 1 tablet (5 mg total) by mouth daily as needed (arthritis flares). (Patient not taking: Reported on 4/29/2025) 30 tablet 0     No current facility-administered medications for this visit.   [2]   Social History  Tobacco Use    Smoking status: Every Day     Current packs/day: 0.50     Average packs/day: 0.5 packs/day for 45.0 years (22.5 ttl pk-yrs)     Types: Cigarettes    Smokeless tobacco: Never   Substance Use  Topics    Alcohol use: Not Currently    Drug use: Never

## 2025-04-30 ENCOUNTER — RESULTS FOLLOW-UP (OUTPATIENT)
Dept: CARDIOLOGY | Facility: CLINIC | Age: 70
End: 2025-04-30

## 2025-05-08 RX ORDER — GABAPENTIN 300 MG/1
CAPSULE ORAL
Qty: 270 CAPSULE | Refills: 0 | Status: SHIPPED | OUTPATIENT
Start: 2025-05-08 | End: 2025-08-06

## 2025-05-08 NOTE — TELEPHONE ENCOUNTER
Pt is requesting for a refill of: gabapentin (NEURONTIN) 300 MG capsule   Last filled:7/02/24  Last encounter: 1/09/25  Pharmacy:6/26/25  [id number] - [place] - [id number]

## 2025-05-08 NOTE — TELEPHONE ENCOUNTER
----- Message from Estrella sent at 5/8/2025 11:42 AM CDT -----  Contact: Ptety  .Type:  RX Refill RequestWho Called: Je Refill or New Rx:refill RX Name and Strength:gabapentin (NEURONTIN) 300 MG capsule How is the patient currently taking it? (ex. 1XDay):as prescribed Is this a 30 day or 90 day RX:90 days Preferred Pharmacy with phone number:.[id number] - [place] - [id number] [id number]: [phone] Fax: [phone] or Mail Order:Local Ordering Provider:Dr. Burgos Would the patient rather a call back or a response via [facility]? Call Best Call Back Number:.[phone] Additional Information: Pharmacy requesting a medication refill

## 2025-05-14 NOTE — TELEPHONE ENCOUNTER
Pt is requesting for a refill of: gabapentin (NEURONTIN) 300 MG capsule   Last filled:5/08/25  Last encounter: 1/09/25  Up coming apt: 6/27/25  Pharmacy:[facility] - [place]

## 2025-05-15 ENCOUNTER — PATIENT MESSAGE (OUTPATIENT)
Dept: ADMINISTRATIVE | Facility: OTHER | Age: 70
End: 2025-05-15
Payer: MEDICARE

## 2025-05-15 RX ORDER — GABAPENTIN 300 MG/1
CAPSULE ORAL
Qty: 270 CAPSULE | Refills: 0 | Status: SHIPPED | OUTPATIENT
Start: 2025-05-15 | End: 2025-08-13

## 2025-06-10 ENCOUNTER — PATIENT MESSAGE (OUTPATIENT)
Dept: ADMINISTRATIVE | Facility: OTHER | Age: 70
End: 2025-06-10
Payer: MEDICARE

## 2025-07-20 ENCOUNTER — PATIENT MESSAGE (OUTPATIENT)
Dept: PAIN MEDICINE | Facility: CLINIC | Age: 70
End: 2025-07-20
Payer: MEDICARE

## 2025-07-28 DIAGNOSIS — Z00.00 ENCOUNTER FOR MEDICARE ANNUAL WELLNESS EXAM: ICD-10-CM

## 2025-07-29 DIAGNOSIS — I10 HYPERTENSION, UNSPECIFIED TYPE: ICD-10-CM

## 2025-07-29 RX ORDER — EZETIMIBE 10 MG/1
10 TABLET ORAL EVERY MORNING
COMMUNITY
Start: 2025-03-26

## 2025-07-29 RX ORDER — HYDROCHLOROTHIAZIDE 25 MG/1
25 TABLET ORAL EVERY MORNING
COMMUNITY
Start: 2025-03-26 | End: 2026-03-26

## 2025-07-29 RX ORDER — ASPIRIN 81 MG/1
81 TABLET ORAL EVERY MORNING
COMMUNITY
Start: 2025-03-26

## 2025-07-29 RX ORDER — ROSUVASTATIN CALCIUM 20 MG/1
20 TABLET, COATED ORAL EVERY MORNING
COMMUNITY
Start: 2025-03-26

## 2025-07-29 RX ORDER — LEVOCETIRIZINE DIHYDROCHLORIDE 5 MG/1
5 TABLET, FILM COATED ORAL NIGHTLY
COMMUNITY
Start: 2025-04-15

## 2025-07-29 RX ORDER — AMLODIPINE BESYLATE 10 MG/1
10 TABLET ORAL EVERY MORNING
COMMUNITY
Start: 2025-03-26

## 2025-07-29 RX ORDER — BENAZEPRIL HYDROCHLORIDE 40 MG/1
40 TABLET ORAL EVERY MORNING
COMMUNITY
Start: 2025-03-26

## 2025-07-29 NOTE — TELEPHONE ENCOUNTER
No care due was identified.  [facility] Embedded Care Due Messages. Reference number: [id number].   7/29/2025 2:17:55 PM CDT

## 2025-07-30 NOTE — TELEPHONE ENCOUNTER
Refill Routing Note   Medication(s) are not appropriate for processing by [facility] for the following reason(s):        Outside of protocol    [facility] action(s):  Route             Appointments  past 12m or future 3m with PCP    Date Provider   Last Visit   12/5/2024 Chastity Guzmán MD   Next Visit   12/9/2025 Chastity Guzmán MD   ED visits in past 90 days: 0        Note composed:11:48 PM 07/29/2025

## 2025-07-31 RX ORDER — CLONIDINE HYDROCHLORIDE 0.2 MG/1
0.2 TABLET ORAL 2 TIMES DAILY
Qty: 180 TABLET | Refills: 3 | Status: SHIPPED | OUTPATIENT
Start: 2025-07-31

## 2025-08-04 DIAGNOSIS — N52.9 ERECTILE DYSFUNCTION, UNSPECIFIED ERECTILE DYSFUNCTION TYPE: Primary | ICD-10-CM

## 2025-08-04 RX ORDER — SILDENAFIL 25 MG/1
25 TABLET, FILM COATED ORAL DAILY PRN
Qty: 30 TABLET | Refills: 3 | Status: SHIPPED | OUTPATIENT
Start: 2025-08-04 | End: 2026-08-04

## 2025-08-08 ENCOUNTER — PATIENT MESSAGE (OUTPATIENT)
Dept: ADMINISTRATIVE | Facility: OTHER | Age: 70
End: 2025-08-08
Payer: MEDICARE

## 2025-08-11 ENCOUNTER — TELEPHONE (OUTPATIENT)
Dept: PAIN MEDICINE | Facility: CLINIC | Age: 70
End: 2025-08-11
Payer: MEDICARE

## 2025-08-15 ENCOUNTER — OFFICE VISIT (OUTPATIENT)
Dept: PAIN MEDICINE | Facility: CLINIC | Age: 70
End: 2025-08-15
Payer: MEDICARE

## 2025-08-15 ENCOUNTER — OFFICE VISIT (OUTPATIENT)
Dept: CARDIOLOGY | Facility: CLINIC | Age: 70
End: 2025-08-15
Payer: MEDICARE

## 2025-08-15 VITALS
WEIGHT: 162.5 LBS | DIASTOLIC BLOOD PRESSURE: 76 MMHG | HEIGHT: 64 IN | OXYGEN SATURATION: 96 % | HEART RATE: 59 BPM | SYSTOLIC BLOOD PRESSURE: 152 MMHG | BODY MASS INDEX: 27.74 KG/M2

## 2025-08-15 DIAGNOSIS — M48.12: ICD-10-CM

## 2025-08-15 DIAGNOSIS — I25.118 CORONARY ARTERY DISEASE OF NATIVE ARTERY OF NATIVE HEART WITH STABLE ANGINA PECTORIS: Primary | ICD-10-CM

## 2025-08-15 DIAGNOSIS — M54.12 CERVICAL RADICULOPATHY: ICD-10-CM

## 2025-08-15 DIAGNOSIS — I10 HYPERTENSION, ESSENTIAL: ICD-10-CM

## 2025-08-15 DIAGNOSIS — Z72.0 TOBACCO ABUSE: ICD-10-CM

## 2025-08-15 DIAGNOSIS — E78.2 MIXED HYPERLIPIDEMIA: ICD-10-CM

## 2025-08-15 DIAGNOSIS — I73.9 PAD (PERIPHERAL ARTERY DISEASE): ICD-10-CM

## 2025-08-15 DIAGNOSIS — M47.812 CERVICAL SPONDYLOSIS: Primary | ICD-10-CM

## 2025-08-15 DIAGNOSIS — Z86.79 S/P AAA REPAIR: ICD-10-CM

## 2025-08-15 DIAGNOSIS — M45.0 ANKYLOSING SPONDYLITIS OF MULTIPLE SITES IN SPINE: ICD-10-CM

## 2025-08-15 DIAGNOSIS — E78.5 HYPERLIPIDEMIA, UNSPECIFIED HYPERLIPIDEMIA TYPE: ICD-10-CM

## 2025-08-15 DIAGNOSIS — Z98.890 S/P AAA REPAIR: ICD-10-CM

## 2025-08-15 DIAGNOSIS — M50.30 DDD (DEGENERATIVE DISC DISEASE), CERVICAL: ICD-10-CM

## 2025-08-15 PROCEDURE — 99214 OFFICE O/P EST MOD 30 MIN: CPT | Mod: PBBFAC | Performed by: INTERNAL MEDICINE

## 2025-08-15 PROCEDURE — 99999 PR PBB SHADOW E&M-EST. PATIENT-LVL IV: CPT | Mod: PBBFAC,,, | Performed by: INTERNAL MEDICINE

## 2025-08-15 RX ORDER — CILOSTAZOL 50 MG/1
50 TABLET ORAL 2 TIMES DAILY
Qty: 180 TABLET | Refills: 1 | Status: SHIPPED | OUTPATIENT
Start: 2025-08-15

## 2025-08-15 RX ORDER — BENAZEPRIL HYDROCHLORIDE 40 MG/1
40 TABLET ORAL DAILY
Qty: 90 TABLET | Refills: 2 | Status: SHIPPED | OUTPATIENT
Start: 2025-08-15

## 2025-08-15 RX ORDER — ROSUVASTATIN CALCIUM 20 MG/1
20 TABLET, COATED ORAL DAILY
Qty: 90 TABLET | Refills: 2 | Status: SHIPPED | OUTPATIENT
Start: 2025-08-15

## 2025-08-15 RX ORDER — HYDROCHLOROTHIAZIDE 25 MG/1
25 TABLET ORAL DAILY
Qty: 90 TABLET | Refills: 1 | Status: SHIPPED | OUTPATIENT
Start: 2025-08-15 | End: 2026-08-15

## 2025-08-15 RX ORDER — EZETIMIBE 10 MG/1
10 TABLET ORAL DAILY
Qty: 90 TABLET | Refills: 1 | Status: SHIPPED | OUTPATIENT
Start: 2025-08-15

## 2025-09-05 DIAGNOSIS — Z79.01 ANTICOAGULATED: Primary | ICD-10-CM

## (undated) DEVICE — SYR 10CC LUER LOCK

## (undated) DEVICE — GUIDEWIRE SAFE T .035IN 180CM

## (undated) DEVICE — TUBING HIGH PRESSURE

## (undated) DEVICE — APPLICATOR CHLORAPREP ORN 26ML

## (undated) DEVICE — PENCIL ROCKER SWITCH 10FT CORD

## (undated) DEVICE — CATH ULTRAVERSE 035 7X60X75

## (undated) DEVICE — DRAPE INCISE IOBAN 2 23X33IN

## (undated) DEVICE — FLEXSHEATH DRYSEAL 16FR 33CM

## (undated) DEVICE — COVER INSTR ELASTIC BAND 40X20

## (undated) DEVICE — PACK UNIVERSAL SPLIT II

## (undated) DEVICE — SUT MCRYL PLUS 4-0 PS2 27IN

## (undated) DEVICE — CATH VALVE BALLOON 23X4

## (undated) DEVICE — STOPCOCK 1 WAY PLASTIC

## (undated) DEVICE — SUT PROLENE 5-0 24 C-1 BL

## (undated) DEVICE — CONTRAST FLEXCIL INJECTION

## (undated) DEVICE — SOL NS 1000CC

## (undated) DEVICE — KIT INTRO MICRO NIT VSI 4FR

## (undated) DEVICE — SUT PERCLOSE PROSTYLE MEDIATE

## (undated) DEVICE — CATH TORCOON NB ADV DAV

## (undated) DEVICE — VISE RADIFOCUS MULTI TORQUE

## (undated) DEVICE — FLEXSHEATH DRYSEAL 12FR 33CM

## (undated) DEVICE — BLLN MOLDG OCCL 10-37X4X90

## (undated) DEVICE — SUT MONOCRYL 3-0 SH U/D

## (undated) DEVICE — STAPLER SKIN PROXIMATE WIDE

## (undated) DEVICE — SYR ONLY LUER LOCK 20CC

## (undated) DEVICE — INFLATOR ENCORE

## (undated) DEVICE — SUT 3-0 12-18IN SILK

## (undated) DEVICE — SUT MONOCRYL 2-0 CT-1 VIL

## (undated) DEVICE — SUT 2-0 12-18IN SILK

## (undated) DEVICE — BLLN CAT CODA 32MM

## (undated) DEVICE — CATH PIGTAIL

## (undated) DEVICE — SPONGE GAUZE 16PLY 4X4

## (undated) DEVICE — GUIDEWIRE STF .035X260CM ANG

## (undated) DEVICE — DECANTER FLUID TRNSF WHITE 9IN

## (undated) DEVICE — SYR MED RAD 150ML

## (undated) DEVICE — ELECTRODE REM PLYHSV RETURN 9

## (undated) DEVICE — WIRE LUNDERQUIST 260CM

## (undated) DEVICE — SUT SILK 2-0 SH 18IN BLACK

## (undated) DEVICE — TRAY CATH FOL SIL URIMTR 16FR

## (undated) DEVICE — SUT PROLENE 5-0 36IN C-1

## (undated) DEVICE — INTRODUCER VASC RADPQ 8FRX10CM

## (undated) DEVICE — SUT 4-0 12-18IN SILK BLACK

## (undated) DEVICE — CATH URETHRAL RED RUBBER 18FR